# Patient Record
Sex: MALE | Race: WHITE | NOT HISPANIC OR LATINO | Employment: OTHER | URBAN - NONMETROPOLITAN AREA
[De-identification: names, ages, dates, MRNs, and addresses within clinical notes are randomized per-mention and may not be internally consistent; named-entity substitution may affect disease eponyms.]

---

## 2017-03-28 DIAGNOSIS — K21.9 GASTROESOPHAGEAL REFLUX DISEASE, ESOPHAGITIS PRESENCE NOT SPECIFIED: ICD-10-CM

## 2017-09-13 DIAGNOSIS — I10 BENIGN ESSENTIAL HYPERTENSION: ICD-10-CM

## 2017-09-13 RX ORDER — HYDROCHLOROTHIAZIDE 25 MG/1
TABLET ORAL
Qty: 60 TABLET | Refills: 0 | Status: SHIPPED | OUTPATIENT
Start: 2017-09-13 | End: 2017-10-18

## 2017-09-13 NOTE — TELEPHONE ENCOUNTER
Hydrochlorithiazide      Last Written Prescription Date: 10/26/16  Last Fill Quantity: 90, # refills: 3  Last Office Visit with [facility], [facility] or [facility] prescribing provider: 12/9/16  Next 5 appointments (look out 90 days)     Oct 18, 2017  8:20 AM CDT   (Arrive by 8:00 AM)   Office Visit with Santy Agustin,    [facility] ([facility] )    [address] [place] [address]   [phone]                   Potassium   Date Value Ref Range Status   10/26/2016 4.2 3.4 - 5.3 mmol/L Final     Creatinine   Date Value Ref Range Status   10/26/2016 0.94 0.66 - 1.25 mg/dL Final     BP Readings from Last 3 Encounters:   12/09/16 122/80   10/26/16 142/78   10/16/15 167/88

## 2017-10-18 ENCOUNTER — OFFICE VISIT (OUTPATIENT)
Dept: PEDIATRICS | Facility: OTHER | Age: 72
End: 2017-10-18
Attending: INTERNAL MEDICINE
Payer: COMMERCIAL

## 2017-10-18 VITALS
HEART RATE: 55 BPM | DIASTOLIC BLOOD PRESSURE: 78 MMHG | SYSTOLIC BLOOD PRESSURE: 158 MMHG | BODY MASS INDEX: 27.2 KG/M2 | HEIGHT: 70 IN | TEMPERATURE: 96.5 F | WEIGHT: 190 LBS | OXYGEN SATURATION: 98 %

## 2017-10-18 DIAGNOSIS — N52.9 ERECTILE DYSFUNCTION, UNSPECIFIED ERECTILE DYSFUNCTION TYPE: ICD-10-CM

## 2017-10-18 DIAGNOSIS — I10 BENIGN ESSENTIAL HYPERTENSION: Primary | ICD-10-CM

## 2017-10-18 DIAGNOSIS — Z00.00 ROUTINE GENERAL MEDICAL EXAMINATION AT A HEALTH CARE FACILITY: ICD-10-CM

## 2017-10-18 DIAGNOSIS — K21.9 GASTROESOPHAGEAL REFLUX DISEASE, ESOPHAGITIS PRESENCE NOT SPECIFIED: ICD-10-CM

## 2017-10-18 DIAGNOSIS — Z23 NEED FOR PROPHYLACTIC VACCINATION AND INOCULATION AGAINST INFLUENZA: ICD-10-CM

## 2017-10-18 DIAGNOSIS — R97.20 ELEVATED PROSTATE SPECIFIC ANTIGEN (PSA): ICD-10-CM

## 2017-10-18 DIAGNOSIS — M1A.0720 CHRONIC IDIOPATHIC GOUT INVOLVING TOE OF LEFT FOOT WITHOUT TOPHUS: ICD-10-CM

## 2017-10-18 DIAGNOSIS — Z12.11 SPECIAL SCREENING FOR MALIGNANT NEOPLASMS, COLON: ICD-10-CM

## 2017-10-18 PROBLEM — M1A.9XX0 CHRONIC GOUT: Status: ACTIVE | Noted: 2017-10-18

## 2017-10-18 LAB
ALBUMIN SERPL-MCNC: 3.9 G/DL (ref 3.4–5)
ALP SERPL-CCNC: 63 U/L (ref 40–150)
ALT SERPL W P-5'-P-CCNC: 42 U/L (ref 0–70)
ANION GAP SERPL CALCULATED.3IONS-SCNC: 6 MMOL/L (ref 3–14)
AST SERPL W P-5'-P-CCNC: 22 U/L (ref 0–45)
BILIRUB SERPL-MCNC: 0.5 MG/DL (ref 0.2–1.3)
BUN SERPL-MCNC: 23 MG/DL (ref 7–30)
CALCIUM SERPL-MCNC: 9 MG/DL (ref 8.5–10.1)
CHLORIDE SERPL-SCNC: 100 MMOL/L (ref 94–109)
CO2 SERPL-SCNC: 32 MMOL/L (ref 20–32)
CREAT SERPL-MCNC: 0.9 MG/DL (ref 0.66–1.25)
ERYTHROCYTE [DISTWIDTH] IN BLOOD BY AUTOMATED COUNT: 13.2 % (ref 10–15)
GFR SERPL CREATININE-BSD FRML MDRD: 83 ML/MIN/1.7M2
GLUCOSE SERPL-MCNC: 105 MG/DL (ref 70–99)
HCT VFR BLD AUTO: 43.3 % (ref 40–53)
HGB BLD-MCNC: 15.2 G/DL (ref 13.3–17.7)
MCH RBC QN AUTO: 31.8 PG (ref 26.5–33)
MCHC RBC AUTO-ENTMCNC: 35.1 G/DL (ref 31.5–36.5)
MCV RBC AUTO: 91 FL (ref 78–100)
PLATELET # BLD AUTO: 173 10E9/L (ref 150–450)
POTASSIUM SERPL-SCNC: 3.5 MMOL/L (ref 3.4–5.3)
PROT SERPL-MCNC: 7.9 G/DL (ref 6.8–8.8)
PSA SERPL-ACNC: 4.82 UG/L (ref 0–4)
RBC # BLD AUTO: 4.78 10E12/L (ref 4.4–5.9)
SODIUM SERPL-SCNC: 138 MMOL/L (ref 133–144)
TSH SERPL DL<=0.005 MIU/L-ACNC: 1.52 MU/L (ref 0.4–4)
WBC # BLD AUTO: 6.1 10E9/L (ref 4–11)

## 2017-10-18 PROCEDURE — 99000 SPECIMEN HANDLING OFFICE-LAB: CPT | Performed by: INTERNAL MEDICINE

## 2017-10-18 PROCEDURE — 84403 ASSAY OF TOTAL TESTOSTERONE: CPT | Mod: ZL | Performed by: INTERNAL MEDICINE

## 2017-10-18 PROCEDURE — 93010 ELECTROCARDIOGRAM REPORT: CPT | Performed by: INTERNAL MEDICINE

## 2017-10-18 PROCEDURE — G0103 PSA SCREENING: HCPCS | Mod: ZL | Performed by: INTERNAL MEDICINE

## 2017-10-18 PROCEDURE — 99397 PER PM REEVAL EST PAT 65+ YR: CPT | Mod: 25 | Performed by: INTERNAL MEDICINE

## 2017-10-18 PROCEDURE — G0008 ADMIN INFLUENZA VIRUS VAC: HCPCS

## 2017-10-18 PROCEDURE — 90662 IIV NO PRSV INCREASED AG IM: CPT | Performed by: INTERNAL MEDICINE

## 2017-10-18 PROCEDURE — 84270 ASSAY OF SEX HORMONE GLOBUL: CPT | Mod: ZL | Performed by: INTERNAL MEDICINE

## 2017-10-18 PROCEDURE — 84443 ASSAY THYROID STIM HORMONE: CPT | Mod: ZL | Performed by: INTERNAL MEDICINE

## 2017-10-18 PROCEDURE — 84153 ASSAY OF PSA TOTAL: CPT | Performed by: INTERNAL MEDICINE

## 2017-10-18 PROCEDURE — 93005 ELECTROCARDIOGRAM TRACING: CPT

## 2017-10-18 PROCEDURE — 85027 COMPLETE CBC AUTOMATED: CPT | Mod: ZL | Performed by: INTERNAL MEDICINE

## 2017-10-18 PROCEDURE — 80053 COMPREHEN METABOLIC PANEL: CPT | Mod: ZL | Performed by: INTERNAL MEDICINE

## 2017-10-18 PROCEDURE — 36415 COLL VENOUS BLD VENIPUNCTURE: CPT | Mod: ZL | Performed by: INTERNAL MEDICINE

## 2017-10-18 RX ORDER — AMLODIPINE BESYLATE 5 MG/1
5 TABLET ORAL DAILY
Qty: 30 TABLET | Refills: 3 | Status: SHIPPED | OUTPATIENT
Start: 2017-10-18 | End: 2018-01-19

## 2017-10-18 RX ORDER — ATENOLOL 50 MG/1
50 TABLET ORAL DAILY
Qty: 90 TABLET | Refills: 3 | Status: SHIPPED | OUTPATIENT
Start: 2017-10-18 | End: 2018-10-18

## 2017-10-18 RX ORDER — HYDROCHLOROTHIAZIDE 25 MG/1
25 TABLET ORAL DAILY
Qty: 180 TABLET | Refills: 3 | Status: SHIPPED | OUTPATIENT
Start: 2017-10-18 | End: 2018-10-18

## 2017-10-18 RX ORDER — TADALAFIL 20 MG/1
20 TABLET ORAL PRN
Qty: 36 TABLET | Refills: 3 | Status: SHIPPED | OUTPATIENT
Start: 2017-10-18 | End: 2019-03-18

## 2017-10-18 ASSESSMENT — ANXIETY QUESTIONNAIRES
2. NOT BEING ABLE TO STOP OR CONTROL WORRYING: NOT AT ALL
IF YOU CHECKED OFF ANY PROBLEMS ON THIS QUESTIONNAIRE, HOW DIFFICULT HAVE THESE PROBLEMS MADE IT FOR YOU TO DO YOUR WORK, TAKE CARE OF THINGS AT HOME, OR GET ALONG WITH OTHER PEOPLE: NOT DIFFICULT AT ALL
3. WORRYING TOO MUCH ABOUT DIFFERENT THINGS: NOT AT ALL
6. BECOMING EASILY ANNOYED OR IRRITABLE: NOT AT ALL
7. FEELING AFRAID AS IF SOMETHING AWFUL MIGHT HAPPEN: NOT AT ALL
5. BEING SO RESTLESS THAT IT IS HARD TO SIT STILL: NOT AT ALL
1. FEELING NERVOUS, ANXIOUS, OR ON EDGE: NOT AT ALL
4. TROUBLE RELAXING: NOT AT ALL
GAD7 TOTAL SCORE: 0

## 2017-10-18 ASSESSMENT — PATIENT HEALTH QUESTIONNAIRE - PHQ9: SUM OF ALL RESPONSES TO PHQ QUESTIONS 1-9: 0

## 2017-10-18 ASSESSMENT — PAIN SCALES - GENERAL: PAINLEVEL: MILD PAIN (3)

## 2017-10-18 NOTE — NURSING NOTE
"Chief Complaint   Patient presents with     Wellness Visit     prevenative care: Aortic Aneurysm        Initial /78  Pulse 55  Temp 96.5  F (35.8  C) (Tympanic)  Ht 5' 10\" (1.778 m)  Wt 190 lb (86.2 kg)  SpO2 98%  BMI 27.26 kg/m2 Estimated body mass index is 27.26 kg/(m^2) as calculated from the following:    Height as of this encounter: 5' 10\" (1.778 m).    Weight as of this encounter: 190 lb (86.2 kg).  Medication Reconciliation: complete   Suki Garza    "

## 2017-10-18 NOTE — PROGRESS NOTES
SUBJECTIVE:   CC: Rafa Bhatti is an 72 year old male who presents for preventative health visit.     Healthy Habits:    Do you get at least three servings of calcium containing foods daily (dairy, green leafy vegetables, etc.)? yes    Amount of exercise or daily activities, outside of work: Walking 3 days per week.    Problems taking medications regularly No  He reports that his Cialis is not working     Medication side effects: No    Have you had an eye exam in the past two years? Yes, he had an eye exam 6 months ago.    Do you see a dentist twice per year? yes    Do you have sleep apnea, excessive snoring or daytime drowsiness?yes          Hypertension Follow-up      Outpatient blood pressures are being checked at home.  Results are 140s/80.    Low Salt Diet: no added salt    GERD:  He has good control with his current medications of Zantac twice daily      Gout:  His last attack was in his usually left great toe.  He is not on any medications for this.      Today's PHQ-2 Score: PHQ-2 ( 1999 Pfizer) 5/5/2015   Q1: Little interest or pleasure in doing things 0   Q2: Feeling down, depressed or hopeless 0   PHQ-2 Score 0         Abuse: Current or Past(Physical, Sexual or Emotional)- No  Do you feel safe in your environment - Yes  Social History   Substance Use Topics     Smoking status: Never Smoker     Smokeless tobacco: Never Used     Alcohol use Yes      Comment: socially     The patient does not drink >3 drinks per day nor >7 drinks per week.    Last PSA:   PSA   Date Value Ref Range Status   10/26/2016 4.97 (H) 0 - 4 ug/L Final     Comment:     Assay Method:  Chemiluminescence using Siemens Vista analyzer       Reviewed orders with patient. Reviewed health maintenance and updated orders accordingly - Yes  BP Readings from Last 3 Encounters:   10/18/17 158/78   12/09/16 122/80   10/26/16 142/78    Wt Readings from Last 3 Encounters:   10/18/17 190 lb (86.2 kg)   12/09/16 180 lb (81.6 kg)   10/26/16 190 lb  "(86.2 kg)                      Reviewed and updated as needed this visit by clinical staffTobacco  Allergies  Meds  Med Hx  Surg Hx  Fam Hx  Soc Hx        Reviewed and updated as needed this visit by Provider        Past Medical History:   Diagnosis Date     Benign neoplasm of unspecified site 8/1/2012    Dermoid cyst     Calculus of kidney 5/20/2002     Chest pain, unspecified 3/2/2000     Gout, unspecified 8/3/2011     Hyperlipidemia      Unspecified essential hypertension 7/18/2000      Past Surgical History:   Procedure Laterality Date     cardiolyte stress test  2000    chest pain     COLONOSCOPY  1999     fistula in ANO       HEMORRHOIDECTOMY       removal of dermoid cyst of mediastinum       VASECTOMY         ROS:  C: NEGATIVE for fever, chills, change in weight  CONSTITUTIONAL:weight loss due to cutting out snacks  I: NEGATIVE for worrisome rashes, moles or lesions  E: NEGATIVE for vision changes or irritation  ENT: NEGATIVE for ear, mouth and throat problems  R: NEGATIVE for significant cough or SOB  CV: NEGATIVE for chest pain, palpitations or peripheral edema  CV: POSITIVE for palpitations on occasion  GI: NEGATIVE for nausea, abdominal pain, heartburn, or change in bowel habits   male: negative for  dysuria, hematuria, frequency, decreased urinary stream and hesitancy, he does have erectile dysfunction.  M: NEGATIVE for significant arthralgias or myalgia  N: NEGATIVE for weakness, dizziness or paresthesias  E: NEGATIVE for temperature intolerance, skin/hair changes  P: NEGATIVE for changes in mood or affect    OBJECTIVE:   /78  Pulse 55  Temp 96.5  F (35.8  C) (Tympanic)  Ht 5' 10\" (1.778 m)  Wt 190 lb (86.2 kg)  SpO2 98%  BMI 27.26 kg/m2  EXAM:  GENERAL: healthy, alert and no distress  EYES: Eyes grossly normal to inspection, PERRL and conjunctivae and sclerae normal  HENT: ear canals and TM's normal, nose and mouth without ulcers or lesions  NECK: no adenopathy, no asymmetry, " masses, or scars and thyroid normal to palpation  RESP: lungs clear to auscultation - no rales, rhonchi or wheezes  CV: regular rate and rhythm, normal S1 S2, no S3 or S4, no murmur, click or rub, no peripheral edema and peripheral pulses strong  ABDOMEN: soft, nontender, no hepatosplenomegaly, no masses and bowel sounds normal  ABDOMEN: no palpable or pulsatile masses and no bruits heard   (male): normal male genitalia without lesions or urethral discharge, no hernia  RECTAL (male): normal sphincter tone, no rectal masses, prostate normal size, smooth, nontender without nodules or masses  MS: no gross musculoskeletal defects noted, no edema  SKIN: no suspicious lesions or rashes  NEURO: Normal strength and tone, mentation intact and speech normal  NEURO: cranial nerves 3-12 intact  PSYCH: mentation appears normal, affect normal/bright  LYMPH: no cervical, supraclavicular, axillary, or inguinal adenopathy        Labs:  Results for orders placed or performed in visit on 10/18/17   CBC with platelets   Result Value Ref Range    WBC 6.1 4.0 - 11.0 10e9/L    RBC Count 4.78 4.4 - 5.9 10e12/L    Hemoglobin 15.2 13.3 - 17.7 g/dL    Hematocrit 43.3 40.0 - 53.0 %    MCV 91 78 - 100 fl    MCH 31.8 26.5 - 33.0 pg    MCHC 35.1 31.5 - 36.5 g/dL    RDW 13.2 10.0 - 15.0 %    Platelet Count 173 150 - 450 10e9/L   Comprehensive metabolic panel   Result Value Ref Range    Sodium 138 133 - 144 mmol/L    Potassium 3.5 3.4 - 5.3 mmol/L    Chloride 100 94 - 109 mmol/L    Carbon Dioxide 32 20 - 32 mmol/L    Anion Gap 6 3 - 14 mmol/L    Glucose 105 (H) 70 - 99 mg/dL    Urea Nitrogen 23 7 - 30 mg/dL    Creatinine 0.90 0.66 - 1.25 mg/dL    GFR Estimate 83 >60 mL/min/1.7m2    GFR Estimate If Black >90 >60 mL/min/1.7m2    Calcium 9.0 8.5 - 10.1 mg/dL    Bilirubin Total 0.5 0.2 - 1.3 mg/dL    Albumin 3.9 3.4 - 5.0 g/dL    Protein Total 7.9 6.8 - 8.8 g/dL    Alkaline Phosphatase 63 40 - 150 U/L    ALT 42 0 - 70 U/L    AST 22 0 - 45 U/L   TSH    Result Value Ref Range    TSH 1.52 0.40 - 4.00 mU/L   PSA, screen   Result Value Ref Range    PSA 4.82 (H) 0 - 4 ug/L   Testosterone Free and Total   Result Value Ref Range    Testosterone Total 248 240 - 950 ng/dL    Sex Hormone Binding Globulin 27 11 - 80 nmol/L    Free Testosterone Calculated 5.36 4.7 - 24.4 ng/dL          EKG Interpretation:      Interpreted by Santy Agustin DO  Time reviewed:1200 pm  Symptoms at time of EKG: None   Rhythm: Normal sinus  and Sinus arrhythmia  Rate: Normal  Axis: Normal  Ectopy: None  Conduction: Normal  ST Segments/ T Waves: No ST-T wave changes and No acute ischemic changes  Q Waves: None  Comparison to prior: No old EKG available    Clinical Impression: normal EKG          ASSESSMENT/PLAN:   (Z00.00) Routine general medical examination at a health care facility  Comment: He is up to date on his prostate cancer screening and needs a colonoscopy.  He is up to date on his immunizations.  Plan:   Coloscopy to be done within 12 months time    (I10) Benign essential hypertension  (primary encounter diagnosis)  Comment: Not at  goal.  He has normal renal function.  Plan:  He will add amLODIPine (NORVASC) 5 MG tablet, and continue atenolol (TENORMIN) 50 MG tablet, and         hydrochlorothiazide (HYDRODIURIL) 25 MG tablet    (M1A.0720) Chronic idiopathic gout involving toe of left foot without tophus  Comment: Stable without medications.  Plan:   Follow clinically.    (K21.9) Gastroesophageal reflux disease, esophagitis presence not specified  Comment: Stable on ranitidine.    Plan:   Continue ranitidine (ZANTAC) 150 MG tablet twice daily for 6 months, then discontinue.    (N52.9) Erectile dysfunction, unspecified erectile dysfunction type  Comment: His thyroid is normal.  His testosterone levels are normal.  Plan:   Increase  tadalafil  (CIALIS)from 10 MG to 20 MG tablet    (R97.20) Elevated prostate specific antigen (PSA)  Comment: His prostate is slightly  "elevated.  Plan:   Repeat in 4-6 months.    (Z12.11) Special screening for malignant neoplasms, colon  Comment: Patient last colonoscopy was pver 15 years ago.  Plan:   GENERAL SURG ADULT REFERRAL      (Z23) Need for prophylactic vaccination and inoculation against influenza  Comment:   Plan: FLU VACCINE, INCREASED ANTIGEN, PRESV FREE        COUNSELING:  Reviewed preventive health counseling, as reflected in patient instructions       Regular exercise       Vision screening       Immunizations    Vaccinated for: Influenza           Aspirin Prophylaxsis       Colon cancer screening       Prostate cancer screening             reports that he has never smoked. He has never used smokeless tobacco.      Estimated body mass index is 27.26 kg/(m^2) as calculated from the following:    Height as of this encounter: 5' 10\" (1.778 m).    Weight as of this encounter: 190 lb (86.2 kg).         Counseling Resources:  ATP IV Guidelines  Pooled Cohorts Equation Calculator  FRAX Risk Assessment  ICSI Preventive Guidelines  Dietary Guidelines for Americans, 2010  USDA's MyPlate  ASA Prophylaxis  Lung CA Screening    Santyyojana Agustin DO, DO  [facility] [place]  "

## 2017-10-18 NOTE — PROGRESS NOTES
Injectable Influenza Immunization Documentation    1.  Is the person to be vaccinated sick today?   No    2. Does the person to be vaccinated have an allergy to a component   of the vaccine?   No    3. Has the person to be vaccinated ever had a serious reaction   to influenza vaccine in the past?   No    4. Has the person to be vaccinated ever had Guillain-Barré syndrome?   No    Form completed by Suki Garza

## 2017-10-19 ASSESSMENT — ANXIETY QUESTIONNAIRES: GAD7 TOTAL SCORE: 0

## 2017-10-20 LAB
SHBG SERPL-SCNC: 27 NMOL/L (ref 11–80)
TESTOST FREE SERPL-MCNC: 5.36 NG/DL (ref 4.7–24.4)
TESTOST SERPL-MCNC: 248 NG/DL (ref 240–950)

## 2017-10-31 ENCOUNTER — TELEPHONE (OUTPATIENT)
Dept: SURGERY | Facility: OTHER | Age: 72
End: 2017-10-31

## 2017-10-31 NOTE — TELEPHONE ENCOUNTER
Patient contacted via telephone regarding a referral sent by Dr archuleta for a meet and greet colonoscopy.  Patient has been cleared by the surgery education department to have a meet and greet colonoscopy.  Message left with the direct line to the [facility] surgery department nurses to contact regarding setting up this procedure.

## 2017-12-01 ENCOUNTER — TELEPHONE (OUTPATIENT)
Dept: SURGERY | Facility: OTHER | Age: 72
End: 2017-12-01

## 2017-12-04 ENCOUNTER — HOSPITAL ENCOUNTER (EMERGENCY)
Facility: HOSPITAL | Age: 72
Discharge: HOME | End: 2017-12-04
Attending: PHYSICIAN ASSISTANT | Admitting: PHYSICIAN ASSISTANT
Payer: COMMERCIAL

## 2017-12-04 VITALS
TEMPERATURE: 96.3 F | RESPIRATION RATE: 18 BRPM | OXYGEN SATURATION: 95 % | DIASTOLIC BLOOD PRESSURE: 85 MMHG | SYSTOLIC BLOOD PRESSURE: 133 MMHG

## 2017-12-04 DIAGNOSIS — M10.9 ACUTE GOUTY ARTHRITIS: ICD-10-CM

## 2017-12-04 PROCEDURE — 99213 OFFICE O/P EST LOW 20 MIN: CPT

## 2017-12-04 PROCEDURE — 99213 OFFICE O/P EST LOW 20 MIN: CPT | Performed by: PHYSICIAN ASSISTANT

## 2017-12-04 RX ORDER — COLCHICINE 0.6 MG/1
TABLET ORAL
Qty: 20 TABLET | Refills: 0 | Status: SHIPPED | OUTPATIENT
Start: 2017-12-04 | End: 2018-01-12

## 2017-12-04 RX ORDER — INDOMETHACIN 50 MG/1
CAPSULE ORAL
Qty: 20 CAPSULE | Refills: 0 | Status: SHIPPED | OUTPATIENT
Start: 2017-12-04 | End: 2018-01-12

## 2017-12-04 RX ORDER — INDOMETHACIN 75 MG/1
CAPSULE, EXTENDED RELEASE ORAL
Qty: 10 CAPSULE | Refills: 1 | Status: SHIPPED | OUTPATIENT
Start: 2017-12-04 | End: 2017-12-04

## 2017-12-04 ASSESSMENT — ENCOUNTER SYMPTOMS
CONSTITUTIONAL NEGATIVE: 1
PSYCHIATRIC NEGATIVE: 1
CARDIOVASCULAR NEGATIVE: 1
ARTHRALGIAS: 1

## 2017-12-04 NOTE — ED AVS SNAPSHOT
[facility]    [address]    [place] [address]    Phone:  [phone]                                       Rafa Bhatti   MRN: [id number]    Department:  [facility]   Date of Visit:  12/4/2017           Patient Information     Date Of Birth          1945        Your diagnoses for this visit were:     Acute gouty arthritis        You were seen by Ann-Marie Chatman PA.      Follow-up Information     Follow up with Santy Agustin DO.    Specialties:  Internal Medicine, Pediatrics    Why:  If symptoms worsen    Contact information:    [facility] [place]  [address]  [place] [address] [phone]          Follow up with [facility].    Specialty:  EMERGENCY MEDICINE    Why:  If further concerns develop    Contact information:    [address] [place] [address] [phone]    Additional information:    From [facility]: Take [address]. Turn left at [address]. Turn left at the first stoplight on [address]. At the first stop sign, take a right onto [address]. Take a left into the parking lot and continue through until you reach the North enterance of the building.       From [place]: Take [address]. Take the [address] ramp towards [place]. Turn left onto [address]. Take a slight right onto [address]. Turn left at the first stoplight on [address]. At the first stop sign, take a right onto [address]. Take a left into the parking lot and continue through until you reach the North enterance of the building.       From [place]: Take [address]. Take a right at [address]. At the first stop sign, take a right onto [address]. Take a left into the parking lot and continue through until you reach the North enterance of the building.       Discharge References/Attachments     GOUT (ENGLISH)    GOUT ATTACKS, TREATING (ENGLISH)    GOUT  DIET (ENGLISH)         Review of your medicines      START taking        Dose / Directions Last dose taken    colchicine 0.6 MG tablet   Quantity:  20 tablet        Take 2 tablets at the onset of gout pain. May take one tablet again in one hour. Max 4 tabs in 24 hrs.   Refills:  0        indomethacin 75 MG CR capsule   Commonly known as:  INDOCIN SR   Quantity:  10 capsule        Take one tablet twice a day during gout pain   Refills:  1          Our records show that you are taking the medicines listed below. If these are incorrect, please call your family doctor or clinic.        Dose / Directions Last dose taken    amLODIPine 5 MG tablet   Commonly known as:  NORVASC   Dose:  5 mg   Quantity:  30 tablet        Take 1 tablet (5 mg) by mouth daily   Refills:  3        aspirin 81 MG tablet   Dose:  81 mg        Take 81 mg by mouth daily   Refills:  0        atenolol 50 MG tablet   Commonly known as:  TENORMIN   Dose:  50 mg   Quantity:  90 tablet        Take 1 tablet (50 mg) by mouth daily   Refills:  3        hydrochlorothiazide 25 MG tablet   Commonly known as:  HYDRODIURIL   Dose:  25 mg   Quantity:  180 tablet        Take 1 tablet (25 mg) by mouth daily   Refills:  3        ibuprofen 800 MG tablet   Commonly known as:  ADVIL/MOTRIN   Dose:  800 mg   Quantity:  30 tablet        Take 1 tablet (800 mg) by mouth every 8 hours as needed for moderate pain   Refills:  0        NAPROXEN SODIUM PO   Dose:  275 mg   Indication:  as needed        Take 275 mg by mouth once   Refills:  0        ranitidine 150 MG tablet   Commonly known as:  ZANTAC   Quantity:  180 tablet        TAKE 1 TABLET BY MOUTH TWIC E A DAY *NEEDS TO BE SEEN F OR FUTURE FILLS*   Refills:  1        tadalafil 20 MG tablet   Commonly known as:  CIALIS   Dose:  20 mg   Quantity:  36 tablet        Take 1 tablet (20 mg) by mouth as needed   Refills:  3        TUMS PO        tums 200 mg calcium (500 mg) chewable Take 1 tablet by mouth prn   Refills:  0         "        Prescriptions were sent or printed at these locations (2 Prescriptions)                   [id number] - [place], [place] - [address]   [address] [place] [address]    Telephone:  [phone]   Fax:  [phone]   Hours:                  E-Prescribed (2 of 2)         colchicine 0.6 MG tablet               indomethacin (INDOCIN SR) 75 MG CR capsule                Orders Needing Specimen Collection     None      Pending Results     No orders found from 2017 to 2017.            Pending Culture Results     No orders found from 2017 to 2017.            Thank you for choosing [facility]       Thank you for choosing [facility] for your care. Our goal is always to provide you with excellent care. Hearing back from our patients is one way we can continue to improve our services. Please take a few minutes to complete the written survey that you may receive in the mail after you visit with us. Thank you!        [employer] Information     [employer] lets you send messages to your doctor, view your test results, renew your prescriptions, schedule appointments and more. To sign up, go to [email or web address] . Click on \"Log in\" on the left side of the screen, which will take you to the Welcome page. Then click on \"Sign up Now\" on the right side of the page.     You will be asked to enter the access code listed below, as well as some personal information. Please follow the directions to create your username and password.     Your access code is: [email or web address]  Expires: 2018  8:42 AM     Your access code will  in 90 days. If you need help or a new code, please call your [facility] clinic or [phone].        Care EveryWhere ID     This is your Care EveryWhere ID. This could be used by other organizations to access your [facility] medical records  [id number]        Equal Access to Services     LIZZY ALFONSO AH: Karin Benavidez, diaz lujan, natasha betts " greg benitez ah. So [facility] [place] [phone].    ATENCIÓN: Si habla español, tiene a bae disposición servicios gratuitos de asistencia lingüística. Llame al [phone].    We comply with applicable federal civil rights laws and [place] laws. We do not discriminate on the basis of race, color, national origin, age, disability, sex, sexual orientation, or gender identity.            After Visit Summary       This is your record. Keep this with you and show to your community pharmacist(s) and doctor(s) at your next visit.

## 2017-12-04 NOTE — ED AVS SNAPSHOT
[facility]    [address] [place] [address]    Phone:  [phone]                                       Rafa Bhatti   MRN: [id number]    Department:  [facility]   Date of Visit:  12/4/2017           After Visit Summary Signature Page     I have received my discharge instructions, and my questions have been answered. I have discussed any challenges I see with this plan with the nurse or doctor.    ..........................................................................................................................................  Patient/Patient Representative Signature      ..........................................................................................................................................  Patient Representative Print Name and Relationship to Patient    ..................................................               ................................................  Date                                            Time    ..........................................................................................................................................  Reviewed by Signature/Title    ...................................................              ..............................................  Date                                                            Time

## 2017-12-05 NOTE — ED PROVIDER NOTES
History     Chief Complaint   Patient presents with     Foot Pain     left foot pain; history of gout     The history is provided by the patient. No  was used.     Rafa Bhatti is a 72 year old male who has 3 days of left first toe redness/swelling and pain. Pt states it feels like the last time he had gout. No fever. Denies any direct trauma.    Problem List:    Patient Active Problem List    Diagnosis Date Noted     Chronic gout 10/18/2017     Priority: Medium     Esophageal reflux 10/18/2017     Priority: Medium     ACP (advance care planning) 10/26/2016     Priority: Medium     Advance Care Planning 10/26/2016: ACP Review of Chart / Resources Provided:  Reviewed chart for advance care plan.  Rafa Bhatti has no plan or code status on file. Discussed available resources and provided with information. Confirmed code status reflects current choices pending further ACP discussions.  Confirmed/documented legally designated decision makers.  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 Provider to review          Past Medical History:    Past Medical History:   Diagnosis Date     Benign neoplasm of unspecified site 8/1/2012     Calculus of kidney 5/20/2002     Chest pain, unspecified 3/2/2000     Gout, unspecified 8/3/2011     Hyperlipidemia      Unspecified essential hypertension 7/18/2000       Past Surgical History:    Past Surgical History:   Procedure Laterality Date     cardiolyte stress test  2000    chest pain      COLONOSCOPY  1999     fistula in ANO       HEMORRHOIDECTOMY       removal of dermoid cyst of mediastinum       VASECTOMY         Family History:    Family History   Problem Relation Age of Onset     C.A.D. Father 80     C.A.D. Mother      Hypertension Mother      C.A.D. Brother      C.A.D. Other      family hx       Social History:  Marital Status:   [2]  Social History   Substance Use Topics     Smoking status: Never Smoker     Smokeless tobacco: Never Used     Alcohol use Yes      Comment: socially        Medications:      colchicine 0.6 MG tablet   indomethacin (INDOCIN) 50 MG capsule   NAPROXEN SODIUM PO   amLODIPine (NORVASC) 5 MG tablet   atenolol (TENORMIN) 50 MG tablet   hydrochlorothiazide (HYDRODIURIL) 25 MG tablet   ranitidine (ZANTAC) 150 MG tablet   tadalafil (CIALIS) 20 MG tablet   ibuprofen (ADVIL,MOTRIN) 800 MG tablet   aspirin 81 MG tablet   Calcium Carbonate Antacid (TUMS PO)   [DISCONTINUED] indomethacin (INDOCIN SR) 75 MG CR capsule         Review of Systems   Constitutional: Negative.    Cardiovascular: Negative.    Musculoskeletal: Positive for arthralgias and gait problem.   Psychiatric/Behavioral: Negative.        Physical Exam   BP: 133/85  Heart Rate: 80  Temp: 96.3  F (35.7  C)  Resp: 18  SpO2: 95 %      Physical Exam   Constitutional: He is oriented to person, place, and time. He appears well-developed and well-nourished. No distress.   Cardiovascular: Normal rate.    Pulmonary/Chest: Effort normal.   Musculoskeletal:   Left first toe: moderate edema/erythema to the MTP joint area. Moderate TTP. 3/5 strength due to pain. M/n/v intact.   Neurological: He is alert and oriented to person, place, and time.   Skin: He is not diaphoretic.   Psychiatric: He has a normal mood and affect.   Nursing note and vitals reviewed.      ED Course     ED Course     Procedures        Assessments & Plan (with Medical Decision Making)     I have reviewed the nursing notes.    I have reviewed the  findings, diagnosis, plan and need for follow up with the patient.      Discharge Medication List as of 12/4/2017  9:28 AM      START taking these medications    Details   colchicine 0.6 MG tablet Take 2 tablets at the onset of gout pain. May take one tablet again in one hour. Max 4 tabs in 24 hrs., Disp-20 tablet, R-0, E-Prescribe      indomethacin (INDOCIN SR) 75 MG CR capsule Take one tablet twice a day during gout pain, Disp-10 capsule, R-1, E-Prescribe             Final diagnoses:   Acute gouty arthritis       Increase fluids/cherries, decrease caffeine/ETOH  Patient verbally educated and given appropriate education sheets for the diagnoses and has no questions.  Take medications as directed.   Follow up with your Primary Care provider if symptoms increase or if concerns develop, return to the ER  Ann-Marie Chatman Certified  Physician Assistant  12/4/2017  7:52 PM  URGENT CARE CLINIC      12/4/2017   [facility]     Ann-Marie Chatman PA  12/04/17 1953

## 2017-12-26 ENCOUNTER — TELEPHONE (OUTPATIENT)
Dept: SURGERY | Facility: OTHER | Age: 72
End: 2017-12-26

## 2017-12-26 NOTE — TELEPHONE ENCOUNTER
Patient contacted via telephone regarding a referral sent by Dr Agustin for a meet and greet colonoscopy.  Patient has been cleared by the surgery education department to have a meet and greet colonoscopy.  Message left with the direct line to the [facility] surgery department nurses to contact regarding setting up this procedure.  3rd message left for patient so will send his a letter. Almaz Rincon

## 2017-12-28 ENCOUNTER — TELEPHONE (OUTPATIENT)
Dept: PEDIATRICS | Facility: OTHER | Age: 72
End: 2017-12-28

## 2017-12-28 NOTE — TELEPHONE ENCOUNTER
Patient called and stated he was seen in urgent care on 12/4/17 for a gout attack.  He said that the medication that was prescribed to him has helped ease the pain a little bit but his problem has still not cleared up.  He was wondering if you could possibly prescribe him another medication or would you need to see him in the office.  Please advise and I will call the pt back to let him know.  Thank you

## 2018-01-04 NOTE — TELEPHONE ENCOUNTER
Patient is calling back in regards to medication and gout concern. Patient has not received call back and would like call. If you can prescribe something else, please send to Ron Rincon in [place].

## 2018-01-12 ENCOUNTER — OFFICE VISIT (OUTPATIENT)
Dept: PEDIATRICS | Facility: OTHER | Age: 73
End: 2018-01-12
Attending: INTERNAL MEDICINE
Payer: COMMERCIAL

## 2018-01-12 VITALS
OXYGEN SATURATION: 97 % | HEART RATE: 61 BPM | WEIGHT: 190 LBS | BODY MASS INDEX: 27.2 KG/M2 | RESPIRATION RATE: 20 BRPM | SYSTOLIC BLOOD PRESSURE: 130 MMHG | TEMPERATURE: 98 F | HEIGHT: 70 IN | DIASTOLIC BLOOD PRESSURE: 84 MMHG

## 2018-01-12 DIAGNOSIS — M1A.0720 CHRONIC IDIOPATHIC GOUT INVOLVING TOE OF LEFT FOOT WITHOUT TOPHUS: Primary | ICD-10-CM

## 2018-01-12 LAB — URATE SERPL-MCNC: 8.7 MG/DL (ref 3.5–7.2)

## 2018-01-12 PROCEDURE — 84550 ASSAY OF BLOOD/URIC ACID: CPT | Mod: ZL | Performed by: INTERNAL MEDICINE

## 2018-01-12 PROCEDURE — 36415 COLL VENOUS BLD VENIPUNCTURE: CPT | Mod: ZL | Performed by: INTERNAL MEDICINE

## 2018-01-12 PROCEDURE — G0463 HOSPITAL OUTPT CLINIC VISIT: HCPCS

## 2018-01-12 PROCEDURE — 99213 OFFICE O/P EST LOW 20 MIN: CPT | Performed by: INTERNAL MEDICINE

## 2018-01-12 RX ORDER — ALLOPURINOL 100 MG/1
100 TABLET ORAL 2 TIMES DAILY
Qty: 90 TABLET | Refills: 1 | Status: SHIPPED | OUTPATIENT
Start: 2018-01-12 | End: 2018-04-25

## 2018-01-12 RX ORDER — COLCHICINE 0.6 MG/1
TABLET ORAL
Qty: 20 TABLET | Refills: 0 | Status: SHIPPED | OUTPATIENT
Start: 2018-01-12 | End: 2018-10-09

## 2018-01-12 RX ORDER — INDOMETHACIN 50 MG/1
CAPSULE ORAL
Qty: 20 CAPSULE | Refills: 0 | Status: SHIPPED | OUTPATIENT
Start: 2018-01-12 | End: 2018-04-11 | Stop reason: SINTOL

## 2018-01-12 ASSESSMENT — ENCOUNTER SYMPTOMS
DYSURIA: 0
HEMATURIA: 0
BLOOD IN STOOL: 0
DIZZINESS: 0
SHORTNESS OF BREATH: 0
WHEEZING: 0
CONSTIPATION: 0
HEADACHES: 0
ABDOMINAL PAIN: 0
NAUSEA: 0
FEVER: 0
PALPITATIONS: 0
COUGH: 0
SORE THROAT: 0
CHILLS: 0
VOMITING: 0
DIARRHEA: 0

## 2018-01-12 ASSESSMENT — PAIN SCALES - GENERAL: PAINLEVEL: NO PAIN (1)

## 2018-01-12 NOTE — PROGRESS NOTES
"HPI  Patient is a 71 yo male who presents for his gout.  He has had two gout attacks in the past 8 weeks.   He has pain in his left great toe which is now resolving.      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   HENT: Negative for congestion and sore throat.    Respiratory: Negative for cough, shortness of breath and wheezing.    Cardiovascular: Negative for chest pain, palpitations and leg swelling.   Gastrointestinal: Negative for abdominal pain, blood in stool, constipation, diarrhea, nausea and vomiting.   Genitourinary: Negative for dysuria and hematuria.   Musculoskeletal: Positive for joint pain.   Neurological: Negative for dizziness and headaches.       /84 (BP Location: Right arm, Patient Position: Chair, Cuff Size: Adult Regular)  Pulse 61  Temp 98  F (36.7  C) (Tympanic)  Resp 20  Ht 5' 10\" (1.778 m)  Wt 190 lb (86.2 kg)  SpO2 97%  BMI 27.26 kg/m2    Physical Exam   Constitutional: He is oriented to person, place, and time and well-developed, well-nourished, and in no distress. No distress.   Eyes: No scleral icterus.   Cardiovascular: Normal rate, regular rhythm, normal heart sounds and intact distal pulses.    No murmur heard.  Pulses:       Radial pulses are 2+ on the right side, and 2+ on the left side.        Dorsalis pedis pulses are 2+ on the left side.        Posterior tibial pulses are 2+ on the left side.   Pulmonary/Chest: Effort normal and breath sounds " normal. He has no wheezes. He has no rales.   Musculoskeletal: He exhibits no edema.   Neurological: He is alert and oriented to person, place, and time.   Skin: No rash noted.       Labs:  Recent Labs   Lab Test  10/18/17   0951  10/26/16   1152   NA  138  138   POTASSIUM  3.5  4.2   CHLORIDE  100  104   CO2  32  26   ANIONGAP  6  8   GLC  105*  95   BUN  23  16   CR  0.90  0.94   HUY  9.0  8.8     Results for orders placed or performed in visit on 01/12/18   Uric acid   Result Value Ref Range    Uric Acid 8.7 (H) 3.5 - 7.2 mg/dL           Imaging:  NA      ASSESSMENT /PLAN:  (M1A.0720) Chronic idiopathic gout involving toe of left foot without tophus  (primary encounter diagnosis)  Comment: His uric acid is elevated.  This is his second attack in 2 months.  Plan:  Start allopurinol (ZYLOPRIM) 100 MG tablet two time daily.        colchicine 0.6 MG tablet twice on the day of onset of a gouty attack and  indomethacin 50 mg BID for pain due to gout.          Follow up with Provider - 6 months for HTN        Santy Agustin DO

## 2018-01-12 NOTE — NURSING NOTE
"Chief Complaint   Patient presents with     Er F/u     Pt was seen in the [facility] on 12/4 for Gout        Initial /84 (BP Location: Right arm, Patient Position: Chair, Cuff Size: Adult Regular)  Pulse 61  Temp 98  F (36.7  C) (Tympanic)  Resp 20  Ht 5' 10\" (1.778 m)  Wt 190 lb (86.2 kg)  SpO2 97%  BMI 27.26 kg/m2 Estimated body mass index is 27.26 kg/(m^2) as calculated from the following:    Height as of this encounter: 5' 10\" (1.778 m).    Weight as of this encounter: 190 lb (86.2 kg).  Medication Reconciliation: akbar Dalton      "

## 2018-01-12 NOTE — MR AVS SNAPSHOT
"              After Visit Summary   2018    Rafa Bhatti    MRN: [id number]           Patient Information     Date Of Birth          1945        Visit Information        Provider Department      2018 2:00 PM Santy Agustin,  [facility] [place]        Today's Diagnoses     Chronic idiopathic gout involving toe of left foot without tophus    -  1       Follow-ups after your visit        Who to contact     If you have questions or need follow up information about today's clinic visit or your schedule please contact [facility] directly at [phone].  Normal or non-critical lab and imaging results will be communicated to you by Cl[employer], letter or phone within 4 business days after the clinic has received the results. If you do not hear from us within 7 days, please contact the clinic through [employer] or phone. If you have a critical or abnormal lab result, we will notify you by phone as soon as possible.  Submit refill requests through [employer] or call your pharmacy and they will forward the refill request to us. Please allow 3 business days for your refill to be completed.          Additional Information About Your Visit        MyChart Information     [employer] lets you send messages to your doctor, view your test results, renew your prescriptions, schedule appointments and more. To sign up, go to [email or web address] . Click on \"Log in\" on the left side of the screen, which will take you to the Welcome page. Then click on \"Sign up Now\" on the right side of the page.     You will be asked to enter the access code listed below, as well as some personal information. Please follow the directions to create your username and password.     Your access code is: [email or web address]  Expires: 2018  8:42 AM     Your access code will  in 90 days. If you need help or a new code, please call your [facility] or [phone].        Care EveryWhere ID     This is your Care " "EveryWhere ID. This could be used by other organizations to access your [place] medical records  [id number]        Your Vitals Were     Pulse Temperature Respirations Height Pulse Oximetry BMI (Body Mass Index)    61 98  F (36.7  C) (Tympanic) 20 5' 10\" (1.778 m) 97% 27.26 kg/m2       Blood Pressure from Last 3 Encounters:   01/12/18 130/84   12/04/17 133/85   10/18/17 158/78    Weight from Last 3 Encounters:   01/12/18 190 lb (86.2 kg)   10/18/17 190 lb (86.2 kg)   12/09/16 180 lb (81.6 kg)              We Performed the Following     Uric acid          Today's Medication Changes          These changes are accurate as of: 1/12/18  2:26 PM.  If you have any questions, ask your nurse or doctor.               Start taking these medicines.        Dose/Directions    allopurinol 100 MG tablet   Commonly known as:  ZYLOPRIM   Used for:  Chronic idiopathic gout involving toe of left foot without tophus   Started by:  Santy Agustin DO        Dose:  100 mg   Take 1 tablet (100 mg) by mouth 2 times daily   Quantity:  90 tablet   Refills:  1            Where to get your medicines      These medications were sent to [id number] - [place], [place] - [address]  [facility] [facility] [place] [address]     Phone:  [phone]     allopurinol 100 MG tablet    colchicine 0.6 MG tablet    indomethacin 50 MG capsule                Primary Care Provider Office Phone # Fax #    Santy Agustin [phone]       [facility] [address]  [place] [address]        Equal Access to Services     [facility] NATY AH: Hadkesha dunn Sonikole, waaxda luqadaha, qaybta kaalmada emmanuel, greg noguera. So [facility] [phone].    ATENCIÓN: Si habla español, tiene a bae disposición servicios gratuitos de asistencia lingüística. Llame al [phone].    We comply with applicable federal civil rights laws and [place] laws. We do not discriminate on the " basis of race, color, national origin, age, disability, sex, sexual orientation, or gender identity.            Thank you!     Thank you for choosing [facility]  for your care. Our goal is always to provide you with excellent care. Hearing back from our patients is one way we can continue to improve our services. Please take a few minutes to complete the written survey that you may receive in the mail after your visit with us. Thank you!             Your Updated Medication List - Protect others around you: Learn how to safely use, store and throw away your medicines at www.disposemymeds.org.          This list is accurate as of: 1/12/18  2:26 PM.  Always use your most recent med list.                   Brand Name Dispense Instructions for use Diagnosis    allopurinol 100 MG tablet    ZYLOPRIM    90 tablet    Take 1 tablet (100 mg) by mouth 2 times daily    Chronic idiopathic gout involving toe of left foot without tophus       amLODIPine 5 MG tablet    NORVASC    30 tablet    Take 1 tablet (5 mg) by mouth daily    Benign essential hypertension       aspirin 81 MG tablet      Take 81 mg by mouth daily        atenolol 50 MG tablet    TENORMIN    90 tablet    Take 1 tablet (50 mg) by mouth daily    Benign essential hypertension       colchicine 0.6 MG tablet     20 tablet    Take 2 tablets at the onset of gout pain. May take one tablet again in one hour. Max 4 tabs in 24 hrs.    Chronic idiopathic gout involving toe of left foot without tophus       hydrochlorothiazide 25 MG tablet    HYDRODIURIL    180 tablet    Take 1 tablet (25 mg) by mouth daily    Benign essential hypertension       ibuprofen 800 MG tablet    ADVIL/MOTRIN    30 tablet    Take 1 tablet (800 mg) by mouth every 8 hours as needed for moderate pain    Right-sided low back pain without sciatica       indomethacin 50 MG capsule    INDOCIN    20 capsule    Take 1 capsule twice a day as needed for gout pain    Chronic idiopathic gout  involving toe of left foot without tophus       NAPROXEN SODIUM PO      Take 275 mg by mouth once        ranitidine 150 MG tablet    ZANTAC    180 tablet    TAKE 1 TABLET BY MOUTH TWIC E A DAY *NEEDS TO BE SEEN F OR FUTURE FILLS*    Gastroesophageal reflux disease, esophagitis presence not specified       tadalafil 20 MG tablet    CIALIS    36 tablet    Take 1 tablet (20 mg) by mouth as needed    Erectile dysfunction, unspecified erectile dysfunction type       TUMS PO      tums 200 mg calcium (500 mg) chewable Take 1 tablet by mouth prn

## 2018-01-15 ENCOUNTER — TELEPHONE (OUTPATIENT)
Dept: PEDIATRICS | Facility: OTHER | Age: 73
End: 2018-01-15

## 2018-01-15 NOTE — TELEPHONE ENCOUNTER
Received PA request from Ron Rincon for Indomethacin on 01/15/2018. Patient has [employer]. Submitted to [facility] waiting for response. Received an APPROVAL on 01/15/18 from [employer]. Effective dates 12/30/2017 through 12/31/2018. Forms scanned to [facility].

## 2018-01-19 DIAGNOSIS — I10 BENIGN ESSENTIAL HYPERTENSION: ICD-10-CM

## 2018-01-19 RX ORDER — AMLODIPINE BESYLATE 5 MG/1
TABLET ORAL
Qty: 30 TABLET | Refills: 5 | Status: SHIPPED | OUTPATIENT
Start: 2018-01-19 | End: 2018-07-20

## 2018-03-21 DIAGNOSIS — K21.9 GASTROESOPHAGEAL REFLUX DISEASE, ESOPHAGITIS PRESENCE NOT SPECIFIED: ICD-10-CM

## 2018-03-22 NOTE — TELEPHONE ENCOUNTER
Zantac  Last Written Prescription Date: 10/18/17  Last Fill Quantity: 180 # of Refills: 1  Last Office Visit: 1/12/18

## 2018-04-08 ENCOUNTER — APPOINTMENT (OUTPATIENT)
Dept: GENERAL RADIOLOGY | Facility: HOSPITAL | Age: 73
End: 2018-04-08
Attending: FAMILY MEDICINE
Payer: COMMERCIAL

## 2018-04-08 ENCOUNTER — HOSPITAL ENCOUNTER (EMERGENCY)
Facility: HOSPITAL | Age: 73
Discharge: HOME | End: 2018-04-08
Attending: INTERNAL MEDICINE | Admitting: INTERNAL MEDICINE
Payer: COMMERCIAL

## 2018-04-08 VITALS
DIASTOLIC BLOOD PRESSURE: 87 MMHG | HEIGHT: 70 IN | OXYGEN SATURATION: 96 % | TEMPERATURE: 98 F | RESPIRATION RATE: 17 BRPM | WEIGHT: 185 LBS | BODY MASS INDEX: 26.48 KG/M2 | SYSTOLIC BLOOD PRESSURE: 143 MMHG

## 2018-04-08 DIAGNOSIS — K21.9 GASTROESOPHAGEAL REFLUX DISEASE WITHOUT ESOPHAGITIS: ICD-10-CM

## 2018-04-08 LAB
ALBUMIN SERPL-MCNC: 4.2 G/DL (ref 3.4–5)
ALP SERPL-CCNC: 67 U/L (ref 40–150)
ALT SERPL W P-5'-P-CCNC: 214 U/L (ref 0–70)
ANION GAP SERPL CALCULATED.3IONS-SCNC: 10 MMOL/L (ref 3–14)
AST SERPL W P-5'-P-CCNC: 252 U/L (ref 0–45)
BASOPHILS # BLD AUTO: 0 10E9/L (ref 0–0.2)
BASOPHILS NFR BLD AUTO: 0.4 %
BILIRUB DIRECT SERPL-MCNC: 0.5 MG/DL (ref 0–0.2)
BILIRUB SERPL-MCNC: 1 MG/DL (ref 0.2–1.3)
BUN SERPL-MCNC: 21 MG/DL (ref 7–30)
CALCIUM SERPL-MCNC: 9.8 MG/DL (ref 8.5–10.1)
CHLORIDE SERPL-SCNC: 98 MMOL/L (ref 94–109)
CO2 SERPL-SCNC: 30 MMOL/L (ref 20–32)
CREAT SERPL-MCNC: 0.83 MG/DL (ref 0.66–1.25)
DIFFERENTIAL METHOD BLD: NORMAL
EOSINOPHIL # BLD AUTO: 0 10E9/L (ref 0–0.7)
EOSINOPHIL NFR BLD AUTO: 0 %
ERYTHROCYTE [DISTWIDTH] IN BLOOD BY AUTOMATED COUNT: 12.9 % (ref 10–15)
GFR SERPL CREATININE-BSD FRML MDRD: >90 ML/MIN/1.7M2
GLUCOSE SERPL-MCNC: 236 MG/DL (ref 70–99)
HCT VFR BLD AUTO: 46.1 % (ref 40–53)
HGB BLD-MCNC: 16.1 G/DL (ref 13.3–17.7)
IMM GRANULOCYTES # BLD: 0 10E9/L (ref 0–0.4)
IMM GRANULOCYTES NFR BLD: 0.2 %
LIPASE SERPL-CCNC: 174 U/L (ref 73–393)
LYMPHOCYTES # BLD AUTO: 1.9 10E9/L (ref 0.8–5.3)
LYMPHOCYTES NFR BLD AUTO: 23.3 %
MCH RBC QN AUTO: 31.4 PG (ref 26.5–33)
MCHC RBC AUTO-ENTMCNC: 34.9 G/DL (ref 31.5–36.5)
MCV RBC AUTO: 90 FL (ref 78–100)
MONOCYTES # BLD AUTO: 0.7 10E9/L (ref 0–1.3)
MONOCYTES NFR BLD AUTO: 8.9 %
NEUTROPHILS # BLD AUTO: 5.6 10E9/L (ref 1.6–8.3)
NEUTROPHILS NFR BLD AUTO: 67.2 %
NRBC # BLD AUTO: 0 10*3/UL
NRBC BLD AUTO-RTO: 0 /100
PLATELET # BLD AUTO: 170 10E9/L (ref 150–450)
POTASSIUM SERPL-SCNC: 3.6 MMOL/L (ref 3.4–5.3)
PROT SERPL-MCNC: 8.2 G/DL (ref 6.8–8.8)
RBC # BLD AUTO: 5.13 10E12/L (ref 4.4–5.9)
SODIUM SERPL-SCNC: 138 MMOL/L (ref 133–144)
TROPONIN I SERPL-MCNC: <0.015 UG/L (ref 0–0.04)
WBC # BLD AUTO: 8.3 10E9/L (ref 4–11)

## 2018-04-08 PROCEDURE — 93005 ELECTROCARDIOGRAM TRACING: CPT

## 2018-04-08 PROCEDURE — 80076 HEPATIC FUNCTION PANEL: CPT | Performed by: INTERNAL MEDICINE

## 2018-04-08 PROCEDURE — 71046 X-RAY EXAM CHEST 2 VIEWS: CPT | Mod: TC

## 2018-04-08 PROCEDURE — 96365 THER/PROPH/DIAG IV INF INIT: CPT

## 2018-04-08 PROCEDURE — 25000132 ZZH RX MED GY IP 250 OP 250 PS 637: Performed by: INTERNAL MEDICINE

## 2018-04-08 PROCEDURE — 93010 ELECTROCARDIOGRAM REPORT: CPT | Performed by: INTERNAL MEDICINE

## 2018-04-08 PROCEDURE — 25000125 ZZHC RX 250: Performed by: INTERNAL MEDICINE

## 2018-04-08 PROCEDURE — 85025 COMPLETE CBC W/AUTO DIFF WBC: CPT | Performed by: FAMILY MEDICINE

## 2018-04-08 PROCEDURE — 83690 ASSAY OF LIPASE: CPT | Performed by: INTERNAL MEDICINE

## 2018-04-08 PROCEDURE — 84484 ASSAY OF TROPONIN QUANT: CPT | Performed by: FAMILY MEDICINE

## 2018-04-08 PROCEDURE — 99285 EMERGENCY DEPT VISIT HI MDM: CPT | Performed by: INTERNAL MEDICINE

## 2018-04-08 PROCEDURE — 36415 COLL VENOUS BLD VENIPUNCTURE: CPT | Performed by: FAMILY MEDICINE

## 2018-04-08 PROCEDURE — 99285 EMERGENCY DEPT VISIT HI MDM: CPT | Mod: 25

## 2018-04-08 PROCEDURE — 80048 BASIC METABOLIC PNL TOTAL CA: CPT | Performed by: FAMILY MEDICINE

## 2018-04-08 RX ORDER — MULTIPLE VITAMINS W/ MINERALS TAB 9MG-400MCG
1 TAB ORAL DAILY
COMMUNITY
End: 2020-05-18

## 2018-04-08 RX ORDER — ALUMINA, MAGNESIA, AND SIMETHICONE 2400; 2400; 240 MG/30ML; MG/30ML; MG/30ML
30 SUSPENSION ORAL ONCE
Status: COMPLETED | OUTPATIENT
Start: 2018-04-08 | End: 2018-04-08

## 2018-04-08 RX ADMIN — ALUMINUM HYDROXIDE, MAGNESIUM HYDROXIDE, AND DIMETHICONE 30 ML: 400; 400; 40 SUSPENSION ORAL at 01:32

## 2018-04-08 RX ADMIN — FAMOTIDINE 20 MG: 20 INJECTION, SOLUTION INTRAVENOUS at 01:33

## 2018-04-08 NOTE — ED AVS SNAPSHOT
[facility]    [address] [address]    Phone:  [phone]                                       Rafa Bhatti   MRN: [id number]    Department:  [facility]   Date of Visit:  4/8/2018           After Visit Summary Signature Page     I have received my discharge instructions, and my questions have been answered. I have discussed any challenges I see with this plan with the nurse or doctor.    ..........................................................................................................................................  Patient/Patient Representative Signature      ..........................................................................................................................................  Patient Representative Print Name and Relationship to Patient    ..................................................               ................................................  Date                                            Time    ..........................................................................................................................................  Reviewed by Signature/Title    ...................................................              ..............................................  Date                                                            Time

## 2018-04-08 NOTE — ED NOTES
Patient verbalizes understanding of discharge instructions. Afebrile. Rates pain 3/10. HR 60's no ectopy noted.

## 2018-04-08 NOTE — ED NOTES
States chest pain and abdominal discomfort better after IV pepcid and PO maalox. Dr. Hernandez updated.

## 2018-04-08 NOTE — DISCHARGE INSTRUCTIONS
Medicines for Acid Reflux  Your healthcare provider has told you that you have acid reflux. This condition causes stomach acid to wash up into your throat. For most people, acid reflux is troubling but not dangerous. But left untreated, acid reflux sometimes damages the esophagus. Medicines can help control acid reflux and limit your risk of future problems.  Medicines for acid reflux  Your healthcare provider may prescribe medicine to help treat your acid reflux. Medicine will be based on your symptoms and any test results. Your provider will explain how to take your medicine. You will also be told about possible side effects.  Reducing stomach acid  Your provider may suggest antacids that you can buy over the counter. Antacids can give fast relief. Or you may be told to take a type of medicine called H2 blockers. These are available over the counter and by prescription (for higher doses).  Blocking stomach acid  In more severe cases, your healthcare provider may suggest stronger medicines such as proton pump inhibitors (PPIs). These keep the stomach from making acid. They are often prescribed for long-term use.  Other medicines  In some cases medicines to reduce or block stomach acid may not work. Then you may be switched to another type of medicine that helps your stomach empty better.     Date Last Reviewed: 10/1/2016    1996-9998 [employer]. [address] [place] [address], [place] [address]. All rights reserved. This information is not intended as a substitute for professional medical care. Always follow your healthcare professional's instructions.          GERD (Adult)    The esophagus is a tube that carries food from the mouth to the stomach. A valve at the lower end of the esophagus prevents stomach acid from flowing upward. When this valve doesn't work properly, stomach contents may repeatedly flow back up (reflux) into the esophagus. This is called gastroesophageal reflux disease (GERD). GERD can  "irritate the esophagus. It can cause problems with swallowing or breathing. In severe cases, GERD can cause recurrent pneumonia or other serious problems.  Symptoms of reflux include burning, pressure or sharp pain in the upper abdomen or mid to lower chest. The pain can spread to the neck, back, or shoulder. There may be belching, an acid taste in the back of the throat, chronic cough, or sore throat or hoarseness. GERD symptoms often occur during the day after a big meal. They can also occur at night when lying down.   Home care  Lifestyle changes can help reduce symptoms. If needed, medicines may be prescribed. Symptoms often improve with treatment, but if treatment is stopped, the symptoms often return after a few months. So most persons with GERD will need to continue treatment.  Lifestyle changes    Limit or avoid fatty, fried, and spicy foods, as well as coffee, chocolate, mint, and foods with high acid content such as tomatoes and citrus fruit and juices (orange, grapefruit, lemon).    Don t eat large meals, especially at night. Frequent, smaller meals are best. Do not lie down right after eating. And don t eat anything 3 hours before going to bed.    Avoid drinking alcohol and smoking. As much as possible, stay away from second hand smoke.    If you are overweight, losing weight will reduce symptoms.     Avoid wearing tight clothing around your stomach area.    If your symptoms occur during sleep, use a foam wedge to elevate your upper body (not just your head.) Or, place 4\" blocks under the head of your bed.  Medicines  If needed, medicines can help relieve the symptoms of GERD and prevent damage to the esophagus. Discuss a medicine plan with your healthcare provider. This may include one or more of the following medicines:    Antacids to help neutralize the normal acids in your stomach.    Acid blockers (H2 blockers) to decrease acid production.    Acid inhibitors (PPIs) to decrease acid production in a " different way than the blockers. They may work better, but can take a little longer to take effect.  Take an antacid 30-60 minutes after eating and at bedtime, but not at the same time as an acid blocker.  Try not to take medicines such as ibuprofen and aspirin. If you are taking aspirin for your heart or other medical reasons, talk to your healthcare provider about stopping it.  Follow-up care  Follow up with your healthcare provider or as advised by our staff.  When to seek medical advice  Call your healthcare provider if any of the following occur:    Stomach pain gets worse or moves to the lower right abdomen (appendix area)    Chest pain appears or gets worse, or spreads to the back, neck, shoulder, or arm    Frequent vomiting (can t keep down liquids)    Blood in the stool or vomit (red or black in color)    Feeling weak or dizzy    Fever of 100.4 F (38 C) or higher, or as directed by your healthcare provider  Date Last Reviewed: 6/23/2015 2000-2017 [employer]. [address] [place] [address], [place] [address]. All rights reserved. This information is not intended as a substitute for professional medical care. Always follow your healthcare professional's instructions.

## 2018-04-08 NOTE — ED AVS SNAPSHOT
[facility]    [address] [place] [id number]    Phone:  [phone]                                       Rafa Bhatti   MRN: [id number]    Department:  [facility]   Date of Visit:  4/8/2018           Patient Information     Date Of Birth          1945        Your diagnoses for this visit were:     Gastroesophageal reflux disease without esophagitis        You were seen by José Luis Hernandez MD.      Follow-up Information     Schedule an appointment as soon as possible for a visit with Santy Agustin DO.    Specialties:  Internal Medicine, Pediatrics    Contact information:    [address] [place] [id number]  [phone]          Discharge Instructions         Medicines for Acid Reflux  Your healthcare provider has told you that you have acid reflux. This condition causes stomach acid to wash up into your throat. For most people, acid reflux is troubling but not dangerous. But left untreated, acid reflux sometimes damages the esophagus. Medicines can help control acid reflux and limit your risk of future problems.  Medicines for acid reflux  Your healthcare provider may prescribe medicine to help treat your acid reflux. Medicine will be based on your symptoms and any test results. Your provider will explain how to take your medicine. You will also be told about possible side effects.  Reducing stomach acid  Your provider may suggest antacids that you can buy over the counter. Antacids can give fast relief. Or you may be told to take a type of medicine called H2 blockers. These are available over the counter and by prescription (for higher doses).  Blocking stomach acid  In more severe cases, your healthcare provider may suggest stronger medicines such as proton pump inhibitors (PPIs). These keep the stomach from making acid. They are often prescribed for long-term use.  Other medicines  In some cases medicines to reduce or block stomach acid may not work.  Then you may be switched to another type of medicine that helps your stomach empty better.     Date Last Reviewed: 10/1/2016    [id number] [employer]. [address] [place] [address], [place] [address]. All rights reserved. This information is not intended as a substitute for professional medical care. Always follow your healthcare professional's instructions.          GERD (Adult)    The esophagus is a tube that carries food from the mouth to the stomach. A valve at the lower end of the esophagus prevents stomach acid from flowing upward. When this valve doesn't work properly, stomach contents may repeatedly flow back up (reflux) into the esophagus. This is called gastroesophageal reflux disease (GERD). GERD can irritate the esophagus. It can cause problems with swallowing or breathing. In severe cases, GERD can cause recurrent pneumonia or other serious problems.  Symptoms of reflux include burning, pressure or sharp pain in the upper abdomen or mid to lower chest. The pain can spread to the neck, back, or shoulder. There may be belching, an acid taste in the back of the throat, chronic cough, or sore throat or hoarseness. GERD symptoms often occur during the day after a big meal. They can also occur at night when lying down.   Home care  Lifestyle changes can help reduce symptoms. If needed, medicines may be prescribed. Symptoms often improve with treatment, but if treatment is stopped, the symptoms often return after a few months. So most persons with GERD will need to continue treatment.  Lifestyle changes    Limit or avoid fatty, fried, and spicy foods, as well as coffee, chocolate, mint, and foods with high acid content such as tomatoes and citrus fruit and juices (orange, grapefruit, lemon).    Don t eat large meals, especially at night. Frequent, smaller meals are best. Do not lie down right after eating. And don t eat anything 3 hours before going to bed.    Avoid drinking alcohol and smoking. As much  "as possible, stay away from second hand smoke.    If you are overweight, losing weight will reduce symptoms.     Avoid wearing tight clothing around your stomach area.    If your symptoms occur during sleep, use a foam wedge to elevate your upper body (not just your head.) Or, place 4\" blocks under the head of your bed.  Medicines  If needed, medicines can help relieve the symptoms of GERD and prevent damage to the esophagus. Discuss a medicine plan with your healthcare provider. This may include one or more of the following medicines:    Antacids to help neutralize the normal acids in your stomach.    Acid blockers (H2 blockers) to decrease acid production.    Acid inhibitors (PPIs) to decrease acid production in a different way than the blockers. They may work better, but can take a little longer to take effect.  Take an antacid 30-60 minutes after eating and at bedtime, but not at the same time as an acid blocker.  Try not to take medicines such as ibuprofen and aspirin. If you are taking aspirin for your heart or other medical reasons, talk to your healthcare provider about stopping it.  Follow-up care  Follow up with your healthcare provider or as advised by our staff.  When to seek medical advice  Call your healthcare provider if any of the following occur:    Stomach pain gets worse or moves to the lower right abdomen (appendix area)    Chest pain appears or gets worse, or spreads to the back, neck, shoulder, or arm    Frequent vomiting (can t keep down liquids)    Blood in the stool or vomit (red or black in color)    Feeling weak or dizzy    Fever of 100.4 F (38 C) or higher, or as directed by your healthcare provider  Date Last Reviewed: 6/23/2015 2000-2017 [email or web address]. [address] [place] [address], [place] [address]. All rights reserved. This information is not intended as a substitute for professional medical care. Always follow your healthcare professional's instructions.             Review " of your medicines      Our records show that you are taking the medicines listed below. If these are incorrect, please call your family doctor or clinic.        Dose / Directions Last dose taken    allopurinol 100 MG tablet   Commonly known as:  ZYLOPRIM   Dose:  100 mg   Quantity:  90 tablet        Take 1 tablet (100 mg) by mouth 2 times daily   Refills:  1        amLODIPine 5 MG tablet   Commonly known as:  NORVASC   Quantity:  30 tablet        TAKE 1 TABLET DAILY BY MOUTH - GENERIC FOR NORVASC   Refills:  5        aspirin 81 MG tablet   Dose:  81 mg        Take 81 mg by mouth daily   Refills:  0        atenolol 50 MG tablet   Commonly known as:  TENORMIN   Dose:  50 mg   Quantity:  90 tablet        Take 1 tablet (50 mg) by mouth daily   Refills:  3        colchicine 0.6 MG tablet   Quantity:  20 tablet        Take 2 tablets at the onset of gout pain. May take one tablet again in one hour. Max 4 tabs in 24 hrs.   Refills:  0        hydrochlorothiazide 25 MG tablet   Commonly known as:  HYDRODIURIL   Dose:  25 mg   Quantity:  180 tablet        Take 1 tablet (25 mg) by mouth daily   Refills:  3        ibuprofen 800 MG tablet   Commonly known as:  ADVIL/MOTRIN   Dose:  800 mg   Quantity:  30 tablet        Take 1 tablet (800 mg) by mouth every 8 hours as needed for moderate pain   Refills:  0        indomethacin 50 MG capsule   Commonly known as:  INDOCIN   Quantity:  20 capsule        Take 1 capsule twice a day as needed for gout pain   Refills:  0        Multi-vitamin Tabs tablet   Dose:  1 tablet        Take 1 tablet by mouth daily   Refills:  0        NAPROXEN SODIUM PO   Dose:  275 mg   Indication:  as needed        Take 275 mg by mouth once   Refills:  0        ranitidine 150 MG tablet   Commonly known as:  ZANTAC   Quantity:  180 tablet        TAKE ONE TABLET TWO TIMES A DAY BY MOUTH   Refills:  1        tadalafil 20 MG tablet   Commonly known as:  CIALIS   Dose:  20 mg   Quantity:  36 tablet        Take 1  "tablet (20 mg) by mouth as needed   Refills:  3        TUMS PO        tums 200 mg calcium (500 mg) chewable Take 1 tablet by mouth prn   Refills:  0                Procedures and tests performed during your visit     Basic metabolic panel    CBC with platelets differential    Cardiac Continuous Monitoring    EKG 12-lead, tracing only    Hepatic panel    Lipase    Peripheral IV: Standard    Pulse oximetry nursing    Troponin I    XR Chest 2 Views      Orders Needing Specimen Collection     None      Pending Results     Date and Time Order Name Status Description    2018 0102 XR Chest 2 Views In process             Pending Culture Results     No orders found from 2018 to 2018.            Thank you for choosing [facility]       Thank you for choosing [facility] for your care. Our goal is always to provide you with excellent care. Hearing back from our patients is one way we can continue to improve our services. Please take a few minutes to complete the written survey that you may receive in the mail after you visit with us. Thank you!        [employer] system Information     [email or web address] lets you send messages to your doctor, view your test results, renew your prescriptions, schedule appointments and more. To sign up, go to [email or web address] . Click on \"Log in\" on the left side of the screen, which will take you to the Welcome page. Then click on \"Sign up Now\" on the right side of the page.     You will be asked to enter the access code listed below, as well as some personal information. Please follow the directions to create your username and password.     Your access code is: [email or web address]  Expires: 2018  2:07 AM     Your access code will  in 90 days. If you need help or a new code, please call your [facility] clinic or [phone].        Care EveryWhere ID     This is your Care EveryWhere ID. This could be used by other organizations to access your [facility] medical records  [id number]        Equal Access " to Services     LIZZY ALFONSO : Karin Benavidez, diaz lujan, greg solorzano. So [facility] [phone].    ATENCIÓN: Si habla español, tiene a bae disposición servicios gratuitos de asistencia lingüística. Llame al [phone].    We comply with applicable federal civil rights laws and [place] laws. We do not discriminate on the basis of race, color, national origin, age, disability, sex, sexual orientation, or gender identity.            After Visit Summary       This is your record. Keep this with you and show to your community pharmacist(s) and doctor(s) at your next visit.

## 2018-04-08 NOTE — ED NOTES
C/o chest pain and back pain. Chest pain on and off for a couple of days. Today chest pain is constant since after supper.  Chest pain across epigastric area. Describes the pain as a burning pain. Denies SOB. C/o upset stomach. Abdomen soft and non tender. Hx kidney stones. Denies dizziness. Ambulated to room 4 without difficulty.   Monitors on. IV established. HR 60's with no ectopy noted.

## 2018-04-10 ASSESSMENT — ENCOUNTER SYMPTOMS
ABDOMINAL PAIN: 1
SLEEP DISTURBANCE: 0
HEADACHES: 0
SHORTNESS OF BREATH: 0
FLANK PAIN: 0
CONFUSION: 0
NUMBNESS: 0
PALPITATIONS: 0
FEVER: 0
COUGH: 0
WEAKNESS: 0
BLOOD IN STOOL: 0
ABDOMINAL DISTENTION: 0
CHEST TIGHTNESS: 0
FREQUENCY: 0
CHILLS: 0
DIZZINESS: 0
ANAL BLEEDING: 0
WHEEZING: 0
DIAPHORESIS: 0
COLOR CHANGE: 0
MYALGIAS: 0
NAUSEA: 0
DYSURIA: 0
LIGHT-HEADEDNESS: 0
NECK PAIN: 0
BACK PAIN: 0
VOICE CHANGE: 0
VOMITING: 0

## 2018-04-11 ENCOUNTER — OFFICE VISIT (OUTPATIENT)
Dept: PEDIATRICS | Facility: OTHER | Age: 73
End: 2018-04-11
Attending: INTERNAL MEDICINE
Payer: COMMERCIAL

## 2018-04-11 VITALS
RESPIRATION RATE: 18 BRPM | BODY MASS INDEX: 27.63 KG/M2 | OXYGEN SATURATION: 94 % | SYSTOLIC BLOOD PRESSURE: 138 MMHG | HEART RATE: 61 BPM | TEMPERATURE: 96.7 F | DIASTOLIC BLOOD PRESSURE: 80 MMHG | HEIGHT: 70 IN | WEIGHT: 193 LBS

## 2018-04-11 DIAGNOSIS — R73.9 ELEVATED RANDOM BLOOD GLUCOSE LEVEL: ICD-10-CM

## 2018-04-11 DIAGNOSIS — E11.9 TYPE 2 DIABETES MELLITUS WITHOUT COMPLICATION, WITHOUT LONG-TERM CURRENT USE OF INSULIN (H): ICD-10-CM

## 2018-04-11 DIAGNOSIS — Z87.442 HISTORY OF RENAL CALCULI: ICD-10-CM

## 2018-04-11 DIAGNOSIS — R74.8 ELEVATED LIVER ENZYMES: Primary | ICD-10-CM

## 2018-04-11 DIAGNOSIS — K21.9 GASTROESOPHAGEAL REFLUX DISEASE, ESOPHAGITIS PRESENCE NOT SPECIFIED: ICD-10-CM

## 2018-04-11 DIAGNOSIS — M1A.9XX0 CHRONIC GOUT WITHOUT TOPHUS, UNSPECIFIED CAUSE, UNSPECIFIED SITE: ICD-10-CM

## 2018-04-11 LAB
ALBUMIN SERPL-MCNC: 4 G/DL (ref 3.4–5)
ALBUMIN UR-MCNC: NEGATIVE MG/DL
ALP SERPL-CCNC: 89 U/L (ref 40–150)
ALT SERPL W P-5'-P-CCNC: 387 U/L (ref 0–70)
ANION GAP SERPL CALCULATED.3IONS-SCNC: 5 MMOL/L (ref 3–14)
APPEARANCE UR: CLEAR
AST SERPL W P-5'-P-CCNC: 195 U/L (ref 0–45)
BILIRUB SERPL-MCNC: 0.8 MG/DL (ref 0.2–1.3)
BILIRUB UR QL STRIP: NEGATIVE
BUN SERPL-MCNC: 19 MG/DL (ref 7–30)
CALCIUM SERPL-MCNC: 9.3 MG/DL (ref 8.5–10.1)
CHLORIDE SERPL-SCNC: 99 MMOL/L (ref 94–109)
CO2 SERPL-SCNC: 32 MMOL/L (ref 20–32)
COLOR UR AUTO: YELLOW
CREAT SERPL-MCNC: 1.05 MG/DL (ref 0.66–1.25)
EST. AVERAGE GLUCOSE BLD GHB EST-MCNC: 166 MG/DL
GFR SERPL CREATININE-BSD FRML MDRD: 70 ML/MIN/1.7M2
GLUCOSE SERPL-MCNC: 142 MG/DL (ref 70–99)
GLUCOSE UR STRIP-MCNC: NEGATIVE MG/DL
HBA1C MFR BLD: 7.4 % (ref 0–6.4)
HGB UR QL STRIP: NEGATIVE
KETONES UR STRIP-MCNC: NEGATIVE MG/DL
LEUKOCYTE ESTERASE UR QL STRIP: NEGATIVE
NITRATE UR QL: NEGATIVE
PH UR STRIP: 7 PH (ref 4.7–8)
POTASSIUM SERPL-SCNC: 3.8 MMOL/L (ref 3.4–5.3)
PROT SERPL-MCNC: 8.2 G/DL (ref 6.8–8.8)
SODIUM SERPL-SCNC: 136 MMOL/L (ref 133–144)
SOURCE: NORMAL
SP GR UR STRIP: 1.01 (ref 1–1.03)
URATE SERPL-MCNC: 5.4 MG/DL (ref 3.5–7.2)
UROBILINOGEN UR STRIP-MCNC: NORMAL MG/DL (ref 0–2)

## 2018-04-11 PROCEDURE — 81003 URINALYSIS AUTO W/O SCOPE: CPT | Mod: ZL | Performed by: INTERNAL MEDICINE

## 2018-04-11 PROCEDURE — 80053 COMPREHEN METABOLIC PANEL: CPT | Mod: ZL | Performed by: INTERNAL MEDICINE

## 2018-04-11 PROCEDURE — G0463 HOSPITAL OUTPT CLINIC VISIT: HCPCS

## 2018-04-11 PROCEDURE — 36415 COLL VENOUS BLD VENIPUNCTURE: CPT | Mod: ZL | Performed by: INTERNAL MEDICINE

## 2018-04-11 PROCEDURE — 99214 OFFICE O/P EST MOD 30 MIN: CPT | Performed by: INTERNAL MEDICINE

## 2018-04-11 PROCEDURE — 84550 ASSAY OF BLOOD/URIC ACID: CPT | Mod: ZL | Performed by: INTERNAL MEDICINE

## 2018-04-11 PROCEDURE — 83036 HEMOGLOBIN GLYCOSYLATED A1C: CPT | Mod: ZL | Performed by: INTERNAL MEDICINE

## 2018-04-11 PROCEDURE — 40000788 ZZHCL STATISTIC ESTIMATED AVERAGE GLUCOSE: Mod: ZL | Performed by: INTERNAL MEDICINE

## 2018-04-11 RX ORDER — GLIMEPIRIDE 2 MG/1
2 TABLET ORAL 2 TIMES DAILY WITH MEALS
Qty: 60 TABLET | Refills: 3 | Status: SHIPPED | OUTPATIENT
Start: 2018-04-11 | End: 2018-05-09

## 2018-04-11 ASSESSMENT — ANXIETY QUESTIONNAIRES
1. FEELING NERVOUS, ANXIOUS, OR ON EDGE: NOT AT ALL
2. NOT BEING ABLE TO STOP OR CONTROL WORRYING: NOT AT ALL
5. BEING SO RESTLESS THAT IT IS HARD TO SIT STILL: NOT AT ALL
4. TROUBLE RELAXING: NOT AT ALL
7. FEELING AFRAID AS IF SOMETHING AWFUL MIGHT HAPPEN: NOT AT ALL
3. WORRYING TOO MUCH ABOUT DIFFERENT THINGS: NOT AT ALL
6. BECOMING EASILY ANNOYED OR IRRITABLE: NOT AT ALL
GAD7 TOTAL SCORE: 0

## 2018-04-11 ASSESSMENT — PAIN SCALES - GENERAL: PAINLEVEL: MILD PAIN (2)

## 2018-04-11 NOTE — ED PROVIDER NOTES
History     Chief Complaint   Patient presents with     Chest Pain     for the past couple of days and today after dinner.      Back Pain     Patient is a 72 year old male presenting with abdominal pain. The history is provided by the patient.   Abdominal Pain   Pain location:  Epigastric  Pain quality: sharp    Pain radiates to:  Back  Onset quality:  Gradual  Timing:  Intermittent  Chronicity:  Recurrent  Associated symptoms: no chest pain, no chills, no cough, no dysuria, no fever, no nausea, no shortness of breath and no vomiting          Problem List:    Patient Active Problem List    Diagnosis Date Noted     Chronic gout 10/18/2017     Priority: Medium     Esophageal reflux 10/18/2017     Priority: Medium     ACP (advance care planning) 10/26/2016     Priority: Medium     Advance Care Planning 10/26/2016: ACP Review of Chart / Resources Provided:  Reviewed chart for advance care plan.  Rafa Bhatti has no plan or code status on file. Discussed available resources and provided with information. Confirmed code status reflects current choices pending further ACP discussions.  Confirmed/documented legally designated decision makers.  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 Provider to review          Past Medical History:    Past Medical History:   Diagnosis Date     Benign neoplasm of unspecified site 8/1/2012     Calculus of kidney 5/20/2002     Chest pain, unspecified 3/2/2000     Gout, unspecified 8/3/2011     Hyperlipidemia       Unspecified essential hypertension 7/18/2000       Past Surgical History:    Past Surgical History:   Procedure Laterality Date     cardiolyte stress test  2000    chest pain     COLONOSCOPY  1999     fistula in ANO       HEMORRHOIDECTOMY       removal of dermoid cyst of mediastinum       VASECTOMY         Family History:    Family History   Problem Relation Age of Onset     C.A.D. Father 80     C.A.D. Mother      Hypertension Mother      C.A.D. Brother      C.A.D. Other      family hx       Social History:  Marital Status:   [2]  Social History   Substance Use Topics     Smoking status: Never Smoker     Smokeless tobacco: Never Used     Alcohol use Yes      Comment: socially        Medications:      multivitamin, therapeutic with minerals (MULTI-VITAMIN) TABS tablet   ranitidine (ZANTAC) 150 MG tablet   amLODIPine (NORVASC) 5 MG tablet   allopurinol (ZYLOPRIM) 100 MG tablet   colchicine 0.6 MG tablet   indomethacin (INDOCIN) 50 MG capsule   NAPROXEN SODIUM PO   atenolol (TENORMIN) 50 MG tablet   hydrochlorothiazide (HYDRODIURIL) 25 MG tablet   tadalafil (CIALIS) 20 MG tablet   ibuprofen (ADVIL,MOTRIN) 800 MG tablet   aspirin 81 MG tablet   Calcium Carbonate Antacid (TUMS PO)         Review of Systems   Constitutional: Negative for chills, diaphoresis and fever.   HENT: Negative for voice change.    Eyes: Negative for visual disturbance.   Respiratory: Negative for cough, chest tightness, shortness of breath and wheezing.    Cardiovascular: Negative for chest pain, palpitations and leg swelling.   Gastrointestinal: Positive for abdominal pain. Negative for abdominal distention, anal bleeding, blood in stool, nausea and vomiting.   Genitourinary: Negative for decreased urine volume, dysuria, flank pain and frequency.   Musculoskeletal: Negative for back pain, gait problem, myalgias and neck pain.   Skin: Negative for color change, pallor and rash.   Neurological: Negative for dizziness, syncope, weakness,  "light-headedness, numbness and headaches.   Psychiatric/Behavioral: Negative for confusion, sleep disturbance and suicidal ideas.       Physical Exam   BP: 158/90  Heart Rate: 67  Temp: 96.7  F (35.9  C)  Resp: 16  Height: 177.8 cm (5' 10\")  Weight: 83.9 kg (185 lb)  SpO2: 97 %      Physical Exam   Constitutional: He is oriented to person, place, and time. He appears well-developed and well-nourished.   HENT:   Head: Normocephalic and atraumatic.   Mouth/Throat: No oropharyngeal exudate.   Eyes: Conjunctivae are normal. Pupils are equal, round, and reactive to light.   Neck: Normal range of motion. Neck supple. No JVD present. No tracheal deviation present. No thyromegaly present.   Cardiovascular: Normal rate, regular rhythm, normal heart sounds and intact distal pulses.  Exam reveals no gallop and no friction rub.    No murmur heard.  Pulmonary/Chest: Effort normal and breath sounds normal. No stridor. No respiratory distress. He has no wheezes. He has no rales. He exhibits no tenderness.   Abdominal: Soft. Bowel sounds are normal. He exhibits no distension and no mass. There is tenderness in the epigastric area. There is no rebound and no guarding.   Musculoskeletal: Normal range of motion. He exhibits no edema or tenderness.   Lymphadenopathy:     He has no cervical adenopathy.   Neurological: He is alert and oriented to person, place, and time.   Skin: Skin is warm and dry. No rash noted. No erythema. No pallor.   Psychiatric: His behavior is normal.   Nursing note and vitals reviewed.      ED Course     ED Course     Procedures                 No results found for this or any previous visit (from the past 24 hour(s)).    Medications   famotidine (PEPCID) infusion 20 mg (0 mg Intravenous Stopped 4/8/18 0151)   alum & mag hydroxide-simethicone (MYLANTA ES/MAALOX  ES) suspension 30 mL (30 mLs Oral Given 4/8/18 0132)       Assessments & Plan (with Medical Decision Making)   Epigastric pain with radiation to " back  EKG; no significant changes  CXr negative  All symptoms resolved after receiving antiacid in ER  I advised to return to ER if symptoms recurred  Fu with with PCP fur further evaluation  I have reviewed the nursing notes.    I have reviewed the findings, diagnosis, plan and need for follow up with the patient.      Discharge Medication List as of 4/8/2018  2:07 AM          Final diagnoses:   Gastroesophageal reflux disease without esophagitis       4/8/2018   [facility]     José Luis Hernandez MD  04/10/18 2013

## 2018-04-11 NOTE — PROGRESS NOTES
SUBJECTIVE:   Rafa Bhatti is a 72 year old male who presents to clinic today for the following health issues:     ED/UC Followup:    Facility:  [facility]  Date of visit: 04/08/18  Reason for visit: severe pain of lower back radiating around to under rib cage  Current Status: improved with mild back pain     He was diagnosed with reflux disease as his pain responded to antacids.        He reports that the following day he had blood in his urine.  He reports that this has now cleared  -------------------------------------    Problem list and histories reviewed & adjusted, as indicated.  Additional history: as documented    Patient Active Problem List   Diagnosis     Encounter for monitoring testosterone replacement therapy     Testicular hypofunction     Elevated blood pressure     Benign essential hypertension     Hemangioma of spine     ACP (advance care planning)     Routine general medical examination at a health care facility     Chronic gout     Esophageal reflux     Past Surgical History:   Procedure Laterality Date     cardiolyte stress test  2000    chest pain     COLONOSCOPY  1999     fistula in ANO       HEMORRHOIDECTOMY       removal of dermoid cyst of mediastinum       VASECTOMY         Social History   Substance Use Topics     Smoking status: Never Smoker     Smokeless tobacco: Never Used     Alcohol use Yes      Comment: socially     Family History   Problem Relation Age of Onset     C.A.D. Father 80     C.A.D. Mother      Hypertension Mother      C.A.D. Brother      C.A.D. Other      family hx           Reviewed and updated as needed this visit by clinical staff  Tobacco  Allergies  Meds  Problems  Med Hx  Surg Hx  Fam Hx  Soc Hx        Reviewed and updated as needed this visit by Provider         ROS:  CONSTITUTIONAL: NEGATIVE for fever, chills, change in weight  EYES: NEGATIVE for vision changes or irritation  ENT/MOUTH: NEGATIVE for ear, mouth and throat problems  RESP: NEGATIVE for  "significant cough or SOB  CV: NEGATIVE for chest pain, palpitations or peripheral edema  GI: NEGATIVE for nausea, abdominal pain, heartburn, or change in bowel habits  : NEGATIVE for frequency, dysuria, or hematuria  MUSCULOSKELETAL:back pain, right sided 1-2 /10 pain  NEURO: NEGATIVE for weakness, dizziness or paresthesias  ENDOCRINE: NEGATIVE for temperature intolerance, skin/hair changes    OBJECTIVE:     /80 (BP Location: Left arm, Patient Position: Chair, Cuff Size: Adult Regular)  Pulse 61  Temp 96.7  F (35.9  C) (Tympanic)  Resp 18  Ht 5' 10\" (1.778 m)  Wt 193 lb (87.5 kg)  SpO2 94%  BMI 27.69 kg/m2  Body mass index is 27.69 kg/(m^2).  GENERAL: healthy, alert and no distress  EYES: Eyes grossly normal to inspection and conjunctivae and sclerae normal  NECK: no adenopathy, no asymmetry, masses, or scars and thyroid normal to palpation  RESP: lungs clear to auscultation - no rales, rhonchi or wheezes  CV: regular rate and rhythm, normal S1 S2, no S3 or S4, no murmur, click or rub, no peripheral edema and peripheral pulses strong  ABDOMEN: soft, nontender, no hepatosplenomegaly, no masses and bowel sounds normal  ABDOMEN: no bruits heard and no CVA tenderness  MS: no gross musculoskeletal defects noted, no edema  PSYCH: mentation appears normal, affect normal/bright    Diagnostic Test Results:  Results for orders placed or performed in visit on 04/11/18 (from the past 24 hour(s))   Uric acid   Result Value Ref Range    Uric Acid 5.4 3.5 - 7.2 mg/dL   Comprehensive metabolic panel   Result Value Ref Range    Sodium 136 133 - 144 mmol/L    Potassium 3.8 3.4 - 5.3 mmol/L    Chloride 99 94 - 109 mmol/L    Carbon Dioxide 32 20 - 32 mmol/L    Anion Gap 5 3 - 14 mmol/L    Glucose 142 (H) 70 - 99 mg/dL    Urea Nitrogen 19 7 - 30 mg/dL    Creatinine 1.05 0.66 - 1.25 mg/dL    GFR Estimate 70 >60 mL/min/1.7m2    GFR Estimate If Black 84 >60 mL/min/1.7m2    Calcium 9.3 8.5 - 10.1 mg/dL    Bilirubin Total 0.8 " 0.2 - 1.3 mg/dL    Albumin 4.0 3.4 - 5.0 g/dL    Protein Total 8.2 6.8 - 8.8 g/dL    Alkaline Phosphatase 89 40 - 150 U/L     (H) 0 - 70 U/L     (H) 0 - 45 U/L   Hemoglobin A1c   Result Value Ref Range    Hemoglobin A1C 7.4 (H) 0 - 6.4 %   Estimated Average Glucose   Result Value Ref Range    Estimated Average Glucose 166 mg/dL   UA reflex to Microscopic   Result Value Ref Range    Color Urine Yellow     Appearance Urine Clear     Glucose Urine Negative NEG^Negative mg/dL    Bilirubin Urine Negative NEG^Negative    Ketones Urine Negative NEG^Negative mg/dL    Specific Gravity Urine 1.008 1.003 - 1.035    Blood Urine Negative NEG^Negative    pH Urine 7.0 4.7 - 8.0 pH    Protein Albumin Urine Negative NEG^Negative mg/dL    Urobilinogen mg/dL Normal 0.0 - 2.0 mg/dL    Nitrite Urine Negative NEG^Negative    Leukocyte Esterase Urine Negative NEG^Negative    Source Midstream Urine        ASSESSMENT/PLAN:   (Z87.442) History of renal calculi  Comment: He has no current blood in his urine.  However, his recent ED vivit may have been due to a renal stone as he has back and abdominal pain along with gross hematuria the following day.  Plan:   UA reflex to Microscopic    (K21.9) Gastroesophageal reflux disease, esophagitis presence not specified  Comment: There is a question as to whether or not his recent abdominal and back pain was GERD  Plan:   He will continue Zantac 150 mg BID      (R74.8) Elevated liver enzymes  (primary encounter diagnosis)  Comment: Liver enzymes continue to rise which is likely due to NSAIDS  Plan:   Repeat:  Comprehensive metabolic panel in one week    (M1A.9XX0) Chronic gout without tophus, unspecified cause, unspecified site  Comment: His uric acid is well controlled   Plan:   He will continue his allopurinol 100 mg BID    (R73.09) Elevated random blood glucose level  Comment: He does have diabetes per his A1c of 7.4 %  Plan:   As below.      (E11.9) Type 2 diabetes mellitus without  complication, without long-term current use of insulin (H)  Comment: New onset  Plan:   Start Amaryl 2.0 mg BID          FUTURE APPOINTMENTS:       - Follow-up visit in 4 weeks.    Santy Agustin DO, DO  [facility]

## 2018-04-11 NOTE — NURSING NOTE
"Chief Complaint   Patient presents with     ER F/U       Initial /80 (BP Location: Left arm, Patient Position: Chair, Cuff Size: Adult Regular)  Pulse 61  Temp 96.7  F (35.9  C) (Tympanic)  Resp 18  Ht 5' 10\" (1.778 m)  Wt 193 lb (87.5 kg)  SpO2 94%  BMI 27.69 kg/m2 Estimated body mass index is 27.69 kg/(m^2) as calculated from the following:    Height as of this encounter: 5' 10\" (1.778 m).    Weight as of this encounter: 193 lb (87.5 kg).  Medication Reconciliation: complete   Ruby Be LPN      "

## 2018-04-11 NOTE — MR AVS SNAPSHOT
"              After Visit Summary   2018    Rafa Bhatti    MRN: [id number]           Patient Information     Date Of Birth          1945        Visit Information        Provider Department      2018 2:00 PM [facility] Agustin[facility]        Today's Diagnoses     Elevated liver enzymes    -  1    Chronic gout without tophus, unspecified cause, unspecified site        Elevated random blood glucose level        History of renal calculi           Follow-ups after your visit        Who to contact     If you have questions or need follow up information about today's clinic visit or your schedule please contact [facility] directly at [phone].  Normal or non-critical lab and imaging results will be communicated to you by MyChart, letter or phone within 4 business days after the clinic has received the results. If you do not hear from us within 7 days, please contact the clinic through MyChart or phone. If you have a critical or abnormal lab result, we will notify you by phone as soon as possible.  Submit refill requests through [employer] or call your pharmacy and they will forward the refill request to us. Please allow 3 business days for your refill to be completed.          Additional Information About Your Visit        MyChart Information     [employer] lets you send messages to your doctor, view your test results, renew your prescriptions, schedule appointments and more. To sign up, go to [email or web address] . Click on \"Log in\" on the left side of the screen, which will take you to the Welcome page. Then click on \"Sign up Now\" on the right side of the page.     You will be asked to enter the access code listed below, as well as some personal information. Please follow the directions to create your username and password.     Your access code is: [email or web address]  Expires: 2018  2:07 AM     Your access code will  in 90 days. If you need help or a new code, " "please call your [facility] clinic or [phone].        Care EveryWhere ID     This is your Care EveryWhere ID. This could be used by other organizations to access your [facility] medical records  [id number]        Your Vitals Were     Pulse Temperature Respirations Height Pulse Oximetry BMI (Body Mass Index)    61 96.7  F (35.9  C) (Tympanic) 18 5' 10\" (1.778 m) 94% 27.69 kg/m2       Blood Pressure from Last 3 Encounters:   04/11/18 138/80   04/08/18 143/87   01/12/18 130/84    Weight from Last 3 Encounters:   04/11/18 193 lb (87.5 kg)   04/08/18 185 lb (83.9 kg)   01/12/18 190 lb (86.2 kg)              We Performed the Following     Comprehensive metabolic panel     Hemoglobin A1c     UA reflex to Microscopic     Uric acid        Primary Care Provider Office Phone # Fax #    Santy Raudel Vamsi,  [phone]       [facility] [address]        Equal Access to Services     LIZZY ALFONSO : Hadii malcom ku hadasho Soomaali, waaxda luqadaha, qaybta kaalmada adeegyada, greg browne haycheli monroy . So [facility] [phone].    ATENCIÓN: Si habla español, tiene a bae disposición servicios gratuitos de asistencia lingüística. Llame al [phone].    We comply with applicable federal civil rights laws and [place] laws. We do not discriminate on the basis of race, color, national origin, age, disability, sex, sexual orientation, or gender identity.            Thank you!     Thank you for choosing [facility]formerly [facility]3]  for your care. Our goal is always to provide you with excellent care. Hearing back from our patients is one way we can continue to improve our services. Please take a few minutes to complete the written survey that you may receive in the mail after your visit with us. Thank you!             Your Updated Medication List - Protect others around you: Learn how to safely use, store and throw away your medicines at www.disposemymeds.org.          This list is " accurate as of 4/11/18  2:29 PM.  Always use your most recent med list.                   Brand Name Dispense Instructions for use Diagnosis    allopurinol 100 MG tablet    ZYLOPRIM    90 tablet    Take 1 tablet (100 mg) by mouth 2 times daily    Chronic idiopathic gout involving toe of left foot without tophus       amLODIPine 5 MG tablet    NORVASC    30 tablet    TAKE 1 TABLET DAILY BY MOUTH - GENERIC FOR NORVASC    Benign essential hypertension       aspirin 81 MG tablet      Take 81 mg by mouth daily        atenolol 50 MG tablet    TENORMIN    90 tablet    Take 1 tablet (50 mg) by mouth daily    Benign essential hypertension       colchicine 0.6 MG tablet     20 tablet    Take 2 tablets at the onset of gout pain. May take one tablet again in one hour. Max 4 tabs in 24 hrs.    Chronic idiopathic gout involving toe of left foot without tophus       hydrochlorothiazide 25 MG tablet    HYDRODIURIL    180 tablet    Take 1 tablet (25 mg) by mouth daily    Benign essential hypertension       ibuprofen 800 MG tablet    ADVIL/MOTRIN    30 tablet    Take 1 tablet (800 mg) by mouth every 8 hours as needed for moderate pain    Right-sided low back pain without sciatica       indomethacin 50 MG capsule    INDOCIN    20 capsule    Take 1 capsule twice a day as needed for gout pain    Chronic idiopathic gout involving toe of left foot without tophus       Multi-vitamin Tabs tablet      Take 1 tablet by mouth daily        NAPROXEN SODIUM PO      Take 275 mg by mouth once        ranitidine 150 MG tablet    ZANTAC    180 tablet    TAKE ONE TABLET TWO TIMES A DAY BY MOUTH    Gastroesophageal reflux disease, esophagitis presence not specified       tadalafil 20 MG tablet    CIALIS    36 tablet    Take 1 tablet (20 mg) by mouth as needed    Erectile dysfunction, unspecified erectile dysfunction type       TUMS PO      tums 200 mg calcium (500 mg) chewable Take 1 tablet by mouth prn

## 2018-04-12 ENCOUNTER — APPOINTMENT (OUTPATIENT)
Dept: CT IMAGING | Facility: HOSPITAL | Age: 73
End: 2018-04-12
Attending: FAMILY MEDICINE
Payer: COMMERCIAL

## 2018-04-12 ENCOUNTER — HOSPITAL ENCOUNTER (EMERGENCY)
Facility: HOSPITAL | Age: 73
Discharge: ANOTHER HOSPITAL | End: 2018-04-12
Attending: FAMILY MEDICINE | Admitting: FAMILY MEDICINE
Payer: COMMERCIAL

## 2018-04-12 VITALS
DIASTOLIC BLOOD PRESSURE: 71 MMHG | TEMPERATURE: 96.9 F | HEART RATE: 51 BPM | OXYGEN SATURATION: 96 % | SYSTOLIC BLOOD PRESSURE: 109 MMHG | RESPIRATION RATE: 20 BRPM

## 2018-04-12 DIAGNOSIS — R10.84 ABDOMINAL PAIN, GENERALIZED: ICD-10-CM

## 2018-04-12 DIAGNOSIS — K85.10 ACUTE BILIARY PANCREATITIS WITHOUT INFECTION OR NECROSIS: Primary | ICD-10-CM

## 2018-04-12 DIAGNOSIS — K80.50 COMMON BILE DUCT STONE: ICD-10-CM

## 2018-04-12 DIAGNOSIS — R40.4 ALTERED LEVEL OF CONSCIOUSNESS: ICD-10-CM

## 2018-04-12 LAB
ALBUMIN SERPL-MCNC: 4.1 G/DL (ref 3.4–5)
ALP SERPL-CCNC: 107 U/L (ref 40–150)
ALT SERPL W P-5'-P-CCNC: 488 U/L (ref 0–70)
AMYLASE SERPL-CCNC: 5893 U/L (ref 30–110)
ANION GAP SERPL CALCULATED.3IONS-SCNC: 10 MMOL/L (ref 3–14)
APTT PPP: 23 SEC (ref 24–37)
AST SERPL W P-5'-P-CCNC: 321 U/L (ref 0–45)
BASOPHILS # BLD AUTO: 0 10E9/L (ref 0–0.2)
BASOPHILS NFR BLD AUTO: 0.4 %
BILIRUB SERPL-MCNC: 1.1 MG/DL (ref 0.2–1.3)
BUN SERPL-MCNC: 22 MG/DL (ref 7–30)
CALCIUM SERPL-MCNC: 9.3 MG/DL (ref 8.5–10.1)
CHLORIDE SERPL-SCNC: 100 MMOL/L (ref 94–109)
CO2 SERPL-SCNC: 26 MMOL/L (ref 20–32)
CREAT SERPL-MCNC: 1.01 MG/DL (ref 0.66–1.25)
CRP SERPL-MCNC: 3.8 MG/L (ref 0–8)
D DIMER PPP DDU-MCNC: 270 NG/ML D-DU (ref 0–300)
DIFFERENTIAL METHOD BLD: NORMAL
EOSINOPHIL # BLD AUTO: 0 10E9/L (ref 0–0.7)
EOSINOPHIL NFR BLD AUTO: 0 %
ERYTHROCYTE [DISTWIDTH] IN BLOOD BY AUTOMATED COUNT: 13.1 % (ref 10–15)
GFR SERPL CREATININE-BSD FRML MDRD: 73 ML/MIN/1.7M2
GLUCOSE BLDC GLUCOMTR-MCNC: 141 MG/DL (ref 70–99)
GLUCOSE SERPL-MCNC: 142 MG/DL (ref 70–99)
HCT VFR BLD AUTO: 46 % (ref 40–53)
HGB BLD-MCNC: 16.4 G/DL (ref 13.3–17.7)
IMM GRANULOCYTES # BLD: 0 10E9/L (ref 0–0.4)
IMM GRANULOCYTES NFR BLD: 0.2 %
INR PPP: 1.14 (ref 0.8–1.2)
LACTATE SERPL-SCNC: 1.8 MMOL/L (ref 0.4–2)
LIPASE SERPL-CCNC: ABNORMAL U/L (ref 73–393)
LYMPHOCYTES # BLD AUTO: 2.7 10E9/L (ref 0.8–5.3)
LYMPHOCYTES NFR BLD AUTO: 25.5 %
MCH RBC QN AUTO: 31.6 PG (ref 26.5–33)
MCHC RBC AUTO-ENTMCNC: 35.7 G/DL (ref 31.5–36.5)
MCV RBC AUTO: 89 FL (ref 78–100)
MONOCYTES # BLD AUTO: 0.9 10E9/L (ref 0–1.3)
MONOCYTES NFR BLD AUTO: 8.6 %
NEUTROPHILS # BLD AUTO: 7 10E9/L (ref 1.6–8.3)
NEUTROPHILS NFR BLD AUTO: 65.3 %
NRBC # BLD AUTO: 0 10*3/UL
NRBC BLD AUTO-RTO: 0 /100
PLATELET # BLD AUTO: 214 10E9/L (ref 150–450)
POTASSIUM SERPL-SCNC: 3.5 MMOL/L (ref 3.4–5.3)
PROT SERPL-MCNC: 8.2 G/DL (ref 6.8–8.8)
RBC # BLD AUTO: 5.19 10E12/L (ref 4.4–5.9)
SODIUM SERPL-SCNC: 136 MMOL/L (ref 133–144)
TROPONIN I SERPL-MCNC: <0.015 UG/L (ref 0–0.04)
WBC # BLD AUTO: 10.7 10E9/L (ref 4–11)

## 2018-04-12 PROCEDURE — 74174 CTA ABD&PLVS W/CONTRAST: CPT | Mod: TC

## 2018-04-12 PROCEDURE — 00000146 ZZHCL STATISTIC GLUCOSE BY METER IP

## 2018-04-12 PROCEDURE — 84484 ASSAY OF TROPONIN QUANT: CPT | Performed by: FAMILY MEDICINE

## 2018-04-12 PROCEDURE — 93010 ELECTROCARDIOGRAM REPORT: CPT | Performed by: INTERNAL MEDICINE

## 2018-04-12 PROCEDURE — 83690 ASSAY OF LIPASE: CPT | Performed by: EMERGENCY MEDICINE

## 2018-04-12 PROCEDURE — 85610 PROTHROMBIN TIME: CPT | Performed by: EMERGENCY MEDICINE

## 2018-04-12 PROCEDURE — 99285 EMERGENCY DEPT VISIT HI MDM: CPT | Mod: 25

## 2018-04-12 PROCEDURE — 25000128 H RX IP 250 OP 636

## 2018-04-12 PROCEDURE — 96375 TX/PRO/DX INJ NEW DRUG ADDON: CPT

## 2018-04-12 PROCEDURE — 80053 COMPREHEN METABOLIC PANEL: CPT | Performed by: FAMILY MEDICINE

## 2018-04-12 PROCEDURE — 83605 ASSAY OF LACTIC ACID: CPT | Performed by: EMERGENCY MEDICINE

## 2018-04-12 PROCEDURE — 25000128 H RX IP 250 OP 636: Performed by: FAMILY MEDICINE

## 2018-04-12 PROCEDURE — 82150 ASSAY OF AMYLASE: CPT | Performed by: EMERGENCY MEDICINE

## 2018-04-12 PROCEDURE — 86140 C-REACTIVE PROTEIN: CPT | Performed by: FAMILY MEDICINE

## 2018-04-12 PROCEDURE — 85730 THROMBOPLASTIN TIME PARTIAL: CPT | Performed by: EMERGENCY MEDICINE

## 2018-04-12 PROCEDURE — 93005 ELECTROCARDIOGRAM TRACING: CPT

## 2018-04-12 PROCEDURE — 85379 FIBRIN DEGRADATION QUANT: CPT | Performed by: EMERGENCY MEDICINE

## 2018-04-12 PROCEDURE — 85025 COMPLETE CBC W/AUTO DIFF WBC: CPT | Performed by: FAMILY MEDICINE

## 2018-04-12 PROCEDURE — 36415 COLL VENOUS BLD VENIPUNCTURE: CPT | Performed by: FAMILY MEDICINE

## 2018-04-12 PROCEDURE — 25000128 H RX IP 250 OP 636: Performed by: EMERGENCY MEDICINE

## 2018-04-12 PROCEDURE — 96374 THER/PROPH/DIAG INJ IV PUSH: CPT

## 2018-04-12 PROCEDURE — 99285 EMERGENCY DEPT VISIT HI MDM: CPT | Mod: Z6 | Performed by: FAMILY MEDICINE

## 2018-04-12 RX ORDER — HYDROMORPHONE HYDROCHLORIDE 1 MG/ML
1 INJECTION, SOLUTION INTRAMUSCULAR; INTRAVENOUS; SUBCUTANEOUS ONCE
Status: COMPLETED | OUTPATIENT
Start: 2018-04-12 | End: 2018-04-12

## 2018-04-12 RX ORDER — ONDANSETRON 2 MG/ML
INJECTION INTRAMUSCULAR; INTRAVENOUS
Status: COMPLETED
Start: 2018-04-12 | End: 2018-04-12

## 2018-04-12 RX ORDER — FENTANYL CITRATE 50 UG/ML
100 INJECTION, SOLUTION INTRAMUSCULAR; INTRAVENOUS ONCE
Status: COMPLETED | OUTPATIENT
Start: 2018-04-12 | End: 2018-04-12

## 2018-04-12 RX ORDER — ONDANSETRON 2 MG/ML
4 INJECTION INTRAMUSCULAR; INTRAVENOUS ONCE
Status: COMPLETED | OUTPATIENT
Start: 2018-04-12 | End: 2018-04-12

## 2018-04-12 RX ORDER — MORPHINE SULFATE 4 MG/ML
INJECTION, SOLUTION INTRAMUSCULAR; INTRAVENOUS
Status: COMPLETED
Start: 2018-04-12 | End: 2018-04-12

## 2018-04-12 RX ORDER — FENTANYL CITRATE 50 UG/ML
INJECTION, SOLUTION INTRAMUSCULAR; INTRAVENOUS
Status: COMPLETED
Start: 2018-04-12 | End: 2018-04-12

## 2018-04-12 RX ORDER — IOPAMIDOL 755 MG/ML
75 INJECTION, SOLUTION INTRAVASCULAR ONCE
Status: COMPLETED | OUTPATIENT
Start: 2018-04-12 | End: 2018-04-12

## 2018-04-12 RX ORDER — MORPHINE SULFATE 4 MG/ML
4 INJECTION, SOLUTION INTRAMUSCULAR; INTRAVENOUS ONCE
Status: COMPLETED | OUTPATIENT
Start: 2018-04-12 | End: 2018-04-12

## 2018-04-12 RX ADMIN — FENTANYL CITRATE 100 MCG: 50 INJECTION, SOLUTION INTRAMUSCULAR; INTRAVENOUS at 20:17

## 2018-04-12 RX ADMIN — ONDANSETRON 4 MG: 2 INJECTION INTRAMUSCULAR; INTRAVENOUS at 20:11

## 2018-04-12 RX ADMIN — MORPHINE SULFATE 4 MG: 4 INJECTION, SOLUTION INTRAMUSCULAR; INTRAVENOUS at 20:09

## 2018-04-12 RX ADMIN — HYDROMORPHONE HYDROCHLORIDE 1 MG: 1 INJECTION, SOLUTION INTRAMUSCULAR; INTRAVENOUS; SUBCUTANEOUS at 21:01

## 2018-04-12 RX ADMIN — IOPAMIDOL 75 ML: 755 INJECTION, SOLUTION INTRAVENOUS at 20:19

## 2018-04-12 ASSESSMENT — PATIENT HEALTH QUESTIONNAIRE - PHQ9: SUM OF ALL RESPONSES TO PHQ QUESTIONS 1-9: 0

## 2018-04-12 ASSESSMENT — ANXIETY QUESTIONNAIRES: GAD7 TOTAL SCORE: 0

## 2018-04-13 ENCOUNTER — TRANSFERRED RECORDS (OUTPATIENT)
Dept: HEALTH INFORMATION MANAGEMENT | Facility: CLINIC | Age: 73
End: 2018-04-13

## 2018-04-13 NOTE — ED PROVIDER NOTES
Patient handed over from Dr. Santos at shift change at 8 PM, please see her notes  Diagnoses:  Acute pancreatitis with stone in the distal common bile duct  Patient's pain was controlled with IV Dilaudid and fentanyl in the emergency department.  Started on aggressive IV fluid hydration.  CT scan showed acute pancreatitis with stone in the common bile duct.  Patient now being transferred to [facility] in [place] under care of gastroenterologist. Dr Kevin avila.     Yfn Hummel MD  04/12/18 [id number]

## 2018-04-13 NOTE — ED NOTES
Nurse to nurse report given to Naida DOYLE.  Aware family will be coming to hospital tonight.  Pt is transferred to [facility] at 2255 via [facility] with report given to EMS.  IV sites WDL X2.  VSS as charted.  States he is comfortable and does not rate pain on discharge.  No s/x of distress.

## 2018-04-13 NOTE — ED PROVIDER NOTES
History     Chief Complaint   Patient presents with     Abdominal Pain     c/o abd pain since supper time, denies nausea, vomiting or diarrhea. notes same pain evaluated last sat     HPI  Rafa Bhatti is a 72 year old male who presented to the ED with abdominal pain in the low abdomen that started about 1700.  By the time he got to triage he was losing consciousness and had to be brought emergently to the room.  He was profusely diaphoretic and had minimal response to stimulus.  After lying down he became somewhat more responsive, but took some time to get back to normal response.  Blood glucose on arrival was 141.  EKG showed sinus bradycardia with LVH and widened QRS complex.  Patient was taken emergently to CT for aortic angiogram.    Problem List:    Patient Active Problem List    Diagnosis Date Noted     Type 2 diabetes mellitus without complication, without long-term current use of insulin (H) 04/11/2018     Priority: Medium     Chronic gout 10/18/2017     Priority: Medium     Esophageal reflux 10/18/2017     Priority: Medium     ACP (advance care planning) 10/26/2016     Priority: Medium     Advance Care Planning 10/26/2016: ACP Review of Chart / Resources Provided:  Reviewed chart for advance care plan.  Rafa Bhatti has no plan or code status on file. Discussed available resources and provided with information. Confirmed code status reflects current choices pending further ACP discussions.  Confirmed/documented legally designated decision makers.  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 Provider to review          Past Medical History:    Past Medical History:   Diagnosis Date     Benign neoplasm of unspecified site 8/1/2012     Calculus of kidney 5/20/2002     Chest pain, unspecified 3/2/2000     Gout, unspecified 8/3/2011     Hyperlipidemia      Unspecified essential hypertension 7/18/2000       Past Surgical History:    Past Surgical History:   Procedure Laterality Date     cardiolyte stress test  2000    chest pain     COLONOSCOPY  1999     fistula in ANO       HEMORRHOIDECTOMY       removal of dermoid cyst of mediastinum       VASECTOMY         Family History:    Family History   Problem Relation Age of Onset     C.A.D. Father 80     C.A.D. Mother      Hypertension Mother      C.A.D. Brother      C.A.D. Other      family hx       Social History:  Marital Status:   [2]  Social History   Substance Use Topics     Smoking status: Never Smoker     Smokeless tobacco: Never Used     Alcohol use Yes      Comment: socially        Medications:      multivitamin, therapeutic with minerals (MULTI-VITAMIN) TABS tablet   ranitidine (ZANTAC) 150 MG tablet   amLODIPine (NORVASC) 5 MG tablet   allopurinol (ZYLOPRIM) 100 MG tablet   atenolol (TENORMIN) 50 MG tablet   hydrochlorothiazide (HYDRODIURIL) 25 MG tablet   aspirin 81 MG tablet   glimepiride (AMARYL) 2 MG tablet   colchicine 0.6 MG tablet   tadalafil (CIALIS) 20 MG tablet   Calcium Carbonate Antacid (TUMS PO)         Review of Systems  Initially unable to obtain due to patient condition, see Dr. Fernandez's note.    Physical Exam   BP: 149/88  Heart Rate: 57  Temp: 96.4  F (35.8  C)  Resp: 16  SpO2: 98 %      Physical Exam   Constitutional: He appears well-developed and well-nourished. He appears listless. He appears distressed.   HENT:   Head: Normocephalic and atraumatic.   Neck: Normal range of motion. Neck supple.   Cardiovascular: Normal rate, regular rhythm and intact distal pulses.  Exam  reveals distant heart sounds.    No murmur heard.  Pulmonary/Chest: Effort normal and breath sounds normal. No respiratory distress.   Abdominal: Soft. Bowel sounds are normal. He exhibits no distension. There is tenderness. There is rebound and guarding.   Neurological: He appears listless.   Skin: Skin is warm. He is diaphoretic.   Psychiatric:   Unable to assess.   Nursing note and vitals reviewed.      ED Course     ED Course     Procedures          EKG Interpretation:      Interpreted by Marjan Horner  Time reviewed: 1949  Symptoms at time of EKG: loss of consciousness.   Rhythm: sinus bradycardia  Rate: 50-60  Axis: Left Axis Deviation  Ectopy: none  Conduction: nonspecific interventricular conduction block  ST Segments/ T Waves: No acute ischemic changes and Non-specific ST-T wave changes  Q Waves: none  Comparison to prior: bradycardia and axis changed    Clinical Impression: non-specific EKG    Results for orders placed or performed during the hospital encounter of 04/12/18 (from the past 24 hour(s))   Glucose by meter   Result Value Ref [place]    Glucose 141 (H) 70 - 99 mg/dL   CBC with platelets differential   Result Value Ref [place]    WBC 10.7 4.0 - 11.0 10e9/L    RBC Count 5.19 4.4 - 5.9 10e12/L    Hemoglobin 16.4 13.3 - 17.7 g/dL    Hematocrit 46.0 40.0 - 53.0 %    MCV 89 78 - 100 fl    MCH 31.6 26.5 - 33.0 pg    MCHC 35.7 31.5 - 36.5 g/dL    RDW 13.1 10.0 - 15.0 %    Platelet Count 214 150 - 450 10e9/L    Diff Method Automated Method     % Neutrophils 65.3 %    % Lymphocytes 25.5 %    % Monocytes 8.6 %    % Eosinophils 0.0 %    % Basophils 0.4 %    % Immature Granulocytes 0.2 %    Nucleated RBCs 0 0 /100    Absolute Neutrophil 7.0 1.6 - 8.3 10e9/L    Absolute Lymphocytes 2.7 0.8 - 5.3 10e9/L    Absolute Monocytes 0.9 0.0 - 1.3 10e9/L    Absolute Eosinophils 0.0 0.0 - 0.7 10e9/L    Absolute Basophils 0.0 0.0 - 0.2 10e9/L    Abs Immature Granulocytes 0.0 0 - 0.4 10e9/L    Absolute Nucleated  RBC 0.0    CT Chest/Abd/Pelvis Angio w Processing    Narrative    PROCEDURE: CTA ANGIOGRAM CHEST/ABD/PELVIS W PROCESSING   4/12/2018 8:22 PM    HISTORY:Male, age,  72 years, , , shock;     COMPARISON: None    TECHNIQUE: CT scan was performed of the chest, abdomen and pelvis  after the administration of intravenous contrast. Sagittal, coronal,  axial and 3-D reconstructed images were reviewed.    FINDINGS:   Thoracic CT angiogram:  Heart and great vessels: Minimal atherosclerotic calcification within  the coronary arteries. No evidence of pulmonary embolus no evidence of  aneurysm; no dissection.    Thoracic CT:  Lungs: Mild emphysema and peripheral areas of atelectasis.    Lymph nodes: Number of normal and occasionally prominent lymph nodes  are seen throughout the mediastinum and hilar regions.    Bony structures: Scattered degenerative changes throughout the  thoracic spine. Sternum is intact.    Abdomen Pelvis CT Angiogram:    Abdominal aorta: Small volume of calcified noncalcified plaque  Iliac arteries: Small volume of calcified noncalcified plaque.    Lower extremity/runoff CT angiogram:   Femoral arteries: Patent.    Abdomen/Pelvis CT:    Esophagus/stomach: Normal.    Liver:  Hepatic steatosis.  Portable venous system: Patent.  Gallbladder: Distended. There is mild fat stranding seen along the  distal aspects of the common duct. There is a 3 mm radiodense focus  suggesting a small obstructing stone within the common duct and the  ampulla.    Spleen: Normal.    Pancreas: Pancreatitis. No evidence of apparent necrosis.    Adrenal Glands: Normal.    Kidneys: Normal.  Ureters: Normal.    Abdominal Aorta: Scattered atherosclerotic calcifications.  IVC: Normal.    Lymph Nodes: Normal.  Urinary bladder: Normal.    Large and Small Bowel: Normal.  Appendix: Not seen. No secondary signs of appendicitis.    Pelvic Organs:  Prostate calcifications.  Bony structures: No acute abnormality. Mild degenerative changes  are  seen within the lumbar spine.    Other Findings:  Inguinal lymph nodes are normal.         Impression    IMPRESSION:   1.  Pancreatitis with suggestion a small, 3 to 4 mm obstructing stone  within the distal common bile duct at the ampulla.    2.   No evidence of dissection, pulmonary embolus or acute vascular  abnormality. Hepatic steatosis.    MOISÉS RENO MD       Medications   morphine (PF) injection 4 mg (4 mg Intravenous Given 4/12/18 2009)   ondansetron (ZOFRAN) injection 4 mg (4 mg Intravenous Given 4/12/18 2011)   sodium chloride (PF) 0.9% PF flush 100 mL (100 mLs Intravenous Given 4/12/18 2019)   iopamidol (ISOVUE-370) solution 75 mL (75 mLs Intravenous Given 4/12/18 2019)   fentaNYL (PF) (SUBLIMAZE) 100 MCG/2ML injection (100 mcg  Given 4/12/18 2017)       Assessments & Plan (with Medical Decision Making)   Patient turned over to Dr. Fernandez at change of shift with labs and CT interpretation pending.      I have reviewed the nursing notes.    I have reviewed the findings, diagnosis, plan and need for follow up with the patient.  New Prescriptions    No medications on file       Final diagnoses:   Abdominal pain, generalized - BLQ   Altered level of consciousness       4/12/2018   [facility]     Marjan Flynn MD  04/12/18 2031

## 2018-04-13 NOTE — ED NOTES
Pt in triage and started becoming unresponsive and brought to ED room 10 in w/c by staff.  Pt c/o increase low abd pain 10/10.  .  Saw Dr Agustin yesterday and dxs with DM and was told he probably passed a kidney stone as he had blood in his urine.  Wife states his liver lab was elevated.  Pt with eyes closed but answers questions appropriately.  Did not lose consciousness.  Assessment is complete.  Monitors on pt.  HR dipping to 30-40s.  Provider is at the bedside.

## 2018-04-13 NOTE — DISCHARGE INSTRUCTIONS
What to expect when you have contrast    During your exam, we will inject  contrast  into your vein or artery. (Contrast is a clear liquid with iodine in it. It shows up on X-rays.)    You may feel warm or hot. You may have a metal taste in your mouth and a slight upset stomach. You may also feel pressure near the kidneys and bladder. These effects will last about 1 to 3 minutes.    Please tell us if you have:    Sneezing     Itching    Hives     Swelling in the face    A hoarse voice    Breathing problems    Other new symptoms    Serious problems are rare.  They may include:    Irregular heartbeat     Seizures    Kidney failure              Tissue damage    Shock      Death    If you have any problems during the exam, we  will treat them right away.    When you get home    Call your hospital if you have any new symptoms in the next 2 days, like hives or swelling. (Phone numbers are at the bottom of this page.) Or call your family doctor.     If you have wheezing or trouble breathing, call 911.    Self-care  -Drink at least 4 extra glasses of water today.   This reduces the stress on your kidneys.  -Keep taking your regular medicines.    The contrast will pass out of your body in your  Urine(pee). This will happen in the next 24 hours. You  will not feel this. Your urine will not  change color.    If you have kidney problems or take metformin    Drink 4 to 8 large glasses of water for the next  2 days, if you are not on a fluid restriction.    ?If you take metformin (Glucophage or Glucovance) for diabetes, keep taking it.      ?Your kidney function tests are abnormal.  If you take Metformin, do not take it for 48 hours. Please go to your clinic for a blood test within 3 days after your exam before the restarting this medicine.     (Note to provider:please give patient prescription for lab tests.)    ?Special instructions: DRINK 4 TO 8 EXTRA GLASSES OF WATER FOR THE NEXT 2 DAYS    I have read and understand the above  information.    Patient Sign Here:______________________________________Date:________Time:______    Staff Sign Here:________________________________________Date:_______Time:______      Radiology Departments:     ?[facility]: [phone] ?[facility]: [phone]     ?[facility]: [phone] ?[facility]:[phone]      ?[facility]: [phone]  ?[facility]: [phone]  ?[facility]:[phone]    ?[facility]:[phone]  ?[facility]:[phone]

## 2018-04-13 NOTE — ED NOTES
Per UA at [facility] Patricio RN will phone back in 20 minutes for report.  UA aware pt has left over minutes ago.

## 2018-04-17 ENCOUNTER — TELEPHONE (OUTPATIENT)
Dept: PEDIATRICS | Facility: OTHER | Age: 73
End: 2018-04-17

## 2018-04-17 NOTE — TELEPHONE ENCOUNTER
Patient called and LVM on my phone stating he would like a call at [phone].  Patient did no specify what he would like a call about.    Thank you

## 2018-04-18 NOTE — TELEPHONE ENCOUNTER
I don't see any insulin on his current med list nor on the cancelled list. Am I missing something or is he just not on insulin. Please advise.

## 2018-04-18 NOTE — TELEPHONE ENCOUNTER
Cookie (daughter) called and stated they would like to speak with Dr Agustin if they can. They have a question regarding pt's insulin. Pt did not have it yesterday or today. They need clarification on what pt is taking and would like to discuss this. Please call pt back at [phone]

## 2018-04-18 NOTE — TELEPHONE ENCOUNTER
Called patient back and he just wanted you to know that he was transferred to [facility] on  4/12/18 and is now home but is unable to come in this week for his diabetic labs. I told him he does have an appointment on 5/23/18 for a DM follow up and he will keep that and come in when the time is closer to have his labs done. He will have a hospital follow up next week in [place] with his surgeon.

## 2018-04-19 ENCOUNTER — TELEPHONE (OUTPATIENT)
Dept: PEDIATRICS | Facility: OTHER | Age: 73
End: 2018-04-19

## 2018-04-19 NOTE — TELEPHONE ENCOUNTER
Returned patient call and advised him to take medication as prescribed and that he does not need to take monitor blood sugars at home at this time per Dr. Agustin.

## 2018-04-19 NOTE — TELEPHONE ENCOUNTER
9:01 AM    Reason for Call: Phone Call    Description: Rafa needs to know when to take his diabetic medication / testing.  Please call Rafa MCARTHUR.    Was an appointment offered for this call?   No    Preferred method for responding to this message: [phone]    PRISCILLA Stephens

## 2018-04-21 DIAGNOSIS — R10.9 FLANK PAIN: Primary | ICD-10-CM

## 2018-04-21 NOTE — TELEPHONE ENCOUNTER
I called the daughter and clarified that Rafa was on insulin in the hospital only and not on oral medications.  Some other questions were answered and she was satisfied with the answers.

## 2018-04-23 ENCOUNTER — TELEPHONE (OUTPATIENT)
Dept: INTERNAL MEDICINE | Facility: OTHER | Age: 73
End: 2018-04-23

## 2018-04-23 NOTE — TELEPHONE ENCOUNTER
Kelsy I did not know where to put patient, I did let them know that I saw this and that I was going to forward to you, and we would be in contact with them tomorrow.    Thank you

## 2018-04-23 NOTE — TELEPHONE ENCOUNTER
10:35 AM    Reason for Call: OVERBOOK    Patient is having the following symptoms: ringing in ears and scratchy throat for days days.    The patient is requesting an appointment for 04/24/2018 with Dr Agustin.    Was an appointment offered for this call? Yes  If yes : Appointment type              Date    Preferred method for responding to this message: Telephone Call  What is your phone number ?[phone]    If we cannot reach you directly, may we leave a detailed response at the number you provided? Yes    Can this message wait until your PCP/provider returns, if unavailable today? Lyssa,     Chelsy Barcenas

## 2018-04-24 ENCOUNTER — OFFICE VISIT (OUTPATIENT)
Dept: PEDIATRICS | Facility: OTHER | Age: 73
End: 2018-04-24
Attending: INTERNAL MEDICINE
Payer: COMMERCIAL

## 2018-04-24 ENCOUNTER — RADIANT APPOINTMENT (OUTPATIENT)
Dept: GENERAL RADIOLOGY | Facility: OTHER | Age: 73
End: 2018-04-24
Attending: INTERNAL MEDICINE
Payer: COMMERCIAL

## 2018-04-24 VITALS
BODY MASS INDEX: 26.54 KG/M2 | HEART RATE: 63 BPM | OXYGEN SATURATION: 97 % | DIASTOLIC BLOOD PRESSURE: 68 MMHG | WEIGHT: 185 LBS | TEMPERATURE: 97.4 F | SYSTOLIC BLOOD PRESSURE: 118 MMHG

## 2018-04-24 DIAGNOSIS — E11.9 TYPE 2 DIABETES MELLITUS WITHOUT COMPLICATION, WITHOUT LONG-TERM CURRENT USE OF INSULIN (H): ICD-10-CM

## 2018-04-24 DIAGNOSIS — M25.571 PAIN IN JOINT INVOLVING ANKLE AND FOOT, RIGHT: ICD-10-CM

## 2018-04-24 DIAGNOSIS — J06.9 VIRAL URI: ICD-10-CM

## 2018-04-24 DIAGNOSIS — K85.10 ACUTE BILIARY PANCREATITIS, UNSPECIFIED COMPLICATION STATUS: Primary | ICD-10-CM

## 2018-04-24 LAB
ALBUMIN SERPL-MCNC: 3.3 G/DL (ref 3.4–5)
ALP SERPL-CCNC: 92 U/L (ref 40–150)
ALT SERPL W P-5'-P-CCNC: 88 U/L (ref 0–70)
AMYLASE SERPL-CCNC: 81 U/L (ref 30–110)
ANION GAP SERPL CALCULATED.3IONS-SCNC: 9 MMOL/L (ref 3–14)
AST SERPL W P-5'-P-CCNC: 50 U/L (ref 0–45)
BILIRUB SERPL-MCNC: 0.4 MG/DL (ref 0.2–1.3)
BUN SERPL-MCNC: 19 MG/DL (ref 7–30)
CALCIUM SERPL-MCNC: 9.9 MG/DL (ref 8.5–10.1)
CHLORIDE SERPL-SCNC: 97 MMOL/L (ref 94–109)
CO2 SERPL-SCNC: 29 MMOL/L (ref 20–32)
CREAT SERPL-MCNC: 0.96 MG/DL (ref 0.66–1.25)
ERYTHROCYTE [DISTWIDTH] IN BLOOD BY AUTOMATED COUNT: 13.5 % (ref 10–15)
GFR SERPL CREATININE-BSD FRML MDRD: 77 ML/MIN/1.7M2
GLUCOSE SERPL-MCNC: 218 MG/DL (ref 70–99)
HCT VFR BLD AUTO: 45.6 % (ref 40–53)
HGB BLD-MCNC: 15.2 G/DL (ref 13.3–17.7)
LIPASE SERPL-CCNC: 365 U/L (ref 73–393)
MCH RBC QN AUTO: 30.8 PG (ref 26.5–33)
MCHC RBC AUTO-ENTMCNC: 33.3 G/DL (ref 31.5–36.5)
MCV RBC AUTO: 93 FL (ref 78–100)
PLATELET # BLD AUTO: 396 10E9/L (ref 150–450)
POTASSIUM SERPL-SCNC: 4 MMOL/L (ref 3.4–5.3)
PROT SERPL-MCNC: 8.9 G/DL (ref 6.8–8.8)
RBC # BLD AUTO: 4.93 10E12/L (ref 4.4–5.9)
SODIUM SERPL-SCNC: 135 MMOL/L (ref 133–144)
TSH SERPL DL<=0.005 MIU/L-ACNC: 1.44 MU/L (ref 0.4–4)
WBC # BLD AUTO: 12.7 10E9/L (ref 4–11)

## 2018-04-24 PROCEDURE — 84443 ASSAY THYROID STIM HORMONE: CPT | Mod: ZL | Performed by: INTERNAL MEDICINE

## 2018-04-24 PROCEDURE — 82150 ASSAY OF AMYLASE: CPT | Mod: ZL | Performed by: INTERNAL MEDICINE

## 2018-04-24 PROCEDURE — 85027 COMPLETE CBC AUTOMATED: CPT | Mod: ZL | Performed by: INTERNAL MEDICINE

## 2018-04-24 PROCEDURE — 99214 OFFICE O/P EST MOD 30 MIN: CPT | Performed by: INTERNAL MEDICINE

## 2018-04-24 PROCEDURE — 80053 COMPREHEN METABOLIC PANEL: CPT | Mod: ZL | Performed by: INTERNAL MEDICINE

## 2018-04-24 PROCEDURE — 73630 X-RAY EXAM OF FOOT: CPT | Mod: TC,RT

## 2018-04-24 PROCEDURE — 36415 COLL VENOUS BLD VENIPUNCTURE: CPT | Mod: ZL | Performed by: INTERNAL MEDICINE

## 2018-04-24 PROCEDURE — 83690 ASSAY OF LIPASE: CPT | Mod: ZL | Performed by: INTERNAL MEDICINE

## 2018-04-24 PROCEDURE — G0463 HOSPITAL OUTPT CLINIC VISIT: HCPCS

## 2018-04-24 ASSESSMENT — ANXIETY QUESTIONNAIRES
2. NOT BEING ABLE TO STOP OR CONTROL WORRYING: NOT AT ALL
GAD7 TOTAL SCORE: 0
5. BEING SO RESTLESS THAT IT IS HARD TO SIT STILL: NOT AT ALL
7. FEELING AFRAID AS IF SOMETHING AWFUL MIGHT HAPPEN: NOT AT ALL
3. WORRYING TOO MUCH ABOUT DIFFERENT THINGS: NOT AT ALL
6. BECOMING EASILY ANNOYED OR IRRITABLE: NOT AT ALL
1. FEELING NERVOUS, ANXIOUS, OR ON EDGE: NOT AT ALL
4. TROUBLE RELAXING: NOT AT ALL

## 2018-04-24 ASSESSMENT — PAIN SCALES - GENERAL: PAINLEVEL: MODERATE PAIN (4)

## 2018-04-24 NOTE — NURSING NOTE
"Chief Complaint   Patient presents with     URI     ringing ears, scratchy throat       Initial /68 (BP Location: Left arm, Patient Position: Chair, Cuff Size: Adult Regular)  Pulse 63  Temp 97.4  F (36.3  C) (Tympanic)  Wt 185 lb (83.9 kg)  SpO2 97%  BMI 26.54 kg/m2 Estimated body mass index is 26.54 kg/(m^2) as calculated from the following:    Height as of 4/11/18: 5' 10\" (1.778 m).    Weight as of this encounter: 185 lb (83.9 kg).  Medication Reconciliation: complete   Verna Brock LPN  "

## 2018-04-24 NOTE — MR AVS SNAPSHOT
After Visit Summary   4/24/2018    Rafa Bhatti    MRN: [id number]           Patient Information     Date Of Birth          1945        Visit Information        Provider Department      4/24/2018 10:00 AM Santy Agustin DO [place] [facility]        Today's Diagnoses     Acute biliary pancreatitis, unspecified complication status    -  1    Type 2 diabetes mellitus without complication, without long-term current use of insulin (H)        Pain in joint involving ankle and foot, right           Follow-ups after your visit        Your next 10 appointments already scheduled     May 23, 2018  9:00 AM CDT   (Arrive by 8:40 AM)   SHORT with Santy Agustin DO   [facility] ([facility] )    [address]  [place] [address]   [phone]              Future tests that were ordered for you today     Open Future Orders        Priority Expected Expires Ordered    XR FOOT RIGHT 2 VIEWS (Clinic Performed) Routine 4/24/2018 4/24/2019 4/24/2018            Who to contact     If you have questions or need follow up information about today's clinic visit or your schedule please contact [facility] directly at [phone].  Normal or non-critical lab and imaging results will be communicated to you by MyChart, letter or phone within 4 business days after the clinic has received the results. If you do not hear from us within 7 days, please contact the clinic through MyChart or phone. If you have a critical or abnormal lab result, we will notify you by phone as soon as possible.  Submit refill requests through [facility] or call your pharmacy and they will forward the refill request to us. Please allow 3 business days for your refill to be completed.          Additional Information About Your Visit        MyChart Information     [facility] lets you send messages to your doctor, view your test results, renew your prescriptions, schedule appointments  "and more. To sign up, go to [email or web address] . Click on \"Log in\" on the left side of the screen, which will take you to the Welcome page. Then click on \"Sign up Now\" on the right side of the page.     You will be asked to enter the access code listed below, as well as some personal information. Please follow the directions to create your username and password.     Your access code is: [email or web address]  Expires: 2018  2:07 AM     Your access code will  in 90 days. If you need help or a new code, please call your [facility] clinic or [phone].        Care EveryWhere ID     This is your Care EveryWhere ID. This could be used by other organizations to access your [facility] medical records  [id number]        Your Vitals Were     Pulse Temperature Pulse Oximetry BMI (Body Mass Index)          63 97.4  F (36.3  C) (Tympanic) 97% 26.54 kg/m2         Blood Pressure from Last 3 Encounters:   18 118/68   18 109/71   18 138/80    Weight from Last 3 Encounters:   18 185 lb (83.9 kg)   18 193 lb (87.5 kg)   18 185 lb (83.9 kg)              We Performed the Following     Amylase     CBC with platelets     Comprehensive metabolic panel     Lipase     TSH with free T4 reflex        Primary Care Provider Office Phone # Fax #    Santy Raudel Agustin,  [phone]       [address] [place] [address]        Equal Access to Services     [facility] : Hadii malcom dunn Sonikole, waaxda luqadaha, qaybta kaalmada emmanuel, greg monroy . So [facility] [phone].    ATENCIÓN: Si habla español, tiene a bae disposición servicios gratuitos de asistencia lingüística. Llame al [phone].    We comply with applicable federal civil rights laws and [place] laws. We do not discriminate on the basis of race, color, national origin, age, disability, sex, sexual orientation, or gender identity.            Thank you!     Thank you for " choosing [facility] [place]  for your care. Our goal is always to provide you with excellent care. Hearing back from our patients is one way we can continue to improve our services. Please take a few minutes to complete the written survey that you may receive in the mail after your visit with us. Thank you!             Your Updated Medication List - Protect others around you: Learn how to safely use, store and throw away your medicines at www.disposemymeds.org.          This list is accurate as of 4/24/18 10:11 AM.  Always use your most recent med list.                   Brand Name Dispense Instructions for use Diagnosis    allopurinol 100 MG tablet    ZYLOPRIM    90 tablet    Take 1 tablet (100 mg) by mouth 2 times daily    Chronic idiopathic gout involving toe of left foot without tophus       amLODIPine 5 MG tablet    NORVASC    30 tablet    TAKE 1 TABLET DAILY BY MOUTH - GENERIC FOR NORVASC    Benign essential hypertension       aspirin 81 MG tablet      Take 81 mg by mouth daily        atenolol 50 MG tablet    TENORMIN    90 tablet    Take 1 tablet (50 mg) by mouth daily    Benign essential hypertension       colchicine 0.6 MG tablet     20 tablet    Take 2 tablets at the onset of gout pain. May take one tablet again in one hour. Max 4 tabs in 24 hrs.    Chronic idiopathic gout involving toe of left foot without tophus       glimepiride 2 MG tablet    AMARYL    60 tablet    Take 1 tablet (2 mg) by mouth 2 times daily (with meals)    Type 2 diabetes mellitus without complication, without long-term current use of insulin (H)       hydrochlorothiazide 25 MG tablet    HYDRODIURIL    180 tablet    Take 1 tablet (25 mg) by mouth daily    Benign essential hypertension       Multi-vitamin Tabs tablet      Take 1 tablet by mouth daily        ranitidine 150 MG tablet    ZANTAC    180 tablet    TAKE ONE TABLET TWO TIMES A DAY BY MOUTH    Gastroesophageal reflux disease, esophagitis presence not specified        tadalafil 20 MG tablet    CIALIS    36 tablet    Take 1 tablet (20 mg) by mouth as needed    Erectile dysfunction, unspecified erectile dysfunction type       TUMS PO      tums 200 mg calcium (500 mg) chewable Take 1 tablet by mouth prn

## 2018-04-24 NOTE — TELEPHONE ENCOUNTER
Please schedule patient for date/time: Today at 10 arrive 940    Have patient go to ER/Urgent Care Center. Urgent Care hours are 9:30 am to 8 pm, open 7 days a week. No.    Provider will call patient.No.    Other:

## 2018-04-24 NOTE — PROGRESS NOTES
SUBJECTIVE:   Rafa Bhatti is a 72 year old male who presents to clinic today for the following health issues:      RESPIRATORY SYMPTOMS      Duration: Day 3    Description  ear pain both and hoarse voice, sounds are muffles    Severity: moderate    Accompanying signs and symptoms: None    History (predisposing factors):  strep exposure (possible)    Precipitating or alleviating factors: None    Therapies tried and outcome:  rest and fluids, tylenol      -------------------------------------    Problem list and histories reviewed & adjusted, as indicated.  Additional history: as documented    Patient Active Problem List   Diagnosis     Encounter for monitoring testosterone replacement therapy     Testicular hypofunction     Elevated blood pressure     Benign essential hypertension     Hemangioma of spine     ACP (advance care planning)     Routine general medical examination at a health care facility     Chronic gout     Esophageal reflux     Type 2 diabetes mellitus without complication, without long-term current use of insulin (H)     Past Surgical History:   Procedure Laterality Date     cardiolyte stress test  2000    chest pain     CHOLECYSTECTOMY, COMMON BILE DUCT EXPLORATION, COMBINED  04/15/2018     COLONOSCOPY  1999     fistula in ANO       HEMORRHOIDECTOMY       removal of dermoid cyst of mediastinum       VASECTOMY         Social History   Substance Use Topics     Smoking status: Never Smoker     Smokeless tobacco: Never Used     Alcohol use Yes      Comment: socially     Family History   Problem Relation Age of Onset     C.A.D. Father 80     C.A.D. Mother      Hypertension Mother      C.A.D. Brother      C.A.D. Other      family hx           Reviewed and updated as needed this visit by clinical staff  Tobacco  Allergies  Meds  Med Hx  Surg Hx  Fam Hx  Soc Hx      Reviewed and updated as needed this visit by Provider         ROS:  CONSTITUTIONAL: NEGATIVE for fever, chills, change in  weight    ENT/MOUTH: as in the HPI  RESP:POSITIVE for cough-productive  BREAST: NEGATIVE for masses, tenderness or discharge  CV: NEGATIVE for chest pain, palpitations or peripheral edema  GI: NEGATIVE for nausea, abdominal pain, heartburn, or change in bowel habits  : NEGATIVE for frequency, dysuria, or hematuria  MUSCULOSKELETAL: NEGATIVE for significant arthralgias or myalgia  NEURO: NEGATIVE for weakness, dizziness or paresthesias  PSYCHIATRIC: NEGATIVE for changes in mood or affect    OBJECTIVE:     /68 (BP Location: Left arm, Patient Position: Chair, Cuff Size: Adult Regular)  Pulse 63  Temp 97.4  F (36.3  C) (Tympanic)  Wt 185 lb (83.9 kg)  SpO2 97%  BMI 26.54 kg/m2  Body mass index is 26.54 kg/(m^2).  GENERAL: healthy, alert and no distress  HENT: both ears: clear effusion, nasal mucosa erythematous, oropharynx clear and oral mucous membranes moist  NECK: no adenopathy, no asymmetry, masses, or scars and thyroid normal to palpation  RESP: lungs clear to auscultation - no rales, rhonchi or wheezes  CV: regular rate and rhythm, normal S1 S2, no S3 or S4, no murmur, click or rub, no peripheral edema and peripheral pulses strong  ABDOMEN: soft, nontender, no hepatosplenomegaly, no masses and bowel sounds normal  ABDOMEN: no bruits heard  MS: no gross musculoskeletal defects noted, no edema  MS: Tenderness over the proximal foot on the right overe the 1st and 2nd metarsals  SKIN: no suspicious lesions or rashes  PSYCH: mentation appears normal, affect normal/bright    Diagnostic Test Results:  Results for orders placed or performed in visit on 04/24/18 (from the past 24 hour(s))   Comprehensive metabolic panel   Result Value Ref Range    Sodium 135 133 - 144 mmol/L    Potassium 4.0 3.4 - 5.3 mmol/L    Chloride 97 94 - 109 mmol/L    Carbon Dioxide 29 20 - 32 mmol/L    Anion Gap 9 3 - 14 mmol/L    Glucose 218 (H) 70 - 99 mg/dL    Urea Nitrogen 19 7 - 30 mg/dL    Creatinine 0.96 0.66 - 1.25 mg/dL    GFR  Estimate 77 >60 mL/min/1.7m2    GFR Estimate If Black >90 >60 mL/min/1.7m2    Calcium 9.9 8.5 - 10.1 mg/dL    Bilirubin Total 0.4 0.2 - 1.3 mg/dL    Albumin 3.3 (L) 3.4 - 5.0 g/dL    Protein Total 8.9 (H) 6.8 - 8.8 g/dL    Alkaline Phosphatase 92 40 - 150 U/L    ALT 88 (H) 0 - 70 U/L    AST 50 (H) 0 - 45 U/L   Amylase   Result Value Ref Range    Amylase 81 30 - 110 U/L   Lipase   Result Value Ref Range    Lipase 365 73 - 393 U/L   CBC with platelets   Result Value Ref Range    WBC 12.7 (H) 4.0 - 11.0 10e9/L    RBC Count 4.93 4.4 - 5.9 10e12/L    Hemoglobin 15.2 13.3 - 17.7 g/dL    Hematocrit 45.6 40.0 - 53.0 %    MCV 93 78 - 100 fl    MCH 30.8 26.5 - 33.0 pg    MCHC 33.3 31.5 - 36.5 g/dL    RDW 13.5 10.0 - 15.0 %    Platelet Count 396 150 - 450 10e9/L   TSH with free T4 reflex   Result Value Ref Range    TSH 1.44 0.40 - 4.00 mU/L       ASSESSMENT/PLAN:   (J06.9,  B97.89) Viral URI  Comment:   Plan:   Reassurance given.    (K85.10) Acute biliary pancreatitis, unspecified complication status  (primary encounter diagnosis)  Comment: Labs have Neer normal values.  Plan:  Resolved.    (E11.9) Type 2 diabetes mellitus without complication, without long-term current use of insulin (H)  Comment: Stable.  He did hava an underlying pancreatitis which may have causes a reduction in insulin.  Plan:   Continue Amaryl 2 mg BID and chesk fasting glucose level weakly.    (M25.571) Pain in joint involving ankle and foot, right  Comment: His foot xray  Is normal.  Plan:   Reassurance given.              FUTURE APPOINTMENTS:       - Follow-up visit in one month for diabetes.    Santy Agustin DO,   [facility]

## 2018-04-25 DIAGNOSIS — M1A.0720 CHRONIC IDIOPATHIC GOUT INVOLVING TOE OF LEFT FOOT WITHOUT TOPHUS: ICD-10-CM

## 2018-04-25 ASSESSMENT — ANXIETY QUESTIONNAIRES: GAD7 TOTAL SCORE: 0

## 2018-04-25 ASSESSMENT — PATIENT HEALTH QUESTIONNAIRE - PHQ9: SUM OF ALL RESPONSES TO PHQ QUESTIONS 1-9: 1

## 2018-04-26 RX ORDER — ALLOPURINOL 100 MG/1
TABLET ORAL
Qty: 90 TABLET | Refills: 0 | Status: SHIPPED | OUTPATIENT
Start: 2018-04-26 | End: 2018-06-07

## 2018-04-30 ENCOUNTER — TRANSFERRED RECORDS (OUTPATIENT)
Dept: HEALTH INFORMATION MANAGEMENT | Facility: CLINIC | Age: 73
End: 2018-04-30

## 2018-05-02 DIAGNOSIS — R10.9 FLANK PAIN: ICD-10-CM

## 2018-05-02 LAB
ALBUMIN UR-MCNC: NEGATIVE MG/DL
APPEARANCE UR: CLEAR
BILIRUB UR QL STRIP: NEGATIVE
COLOR UR AUTO: YELLOW
GLUCOSE UR STRIP-MCNC: NEGATIVE MG/DL
HGB UR QL STRIP: NEGATIVE
KETONES UR STRIP-MCNC: NEGATIVE MG/DL
LEUKOCYTE ESTERASE UR QL STRIP: NEGATIVE
NITRATE UR QL: NEGATIVE
PH UR STRIP: 5.5 PH (ref 4.7–8)
SOURCE: NORMAL
SP GR UR STRIP: 1.02 (ref 1–1.03)
UROBILINOGEN UR STRIP-MCNC: NORMAL MG/DL (ref 0–2)

## 2018-05-02 PROCEDURE — 81003 URINALYSIS AUTO W/O SCOPE: CPT | Mod: ZL | Performed by: INTERNAL MEDICINE

## 2018-05-09 DIAGNOSIS — D72.829 LEUKOCYTOSIS, UNSPECIFIED TYPE: ICD-10-CM

## 2018-05-09 DIAGNOSIS — E11.9 TYPE 2 DIABETES MELLITUS WITHOUT COMPLICATION, WITHOUT LONG-TERM CURRENT USE OF INSULIN (H): Primary | ICD-10-CM

## 2018-05-09 DIAGNOSIS — E11.9 TYPE 2 DIABETES MELLITUS WITHOUT COMPLICATION, WITHOUT LONG-TERM CURRENT USE OF INSULIN (H): ICD-10-CM

## 2018-05-09 LAB
ALBUMIN SERPL-MCNC: 3.5 G/DL (ref 3.4–5)
ALP SERPL-CCNC: 65 U/L (ref 40–150)
ALT SERPL W P-5'-P-CCNC: 44 U/L (ref 0–70)
ANION GAP SERPL CALCULATED.3IONS-SCNC: 12 MMOL/L (ref 3–14)
AST SERPL W P-5'-P-CCNC: 23 U/L (ref 0–45)
BILIRUB SERPL-MCNC: 0.4 MG/DL (ref 0.2–1.3)
BUN SERPL-MCNC: 14 MG/DL (ref 7–30)
CALCIUM SERPL-MCNC: 9.2 MG/DL (ref 8.5–10.1)
CHLORIDE SERPL-SCNC: 100 MMOL/L (ref 94–109)
CHOLEST SERPL-MCNC: 159 MG/DL
CO2 SERPL-SCNC: 26 MMOL/L (ref 20–32)
CREAT SERPL-MCNC: 0.94 MG/DL (ref 0.66–1.25)
CREAT UR-MCNC: 256 MG/DL
ERYTHROCYTE [DISTWIDTH] IN BLOOD BY AUTOMATED COUNT: 13.2 % (ref 10–15)
EST. AVERAGE GLUCOSE BLD GHB EST-MCNC: 177 MG/DL
GFR SERPL CREATININE-BSD FRML MDRD: 79 ML/MIN/1.7M2
GLUCOSE SERPL-MCNC: 114 MG/DL (ref 70–99)
HBA1C MFR BLD: 7.8 % (ref 0–5.6)
HCT VFR BLD AUTO: 41.6 % (ref 40–53)
HDLC SERPL-MCNC: 39 MG/DL
HGB BLD-MCNC: 14 G/DL (ref 13.3–17.7)
LDLC SERPL CALC-MCNC: 83 MG/DL
MCH RBC QN AUTO: 30.7 PG (ref 26.5–33)
MCHC RBC AUTO-ENTMCNC: 33.7 G/DL (ref 31.5–36.5)
MCV RBC AUTO: 91 FL (ref 78–100)
MICROALBUMIN UR-MCNC: 12 MG/L
MICROALBUMIN/CREAT UR: 4.77 MG/G CR (ref 0–17)
NONHDLC SERPL-MCNC: 120 MG/DL
PLATELET # BLD AUTO: 222 10E9/L (ref 150–450)
POTASSIUM SERPL-SCNC: 3.8 MMOL/L (ref 3.4–5.3)
PROT SERPL-MCNC: 7.7 G/DL (ref 6.8–8.8)
RBC # BLD AUTO: 4.56 10E12/L (ref 4.4–5.9)
SODIUM SERPL-SCNC: 138 MMOL/L (ref 133–144)
TRIGL SERPL-MCNC: 186 MG/DL
WBC # BLD AUTO: 6.5 10E9/L (ref 4–11)

## 2018-05-09 PROCEDURE — 85027 COMPLETE CBC AUTOMATED: CPT | Mod: ZL | Performed by: INTERNAL MEDICINE

## 2018-05-09 PROCEDURE — 82043 UR ALBUMIN QUANTITATIVE: CPT | Mod: ZL | Performed by: INTERNAL MEDICINE

## 2018-05-09 PROCEDURE — 83036 HEMOGLOBIN GLYCOSYLATED A1C: CPT | Mod: ZL | Performed by: INTERNAL MEDICINE

## 2018-05-09 PROCEDURE — 40000788 ZZHCL STATISTIC ESTIMATED AVERAGE GLUCOSE: Mod: ZL | Performed by: INTERNAL MEDICINE

## 2018-05-09 PROCEDURE — 80061 LIPID PANEL: CPT | Mod: ZL | Performed by: INTERNAL MEDICINE

## 2018-05-09 PROCEDURE — 80053 COMPREHEN METABOLIC PANEL: CPT | Mod: ZL | Performed by: INTERNAL MEDICINE

## 2018-05-09 PROCEDURE — 36415 COLL VENOUS BLD VENIPUNCTURE: CPT | Mod: ZL | Performed by: INTERNAL MEDICINE

## 2018-05-09 RX ORDER — GLIMEPIRIDE 2 MG/1
TABLET ORAL
Qty: 90 TABLET | Refills: 3 | Status: SHIPPED | OUTPATIENT
Start: 2018-05-09 | End: 2018-05-23 | Stop reason: ALTCHOICE

## 2018-05-11 ENCOUNTER — TELEPHONE (OUTPATIENT)
Dept: PEDIATRICS | Facility: OTHER | Age: 73
End: 2018-05-11

## 2018-05-11 DIAGNOSIS — E11.9 TYPE 2 DIABETES MELLITUS WITHOUT COMPLICATION, WITHOUT LONG-TERM CURRENT USE OF INSULIN (H): Primary | ICD-10-CM

## 2018-05-11 DIAGNOSIS — M1A.9XX0 CHRONIC GOUT WITHOUT TOPHUS, UNSPECIFIED CAUSE, UNSPECIFIED SITE: ICD-10-CM

## 2018-05-11 NOTE — TELEPHONE ENCOUNTER
2:24 PM    Reason for Call: Phone Call    Description: Pt called in and states he is looking for some test results. Did not state which tests, but states it had something to do with his A1C. Please call him when you can. Thank you!    Was an appointment offered for this call? No  If yes : Appointment type              Date    Preferred method for responding to this message: Telephone Call  What is your phone number ? [phone] or [phone].      If we cannot reach you directly, may we leave a detailed response at the number you provided? Yes    Can this message wait until your PCP/provider returns, if available today? YES, aware provider out    Rosales Rayo

## 2018-05-11 NOTE — TELEPHONE ENCOUNTER
"Called pt back- states he is looking for an answer of what he told me yesterday- pt was notified he did not speak to me yesterday and that Dr. Agustin is out until Tuesday. Pt states he is \"crashing\" mid morning and thinks he may need to see a specialist for his diabetes because the medications are not working. Pt was notified we can place a referral for him to see the diabetic resource center. Pt states he will call back on Monday. Again pt was notified Dr. Agustin is gone until Tuesday. Pt understood and said he would call back. Mariela Carnes LPN    "

## 2018-05-14 NOTE — TELEPHONE ENCOUNTER
He needs to take his blood glucose if he thinks he is having lows.  His body is gong to act as though he is in withdrawal as his body wants to maintain glucose at 200.  So a glucose of 100-110 my feel like he is low even this may not be the case.  We could consider changing his medications, however, he needs to come see me for more education.  At this time, I do not think diabetes resource center is necessary as I can clarify things for him.

## 2018-05-14 NOTE — TELEPHONE ENCOUNTER
Pt notified. He also states he went to the lab and there were no orders there for him so he could not get drawn. States he needed to do this for 3 weeks and only got it done once. Patient is not sure which labs but is thinking glucose and albumin. He said he will be there ar 7:30 tomorrow morning for labs. Also states he is taking 2 allopurinol a day at 100mg each and it is not working, his gout is back. Please advise. Mariela Carnes LPN

## 2018-05-16 DIAGNOSIS — M1A.9XX0 CHRONIC GOUT WITHOUT TOPHUS, UNSPECIFIED CAUSE, UNSPECIFIED SITE: ICD-10-CM

## 2018-05-16 DIAGNOSIS — E11.9 TYPE 2 DIABETES MELLITUS WITHOUT COMPLICATION, WITHOUT LONG-TERM CURRENT USE OF INSULIN (H): ICD-10-CM

## 2018-05-16 LAB
ANION GAP SERPL CALCULATED.3IONS-SCNC: 7 MMOL/L (ref 3–14)
BUN SERPL-MCNC: 19 MG/DL (ref 7–30)
CALCIUM SERPL-MCNC: 9.4 MG/DL (ref 8.5–10.1)
CHLORIDE SERPL-SCNC: 100 MMOL/L (ref 94–109)
CO2 SERPL-SCNC: 32 MMOL/L (ref 20–32)
CREAT SERPL-MCNC: 0.96 MG/DL (ref 0.66–1.25)
GFR SERPL CREATININE-BSD FRML MDRD: 77 ML/MIN/1.7M2
GLUCOSE SERPL-MCNC: 115 MG/DL (ref 70–99)
POTASSIUM SERPL-SCNC: 3.4 MMOL/L (ref 3.4–5.3)
SODIUM SERPL-SCNC: 139 MMOL/L (ref 133–144)
URATE SERPL-MCNC: 5.7 MG/DL (ref 3.5–7.2)

## 2018-05-16 PROCEDURE — 84550 ASSAY OF BLOOD/URIC ACID: CPT | Mod: ZL | Performed by: INTERNAL MEDICINE

## 2018-05-16 PROCEDURE — 80048 BASIC METABOLIC PNL TOTAL CA: CPT | Mod: ZL | Performed by: INTERNAL MEDICINE

## 2018-05-16 PROCEDURE — 36415 COLL VENOUS BLD VENIPUNCTURE: CPT | Mod: ZL | Performed by: INTERNAL MEDICINE

## 2018-05-23 ENCOUNTER — OFFICE VISIT (OUTPATIENT)
Dept: PEDIATRICS | Facility: OTHER | Age: 73
End: 2018-05-23
Attending: INTERNAL MEDICINE
Payer: COMMERCIAL

## 2018-05-23 VITALS
OXYGEN SATURATION: 98 % | RESPIRATION RATE: 20 BRPM | BODY MASS INDEX: 26.49 KG/M2 | WEIGHT: 184.6 LBS | TEMPERATURE: 97.6 F | SYSTOLIC BLOOD PRESSURE: 128 MMHG | DIASTOLIC BLOOD PRESSURE: 64 MMHG | HEART RATE: 62 BPM

## 2018-05-23 DIAGNOSIS — E11.42 TYPE 2 DIABETES MELLITUS WITH DIABETIC POLYNEUROPATHY, WITHOUT LONG-TERM CURRENT USE OF INSULIN (H): Primary | ICD-10-CM

## 2018-05-23 DIAGNOSIS — E11.9 ENCOUNTER FOR DIABETIC FOOT EXAM (H): ICD-10-CM

## 2018-05-23 PROCEDURE — G0463 HOSPITAL OUTPT CLINIC VISIT: HCPCS

## 2018-05-23 PROCEDURE — 99214 OFFICE O/P EST MOD 30 MIN: CPT | Performed by: INTERNAL MEDICINE

## 2018-05-23 ASSESSMENT — ANXIETY QUESTIONNAIRES
5. BEING SO RESTLESS THAT IT IS HARD TO SIT STILL: NOT AT ALL
7. FEELING AFRAID AS IF SOMETHING AWFUL MIGHT HAPPEN: NOT AT ALL
3. WORRYING TOO MUCH ABOUT DIFFERENT THINGS: NOT AT ALL
6. BECOMING EASILY ANNOYED OR IRRITABLE: NOT AT ALL
4. TROUBLE RELAXING: NOT AT ALL
1. FEELING NERVOUS, ANXIOUS, OR ON EDGE: NOT AT ALL
2. NOT BEING ABLE TO STOP OR CONTROL WORRYING: NOT AT ALL
GAD7 TOTAL SCORE: 0

## 2018-05-23 ASSESSMENT — PAIN SCALES - GENERAL: PAINLEVEL: NO PAIN (0)

## 2018-05-23 NOTE — MR AVS SNAPSHOT
After Visit Summary   5/23/2018    Rafa Bhatti    MRN: [id number]           Patient Information     Date Of Birth          1945        Visit Information        Provider Department      5/23/2018 9:00 AM Santy Agustin DO [facility]        Today's Diagnoses     Type 2 diabetes mellitus with diabetic polyneuropathy, without long-term current use of insulin (H)    -  1    Encounter for diabetic foot exam (H)          Care Instructions    Measure your glucose before AM male and PM meal and with any symptoms of low glucose.          Follow-ups after your visit        Additional Services     DIABETES EDUCATION REFERRAL ([place])       DIABETES SELF-MANAGEMENT TRAINING (DSMT)  Type of training and number of hours requested:  Initial Group DMST; 10    (Medicare will cover:10 hours initial DSMT in 12-month period, plus 2 hours follow-up DSMT annually)    Please add if the patient has special educational need: None  (Patients with special needs requiring individual DSMT)    Please include the following DMST content: Nutritional management            , Diabetes as disease process and Goal setting, problem solving    Patient has the following:Hypertension    Please begin Medical Nutritional Therapy.  Initial Medical Nutritional Therapy (MNT)  ([employer] allows 3 hours initial MNT in the first calendar year, plus two hours follow up MNT annually.  Additional MNT hours are available for change in medical condition treatment and/or diagnosis)    Additional Services Provided:  >>A1c will be completed upon referral and completion of program unless completed in clinic.  >>Influenza vaccination assessment (form #N228) as applicable.  >>Order for diabetes supplies will be faxed to patient's pharmacy.  >>If on insulin: Insulin dose adjustment per staged Diabetes Mgmt. Protocols    DIABETES RESOURCE CENTER  [facility]  Telephone:  [phone]   Fax:  [phone]                   Follow-up notes from your care team     Return in about 2 months (around 7/23/2018) for diabetes .      Who to contact     If you have questions or need follow up information about today's clinic visit or your schedule please contact [facility] directly at [phone].  Normal or non-critical lab and imaging results will be communicated to you by MyChart, letter or phone within 4 business days after the clinic has received the results. If you do not hear from us within 7 days, please contact the clinic through MyChart or phone. If you have a critical or abnormal lab result, we will notify you by phone as soon as possible.  Submit refill requests through [employer] or call your pharmacy and they will forward the refill request to us. Please allow 3 business days for your refill to be completed.          Additional Information About Your Visit        Care EveryWhere ID     This is your Care EveryWhere ID. This could be used by other organizations to access your [facility] medical records  [id number]        Your Vitals Were     Pulse Temperature Respirations Pulse Oximetry BMI (Body Mass Index)       62 97.6  F (36.4  C) (Tympanic) 20 98% 26.49 kg/m2        Blood Pressure from Last 3 Encounters:   05/23/18 128/64   04/24/18 118/68   04/12/18 109/71    Weight from Last 3 Encounters:   05/23/18 184 lb 9.6 oz (83.7 kg)   04/24/18 185 lb (83.9 kg)   04/11/18 193 lb (87.5 kg)              We Performed the Following     DIABETES EDUCATION REFERRAL ([facility])          Today's Medication Changes          These changes are accurate as of 5/23/18  9:38 AM.  If you have any questions, ask your nurse or doctor.               Start taking these medicines.        Dose/Directions    metFORMIN 500 MG tablet   Commonly known as:  GLUCOPHAGE   Used for:  Type 2 diabetes mellitus with diabetic polyneuropathy, without long-term current use of insulin (H)   Started by:  Santy Agustin DO        Dose:  500 mg    Take 1 tablet (500 mg) by mouth 2 times daily (with meals)   Quantity:  60 tablet   Refills:  3         Stop taking these medicines if you haven't already. Please contact your care team if you have questions.     glimepiride 2 MG tablet   Commonly known as:  AMARYL   Stopped by:  Santy Agustin DO                Where to get your medicines      These medications were sent to [id number] - [place], [place] - [address]  [address] [facility] [place] [address]     Phone:  [phone]     metFORMIN 500 MG tablet                Primary Care Provider Office Phone # Fax #    Santy Agustin -[phone] [address] [facility] [address]        Equal Access to Services     LIZZY ALFONSO : Karin lagoso Sonikole, waaxda luqadaha, qaybta kaalmada adeegyada, greg monroy . So [facility] [phone].    ATENCIÓN: Si habla español, tiene a bae disposición servicios gratuitos de asistencia lingüística. [facility] [phone].    We comply with applicable federal civil rights laws and [place] laws. We do not discriminate on the basis of race, color, national origin, age, disability, sex, sexual orientation, or gender identity.            Thank you!     Thank you for choosing [facility]  for your care. Our goal is always to provide you with excellent care. Hearing back from our patients is one way we can continue to improve our services. Please take a few minutes to complete the written survey that you may receive in the mail after your visit with us. Thank you!             Your Updated Medication List - Protect others around you: Learn how to safely use, store and throw away your medicines at www.disposemymeds.org.          This list is accurate as of 5/23/18  9:38 AM.  Always use your most recent med list.                   Brand Name Dispense Instructions for use Diagnosis    allopurinol 100 MG tablet    ZYLOPRIM    90 tablet     TAKE 1 TABLET (100 MG) BY MOUTH TWICE DAILY    Chronic idiopathic gout involving toe of left foot without tophus       amLODIPine 5 MG tablet    NORVASC    30 tablet    TAKE 1 TABLET DAILY BY MOUTH - GENERIC FOR NORVASC    Benign essential hypertension       aspirin 81 MG tablet      Take 81 mg by mouth daily        atenolol 50 MG tablet    TENORMIN    90 tablet    Take 1 tablet (50 mg) by mouth daily    Benign essential hypertension       colchicine 0.6 MG tablet    COLCYRS    20 tablet    Take 2 tablets at the onset of gout pain. May take one tablet again in one hour. Max 4 tabs in 24 hrs.    Chronic idiopathic gout involving toe of left foot without tophus       hydrochlorothiazide 25 MG tablet    HYDRODIURIL    180 tablet    Take 1 tablet (25 mg) by mouth daily    Benign essential hypertension       metFORMIN 500 MG tablet    GLUCOPHAGE    60 tablet    Take 1 tablet (500 mg) by mouth 2 times daily (with meals)    Type 2 diabetes mellitus with diabetic polyneuropathy, without long-term current use of insulin (H)       Multi-vitamin Tabs tablet      Take 1 tablet by mouth daily        ranitidine 150 MG tablet    ZANTAC    180 tablet    TAKE ONE TABLET TWO TIMES A DAY BY MOUTH    Gastroesophageal reflux disease, esophagitis presence not specified       tadalafil 20 MG tablet    CIALIS    36 tablet    Take 1 tablet (20 mg) by mouth as needed    Erectile dysfunction, unspecified erectile dysfunction type       TUMS PO      tums 200 mg calcium (500 mg) chewable Take 1 tablet by mouth prn

## 2018-05-23 NOTE — NURSING NOTE
"Chief Complaint   Patient presents with     Diabetes       Initial /64 (BP Location: Right arm, Patient Position: Chair, Cuff Size: Adult Large)  Pulse 62  Temp 97.6  F (36.4  C) (Tympanic)  Resp 20  Wt 184 lb 9.6 oz (83.7 kg)  SpO2 98%  BMI 26.49 kg/m2 Estimated body mass index is 26.49 kg/(m^2) as calculated from the following:    Height as of 4/11/18: 5' 10\" (1.778 m).    Weight as of this encounter: 184 lb 9.6 oz (83.7 kg).  Medication Reconciliation: complete    Kelsy Yeager LPN    "

## 2018-05-23 NOTE — PROGRESS NOTES
SUBJECTIVE:   Rafa Bhatti is a 72 year old male who presents to clinic today for the following health issues:      Diabetes Follow-up      Patient is checking blood sugars: not at all    Diabetic concerns: None     Symptoms of hypoglycemia (low blood sugar): shaky, weak, sometimes sweaty     Paresthesias (numbness or burning in feet) or sores: Yes Some numbness on ends of toes     Date of last diabetic eye exam: 04-17-18    BP Readings from Last 2 Encounters:   05/23/18 128/64   04/24/18 118/68     Hemoglobin A1C (%)   Date Value   05/09/2018 7.8 (H)   04/11/2018 7.4 (H)     LDL Cholesterol Calculated (mg/dL)   Date Value   05/09/2018 83   10/26/2016 122 (H)       Amount of exercise or physical activity: 6-7 days/week for an average of greater than 60 minutes    Problems taking medications regularly: No    Medication side effects: none    Diet: no alcohol for 3 months, watching sugar intake.        -------------------------------------    Problem list and histories reviewed & adjusted, as indicated.  Additional history: as documented    Patient Active Problem List   Diagnosis     Encounter for monitoring testosterone replacement therapy     Testicular hypofunction     Elevated blood pressure     Benign essential hypertension     Hemangioma of spine     ACP (advance care planning)     Routine general medical examination at a health care facility     Chronic gout     Esophageal reflux     Type 2 diabetes mellitus without complication, without long-term current use of insulin (H)     Viral URI     Past Surgical History:   Procedure Laterality Date     cardiolyte stress test  2000    chest pain     CHOLECYSTECTOMY, COMMON BILE DUCT EXPLORATION, COMBINED  04/15/2018     COLONOSCOPY  1999     fistula in ANO       HEMORRHOIDECTOMY       removal of dermoid cyst of mediastinum       VASECTOMY         Social History   Substance Use Topics     Smoking status: Never Smoker     Smokeless tobacco: Never Used     Alcohol use  Yes      Comment: socially     Family History   Problem Relation Age of Onset     C.A.D. Father 80     C.A.D. Mother      Hypertension Mother      C.A.D. Brother      C.A.D. Other      family hx           Reviewed and updated as needed this visit by clinical staff  Tobacco  Allergies  Meds  Med Hx  Surg Hx  Fam Hx  Soc Hx      Reviewed and updated as needed this visit by Provider         ROS:  CONSTITUTIONAL: NEGATIVE for fever, chills, change in weight  EYES: NEGATIVE for vision changes or irritation  EYES: POSITIVE for blurred vision bilateral when his glucose seems to low  ENT/MOUTH: NEGATIVE for ear, mouth and throat problems  ENT/MOUTH: POSITIVE for nasal congestion which is chronic  RESP: NEGATIVE for significant cough or SOB  CV: NEGATIVE for chest pain, palpitations or peripheral edema  GI: POSITIVE for abdominal pain over his surgical port sites and NEGATIVE for constipation, dyspepsia, gas or bloating, heartburn or reflux, nausea, poor appetite and vomiting  : NEGATIVE for frequency, dysuria, or hematuria  MUSCULOSKELETAL: NEGATIVE for significant arthralgias or myalgia  NEURO: POSITIVE for numbness or tingling in the toes which is inconsistent and NEGATIVE for dizziness/lightheadedness  ENDOCRINE: NEGATIVE for temperature intolerance, skin/hair changes  PSYCHIATRIC: NEGATIVE for changes in mood or affect    OBJECTIVE:     /64 (BP Location: Right arm, Patient Position: Chair, Cuff Size: Adult Large)  Pulse 62  Temp 97.6  F (36.4  C) (Tympanic)  Resp 20  Wt 184 lb 9.6 oz (83.7 kg)  SpO2 98%  BMI 26.49 kg/m2  Body mass index is 26.49 kg/(m^2).  GENERAL: healthy, alert and no distress  NECK: no adenopathy, no asymmetry, masses, or scars and thyroid normal to palpation  NECK: no carotid bruits  RESP: lungs clear to auscultation - no rales, rhonchi or wheezes  CV: regular rate and rhythm, normal S1 S2, no S3 or S4, no murmur, click or rub, no peripheral edema and peripheral pulses  strong  ABDOMEN: soft, nontender, no hepatosplenomegaly, no masses and bowel sounds normal  ABDOMEN: no bruits heard  MS: no gross musculoskeletal defects noted, no edema  SKIN: no suspicious lesions or rashes  PSYCH: mentation appears normal, affect normal/bright  Diabetic foot exam: normal DP and PT pulses, no trophic changes or ulcerative lesions, normal sensory exam, normal monofilament exam and DP reduced right    Diagnostic Test Results:  Lab Results   Component Value Date    A1C 7.8 05/09/2018    A1C 7.4 04/11/2018       Recent Labs   Lab Test  05/16/18   0732  05/09/18   0730   NA  139  138   POTASSIUM  3.4  3.8   CHLORIDE  100  100   CO2  32  26   ANIONGAP  7  12   GLC  115*  114*   BUN  19  14   CR  0.96  0.94   HUY  9.4  9.2     Recent Labs   Lab Test  05/09/18   0730  10/26/16   1152  12/22/14   0740   CHOL  159  188  198   HDL  39*  41  40*   LDL  83  122*  131*   TRIG  186*  127  133   CHOLHDLRATIO   --    --   5.0     Lab Results   Component Value Date    MICROL 12 05/09/2018     No results found for: MICROALBUMIN  ASSESSMENT/PLAN:   (E11.42) Type 2 diabetes mellitus with diabetic polyneuropathy, without long-term current use of insulin (H)  (primary encounter diagnosis)  Comment:  Patient nit at goal.  He is sensitive to sunfonylurea medications.  His blood pressure is at goal and his renal function is normal.  He is not in an ACE inhibitor or a STATIN  Plan:   Discontinue Amaryl, start  metFORMIN (GLUCOPHAGE) 500 MG table BIDt, DIABETES         EDUCATION REFERRAL ([place])    (E11.9) Encounter for diabetic foot exam (H)  Comment: Normal exam.  Plan:   Repeat exam yearly.    Patient Instructions   Measure your glucose before AM male and PM meal and with any symptoms of low glucose.                FUTURE APPOINTMENTS:       - Follow-up visit in 2 months for diabetes.    Santy Agustin DO, DO  [facility]

## 2018-05-24 ASSESSMENT — PATIENT HEALTH QUESTIONNAIRE - PHQ9: SUM OF ALL RESPONSES TO PHQ QUESTIONS 1-9: 0

## 2018-05-24 ASSESSMENT — ANXIETY QUESTIONNAIRES: GAD7 TOTAL SCORE: 0

## 2018-05-31 ENCOUNTER — HOSPITAL ENCOUNTER (OUTPATIENT)
Dept: EDUCATION SERVICES | Facility: HOSPITAL | Age: 73
Discharge: HOME | End: 2018-05-31
Attending: INTERNAL MEDICINE | Admitting: INTERNAL MEDICINE
Payer: COMMERCIAL

## 2018-05-31 VITALS
WEIGHT: 184 LBS | RESPIRATION RATE: 16 BRPM | HEART RATE: 70 BPM | DIASTOLIC BLOOD PRESSURE: 82 MMHG | OXYGEN SATURATION: 98 % | SYSTOLIC BLOOD PRESSURE: 138 MMHG | BODY MASS INDEX: 26.34 KG/M2 | HEIGHT: 70 IN

## 2018-05-31 DIAGNOSIS — E11.9 TYPE 2 DIABETES MELLITUS WITHOUT COMPLICATION, WITHOUT LONG-TERM CURRENT USE OF INSULIN (H): Primary | ICD-10-CM

## 2018-05-31 PROCEDURE — G0108 DIAB MANAGE TRN  PER INDIV: HCPCS | Performed by: DIETITIAN, REGISTERED

## 2018-05-31 RX ORDER — LANCETS 28 GAUGE
EACH MISCELLANEOUS
Qty: 100 EACH | Refills: 3 | Status: SHIPPED | OUTPATIENT
Start: 2018-05-31

## 2018-05-31 ASSESSMENT — PAIN SCALES - GENERAL: PAINLEVEL: NO PAIN (0)

## 2018-05-31 NOTE — NURSING NOTE
"Chief Complaint   Patient presents with     Diabetes     session1       Initial /82  Pulse 70  Resp 16  Ht 1.778 m (5' 10\")  Wt 83.5 kg (184 lb)  SpO2 98%  BMI 26.4 kg/m2 Estimated body mass index is 26.4 kg/(m^2) as calculated from the following:    Height as of this encounter: 1.778 m (5' 10\").    Weight as of this encounter: 83.5 kg (184 lb).  Medication Reconciliation: complete    Deborah Landaverde LPN    "

## 2018-05-31 NOTE — IP AVS SNAPSHOT
MRN:[id number]                      After Visit Summary   5/31/2018    Rafa Bhatti    MRN: [id number]           Thank you!     Thank you for choosing [facility] for your care. Our goal is always to provide you with excellent care. Hearing back from our patients is one way we can continue to improve our services. Please take a few minutes to complete the written survey that you may receive in the mail after you visit with us. Thank you!        Patient Information     Date Of Birth          1945        About your hospital stay     You were admitted on:  May 31, 2018 You last received care in the:  [facility]    You were discharged on:  May 31, 2018       Who to Call     For medical emergencies, please call 911.  For non-urgent questions about your medical care, please call your primary care provider or clinic, [phone]          Attending Provider     Provider Santy Miles DO Internal Medicine       Primary Care Provider Office Phone # Fax #    Santy Agustin -[phone]      Your next 10 appointments already scheduled     Jul 26, 2018  9:20 AM CDT   (Arrive by 9:00 AM)   SHORT with Santy Agustin DO   [facility] [place] ([facility] [place] )    [address]  [place] [address]   [phone]              Further instructions from your care team       -Follow carbohydrate counting meal plan - 60 grams/meal, 15-30 grams/snack.   -Be as active as you can - exercise goal is 30 minutes most days of the week.   -Test your glucose 2x/day - fasting and alternate before supper with 2 hours after supper.   -Target levels are fasting and before supper , 2 hours after supper less than 180.    -Hold Metformin for now and we will determine if needed at next visit.   -Bring your book, meters to follow up session.   -Follow up in 2 weeks.   -Call with any concerns - MICHAEL Seals, [phone].  "    Pending Results     No orders found from 5/29/2018 to 6/1/2018.            Admission Information     Date & Time Provider Department Dept. Phone    5/31/2018 Santy Agustin DO [facility] [phone]      Your Vitals Were     Blood Pressure Pulse Respirations Height Weight Pulse Oximetry    138/82 70 16 1.778 m (5' 10\") 83.5 kg (184 lb) 98%    BMI (Body Mass Index)                   26.4 kg/m2           Care EveryWhere ID     This is your Care EveryWhere ID. This could be used by other organizations to access your [place] medical records  [id number]        Equal Access to Services     [facility]: Hadii malcom barboza hadyury Sonikole, waaxda lurubina, qaybta kaalmada leniyada, greg monroy . [facility] [phone].    ATENCIÓN: Si habla español, tiene a bae disposición servicios gratuitos de asistencia lingüística. Ll[facility], [place] [phone].    We comply with applicable federal civil rights laws and [place] laws. We do not discriminate on the basis of race, color, national origin, age, disability, sex, sexual orientation, or gender identity.               Review of your medicines      UNREVIEWED medicines. Ask your doctor about these medicines        Dose / Directions    allopurinol 100 MG tablet   Commonly known as:  ZYLOPRIM   Used for:  Chronic idiopathic gout involving toe of left foot without tophus        TAKE 1 TABLET (100 MG) BY MOUTH TWICE DAILY   Quantity:  90 tablet   Refills:  0       amLODIPine 5 MG tablet   Commonly known as:  NORVASC   Used for:  Benign essential hypertension        TAKE 1 TABLET DAILY BY MOUTH - GENERIC FOR NORVASC   Quantity:  30 tablet   Refills:  5       aspirin 81 MG tablet        Dose:  81 mg   Take 81 mg by mouth daily   Refills:  0       atenolol 50 MG tablet   Commonly known as:  TENORMIN   Used for:  Benign essential hypertension        Dose:  50 mg   Take 1 tablet (50 mg) by mouth daily   Quantity:  90 tablet   Refills:  3    "    colchicine 0.6 MG tablet   Commonly known as:  COLCYRS   Used for:  Chronic idiopathic gout involving toe of left foot without tophus        Take 2 tablets at the onset of gout pain. May take one tablet again in one hour. Max 4 tabs in 24 hrs.   Quantity:  20 tablet   Refills:  0       hydrochlorothiazide 25 MG tablet   Commonly known as:  HYDRODIURIL   Used for:  Benign essential hypertension        Dose:  25 mg   Take 1 tablet (25 mg) by mouth daily   Quantity:  180 tablet   Refills:  3       metFORMIN 500 MG tablet   Commonly known as:  GLUCOPHAGE   Used for:  Type 2 diabetes mellitus with diabetic polyneuropathy, without long-term current use of insulin (H)        Dose:  500 mg   Take 1 tablet (500 mg) by mouth 2 times daily (with meals)   Quantity:  60 tablet   Refills:  3       Multi-vitamin Tabs tablet        Dose:  1 tablet   Take 1 tablet by mouth daily   Refills:  0       ranitidine 150 MG tablet   Commonly known as:  ZANTAC   Used for:  Gastroesophageal reflux disease, esophagitis presence not specified        TAKE ONE TABLET TWO TIMES A DAY BY MOUTH   Quantity:  180 tablet   Refills:  1       tadalafil 20 MG tablet   Commonly known as:  CIALIS   Used for:  Erectile dysfunction, unspecified erectile dysfunction type        Dose:  20 mg   Take 1 tablet (20 mg) by mouth as needed   Quantity:  36 tablet   Refills:  3       TUMS PO        tums 200 mg calcium (500 mg) chewable Take 1 tablet by mouth prn   Refills:  0                Protect others around you: Learn how to safely use, store and throw away your medicines at www.disposemymeds.org.             Medication List: This is a list of all your medications and when to take them. Check marks below indicate your daily home schedule. Keep this list as a reference.      Medications           Morning Afternoon Evening Bedtime As Needed    allopurinol 100 MG tablet   Commonly known as:  ZYLOPRIM   TAKE 1 TABLET (100 MG) BY MOUTH TWICE DAILY                                 amLODIPine 5 MG tablet   Commonly known as:  NORVASC   TAKE 1 TABLET DAILY BY MOUTH - GENERIC FOR NORVASC                                aspirin 81 MG tablet   Take 81 mg by mouth daily                                atenolol 50 MG tablet   Commonly known as:  TENORMIN   Take 1 tablet (50 mg) by mouth daily                                colchicine 0.6 MG tablet   Commonly known as:  COLCYRS   Take 2 tablets at the onset of gout pain. May take one tablet again in one hour. Max 4 tabs in 24 hrs.                                hydrochlorothiazide 25 MG tablet   Commonly known as:  HYDRODIURIL   Take 1 tablet (25 mg) by mouth daily                                metFORMIN 500 MG tablet   Commonly known as:  GLUCOPHAGE   Take 1 tablet (500 mg) by mouth 2 times daily (with meals)                                Multi-vitamin Tabs tablet   Take 1 tablet by mouth daily                                ranitidine 150 MG tablet   Commonly known as:  ZANTAC   TAKE ONE TABLET TWO TIMES A DAY BY MOUTH                                tadalafil 20 MG tablet   Commonly known as:  CIALIS   Take 1 tablet (20 mg) by mouth as needed                                TUMS PO   tums 200 mg calcium (500 mg) chewable Take 1 tablet by mouth prn

## 2018-05-31 NOTE — PROGRESS NOTES
"Pt here for Session 1.  New dx of diabetes.  Pt reports he was dx when he was hospitalized in April for pancreatitis and gallbladder removal.  Pt has many questions regarding if he has true dx or if levels may have been elevated r/t pancreatitis.      Blood pressure 138/82, pulse 70, resp. rate 16, height 1.778 m (5' 10\"), weight 83.5 kg (184 lb), SpO2 98 %.  Weight down 15# since illness in April.     Current diabetes medications: Pt was on Glimepiride but stopped taking it yesterday r/t s/s hypoglycemia in the am.  He is now prescribed Metformin 500 mg bid but has not started it yet.      Current glucose levels:  Pt is not testing yet.  He has two glucose meters with him that his daughter gave him.  Daughter's dog has diabetes but she decided not to do testing.     Lab Results   Component Value Date    A1C 7.8 05/09/2018    A1C 7.4 04/11/2018     Readiness to learn: very willing.      Barriers to learning: none.      Verbal diet recall obtained.  Pt eats three meals per day.    Breakfast-cereal (Honey Nut Cheerios), lactose free milk, coffee with flavored creamer  Lunch-lunchmeat sandwich, raw vegis, few chips, water  Supper-lean meat, potato, sometimes salad, water  HS snack-popcorn, small soda    Pt walks 3-4x/week with his wife for about 50 minutes.  He also mows large lawn and other outdoor chores.      Topics covered: diabetes pathophysiology, symptoms, diagnosis, treatments, medication actions, BG self-monitoring, HgbA1c, targets (blood sugar/A1c), sharps disposal, complications and risk factors,  food planning/carbohydrate counting meal plan, portion control, label reading and benefits of physical activity.    Pt actively participate and demonstrated adequate understanding of topics discussed.  Pt had many questions and seemed motivated to make necessary changes.      Pt had both a Freestyle Lite and [employer] glucose meter.  He had supplies for [employer] meter from daughter.  Instructed pt on use of both meters.  Pt " appropriately demonstrated use with minimal cueing. Random BG 3.5 hours post parandial = 114.  Testing supplies ordered for Freestyle Lite meter.      Plan: Follow carbohydrate counting meal plan.  Increase exercise to goal of 30 minutes most days of the week.  Test glucose 2x/day - fasting and alternate before supper/2 hours after supper.  Pt desires to test glucose prior to beginning Metformin and I think this is reasonable option.  He is aware if levels are elevated he will need to begin medication.  Will see pt back in two weeks.    Follow up: 2 weeks.      Total time spent with patient: 90 minutes.      Ava Templeton, MICHAEL, CDE

## 2018-05-31 NOTE — IP AVS SNAPSHOT
[facility]    [address] [place] [id number]    Phone:  [phone]    Fax:  [phone]                                       After Visit Summary   5/31/2018    Rafa Bhatti    MRN: [id number]           After Visit Summary Signature Page     I have received my discharge instructions, and my questions have been answered. I have discussed any challenges I see with this plan with the nurse or doctor.    ..........................................................................................................................................  Patient/Patient Representative Signature      ..........................................................................................................................................  Patient Representative Print Name and Relationship to Patient    ..................................................               ................................................  Date                                            Time    ..........................................................................................................................................  Reviewed by Signature/Title    ...................................................              ..............................................  Date                                                            Time

## 2018-06-06 ENCOUNTER — TRANSFERRED RECORDS (OUTPATIENT)
Dept: HEALTH INFORMATION MANAGEMENT | Facility: CLINIC | Age: 73
End: 2018-06-06

## 2018-06-14 ENCOUNTER — HOSPITAL ENCOUNTER (OUTPATIENT)
Dept: EDUCATION SERVICES | Facility: HOSPITAL | Age: 73
Discharge: HOME | End: 2018-06-14
Attending: NURSE PRACTITIONER | Admitting: INTERNAL MEDICINE
Payer: COMMERCIAL

## 2018-06-14 ENCOUNTER — TELEPHONE (OUTPATIENT)
Dept: EDUCATION SERVICES | Facility: HOSPITAL | Age: 73
End: 2018-06-14

## 2018-06-14 VITALS
HEART RATE: 56 BPM | DIASTOLIC BLOOD PRESSURE: 70 MMHG | OXYGEN SATURATION: 97 % | HEIGHT: 70 IN | SYSTOLIC BLOOD PRESSURE: 111 MMHG | WEIGHT: 180 LBS | BODY MASS INDEX: 25.77 KG/M2

## 2018-06-14 DIAGNOSIS — E11.9 TYPE 2 DIABETES MELLITUS WITHOUT COMPLICATION, WITHOUT LONG-TERM CURRENT USE OF INSULIN (H): Primary | ICD-10-CM

## 2018-06-14 PROCEDURE — G0108 DIAB MANAGE TRN  PER INDIV: HCPCS | Performed by: DIETITIAN, REGISTERED

## 2018-06-14 ASSESSMENT — PAIN SCALES - GENERAL: PAINLEVEL: NO PAIN (0)

## 2018-06-14 NOTE — NURSING NOTE
"Chief Complaint   Patient presents with     Diabetes     Session 2       Initial /70 (BP Location: Right arm, Patient Position: Chair, Cuff Size: Adult Large)  Pulse 56  Ht 1.778 m (5' 10\")  Wt 81.6 kg (180 lb)  SpO2 97%  BMI 25.83 kg/m2 Estimated body mass index is 25.83 kg/(m^2) as calculated from the following:    Height as of this encounter: 1.778 m (5' 10\").    Weight as of this encounter: 81.6 kg (180 lb).  Medication Reconciliation: complete  Jacquelyn Fraire LPN    "

## 2018-06-14 NOTE — DISCHARGE INSTRUCTIONS
-Continue to try to limit carbohydrates in your diet.   -Be as active you can - exercise helps lower glucose levels.   -Test your glucose 3-4x/week - alternate times - fasting and 2 hours after supper.   -Target levels are fasting , 2 hours after supper less than 180.   -A1c was 7.8% on 5/9/18.  Goal is A1c less than 7.0%.    -Glucose average today was 114.  Great job!  -Follow up in August.    -Call with any concerns - MICHAEL Seals, [phone].

## 2018-06-14 NOTE — IP AVS SNAPSHOT
[facility]    [address] [place] [id number]    Phone:  [phone]    Fax:  [phone]                                       After Visit Summary   6/14/2018    Rafa Bhatti    MRN: [id number]           After Visit Summary Signature Page     I have received my discharge instructions, and my questions have been answered. I have discussed any challenges I see with this plan with the nurse or doctor.    ..........................................................................................................................................  Patient/Patient Representative Signature      ..........................................................................................................................................  Patient Representative Print Name and Relationship to Patient    ..................................................               ................................................  Date                                            Time    ..........................................................................................................................................  Reviewed by Signature/Title    ...................................................              ..............................................  Date                                                            Time

## 2018-06-14 NOTE — PROGRESS NOTES
"Pt here for session 2.     Blood pressure 111/70, pulse 56, height 1.778 m (5' 10\"), weight 81.6 kg (180 lb), SpO2 97 %.  Weight is down 4# since 5/31.      Current diabetes medications: none    Current glucose levels:  [id number]  Before jzamst-  2 hours post ukoihb-  Two week average is 114.      Pt has been limiting carbohydrates in his diet and reports his wife is supportive of this effort.  He has reduced portion sizes of some food items and is not finding meal plan to be difficult.  He is active with outdoor chores but no routine exercise.  Pt is pleased with weight loss of 4# and would like to lose a few more pounds.  Glucose levels appear to be well controlled without medication. Will notify provider.  Current glucose average of 114 = A1c of 5.6%.  Perhaps pancreatitis caused higher levels at dx.     Readiness to learn: willing.     Barriers to learning: none.      Pt actively participated and demonstrated adequate understanding of topics discussed.     Topics covered: evaluating BG self-test results & improving self-testing skills, meter maintenance & , causes & treatment for high & low blood sugar, sick day management skills, carbohydrate counting review, strategies for food selection when eating away from home and alcohol and diabetes.    Goals: Pt would like to remain off of diabetes medications.     Plan: Continue current meal planning efforts.  Be as active as able.  Test glucose 3-4x/week - alternate before supper/2 hours after supper.     Follow up: August after A1c.     Total time spent with patient: 30 minutes.      MICHAEL Seals, CDE    "

## 2018-06-14 NOTE — IP AVS SNAPSHOT
MRN:[id number]                      After Visit Summary   6/14/2018    Rafa Bhatti    MRN: [id number]           Thank you!     Thank you for choosing [facility] for your care. Our goal is always to provide you with excellent care. Hearing back from our patients is one way we can continue to improve our services. Please take a few minutes to complete the written survey that you may receive in the mail after you visit with us. Thank you!        Patient Information     Date Of Birth          1945        About your hospital stay     You were admitted on:  June 14, 2018 You last received care in the:  [facility]    You were discharged on:  June 14, 2018       Who to Call     For medical emergencies, please call 911.  For non-urgent questions about your medical care, please call your primary care provider or clinic, [phone]          Attending Provider     Provider Specialty    Rosa Muhammad NP Family Practice       Primary Care Provider Office Phone # Fax #    Santy Raudel Agustin [phone]      Your next 10 appointments already scheduled     Jul 23, 2018 10:20 AM CDT   (Arrive by 10:00 AM)   SHORT with Santy Agustin DO   [facility] [place] ([facility] [place] )    [address]  [place] [address]   [phone]              Further instructions from your care team       -Continue to try to limit carbohydrates in your diet.   -Be as active you can - exercise helps lower glucose levels.   -Test your glucose 3-4x/week - alternate times - fasting and 2 hours after supper.   -Target levels are fasting , 2 hours after supper less than 180.   -A1c was 7.8% on 5/9/18.  Goal is A1c less than 7.0%.    -Glucose average today was 114.  Great job!  -Follow up in August.    -Call with any concerns - MICHAEL Seals, [phone].     Pending Results     No orders found from 6/12/2018 to 6/15/2018.            Admission  "Information     Date & Time Provider Department Dept. Phone    6/14/2018 Rosa Muhammad NP [facility] [phone]      Your Vitals Were     Blood Pressure Pulse Height Weight Pulse Oximetry BMI (Body Mass Index)    111/70 (BP Location: Right arm, Patient Position: Chair, Cuff Size: Adult Large) 56 1.778 m (5' 10\") 81.6 kg (180 lb) 97% 25.83 kg/m2      Care EveryWhere ID     This is your Care EveryWhere ID. This could be used by other organizations to access your [facility] medical records  [id number]        Equal Access to Services     : Hadii malcom barboza hadashyasmine Soomaali, waaxda luqadaha, qaybta kaalmagregorio benitez, greg monroy . So [facility] [phone].    ATENCIÓN: Si habla español, tiene a bae disposición servicios gratuitos de asistencia lingüística. [facility] [phone].    We comply with applicable federal civil rights laws and [place] laws. We do not discriminate on the basis of race, color, national origin, age, disability, sex, sexual orientation, or gender identity.               Review of your medicines      UNREVIEWED medicines. Ask your doctor about these medicines        Dose / Directions    allopurinol 100 MG tablet   Commonly known as:  ZYLOPRIM   Used for:  Chronic idiopathic gout involving toe of left foot without tophus        TAKE 1 TABLET (100 MG) BY MOUTH TWICE DAILY   Quantity:  60 tablet   Refills:  2       amLODIPine 5 MG tablet   Commonly known as:  NORVASC   Used for:  Benign essential hypertension        TAKE 1 TABLET DAILY BY MOUTH - GENERIC FOR NORVASC   Quantity:  30 tablet   Refills:  5       aspirin 81 MG tablet        Dose:  81 mg   Take 81 mg by mouth daily   Refills:  0       atenolol 50 MG tablet   Commonly known as:  TENORMIN   Used for:  Benign essential hypertension        Dose:  50 mg   Take 1 tablet (50 mg) by mouth daily   Quantity:  90 tablet   Refills:  3       colchicine 0.6 MG tablet   Commonly known as:  COLCYRS   Used " for:  Chronic idiopathic gout involving toe of left foot without tophus        Take 2 tablets at the onset of gout pain. May take one tablet again in one hour. Max 4 tabs in 24 hrs.   Quantity:  20 tablet   Refills:  0       hydrochlorothiazide 25 MG tablet   Commonly known as:  HYDRODIURIL   Used for:  Benign essential hypertension        Dose:  25 mg   Take 1 tablet (25 mg) by mouth daily   Quantity:  180 tablet   Refills:  3       metFORMIN 500 MG tablet   Commonly known as:  GLUCOPHAGE   Used for:  Type 2 diabetes mellitus with diabetic polyneuropathy, without long-term current use of insulin (H)        Dose:  500 mg   Take 1 tablet (500 mg) by mouth 2 times daily (with meals)   Quantity:  60 tablet   Refills:  3       Multi-vitamin Tabs tablet        Dose:  1 tablet   Take 1 tablet by mouth daily   Refills:  0       ranitidine 150 MG tablet   Commonly known as:  ZANTAC   Used for:  Gastroesophageal reflux disease, esophagitis presence not specified        TAKE ONE TABLET TWO TIMES A DAY BY MOUTH   Quantity:  180 tablet   Refills:  1       tadalafil 20 MG tablet   Commonly known as:  CIALIS   Used for:  Erectile dysfunction, unspecified erectile dysfunction type        Dose:  20 mg   Take 1 tablet (20 mg) by mouth as needed   Quantity:  36 tablet   Refills:  3       TUMS PO        tums 200 mg calcium (500 mg) chewable Take 1 tablet by mouth prn   Refills:  0         CONTINUE these medicines which have NOT CHANGED        Dose / Directions    blood glucose monitoring lancets   Used for:  Type 2 diabetes mellitus without complication, without long-term current use of insulin (H)        Use to test blood sugar 1 times daily.   Quantity:  100 each   Refills:  3       blood glucose monitoring test strip   Commonly known as:  FREESTYLE LITE   Used for:  Type 2 diabetes mellitus without complication, without long-term current use of insulin (H)        Use to test blood sugar 1 times daily.   Quantity:  100 each    Refills:  3                Protect others around you: Learn how to safely use, store and throw away your medicines at www.disposemymeds.org.             Medication List: This is a list of all your medications and when to take them. Check marks below indicate your daily home schedule. Keep this list as a reference.      Medications           Morning Afternoon Evening Bedtime As Needed    allopurinol 100 MG tablet   Commonly known as:  ZYLOPRIM   TAKE 1 TABLET (100 MG) BY MOUTH TWICE DAILY                                amLODIPine 5 MG tablet   Commonly known as:  NORVASC   TAKE 1 TABLET DAILY BY MOUTH - GENERIC FOR NORVASC                                aspirin 81 MG tablet   Take 81 mg by mouth daily                                atenolol 50 MG tablet   Commonly known as:  TENORMIN   Take 1 tablet (50 mg) by mouth daily                                blood glucose monitoring lancets   Use to test blood sugar 1 times daily.                                blood glucose monitoring test strip   Commonly known as:  FREESTYLE LITE   Use to test blood sugar 1 times daily.                                colchicine 0.6 MG tablet   Commonly known as:  COLCYRS   Take 2 tablets at the onset of gout pain. May take one tablet again in one hour. Max 4 tabs in 24 hrs.                                hydrochlorothiazide 25 MG tablet   Commonly known as:  HYDRODIURIL   Take 1 tablet (25 mg) by mouth daily                                metFORMIN 500 MG tablet   Commonly known as:  GLUCOPHAGE   Take 1 tablet (500 mg) by mouth 2 times daily (with meals)                                Multi-vitamin Tabs tablet   Take 1 tablet by mouth daily                                ranitidine 150 MG tablet   Commonly known as:  ZANTAC   TAKE ONE TABLET TWO TIMES A DAY BY MOUTH                                tadalafil 20 MG tablet   Commonly known as:  CIALIS   Take 1 tablet (20 mg) by mouth as needed                                TUMS PO   tums  200 mg calcium (500 mg) chewable Take 1 tablet by mouth prn

## 2018-06-14 NOTE — TELEPHONE ENCOUNTER
Pt was dx with diabetes shortly after having pancreatitis.  A1c was 7.8% (5/9/18).  Pt was prescribed Glimepiride which caused lows so it was discontinued and Metformin was prescribed.   Pt has not started Metformin yet. He has been working on diet changes and being active.  Glucose levels:  [id number]  Before clxyki-  2 hours post gkufbi-  Two week average is 114 = A1c of 5.6%.   Okay to not begin Metformin ?  Thanks!

## 2018-07-20 DIAGNOSIS — I10 BENIGN ESSENTIAL HYPERTENSION: ICD-10-CM

## 2018-07-20 RX ORDER — AMLODIPINE BESYLATE 5 MG/1
TABLET ORAL
Qty: 30 TABLET | Refills: 3 | Status: SHIPPED | OUTPATIENT
Start: 2018-07-20 | End: 2018-11-17

## 2018-07-20 NOTE — TELEPHONE ENCOUNTER
norvasc      Last Written Prescription Date:  1/19/18  Last Fill Quantity: 30,   # refills: 5  Last Office Visit: 5/23/18  Future Office visit:    Next 5 appointments (look out 90 days)     Jul 23, 2018 10:20 AM CDT   (Arrive by 10:00 AM)   SHORT with Santy Agustin DO   [facility] ([facility] [place] )    [address] [place] [address] [id number] [phone]            Aug 14, 2018  9:30 AM CDT   (Arrive by 9:15 AM)   Return Visit with Ava Templeton RD   [facility] ([facility] )    [address] [place] [id number] [phone]

## 2018-07-23 ENCOUNTER — OFFICE VISIT (OUTPATIENT)
Dept: PEDIATRICS | Facility: OTHER | Age: 73
End: 2018-07-23
Attending: INTERNAL MEDICINE
Payer: COMMERCIAL

## 2018-07-23 VITALS
TEMPERATURE: 97.2 F | HEART RATE: 51 BPM | WEIGHT: 180 LBS | RESPIRATION RATE: 20 BRPM | BODY MASS INDEX: 25.83 KG/M2 | DIASTOLIC BLOOD PRESSURE: 60 MMHG | OXYGEN SATURATION: 97 % | SYSTOLIC BLOOD PRESSURE: 130 MMHG

## 2018-07-23 DIAGNOSIS — Z53.9 ERRONEOUS ENCOUNTER--DISREGARD: ICD-10-CM

## 2018-07-23 DIAGNOSIS — E11.9 TYPE 2 DIABETES MELLITUS WITHOUT COMPLICATION, WITHOUT LONG-TERM CURRENT USE OF INSULIN (H): Primary | ICD-10-CM

## 2018-07-23 DIAGNOSIS — E11.9 TYPE 2 DIABETES MELLITUS WITHOUT COMPLICATION, WITHOUT LONG-TERM CURRENT USE OF INSULIN (H): ICD-10-CM

## 2018-07-23 LAB
ANION GAP SERPL CALCULATED.3IONS-SCNC: 8 MMOL/L (ref 3–14)
BUN SERPL-MCNC: 18 MG/DL (ref 7–30)
CALCIUM SERPL-MCNC: 9.2 MG/DL (ref 8.5–10.1)
CHLORIDE SERPL-SCNC: 101 MMOL/L (ref 94–109)
CO2 SERPL-SCNC: 31 MMOL/L (ref 20–32)
CREAT SERPL-MCNC: 0.92 MG/DL (ref 0.66–1.25)
GFR SERPL CREATININE-BSD FRML MDRD: 81 ML/MIN/1.7M2
GLUCOSE SERPL-MCNC: 118 MG/DL (ref 70–99)
POTASSIUM SERPL-SCNC: 3.6 MMOL/L (ref 3.4–5.3)
SODIUM SERPL-SCNC: 140 MMOL/L (ref 133–144)

## 2018-07-23 PROCEDURE — 36415 COLL VENOUS BLD VENIPUNCTURE: CPT | Mod: ZL | Performed by: INTERNAL MEDICINE

## 2018-07-23 PROCEDURE — 80048 BASIC METABOLIC PNL TOTAL CA: CPT | Mod: ZL | Performed by: INTERNAL MEDICINE

## 2018-07-23 ASSESSMENT — ANXIETY QUESTIONNAIRES
1. FEELING NERVOUS, ANXIOUS, OR ON EDGE: NOT AT ALL
7. FEELING AFRAID AS IF SOMETHING AWFUL MIGHT HAPPEN: NOT AT ALL
5. BEING SO RESTLESS THAT IT IS HARD TO SIT STILL: NOT AT ALL
2. NOT BEING ABLE TO STOP OR CONTROL WORRYING: NOT AT ALL
6. BECOMING EASILY ANNOYED OR IRRITABLE: NOT AT ALL
GAD7 TOTAL SCORE: 0
3. WORRYING TOO MUCH ABOUT DIFFERENT THINGS: NOT AT ALL

## 2018-07-23 ASSESSMENT — PATIENT HEALTH QUESTIONNAIRE - PHQ9: 5. POOR APPETITE OR OVEREATING: NOT AT ALL

## 2018-07-23 ASSESSMENT — PAIN SCALES - GENERAL: PAINLEVEL: NO PAIN (0)

## 2018-07-23 NOTE — NURSING NOTE
"Chief Complaint   Patient presents with     Diabetes       Initial /60 (BP Location: Right arm, Patient Position: Chair, Cuff Size: Adult Large)  Pulse 51  Temp 97.2  F (36.2  C) (Tympanic)  Resp 20  Wt 180 lb (81.6 kg)  SpO2 97%  BMI 25.83 kg/m2 Estimated body mass index is 25.83 kg/(m^2) as calculated from the following:    Height as of 6/14/18: 5' 10\" (1.778 m).    Weight as of this encounter: 180 lb (81.6 kg).  Medication Reconciliation: complete    Kelsy Yeager LPN    "

## 2018-07-23 NOTE — PROGRESS NOTES
Patient left without seeing MD. Had to go to a . Will call back to reschedule.     Appointment cancelled.

## 2018-07-23 NOTE — MR AVS SNAPSHOT
After Visit Summary   7/23/2018    Rafa Bhatti    MRN: [id number]           Patient Information     Date Of Birth          1945        Visit Information        Provider Department      7/23/2018 10:20 AM Santy Agustin,  [facility]        Today's Diagnoses     Type 2 diabetes mellitus without complication, without long-term current use of insulin (H)    -  1    ERRONEOUS ENCOUNTER--DISREGARD           Follow-ups after your visit        Your next 10 appointments already scheduled     Aug 14, 2018  9:30 AM CDT   (Arrive by 9:15 AM)   Return Visit with Ava Templeton RD   [facility] ([facility] )    [address] [place] [id number] [phone]              Who to contact     If you have questions or need follow up information about today's clinic visit or your schedule please contact [facility] directly at [phone].  Normal or non-critical lab and imaging results will be communicated to you by MyChart, letter or phone within 4 business days after the clinic has received the results. If you do not hear from us within 7 days, please contact the clinic through MyChart or phone. If you have a critical or abnormal lab result, we will notify you by phone as soon as possible.  Submit refill requests through [employer] or call your pharmacy and they will forward the refill request to us. Please allow 3 business days for your refill to be completed.          Additional Information About Your Visit        Care EveryWhere ID     This is your Care EveryWhere ID. This could be used by other organizations to access your [facility] medical records  [id number]        Your Vitals Were     Pulse Temperature Respirations Pulse Oximetry BMI (Body Mass Index)       51 97.2  F (36.2  C) (Tympanic) 20 97% 25.83 kg/m2        Blood Pressure from Last 3 Encounters:   07/23/18 130/60   06/14/18 111/70   05/31/18 138/82    Weight from Last 3  Encounters:   07/23/18 180 lb (81.6 kg)   06/14/18 180 lb (81.6 kg)   05/31/18 184 lb (83.5 kg)              Today, you had the following     No orders found for display       Primary Care Provider Office Phone # Fax #    Santy Agustin [phone] [address] [address]        Equal Access to Services     LIZZY ALFONSO : Hadii aad ku hadasho Soomaali, waaxda luqadaha, qaybta kaalmada adeegyada, waxay idiin hayaan adeeg gurdeephallebozena lachristal . So [facility] [phone].    ATENCIÓN: Si habla espmargi, tiene a bae disposición servicios gratuitos de asistencia lingüística. Llame al [phone].    We comply with applicable federal civil rights laws and [place] laws. We do not discriminate on the basis of race, color, national origin, age, disability, sex, sexual orientation, or gender identity.            Thank you!     Thank you for choosing [facility]  for your care. Our goal is always to provide you with excellent care. Hearing back from our patients is one way we can continue to improve our services. Please take a few minutes to complete the written survey that you may receive in the mail after your visit with us. Thank you!             Your Updated Medication List - Protect others around you: Learn how to safely use, store and throw away your medicines at www.disposemymeds.org.          This list is accurate as of 7/23/18 11:11 AM.  Always use your most recent med list.                   Brand Name Dispense Instructions for use Diagnosis    allopurinol 100 MG tablet    ZYLOPRIM    60 tablet    TAKE 1 TABLET (100 MG) BY MOUTH TWICE DAILY    Chronic idiopathic gout involving toe of left foot without tophus       amLODIPine 5 MG tablet    NORVASC    30 tablet    TAKE 1 TABLET DAILY BY MOUTH - GENERIC FOR NORVASC    Benign essential hypertension       aspirin 81 MG tablet      Take 81 mg by mouth daily        atenolol 50 MG tablet    TENORMIN    90 tablet    Take 1  tablet (50 mg) by mouth daily    Benign essential hypertension       blood glucose monitoring lancets     100 each    Use to test blood sugar 1 times daily.    Type 2 diabetes mellitus without complication, without long-term current use of insulin (H)       blood glucose monitoring test strip    FREESTYLE LITE    100 each    Use to test blood sugar 1 times daily.    Type 2 diabetes mellitus without complication, without long-term current use of insulin (H)       colchicine 0.6 MG tablet    COLCYRS    20 tablet    Take 2 tablets at the onset of gout pain. May take one tablet again in one hour. Max 4 tabs in 24 hrs.    Chronic idiopathic gout involving toe of left foot without tophus       hydrochlorothiazide 25 MG tablet    HYDRODIURIL    180 tablet    Take 1 tablet (25 mg) by mouth daily    Benign essential hypertension       metFORMIN 500 MG tablet    GLUCOPHAGE    60 tablet    Take 1 tablet (500 mg) by mouth 2 times daily (with meals)    Type 2 diabetes mellitus with diabetic polyneuropathy, without long-term current use of insulin (H)       Multi-vitamin Tabs tablet      Take 1 tablet by mouth daily        ranitidine 150 MG tablet    ZANTAC    180 tablet    TAKE ONE TABLET TWO TIMES A DAY BY MOUTH    Gastroesophageal reflux disease, esophagitis presence not specified       tadalafil 20 MG tablet    CIALIS    36 tablet    Take 1 tablet (20 mg) by mouth as needed    Erectile dysfunction, unspecified erectile dysfunction type       TUMS PO      tums 200 mg calcium (500 mg) chewable Take 1 tablet by mouth prn

## 2018-07-24 ASSESSMENT — PATIENT HEALTH QUESTIONNAIRE - PHQ9: SUM OF ALL RESPONSES TO PHQ QUESTIONS 1-9: 0

## 2018-07-24 ASSESSMENT — ANXIETY QUESTIONNAIRES: GAD7 TOTAL SCORE: 0

## 2018-08-14 ENCOUNTER — HOSPITAL ENCOUNTER (OUTPATIENT)
Dept: EDUCATION SERVICES | Facility: HOSPITAL | Age: 73
Discharge: HOME | End: 2018-08-14
Attending: NURSE PRACTITIONER | Admitting: INTERNAL MEDICINE
Payer: COMMERCIAL

## 2018-08-14 VITALS
DIASTOLIC BLOOD PRESSURE: 75 MMHG | SYSTOLIC BLOOD PRESSURE: 135 MMHG | HEART RATE: 53 BPM | WEIGHT: 180.5 LBS | BODY MASS INDEX: 25.84 KG/M2 | HEIGHT: 70 IN | OXYGEN SATURATION: 97 %

## 2018-08-14 DIAGNOSIS — E11.9 TYPE 2 DIABETES MELLITUS WITHOUT COMPLICATION, WITHOUT LONG-TERM CURRENT USE OF INSULIN (H): Primary | ICD-10-CM

## 2018-08-14 LAB — HBA1C MFR BLD: 5.8 % (ref 0–5.7)

## 2018-08-14 PROCEDURE — 36416 COLLJ CAPILLARY BLOOD SPEC: CPT | Performed by: DIETITIAN, REGISTERED

## 2018-08-14 PROCEDURE — G0108 DIAB MANAGE TRN  PER INDIV: HCPCS | Performed by: DIETITIAN, REGISTERED

## 2018-08-14 PROCEDURE — 83036 HEMOGLOBIN GLYCOSYLATED A1C: CPT | Performed by: DIETITIAN, REGISTERED

## 2018-08-14 NOTE — IP AVS SNAPSHOT
MRN:[id number]                      After Visit Summary   8/14/2018    Rafa Bhatti    MRN: [id number]           Thank you!     Thank you for choosing [facility] for your care. Our goal is always to provide you with excellent care. Hearing back from our patients is one way we can continue to improve our services. Please take a few minutes to complete the written survey that you may receive in the mail after you visit with us. Thank you!        Patient Information     Date Of Birth          1945        About your hospital stay     You were admitted on:  August 14, 2018 You last received care in the:  [facility]    You were discharged on:  August 14, 2018       Who to Call     For medical emergencies, please call 911.  For non-urgent questions about your medical care, please call your primary care provider or clinic, [phone]          Attending Provider     Provider Specialty    Rosa Muhammad NP Family Practice       Primary Care Provider Office Phone # Fax #    Santy Agustin [phone]      Further instructions from your care team       -Keep limiting foods high in carbohydrates in your diet.   -Continue to be active.   -Test your glucose levels a few times per week - either fasting or 2 hours after a meal.   -Target levels are fasting , 2 hours after a meal less than 180.   -A1c today was 5.8% down from 7.8% in May.  Great job!.    -You should have A1c checked in approximately 6 months.   -Follow up annually or sooner if indicated.   -Call with concerns - MICHAEL Seals, [phone].     Pending Results     No orders found from 8/12/2018 to 8/15/2018.            Admission Information     Date & Time Provider Department Dept. Phone    8/14/2018 Rosa Muhammad NP [facility] [phone]      Care EveryWhere ID     This is your Care EveryWhere ID. This could be used by other organizations to access your [facility]  records  [id number]        Equal Access to Services     CARLOSDANNY NATY : Hadii malcom barboza demondyasmine Somelinaali, waaxda luqadaha, qaybta kaalmagregorio benitez, greg mackenziehallebozena noguera. So [facility] [phone].    ATENCIÓN: Si habla español, tiene a bae disposición servicios gratuitos de asistencia lingüística. Llame al [phone].    We comply with applicable federal civil rights laws and [place] laws. We do not discriminate on the basis of race, color, national origin, age, disability, sex, sexual orientation, or gender identity.               Review of your medicines      UNREVIEWED medicines. Ask your doctor about these medicines        Dose / Directions    allopurinol 100 MG tablet   Commonly known as:  ZYLOPRIM   Used for:  Chronic idiopathic gout involving toe of left foot without tophus        TAKE 1 TABLET (100 MG) BY MOUTH TWICE DAILY   Quantity:  60 tablet   Refills:  2       amLODIPine 5 MG tablet   Commonly known as:  NORVASC   Used for:  Benign essential hypertension        TAKE 1 TABLET DAILY BY MOUTH - GENERIC FOR NORVASC   Quantity:  30 tablet   Refills:  3       aspirin 81 MG tablet        Dose:  81 mg   Take 81 mg by mouth daily   Refills:  0       atenolol 50 MG tablet   Commonly known as:  TENORMIN   Used for:  Benign essential hypertension        Dose:  50 mg   Take 1 tablet (50 mg) by mouth daily   Quantity:  90 tablet   Refills:  3       colchicine 0.6 MG tablet   Commonly known as:  COLCYRS   Used for:  Chronic idiopathic gout involving toe of left foot without tophus        Take 2 tablets at the onset of gout pain. May take one tablet again in one hour. Max 4 tabs in 24 hrs.   Quantity:  20 tablet   Refills:  0       hydrochlorothiazide 25 MG tablet   Commonly known as:  HYDRODIURIL   Used for:  Benign essential hypertension        Dose:  25 mg   Take 1 tablet (25 mg) by mouth daily   Quantity:  180 tablet   Refills:  3       metFORMIN 500 MG tablet   Commonly known as:  GLUCOPHAGE    Used for:  Type 2 diabetes mellitus with diabetic polyneuropathy, without long-term current use of insulin (H)        Dose:  500 mg   Take 1 tablet (500 mg) by mouth 2 times daily (with meals)   Quantity:  60 tablet   Refills:  3       Multi-vitamin Tabs tablet        Dose:  1 tablet   Take 1 tablet by mouth daily   Refills:  0       ranitidine 150 MG tablet   Commonly known as:  ZANTAC   Used for:  Gastroesophageal reflux disease, esophagitis presence not specified        TAKE ONE TABLET TWO TIMES A DAY BY MOUTH   Quantity:  180 tablet   Refills:  1       tadalafil 20 MG tablet   Commonly known as:  CIALIS   Used for:  Erectile dysfunction, unspecified erectile dysfunction type        Dose:  20 mg   Take 1 tablet (20 mg) by mouth as needed   Quantity:  36 tablet   Refills:  3       TUMS PO        tums 200 mg calcium (500 mg) chewable Take 1 tablet by mouth prn   Refills:  0         CONTINUE these medicines which have NOT CHANGED        Dose / Directions    blood glucose monitoring lancets   Used for:  Type 2 diabetes mellitus without complication, without long-term current use of insulin (H)        Use to test blood sugar 1 times daily.   Quantity:  100 each   Refills:  3       blood glucose monitoring test strip   Commonly known as:  FREESTYLE LITE   Used for:  Type 2 diabetes mellitus without complication, without long-term current use of insulin (H)        Use to test blood sugar 1 times daily.   Quantity:  100 each   Refills:  3                Protect others around you: Learn how to safely use, store and throw away your medicines at www.disposemymeds.org.             Medication List: This is a list of all your medications and when to take them. Check marks below indicate your daily home schedule. Keep this list as a reference.      Medications           Morning Afternoon Evening Bedtime As Needed    allopurinol 100 MG tablet   Commonly known as:  ZYLOPRIM   TAKE 1 TABLET (100 MG) BY MOUTH TWICE DAILY                                 amLODIPine 5 MG tablet   Commonly known as:  NORVASC   TAKE 1 TABLET DAILY BY MOUTH - GENERIC FOR NORVASC                                aspirin 81 MG tablet   Take 81 mg by mouth daily                                atenolol 50 MG tablet   Commonly known as:  TENORMIN   Take 1 tablet (50 mg) by mouth daily                                blood glucose monitoring lancets   Use to test blood sugar 1 times daily.                                blood glucose monitoring test strip   Commonly known as:  FREESTYLE LITE   Use to test blood sugar 1 times daily.                                colchicine 0.6 MG tablet   Commonly known as:  COLCYRS   Take 2 tablets at the onset of gout pain. May take one tablet again in one hour. Max 4 tabs in 24 hrs.                                hydrochlorothiazide 25 MG tablet   Commonly known as:  HYDRODIURIL   Take 1 tablet (25 mg) by mouth daily                                metFORMIN 500 MG tablet   Commonly known as:  GLUCOPHAGE   Take 1 tablet (500 mg) by mouth 2 times daily (with meals)                                Multi-vitamin Tabs tablet   Take 1 tablet by mouth daily                                ranitidine 150 MG tablet   Commonly known as:  ZANTAC   TAKE ONE TABLET TWO TIMES A DAY BY MOUTH                                tadalafil 20 MG tablet   Commonly known as:  CIALIS   Take 1 tablet (20 mg) by mouth as needed                                TUMS PO   tums 200 mg calcium (500 mg) chewable Take 1 tablet by mouth prn

## 2018-08-14 NOTE — PROGRESS NOTES
"Pt here for Session 4.     Blood pressure 135/75, pulse 53, height 1.778 m (5' 10\"), weight 81.9 kg (180 lb 8 oz), SpO2 97 %. Weight is down 3.5# from initial visit.     Current diabetes medications: none.      Current glucose levels:   [id number]  Before [id number]  Two week average is 107.     Lab Results   Component Value Date    A1C 5.8 08/14/2018    A1C 7.8 05/09/2018    A1C 7.4 04/11/2018     Readiness to learn: willing.      Barriers to learning: none.      Pt continues to limit his portion sizes and foods high in carbohydrates.  He is active walking or biking every other day.  Pt is confident he can continue with changes he has made and is pleased with decrease in his A1c.     Eye exam 6/6/2019.  Foot exam 5/23/2019.  Pt does not smoke.     Pt actively participated in the session and continues to demonstrate motivation.     Topics covered: diabetes success plan, behavior change goal review, meal planning and importance of long term compliance, developing problem solving skills, stress and how it affects blood sugar, relationship between diabetes and depression along with symptoms of depression and how they affect diabetes self-care. Rationale for consistent self-care and regular diabetes care visits, identifying medical tests/exams needed for regular diabetes care and community resources for ongoing education and support    Pt goals updated. Pt met goal of avoiding medications for diabetes.      Follow up Diabetes self-management support plan: Continue current meal planning and exercise efforts.  Test glucose a few times per week - fasting or 2 hours post meal.  A1c check in approximately 6 months.     Will follow annually or sooner if indicated.     Total visit time: 30 minutes.     MICHAEL Seals, CDE    "

## 2018-08-14 NOTE — DISCHARGE INSTRUCTIONS
-Keep limiting foods high in carbohydrates in your diet.   -Continue to be active.   -Test your glucose levels a few times per week - either fasting or 2 hours after a meal.   -Target levels are fasting , 2 hours after a meal less than 180.   -A1c today was 5.8% down from 7.8% in May.  Great job!.    -You should have A1c checked in approximately 6 months.   -Follow up annually or sooner if indicated.   -Call with concerns - MICHAEL Seals, [phone].

## 2018-08-14 NOTE — IP AVS SNAPSHOT
[facility]    [address] [place] [id number]    Phone:  [phone]    Fax:  [phone]                                       After Visit Summary   8/14/2018    Rafa Bhatti    MRN: [id number]           After Visit Summary Signature Page     I have received my discharge instructions, and my questions have been answered. I have discussed any challenges I see with this plan with the nurse or doctor.    ..........................................................................................................................................  Patient/Patient Representative Signature      ..........................................................................................................................................  Patient Representative Print Name and Relationship to Patient    ..................................................               ................................................  Date                                            Time    ..........................................................................................................................................  Reviewed by Signature/Title    ...................................................              ..............................................  Date                                                            Time

## 2018-10-09 DIAGNOSIS — M1A.0720 CHRONIC IDIOPATHIC GOUT INVOLVING TOE OF LEFT FOOT WITHOUT TOPHUS: ICD-10-CM

## 2018-10-11 RX ORDER — COLCHICINE 0.6 MG/1
TABLET ORAL
Qty: 20 TABLET | Refills: 1 | Status: SHIPPED | OUTPATIENT
Start: 2018-10-11 | End: 2020-12-14

## 2018-10-11 NOTE — TELEPHONE ENCOUNTER
colchicine (COLCYRS) 0.6 MG tablet    Last Written Prescription Date:  01/12/2018  Last Fill Quantity: 20,   # refills: 0  Last Office Visit: 07/23/2018  Future Office visit:       Routing refill request to provider for review/approval because:

## 2018-10-18 DIAGNOSIS — I10 BENIGN ESSENTIAL HYPERTENSION: ICD-10-CM

## 2018-10-18 RX ORDER — HYDROCHLOROTHIAZIDE 25 MG/1
TABLET ORAL
Qty: 180 TABLET | Refills: 0 | Status: SHIPPED | OUTPATIENT
Start: 2018-10-18 | End: 2019-03-18

## 2018-10-18 RX ORDER — ATENOLOL 50 MG/1
TABLET ORAL
Qty: 90 TABLET | Refills: 0 | Status: SHIPPED | OUTPATIENT
Start: 2018-10-18 | End: 2019-01-16

## 2018-10-18 NOTE — TELEPHONE ENCOUNTER
Atenolol  Last Written Prescription Date: 10/18/17  Last Fill Quantity: 90 # of Refills: 3  Last Office Visit: 7/23/18    HCTZ  Last Written Prescription Date: 10/18/17  Last Fill Quantity: 180 # of Refills: 3  Last Office Visit: 7/23/18

## 2018-11-17 DIAGNOSIS — I10 BENIGN ESSENTIAL HYPERTENSION: ICD-10-CM

## 2018-11-19 RX ORDER — AMLODIPINE BESYLATE 5 MG/1
TABLET ORAL
Qty: 30 TABLET | Refills: 3 | Status: SHIPPED | OUTPATIENT
Start: 2018-11-19 | End: 2019-03-18

## 2018-11-19 NOTE — TELEPHONE ENCOUNTER
AMLODIPINE 5MG TABLET      Last Written Prescription Date:  7/20/18  Last Fill Quantity: 30,   # refills: 3  Last Office Visit: 7/23/18  Future Office visit:

## 2018-12-06 ENCOUNTER — TELEPHONE (OUTPATIENT)
Dept: PEDIATRICS | Facility: OTHER | Age: 73
End: 2018-12-06

## 2019-01-16 DIAGNOSIS — I10 BENIGN ESSENTIAL HYPERTENSION: ICD-10-CM

## 2019-01-16 RX ORDER — ATENOLOL 50 MG/1
TABLET ORAL
Qty: 90 TABLET | Refills: 0 | Status: SHIPPED | OUTPATIENT
Start: 2019-01-16 | End: 2019-03-18

## 2019-03-01 ENCOUNTER — TELEPHONE (OUTPATIENT)
Dept: PEDIATRICS | Facility: OTHER | Age: 74
End: 2019-03-01

## 2019-03-01 NOTE — TELEPHONE ENCOUNTER
9:35 AM    Reason for Call: OVERBOOK    Patient is having the following symptoms: Needs a Dm follow up/meds before he leaves. weeks.    The patient is requesting an appointment before March 18th with Dr. Agustin.    Was an appointment offered for this call? Yes  If yes : Appointment type: short              Date: March 27    Preferred method for responding to this message: Telephone Call  What is your phone number ?  [phone]    If we cannot reach you directly, may we leave a detailed response at the number you provided? Yes    Can this message wait until your PCP/provider returns, if unavailable today? YES,     Cynthia Hawkins

## 2019-03-14 NOTE — PROGRESS NOTES
SUBJECTIVE:   Rafa Bhatti is a 73 year old male who presents to clinic today for the following health issues:      Diabetes Follow-up      Patient is checking blood sugars: 1 times a week 107-114    Diabetic concerns: None     Symptoms of hypoglycemia (low blood sugar): none     Paresthesias (numbness or burning in feet) or sores: No     Date of last diabetic eye exam: Has not had one    BP Readings from Last 2 Encounters:   08/14/18 135/75   07/23/18 130/60     Hemoglobin A1C (%)   Date Value   08/14/2018 5.8   05/09/2018 7.8 (H)     LDL Cholesterol Calculated (mg/dL)   Date Value   05/09/2018 83   10/26/2016 122 (H)     He had adjusted his diet and exercise.  Diabetes Management Resources    Amount of exercise or physical activity: 6-7 days/week for an average of 30-45 minutes,  Walks 2 miles daily    Problems taking medications regularly: No    Medication side effects: none    Diet: regular (no restrictions) watching     Hypertension:  He does not check his blood pressures.  He denies and dizziness or headaches.  He denies any chest pains or leg swelling.    BP Readings from Last 6 Encounters:   03/18/19 134/76   08/14/18 135/75   07/23/18 130/60   06/14/18 111/70   05/31/18 138/82   05/23/18 128/64     Urinary retention:      Duration: Ongoing for 2 years    Accompanying signs and symptoms: none    History (similar episodes/previous evaluation): Yes    Precipitating or alleviating factors: Cialis; doesn't work       Would like to discuss seeing a urologist.  He has urinary retention, he has post void dribbling.  He drinks two cups of coffee per day and no other caffeine.          PSA   Date Value Ref Range Status   10/18/2017 4.82 (H) 0 - 4 ug/L Final     Comment:     Assay Method:  Chemiluminescence using Siemens Vista analyzer       Would like to discuss a toe fungus ongoing for a while         Problem list and histories reviewed & adjusted, as indicated.  Additional history: as documented    Patient  Active Problem List   Diagnosis     Encounter for monitoring testosterone replacement therapy     Testicular hypofunction     Elevated blood pressure     Benign essential hypertension     Hemangioma of spine     ACP (advance care planning)     Routine general medical examination at a health care facility     Chronic gout     Esophageal reflux     Type 2 diabetes mellitus without complication, without long-term current use of insulin (H)     Encounter for diabetic foot exam (H)     Type 2 diabetes mellitus with diabetic polyneuropathy, without long-term current use of insulin (H)     Basal cell carcinoma, face     Personal history of malignant neoplasm of skin     Past Surgical History:   Procedure Laterality Date     cardiolyte stress test  2000    chest pain     CHOLECYSTECTOMY, COMMON BILE DUCT EXPLORATION, COMBINED  04/15/2018     COLONOSCOPY  1999     fistula in ANO       HEMORRHOIDECTOMY       removal of dermoid cyst of mediastinum       VASECTOMY         Social History     Tobacco Use     Smoking status: Never Smoker     Smokeless tobacco: Never Used   Substance Use Topics     Alcohol use: Yes     Comment: socially     Family History   Problem Relation Age of Onset     C.A.D. Father 80     C.A.D. Mother      Hypertension Mother      C.A.D. Brother      C.A.D. Other         family hx           Reviewed and updated as needed this visit by clinical staff       Reviewed and updated as needed this visit by Provider         ROS:  CONSTITUTIONAL: NEGATIVE for fever, chills, change in weight  EYES: NEGATIVE for vision changes or irritation  ENT/MOUTH: NEGATIVE for ear, mouth and throat problems  RESP: NEGATIVE for significant cough or SOB  CV: NEGATIVE for chest pain, palpitations or peripheral edema  GI: NEGATIVE for nausea, abdominal pain, heartburn, or change in bowel habits   male :See HPI  MUSCULOSKELETAL: NEGATIVE for significant arthralgias or myalgia  NEURO: NEGATIVE for weakness, dizziness or  paresthesias  ENDOCRINE: NEGATIVE for temperature intolerance, skin/hair changes  PSYCHIATRIC: NEGATIVE for changes in mood or affect    OBJECTIVE:     /76 (BP Location: Right arm, Patient Position: Sitting, Cuff Size: Adult Large)   Pulse 57   Temp 96.5  F (35.8  C) (Tympanic)   Resp 12   Wt 86.4 kg (190 lb 6.4 oz)   SpO2 95%   BMI 27.32 kg/m    Body mass index is 27.32 kg/m .  GENERAL: healthy, alert and no distress  EYES: Eyes grossly normal to inspection and conjunctivae and sclerae normal  NECK: no adenopathy, no asymmetry, masses, or scars and thyroid normal to palpation  NECK: no carotid bruits  RESP: lungs clear to auscultation - no rales, rhonchi or wheezes  CV: regular rate and rhythm, normal S1 S2, no S3 or S4, no murmur, click or rub, no peripheral edema and peripheral pulses strong  ABDOMEN: soft, nontender, no hepatosplenomegaly, no masses and bowel sounds normal  ABDOMEN: no bruits heard  MS: no gross musculoskeletal defects noted, no edema  PSYCH: mentation appears normal, affect normal/bright    Diagnostic Test Results:  Results for orders placed or performed in visit on 03/18/19 (from the past 24 hour(s))   Hemoglobin A1c   Result Value Ref Range    Hemoglobin A1C 7.4 (H) 0 - 5.6 %   Comprehensive metabolic panel   Result Value Ref Range    Sodium 136 133 - 144 mmol/L    Potassium 3.3 (L) 3.4 - 5.3 mmol/L    Chloride 102 94 - 109 mmol/L    Carbon Dioxide 29 20 - 32 mmol/L    Anion Gap 5 3 - 14 mmol/L    Glucose 132 (H) 70 - 99 mg/dL    Urea Nitrogen 19 7 - 30 mg/dL    Creatinine 0.96 0.66 - 1.25 mg/dL    GFR Estimate 79 >60 mL/min/[1.73_m2]    GFR Estimate If Black >90 >60 mL/min/[1.73_m2]    Calcium 9.2 8.5 - 10.1 mg/dL    Bilirubin Total 0.6 0.2 - 1.3 mg/dL    Albumin 4.1 3.4 - 5.0 g/dL    Protein Total 7.7 6.8 - 8.8 g/dL    Alkaline Phosphatase 62 40 - 150 U/L    ALT 49 0 - 70 U/L    AST 26 0 - 45 U/L   Lipid Profile (Chol, Trig, HDL, LDL calc)   Result Value Ref Range    Cholesterol  183 <200 mg/dL    Triglycerides 191 (H) <150 mg/dL    HDL Cholesterol 37 (L) >39 mg/dL    LDL Cholesterol Calculated 108 (H) <100 mg/dL    Non HDL Cholesterol 146 (H) <130 mg/dL   Uric acid   Result Value Ref Range    Uric Acid 6.1 3.5 - 7.2 mg/dL   PSA, screen   Result Value Ref Range    PSA 4.62 (H) 0 - 4 ug/L   Albumin Random Urine Quantitative with Creat Ratio   Result Value Ref Range    Creatinine Urine 156 mg/dL    Albumin Urine mg/L 6 mg/L    Albumin Urine mg/g Cr 4.08 0 - 17 mg/g Cr       ASSESSMENT/PLAN:   (E11.42) Type 2 diabetes mellitus with diabetic polyneuropathy, without long-term current use of insulin (H)  (primary encounter diagnosis)  Comment: Not at goal with borderline BP control.  He has normal renal function.  He is not on  STATIN or an ACE inhibitor.  Plan:    Start metFORMIN (GLUCOPHAGE) 500 MG tablet BID.  He will continue ASA 81 mg daily.    (I10) Benign essential hypertension  Comment: Borderline control.  Plan:  He will continue Atenolol 50 mg, Norvasc 10 mg and HCTZ 25 mg daily.    (E78.2) Mixed hyperlipidemia  Comment: The 10-year ASCVD risk score (Damon DC Jr., et al., 2013) is: 43.9%    Values used to calculate the score:      Age: 73 years      Sex: Male      Is Non- : No      Diabetic: Yes      Tobacco smoker: No      Systolic Blood Pressure: 134 mmHg      Is BP treated: Yes      HDL Cholesterol: 37 mg/dL      Total Cholesterol: 183 mg/dL    Plan:   I will discuss starting a STATIN with the patient.    (M1A.8120) Chronic idiopathic gout involving toe of left foot without tophus  Comment: controlled.  Plan:   continue allopurinol (ZYLOPRIM) 100 MG tablet BID    (R33.9) Urinary retention  Comment: Due to BPH  Plan:   tamsulosin (FLOMAX) 0.4 MG capsule, UROLOGY ADULT REFERRAL    (Z12.5) Screening for prostate cancer  Comment: His PSA is below 5.0.  He has mild elevation due to BPH.  Plan:  Continue annual PSA, screen    (N52.9) Erectile dysfunction, unspecified  erectile dysfunction type  Comment:   Plan:   Start sildenafil (REVATIO) 20 MG tablet, 1-2 tablets daily PRN, UROLOGY         ADULT REFERRAL    (Z23) Need for vaccination  Comment:   Plan: 1st  Administration  [63669], ], Pneumococcal vaccine        23 valent PPSV23  (Pneumovax) [78098]              FUTURE APPOINTMENTS:       - Follow-up visit in 6 months for diabetes with foot exam and HTN    Santy Agustin DO,   [facility] [place]

## 2019-03-18 ENCOUNTER — OFFICE VISIT (OUTPATIENT)
Dept: INTERNAL MEDICINE | Facility: OTHER | Age: 74
End: 2019-03-18
Attending: INTERNAL MEDICINE
Payer: COMMERCIAL

## 2019-03-18 VITALS
RESPIRATION RATE: 12 BRPM | HEART RATE: 57 BPM | SYSTOLIC BLOOD PRESSURE: 134 MMHG | BODY MASS INDEX: 27.32 KG/M2 | WEIGHT: 190.4 LBS | OXYGEN SATURATION: 95 % | TEMPERATURE: 96.5 F | DIASTOLIC BLOOD PRESSURE: 76 MMHG

## 2019-03-18 DIAGNOSIS — E11.42 TYPE 2 DIABETES MELLITUS WITH DIABETIC POLYNEUROPATHY, WITHOUT LONG-TERM CURRENT USE OF INSULIN (H): Primary | ICD-10-CM

## 2019-03-18 DIAGNOSIS — E78.2 MIXED HYPERLIPIDEMIA: ICD-10-CM

## 2019-03-18 DIAGNOSIS — R33.9 URINARY RETENTION: ICD-10-CM

## 2019-03-18 DIAGNOSIS — M1A.0720 CHRONIC IDIOPATHIC GOUT INVOLVING TOE OF LEFT FOOT WITHOUT TOPHUS: ICD-10-CM

## 2019-03-18 DIAGNOSIS — I10 BENIGN ESSENTIAL HYPERTENSION: ICD-10-CM

## 2019-03-18 DIAGNOSIS — N52.9 ERECTILE DYSFUNCTION, UNSPECIFIED ERECTILE DYSFUNCTION TYPE: ICD-10-CM

## 2019-03-18 DIAGNOSIS — Z23 NEED FOR VACCINATION: ICD-10-CM

## 2019-03-18 DIAGNOSIS — Z12.5 SCREENING FOR PROSTATE CANCER: ICD-10-CM

## 2019-03-18 PROBLEM — J06.9 VIRAL URI: Status: RESOLVED | Noted: 2018-04-24 | Resolved: 2019-03-18

## 2019-03-18 LAB
ALBUMIN SERPL-MCNC: 4.1 G/DL (ref 3.4–5)
ALP SERPL-CCNC: 62 U/L (ref 40–150)
ALT SERPL W P-5'-P-CCNC: 49 U/L (ref 0–70)
ANION GAP SERPL CALCULATED.3IONS-SCNC: 5 MMOL/L (ref 3–14)
AST SERPL W P-5'-P-CCNC: 26 U/L (ref 0–45)
BILIRUB SERPL-MCNC: 0.6 MG/DL (ref 0.2–1.3)
BUN SERPL-MCNC: 19 MG/DL (ref 7–30)
CALCIUM SERPL-MCNC: 9.2 MG/DL (ref 8.5–10.1)
CHLORIDE SERPL-SCNC: 102 MMOL/L (ref 94–109)
CHOLEST SERPL-MCNC: 183 MG/DL
CO2 SERPL-SCNC: 29 MMOL/L (ref 20–32)
CREAT SERPL-MCNC: 0.96 MG/DL (ref 0.66–1.25)
CREAT UR-MCNC: 156 MG/DL
GFR SERPL CREATININE-BSD FRML MDRD: 79 ML/MIN/{1.73_M2}
GLUCOSE SERPL-MCNC: 132 MG/DL (ref 70–99)
HBA1C MFR BLD: 7.4 % (ref 0–5.6)
HDLC SERPL-MCNC: 37 MG/DL
LDLC SERPL CALC-MCNC: 108 MG/DL
MICROALBUMIN UR-MCNC: 6 MG/L
MICROALBUMIN/CREAT UR: 4.08 MG/G CR (ref 0–17)
NONHDLC SERPL-MCNC: 146 MG/DL
POTASSIUM SERPL-SCNC: 3.3 MMOL/L (ref 3.4–5.3)
PROT SERPL-MCNC: 7.7 G/DL (ref 6.8–8.8)
PSA SERPL-ACNC: 4.62 UG/L (ref 0–4)
SODIUM SERPL-SCNC: 136 MMOL/L (ref 133–144)
TRIGL SERPL-MCNC: 191 MG/DL
URATE SERPL-MCNC: 6.1 MG/DL (ref 3.5–7.2)

## 2019-03-18 PROCEDURE — 84550 ASSAY OF BLOOD/URIC ACID: CPT | Mod: ZL | Performed by: INTERNAL MEDICINE

## 2019-03-18 PROCEDURE — 83036 HEMOGLOBIN GLYCOSYLATED A1C: CPT | Mod: ZL | Performed by: INTERNAL MEDICINE

## 2019-03-18 PROCEDURE — 90732 PPSV23 VACC 2 YRS+ SUBQ/IM: CPT

## 2019-03-18 PROCEDURE — 36415 COLL VENOUS BLD VENIPUNCTURE: CPT | Mod: ZL | Performed by: INTERNAL MEDICINE

## 2019-03-18 PROCEDURE — 80053 COMPREHEN METABOLIC PANEL: CPT | Mod: ZL | Performed by: INTERNAL MEDICINE

## 2019-03-18 PROCEDURE — 90472 IMMUNIZATION ADMIN EACH ADD: CPT | Performed by: INTERNAL MEDICINE

## 2019-03-18 PROCEDURE — 90750 HZV VACC RECOMBINANT IM: CPT | Performed by: INTERNAL MEDICINE

## 2019-03-18 PROCEDURE — G0103 PSA SCREENING: HCPCS | Mod: ZL | Performed by: INTERNAL MEDICINE

## 2019-03-18 PROCEDURE — 84153 ASSAY OF PSA TOTAL: CPT | Mod: ZL | Performed by: INTERNAL MEDICINE

## 2019-03-18 PROCEDURE — 82043 UR ALBUMIN QUANTITATIVE: CPT | Mod: ZL | Performed by: INTERNAL MEDICINE

## 2019-03-18 PROCEDURE — 80061 LIPID PANEL: CPT | Mod: ZL | Performed by: INTERNAL MEDICINE

## 2019-03-18 PROCEDURE — G0009 ADMIN PNEUMOCOCCAL VACCINE: HCPCS | Performed by: INTERNAL MEDICINE

## 2019-03-18 PROCEDURE — G0463 HOSPITAL OUTPT CLINIC VISIT: HCPCS | Mod: 25

## 2019-03-18 PROCEDURE — 99214 OFFICE O/P EST MOD 30 MIN: CPT | Performed by: INTERNAL MEDICINE

## 2019-03-18 RX ORDER — ALLOPURINOL 100 MG/1
TABLET ORAL
Qty: 90 TABLET | Refills: 3 | Status: SHIPPED | OUTPATIENT
Start: 2019-03-18 | End: 2019-11-13

## 2019-03-18 RX ORDER — HYDROCHLOROTHIAZIDE 25 MG/1
TABLET ORAL
Qty: 90 TABLET | Refills: 3 | Status: SHIPPED | OUTPATIENT
Start: 2019-03-18 | End: 2020-02-24 | Stop reason: ALTCHOICE

## 2019-03-18 RX ORDER — SILDENAFIL CITRATE 20 MG/1
20-40 TABLET ORAL DAILY PRN
Qty: 60 TABLET | Refills: 1 | Status: SHIPPED | OUTPATIENT
Start: 2019-03-18 | End: 2023-09-21

## 2019-03-18 RX ORDER — TAMSULOSIN HYDROCHLORIDE 0.4 MG/1
0.4 CAPSULE ORAL DAILY
Qty: 90 CAPSULE | Refills: 1 | Status: SHIPPED | OUTPATIENT
Start: 2019-03-18 | End: 2019-06-24

## 2019-03-18 RX ORDER — ATENOLOL 50 MG/1
TABLET ORAL
Qty: 90 TABLET | Refills: 3 | Status: SHIPPED | OUTPATIENT
Start: 2019-03-18 | End: 2020-02-24

## 2019-03-18 RX ORDER — AMLODIPINE BESYLATE 5 MG/1
TABLET ORAL
Qty: 90 TABLET | Refills: 3 | Status: SHIPPED | OUTPATIENT
Start: 2019-03-18 | End: 2020-02-24

## 2019-03-18 ASSESSMENT — PAIN SCALES - GENERAL: PAINLEVEL: NO PAIN (0)

## 2019-03-18 NOTE — NURSING NOTE
"Chief Complaint   Patient presents with     Diabetes       Initial /76 (BP Location: Right arm, Patient Position: Sitting, Cuff Size: Adult Large)   Pulse 57   Temp 96.5  F (35.8  C) (Tympanic)   Resp 12   Wt 86.4 kg (190 lb 6.4 oz)   SpO2 95%   BMI 27.32 kg/m   Estimated body mass index is 27.32 kg/m  as calculated from the following:    Height as of 8/14/18: 1.778 m (5' 10\").    Weight as of this encounter: 86.4 kg (190 lb 6.4 oz).  Medication Reconciliation: complete    Jessa Behrman, LPN  "

## 2019-03-19 ENCOUNTER — TELEPHONE (OUTPATIENT)
Dept: PEDIATRICS | Facility: OTHER | Age: 74
End: 2019-03-19

## 2019-03-19 DIAGNOSIS — E11.9 TYPE 2 DIABETES MELLITUS WITHOUT COMPLICATION, WITHOUT LONG-TERM CURRENT USE OF INSULIN (H): ICD-10-CM

## 2019-03-19 DIAGNOSIS — E78.2 MIXED HYPERLIPIDEMIA: Primary | ICD-10-CM

## 2019-03-19 RX ORDER — ROSUVASTATIN CALCIUM 10 MG/1
10 TABLET, COATED ORAL DAILY
Qty: 90 TABLET | Refills: 3 | Status: SHIPPED | OUTPATIENT
Start: 2019-03-19 | End: 2019-12-23

## 2019-03-19 NOTE — TELEPHONE ENCOUNTER
Pt called triage requested to review lipid profile and is in agreement with plan to start statin medication. Pt is waiting for drug to be ordered.

## 2019-06-12 ENCOUNTER — OFFICE VISIT (OUTPATIENT)
Dept: UROLOGY | Facility: OTHER | Age: 74
End: 2019-06-12
Attending: INTERNAL MEDICINE
Payer: COMMERCIAL

## 2019-06-12 VITALS
SYSTOLIC BLOOD PRESSURE: 138 MMHG | HEART RATE: 59 BPM | RESPIRATION RATE: 16 BRPM | HEIGHT: 70 IN | OXYGEN SATURATION: 95 % | WEIGHT: 178 LBS | TEMPERATURE: 95.8 F | BODY MASS INDEX: 25.48 KG/M2 | DIASTOLIC BLOOD PRESSURE: 68 MMHG

## 2019-06-12 DIAGNOSIS — N52.9 ERECTILE DYSFUNCTION, UNSPECIFIED ERECTILE DYSFUNCTION TYPE: ICD-10-CM

## 2019-06-12 DIAGNOSIS — R33.9 URINARY RETENTION: ICD-10-CM

## 2019-06-12 PROCEDURE — G0463 HOSPITAL OUTPT CLINIC VISIT: HCPCS

## 2019-06-12 PROCEDURE — 51798 US URINE CAPACITY MEASURE: CPT

## 2019-06-12 PROCEDURE — G0463 HOSPITAL OUTPT CLINIC VISIT: HCPCS | Mod: 25

## 2019-06-12 PROCEDURE — 99203 OFFICE O/P NEW LOW 30 MIN: CPT | Performed by: UROLOGY

## 2019-06-12 ASSESSMENT — PAIN SCALES - GENERAL: PAINLEVEL: NO PAIN (0)

## 2019-06-12 ASSESSMENT — MIFFLIN-ST. JEOR: SCORE: 1563.65

## 2019-06-12 NOTE — PROGRESS NOTES
I was asked to see this patient by Dr Agustin and provide my opinion about the following:  Erectile dysfunction    Type of Visit  Consult    Chief Complaint  Erectile dysfunction    HPI  Mr. Bhatti is a 74 year old male who presents with erectile dysfunction.  The patient has been taking sildenafil 20 to 40 mg per dose for ED.  He typically waits 20 to 30 minutes prior to expecting results.  This approach has been disappointing as he has not noticed an improvement in rigidity.  He denies side effects with the medication.    He reports taking the previous PDE5 inhibitors correctly  He does not take oral nitrates for chest pain.      Past Medical History  He  has a past medical history of Benign neoplasm of unspecified site (8/1/2012), Calculus of kidney (5/20/2002), Chest pain, unspecified (3/2/2000), Diabetic eye exam (H) (06/06/2018), Gout, unspecified (8/3/2011), Hyperlipidemia, and Unspecified essential hypertension (7/18/2000).  Patient Active Problem List   Diagnosis     Encounter for monitoring testosterone replacement therapy     Testicular hypofunction     Elevated blood pressure     Benign essential hypertension     Hemangioma of spine     ACP (advance care planning)     Routine general medical examination at a health care facility     Chronic gout     Esophageal reflux     Type 2 diabetes mellitus without complication, without long-term current use of insulin (H)     Encounter for diabetic foot exam (H)     Type 2 diabetes mellitus with diabetic polyneuropathy, without long-term current use of insulin (H)     Basal cell carcinoma, face     Personal history of malignant neoplasm of skin       Past Surgical History  He  has a past surgical history that includes cardiolyte stress test (2000); vasectomy; hemorrhoidectomy; fistula in ANO; removal of dermoid cyst of mediastinum; colonoscopy (1999); and Cholecystectomy, common bile duct exploration, combined (04/15/2018).    Medications  He has a current medication  "list which includes the following prescription(s): allopurinol, amlodipine, atenolol, blood glucose, blood glucose monitoring, calcium carbonate antacid, colchicine, hydrochlorothiazide, metformin, ranitidine, rosuvastatin, sildenafil, tamsulosin, aspirin, and multivitamin w/minerals.    Allergies  No Known Allergies    Social History  He  reports that he has never smoked. He has never used smokeless tobacco. He reports that he drinks alcohol. He reports that he does not use drugs.  No drug abuse.    Family History  Family History   Problem Relation Age of Onset     C.A.D. Father 80     C.A.D. Mother      Hypertension Mother      C.A.D. Brother      C.A.D. Other         family hx       Review of Systems  I personally reviewed the ROS with the patient.    Nursing Notes:   Chelle Abarca LPN  6/12/2019  9:24 AM  Signed  Chief Complaint   Patient presents with     Consult     Urinary retention and ED. Per Vamsi.       Initial /68   Pulse 59   Temp 95.8  F (35.4  C) (Tympanic)   Resp 16   Ht 1.778 m (5' 10\")   Wt 80.7 kg (178 lb)   SpO2 95%   BMI 25.54 kg/m    Estimated body mass index is 25.54 kg/m  as calculated from the following:    Height as of this encounter: 1.778 m (5' 10\").    Weight as of this encounter: 80.7 kg (178 lb).  Medication Reconciliation: complete   Review of Systems:    Weight loss:    No     Recent fever/chills:  No   Night sweats:   No  Current skin rash:  No   Recent hair loss:  No  Heat intolerance:  No   Cold intolerance:  No  Chest pain:   No   Palpitations:   No  Shortness of breath:  No   Wheezing:   No  Constipation:    yes   Diarrhea:   yes   Nausea:   No   Vomiting:   No   Kidney/side pain:  yes   Back pain:   yes  Frequent headaches:  No   Dizziness:     No  Leg swelling:   yes   Calf pain:    yes    Parents, brothers or sisters with history of kidney cancer:   No  Parents, brothers or sisters with history of bladder cancer: No        Chelle Abarca, " "LPN      Physical Exam  Vitals:    06/12/19 0910   BP: 138/68   Pulse: 59   Resp: 16   Temp: 95.8  F (35.4  C)   TempSrc: Tympanic   SpO2: 95%   Weight: 80.7 kg (178 lb)   Height: 1.778 m (5' 10\")     Constitutional: No acute distress.  Alert and cooperative   Head: NCAT  Eyes: Conjunctivae normal  Cardiovascular: Regular rate  Pulmonary/Chest: Respirations are even and non-labored bilaterally, no audible wheezing  Abdominal: Soft. No distension, tenderness, masses or guarding.   Neurological: A + O x 3.  Cranial Nerves II-XII grossly intact.  Extremities: LYN x 4, Warm. No clubbing.  No cyanosis.    Skin: Pink, warm and dry.  No visible rashes noted.  Psychiatric:  Normal mood and affect  Back:  No left CVA tenderness.  No right CVA tenderness.  Genitourinary:  Nonpalpable bladder  Normal male phallus without discharge or lesions, normal pubic hair distribution.  Testicles descended bilaterally.     Labs  Results for orders placed or performed in visit on 03/18/19   Hemoglobin A1c   Result Value Ref Range    Hemoglobin A1C 7.4 (H) 0 - 5.6 %   Comprehensive metabolic panel   Result Value Ref Range    Sodium 136 133 - 144 mmol/L    Potassium 3.3 (L) 3.4 - 5.3 mmol/L    Chloride 102 94 - 109 mmol/L    Carbon Dioxide 29 20 - 32 mmol/L    Anion Gap 5 3 - 14 mmol/L    Glucose 132 (H) 70 - 99 mg/dL    Urea Nitrogen 19 7 - 30 mg/dL    Creatinine 0.96 0.66 - 1.25 mg/dL    GFR Estimate 79 >60 mL/min/[1.73_m2]    GFR Estimate If Black >90 >60 mL/min/[1.73_m2]    Calcium 9.2 8.5 - 10.1 mg/dL    Bilirubin Total 0.6 0.2 - 1.3 mg/dL    Albumin 4.1 3.4 - 5.0 g/dL    Protein Total 7.7 6.8 - 8.8 g/dL    Alkaline Phosphatase 62 40 - 150 U/L    ALT 49 0 - 70 U/L    AST 26 0 - 45 U/L   Albumin Random Urine Quantitative with Creat Ratio   Result Value Ref Range    Creatinine Urine 156 mg/dL    Albumin Urine mg/L 6 mg/L    Albumin Urine mg/g Cr 4.08 0 - 17 mg/g Cr   Lipid Profile (Chol, Trig, HDL, LDL calc)   Result Value Ref Range    " Cholesterol 183 <200 mg/dL    Triglycerides 191 (H) <150 mg/dL    HDL Cholesterol 37 (L) >39 mg/dL    LDL Cholesterol Calculated 108 (H) <100 mg/dL    Non HDL Cholesterol 146 (H) <130 mg/dL   Uric acid   Result Value Ref Range    Uric Acid 6.1 3.5 - 7.2 mg/dL   PSA, screen   Result Value Ref Range    PSA 4.62 (H) 0 - 4 ug/L     Results for GRACE CANSECO (MRN [id number]) as of 6/12/2019 09:43   9/23/2013 07:37 10/26/2016 11:52 10/18/2017 09:51 3/18/2019 09:02   PSA 3.63 4.97 (H) 4.82 (H) 4.62 (H)       Post-Void Residual  A post-void residual was measured by ultrasonic bladder scanner.  4 mL today    Assessment  Mr. Canseco is a 74 year old male with erectile dysfunction.  PVR rules out retention.    We discussed treatment options including oral PDE5- medication, Trimix injections, vacuum erection devices and implantable penile prostheses.    He was informed of the most common side effects of Viagra such as headache, flushing, nausea and visual changes (such as blurred vision).    Plan  Start sildenafil 60-100mg by mouth 1 hour prior to sexual activity (empty stomach).   -I explained the dosing strategy and I think he will benefit from a higher dose   -I also explained that he needs to wait at least an hour before expecting results after taking the medication   -I also recommended he avoid fatty meals at the time of medication

## 2019-06-12 NOTE — NURSING NOTE
"Chief Complaint   Patient presents with     Consult     Urinary retention and ED. Per Vamsi.       Initial /68   Pulse 59   Temp 95.8  F (35.4  C) (Tympanic)   Resp 16   Ht 1.778 m (5' 10\")   Wt 80.7 kg (178 lb)   SpO2 95%   BMI 25.54 kg/m   Estimated body mass index is 25.54 kg/m  as calculated from the following:    Height as of this encounter: 1.778 m (5' 10\").    Weight as of this encounter: 80.7 kg (178 lb).  Medication Reconciliation: complete   Review of Systems:    Weight loss:    No     Recent fever/chills:  No   Night sweats:   No  Current skin rash:  No   Recent hair loss:  No  Heat intolerance:  No   Cold intolerance:  No  Chest pain:   No   Palpitations:   No  Shortness of breath:  No   Wheezing:   No  Constipation:    yes   Diarrhea:   yes   Nausea:   No   Vomiting:   No   Kidney/side pain:  yes   Back pain:   yes  Frequent headaches:  No   Dizziness:     No  Leg swelling:   yes   Calf pain:    yes    Parents, brothers or sisters with history of kidney cancer:   No  Parents, brothers or sisters with history of bladder cancer: No        Chelle Abarca LPN    "

## 2019-06-24 DIAGNOSIS — R33.9 URINARY RETENTION: ICD-10-CM

## 2019-06-24 RX ORDER — TAMSULOSIN HYDROCHLORIDE 0.4 MG/1
0.4 CAPSULE ORAL DAILY
Qty: 90 CAPSULE | Refills: 1 | OUTPATIENT
Start: 2019-06-24

## 2019-06-24 RX ORDER — TAMSULOSIN HYDROCHLORIDE 0.4 MG/1
0.4 CAPSULE ORAL DAILY
Qty: 90 CAPSULE | Refills: 1 | Status: SHIPPED | OUTPATIENT
Start: 2019-06-24 | End: 2019-12-12

## 2019-06-24 NOTE — TELEPHONE ENCOUNTER
tamsulosin  Last Written Prescription Date: 3/18/19  Last Fill Quantity: 90 # of Refills: 1  Last Office Visit: 3/18/19

## 2019-07-31 ENCOUNTER — TELEPHONE (OUTPATIENT)
Dept: EDUCATION SERVICES | Facility: OTHER | Age: 74
End: 2019-07-31

## 2019-07-31 DIAGNOSIS — E11.9 TYPE 2 DIABETES MELLITUS WITHOUT COMPLICATION, WITHOUT LONG-TERM CURRENT USE OF INSULIN (H): Primary | ICD-10-CM

## 2019-08-14 ENCOUNTER — HOSPITAL ENCOUNTER (OUTPATIENT)
Dept: EDUCATION SERVICES | Facility: HOSPITAL | Age: 74
Discharge: HOME | End: 2019-08-14
Attending: INTERNAL MEDICINE | Admitting: INTERNAL MEDICINE
Payer: COMMERCIAL

## 2019-08-14 VITALS
DIASTOLIC BLOOD PRESSURE: 67 MMHG | HEIGHT: 69 IN | RESPIRATION RATE: 16 BRPM | OXYGEN SATURATION: 97 % | BODY MASS INDEX: 26.51 KG/M2 | WEIGHT: 179 LBS | HEART RATE: 55 BPM | SYSTOLIC BLOOD PRESSURE: 120 MMHG

## 2019-08-14 DIAGNOSIS — E11.9 TYPE 2 DIABETES MELLITUS WITHOUT COMPLICATION, WITHOUT LONG-TERM CURRENT USE OF INSULIN (H): Primary | ICD-10-CM

## 2019-08-14 LAB — HBA1C MFR BLD: 5.9 % (ref 0–5.7)

## 2019-08-14 PROCEDURE — G0108 DIAB MANAGE TRN  PER INDIV: HCPCS | Performed by: DIETITIAN, REGISTERED

## 2019-08-14 PROCEDURE — 36416 COLLJ CAPILLARY BLOOD SPEC: CPT | Performed by: DIETITIAN, REGISTERED

## 2019-08-14 PROCEDURE — 83036 HEMOGLOBIN GLYCOSYLATED A1C: CPT | Performed by: DIETITIAN, REGISTERED

## 2019-08-14 ASSESSMENT — ANXIETY QUESTIONNAIRES
3. WORRYING TOO MUCH ABOUT DIFFERENT THINGS: NOT AT ALL
6. BECOMING EASILY ANNOYED OR IRRITABLE: NOT AT ALL
1. FEELING NERVOUS, ANXIOUS, OR ON EDGE: NOT AT ALL
5. BEING SO RESTLESS THAT IT IS HARD TO SIT STILL: NOT AT ALL
7. FEELING AFRAID AS IF SOMETHING AWFUL MIGHT HAPPEN: NOT AT ALL
2. NOT BEING ABLE TO STOP OR CONTROL WORRYING: NOT AT ALL
GAD7 TOTAL SCORE: 0
IF YOU CHECKED OFF ANY PROBLEMS ON THIS QUESTIONNAIRE, HOW DIFFICULT HAVE THESE PROBLEMS MADE IT FOR YOU TO DO YOUR WORK, TAKE CARE OF THINGS AT HOME, OR GET ALONG WITH OTHER PEOPLE: NOT DIFFICULT AT ALL

## 2019-08-14 ASSESSMENT — PATIENT HEALTH QUESTIONNAIRE - PHQ9
SUM OF ALL RESPONSES TO PHQ QUESTIONS 1-9: 1
5. POOR APPETITE OR OVEREATING: NOT AT ALL

## 2019-08-14 ASSESSMENT — MIFFLIN-ST. JEOR: SCORE: 1552.32

## 2019-08-14 ASSESSMENT — PAIN SCALES - GENERAL: PAINLEVEL: MODERATE PAIN (5)

## 2019-08-14 NOTE — PROGRESS NOTES
"Diabetes Self-Management Education & Support    Diabetes Education Self Management & Training    SUBJECTIVE/OBJECTIVE:  Presents for: Individual review  Accompanied by: Self  Diabetes education in the past 24mo: Yes  Focus of Visit: Monitoring  Diabetes type: Type 2  Date of diagnosis: 4/2018  Disease course: Improving  How confident are you filling out medical forms by yourself:: Extremely  Diabetes management related comments/concerns: No concerns   Transportation concerns: No  Other concerns:: None  Cultural Influences/Ethnic Background:  American    Diabetes Symptoms & Complications  Blurred vision: No  Fatigue: No  Neuropathy: No  Foot ulcerations: No  Polydipsia: No  Polyphagia: No  Polyuria: No  Visual change: No  Weakness: No  Weight loss: No  Slow healing wounds: No  Recent Infection(s): No  Symptom course: Stable  Weight trend: Stable  Autonomic neuropathy: No  CVA: No  Heart disease: No  Nephropathy: No  Peripheral neuropathy: No  Peripheral Vascular Disease: No  Retinopathy: No  Sexual dysfunction: Yes    Patient Problem List and Family Medical History reviewed for relevant medical history, current medical status, and diabetes risk factors.    Vitals:  /67   Pulse 55   Resp 16   Ht 1.753 m (5' 9\")   Wt 81.2 kg (179 lb)   SpO2 97%   BMI 26.43 kg/m    Estimated body mass index is 26.43 kg/m  as calculated from the following:    Height as of this encounter: 1.753 m (5' 9\").    Weight as of this encounter: 81.2 kg (179 lb).   Last 3 BP:   BP Readings from Last 3 Encounters:   08/14/19 120/67   06/12/19 138/68   03/18/19 134/76       History   Smoking Status     Never Smoker   Smokeless Tobacco     Never Used       Labs:  Lab Results   Component Value Date    A1C 5.9 08/14/2019     Lab Results   Component Value Date     03/18/2019     Lab Results   Component Value Date     03/18/2019     HDL Cholesterol   Date Value Ref Range Status   03/18/2019 37 (L) >39 mg/dL Final   ]  GFR " Estimate   Date Value Ref Range Status   2019 79 >60 mL/min/[1.73_m2] Final     Comment:     Non  GFR Calc  Starting 2018, serum creatinine based estimated GFR (eGFR) will be   calculated using the Chronic Kidney Disease Epidemiology Collaboration   (CKD-EPI) equation.       GFR Estimate If Black   Date Value Ref Range Status   2019 >90 >60 mL/min/[1.73_m2] Final     Comment:      GFR Calc  Starting 2018, serum creatinine based estimated GFR (eGFR) will be   calculated using the Chronic Kidney Disease Epidemiology Collaboration   (CKD-EPI) equation.       Lab Results   Component Value Date    CR 0.96 2019     No results found for: MICROALBUMIN    Healthy Eating  Healthy Eating Assessed Today: Yes  Cultural/Yazidi diet restrictions?: No  Patient on a regular basis: Eats 3 meals a day  Meal planning: Smaller portions  Meals include: Breakfast, Lunch, Dinner, Snacks  Breakfast: cold cereal with fruit, coffee  Lunch: soup or sandwich with a few chips - water or sugar free ice tea  Dinner: meat, starch, veggies - water   Snacks: 1/2 english muffin   Beverages: Water, Coffee, Diet soda(sugar free ice tea)  Has patient met with a dietitian in the past?: Yes    Being Active  Being Active Assessed Today: Yes  Exercise:: Yes(cuts grass, walks, kayaking)  Days per week of moderate to strenuous exercise (like a brisk walk): 4  On average, minutes per day of exercise at this level: 40  How intense was your typical exercise? : Moderate (like brisk walking)  Exercise Minutes per Week: 160  Barrier to exercise: None    Monitoring  Monitoring Assessed Today: Yes  Did patient bring glucose meter to appointment? : No  Blood Glucose Meter: FreeStyle  Home Glucose (Sugar) Monitorin-2 times per week  Blood glucose trend: No change  Pt reports he only tests once in awhile.  Fasting levels 100-110 per his report.     Taking Medications  Diabetes Medication(s)      Biguanides       metFORMIN (GLUCOPHAGE) 500 MG tablet    Take 1 tablet (500 mg) by mouth 2 times daily (with meals)        Taking Medication Assessed Today: Yes  Current Treatments: Diet, Oral Agent (monotherapy)(Metformin 500 mg bid)  Problems taking diabetes medications regularly?: No  Diabetes medication side effects?: Yes(diarrhea 1-2x/week)  Treatment Compliance: All of the time    Problem Solving  Problem Solving Assessed Today: Yes  Hypoglycemia Frequency: Rarely  Hypoglycemia Treatment: Glucose (tablets or gel)  Patient carries a carbohydrate source: Yes    Reducing Risks  Reducing Risks Assessed Today: Yes  Diabetes Risks: Age over 45 years, Hyperlipidemia, Family History  CAD Risks: Diabetes Mellitus, Male sex, Family history  Has dilated eye exam at least once a year?: Yes  Sees dentist every 6 months?: Yes  Sees podiatrist (foot doctor)?: No    Healthy Coping  Healthy Coping Assessed Today: Yes  Emotional response to diabetes: Ready to learn, Acceptance, Confidence diabetes can be controlled  Informal Support system:: Family  Stage of change: ACTION (Actively working towards change)  Difficulty affording diabetes management supplies?: No  Support resources: None  Patient Activation Measure Survey Score:  No flowsheet data found.    ASSESSMENT:  A1c today was 5.9% which is improved from previous A1c of 7.4% with addition of Metformin.  Pt continues to limit foods high in carbohydrates in his diet and he gets some activity daily.  Weight is stable.  Pt is doing very well.     Patient's most recent   Lab Results   Component Value Date    A1C 5.9 08/14/2019    is meeting goal of <7.0    INTERVENTION:   Diabetes knowledge and skills assessment:     Patient is knowledgeable in diabetes management concepts related to: Healthy Eating, Being Active, Taking Medication, Problem Solving and Healthy Coping    Patient needs further education on the following diabetes management concepts: Monitoring and Reducing  Risks    Based on learning assessment above, most appropriate setting for further diabetes education would be: Individual setting.    Education provided today on:  AADE Self-Care Behaviors:  Monitoring: individual blood glucose targets and frequency of monitoring  Reducing Risks: major complications of diabetes, prevention, early diagnostic measures and treatment of complications, annual eye exam and A1C - goals, relating to blood glucose levels, how often to check    Opportunities for ongoing education and support in diabetes-self management were discussed.    Pt verbalized understanding of concepts discussed and recommendations provided today.       Education Materials Provided:  No new materials today.     PLAN:  Continue efforts to follow low carbohydrate meal plan.   Try to get some exercise daily.   Test glucose a few times per week - alternate times.   A1c check again in 3-6 months.   See Patient Instructions for co-developed, patient-stated behavior change goals.  AVS printed and provided to patient today.  Follow up annually or sooner if indicated.     Time Spent: 35 minutes  Encounter Type: Individual    Any diabetes medication dose changes were made via the CDE Protocol and Collaborative Practice Agreement with the patient's referring provider. A copy of this encounter was shared with the provider.

## 2019-08-14 NOTE — LETTER
"    8/14/2019        RE: Rafa Bhatti  [address] [facility] [id number]        Diabetes Self-Management Education & Support    Diabetes Education Self Management & Training    SUBJECTIVE/OBJECTIVE:  Presents for: Individual review  Accompanied by: Self  Diabetes education in the past 24mo: Yes  Focus of Visit: Monitoring  Diabetes type: Type 2  Date of diagnosis: 4/2018  Disease course: Improving  How confident are you filling out medical forms by yourself:: Extremely  Diabetes management related comments/concerns: No concerns   Transportation concerns: No  Other concerns:: None  Cultural Influences/Ethnic Background:  American    Diabetes Symptoms & Complications  Blurred vision: No  Fatigue: No  Neuropathy: No  Foot ulcerations: No  Polydipsia: No  Polyphagia: No  Polyuria: No  Visual change: No  Weakness: No  Weight loss: No  Slow healing wounds: No  Recent Infection(s): No  Symptom course: Stable  Weight trend: Stable  Autonomic neuropathy: No  CVA: No  Heart disease: No  Nephropathy: No  Peripheral neuropathy: No  Peripheral Vascular Disease: No  Retinopathy: No  Sexual dysfunction: Yes    Patient Problem List and Family Medical History reviewed for relevant medical history, current medical status, and diabetes risk factors.    Vitals:  /67   Pulse 55   Resp 16   Ht 1.753 m (5' 9\")   Wt 81.2 kg (179 lb)   SpO2 97%   BMI 26.43 kg/m     Estimated body mass index is 26.43 kg/m  as calculated from the following:    Height as of this encounter: 1.753 m (5' 9\").    Weight as of this encounter: 81.2 kg (179 lb).   Last 3 BP:   BP Readings from Last 3 Encounters:   08/14/19 120/67   06/12/19 138/68   03/18/19 134/76       History   Smoking Status     Never Smoker   Smokeless Tobacco     Never Used       Labs:  Lab Results   Component Value Date    A1C 5.9 08/14/2019     Lab Results   Component Value Date     03/18/2019     Lab Results   Component Value Date     03/18/2019     HDL " Cholesterol   Date Value Ref Range Status   2019 37 (L) >39 mg/dL Final   ]  GFR Estimate   Date Value Ref Range Status   2019 79 >60 mL/min/[1.73_m2] Final     Comment:     Non  GFR Calc  Starting 2018, serum creatinine based estimated GFR (eGFR) will be   calculated using the Chronic Kidney Disease Epidemiology Collaboration   (CKD-EPI) equation.       GFR Estimate If Black   Date Value Ref Range Status   2019 >90 >60 mL/min/[1.73_m2] Final     Comment:      GFR Calc  Starting 2018, serum creatinine based estimated GFR (eGFR) will be   calculated using the Chronic Kidney Disease Epidemiology Collaboration   (CKD-EPI) equation.       Lab Results   Component Value Date    CR 0.96 2019     No results found for: MICROALBUMIN    Healthy Eating  Healthy Eating Assessed Today: Yes  Cultural/Muslim diet restrictions?: No  Patient on a regular basis: Eats 3 meals a day  Meal planning: Smaller portions  Meals include: Breakfast, Lunch, Dinner, Snacks  Breakfast: cold cereal with fruit, coffee  Lunch: soup or sandwich with a few chips - water or sugar free ice tea  Dinner: meat, starch, veggies - water   Snacks: 1/2 english muffin   Beverages: Water, Coffee, Diet soda(sugar free ice tea)  Has patient met with a dietitian in the past?: Yes    Being Active  Being Active Assessed Today: Yes  Exercise:: Yes(cuts grass, walks, kayaking)  Days per week of moderate to strenuous exercise (like a brisk walk): 4  On average, minutes per day of exercise at this level: 40  How intense was your typical exercise? : Moderate (like brisk walking)  Exercise Minutes per Week: 160  Barrier to exercise: None    Monitoring  Monitoring Assessed Today: Yes  Did patient bring glucose meter to appointment? : No  Blood Glucose Meter: FreeStyle  Home Glucose (Sugar) Monitorin-2 times per week  Blood glucose trend: No change  Pt reports he only tests once in awhile.  Fasting  levels 100-110 per his report.     Taking Medications  Diabetes Medication(s)     Biguanides       metFORMIN (GLUCOPHAGE) 500 MG tablet    Take 1 tablet (500 mg) by mouth 2 times daily (with meals)        Taking Medication Assessed Today: Yes  Current Treatments: Diet, Oral Agent (monotherapy)(Metformin 500 mg bid)  Problems taking diabetes medications regularly?: No  Diabetes medication side effects?: Yes(diarrhea 1-2x/week)  Treatment Compliance: All of the time    Problem Solving  Problem Solving Assessed Today: Yes  Hypoglycemia Frequency: Rarely  Hypoglycemia Treatment: Glucose (tablets or gel)  Patient carries a carbohydrate source: Yes    Reducing Risks  Reducing Risks Assessed Today: Yes  Diabetes Risks: Age over 45 years, Hyperlipidemia, Family History  CAD Risks: Diabetes Mellitus, Male sex, Family history  Has dilated eye exam at least once a year?: Yes  Sees dentist every 6 months?: Yes  Sees podiatrist (foot doctor)?: No    Healthy Coping  Healthy Coping Assessed Today: Yes  Emotional response to diabetes: Ready to learn, Acceptance, Confidence diabetes can be controlled  Informal Support system:: Family  Stage of change: ACTION (Actively working towards change)  Difficulty affording diabetes management supplies?: No  Support resources: None  Patient Activation Measure Survey Score:  No flowsheet data found.    ASSESSMENT:  A1c today was 5.9% which is improved from previous A1c of 7.4% with addition of Metformin.  Pt continues to limit foods high in carbohydrates in his diet and he gets some activity daily.  Weight is stable.  Pt is doing very well.     Patient's most recent   Lab Results   Component Value Date    A1C 5.9 08/14/2019    is meeting goal of <7.0    INTERVENTION:   Diabetes knowledge and skills assessment:     Patient is knowledgeable in diabetes management concepts related to: Healthy Eating, Being Active, Taking Medication, Problem Solving and Healthy Coping    Patient needs further  education on the following diabetes management concepts: Monitoring and Reducing Risks    Based on learning assessment above, most appropriate setting for further diabetes education would be: Individual setting.    Education provided today on:  AADE Self-Care Behaviors:  Monitoring: individual blood glucose targets and frequency of monitoring  Reducing Risks: major complications of diabetes, prevention, early diagnostic measures and treatment of complications, annual eye exam and A1C - goals, relating to blood glucose levels, how often to check    Opportunities for ongoing education and support in diabetes-self management were discussed.    Pt verbalized understanding of concepts discussed and recommendations provided today.       Education Materials Provided:  No new materials today.     PLAN:  Continue efforts to follow low carbohydrate meal plan.   Try to get some exercise daily.   Test glucose a few times per week - alternate times.   A1c check again in 3-6 months.   See Patient Instructions for co-developed, patient-stated behavior change goals.  AVS printed and provided to patient today.  Follow up annually or sooner if indicated.     Time Spent: 35 minutes  Encounter Type: Individual    Any diabetes medication dose changes were made via the CDE Protocol and Collaborative Practice Agreement with the patient's referring provider. A copy of this encounter was shared with the provider.        Sincerely,        Ava Templeton RD

## 2019-08-14 NOTE — PATIENT INSTRUCTIONS
-Keep limiting foods high in carbohydrates in your diet.   -Try to get some exercise most days of the week.  -Test your glucose a few times per week.  Good times are fasting or 2 hours after a meal.  -Target levels are fasting  and 2 hours after a meal less than 180.  -A1c today was 5.9%.  Great job!  Goal is to keep A1c less than 7.0%.    -Schedule your eye exam when able.  -Follow up in one year or sooner if needed.   -Call with any concerns - MICHAEL Seals, -[phone].

## 2019-08-15 ASSESSMENT — ANXIETY QUESTIONNAIRES: GAD7 TOTAL SCORE: 0

## 2019-09-13 DIAGNOSIS — K21.9 GASTROESOPHAGEAL REFLUX DISEASE, ESOPHAGITIS PRESENCE NOT SPECIFIED: ICD-10-CM

## 2019-10-16 ENCOUNTER — IMMUNIZATION (OUTPATIENT)
Dept: FAMILY MEDICINE | Facility: OTHER | Age: 74
End: 2019-10-16
Attending: INTERNAL MEDICINE
Payer: COMMERCIAL

## 2019-10-16 DIAGNOSIS — Z23 NEED FOR PROPHYLACTIC VACCINATION AND INOCULATION AGAINST INFLUENZA: Primary | ICD-10-CM

## 2019-10-16 PROCEDURE — G0008 ADMIN INFLUENZA VIRUS VAC: HCPCS

## 2019-10-16 PROCEDURE — 90662 IIV NO PRSV INCREASED AG IM: CPT

## 2019-11-18 ENCOUNTER — TELEPHONE (OUTPATIENT)
Dept: PEDIATRICS | Facility: OTHER | Age: 74
End: 2019-11-18

## 2019-11-18 DIAGNOSIS — K21.9 GASTROESOPHAGEAL REFLUX DISEASE, ESOPHAGITIS PRESENCE NOT SPECIFIED: Primary | ICD-10-CM

## 2019-11-18 RX ORDER — FAMOTIDINE 20 MG/1
20 TABLET, FILM COATED ORAL 2 TIMES DAILY
Qty: 180 TABLET | Refills: 3 | Status: SHIPPED | OUTPATIENT
Start: 2019-11-18 | End: 2019-12-02

## 2019-11-18 NOTE — TELEPHONE ENCOUNTER
Dr Augstin stated discontinue zantac and start Pepcid 20 BID. Pepcid pended for pt and pt notified.    Jessa Behrman, LPN

## 2019-11-18 NOTE — TELEPHONE ENCOUNTER
Pt calling, reports Zantac is being recalled,    What should he do, new medication ???    Please call pt.    Flavia Rodrigues LPN

## 2019-12-02 ENCOUNTER — TELEPHONE (OUTPATIENT)
Dept: PEDIATRICS | Facility: OTHER | Age: 74
End: 2019-12-02

## 2019-12-02 DIAGNOSIS — K21.9 GASTROESOPHAGEAL REFLUX DISEASE, ESOPHAGITIS PRESENCE NOT SPECIFIED: ICD-10-CM

## 2019-12-02 RX ORDER — FAMOTIDINE 40 MG/1
40 TABLET, FILM COATED ORAL 2 TIMES DAILY
Qty: 180 TABLET | Refills: 3 | Status: SHIPPED | OUTPATIENT
Start: 2019-12-02 | End: 2020-01-17

## 2019-12-02 NOTE — TELEPHONE ENCOUNTER
Pt calling, has pepcid 20 mg ordered  Wasn't helping, pharmacist suggested he try 2 20mg';s    Helped, worked well     Requesting a new rx be called to [facility] for 40mg tabs.    Flavia Rodrigues LPN

## 2019-12-18 DIAGNOSIS — E11.9 TYPE 2 DIABETES MELLITUS WITHOUT COMPLICATION, WITHOUT LONG-TERM CURRENT USE OF INSULIN (H): ICD-10-CM

## 2019-12-18 DIAGNOSIS — E78.2 MIXED HYPERLIPIDEMIA: ICD-10-CM

## 2019-12-18 NOTE — TELEPHONE ENCOUNTER
Crestor      Last Written Prescription Date:  03/19/19  Last Fill Quantity: 90,   # refills: 3  Last Office Visit: 03/18/19  Future Office visit:

## 2019-12-23 ENCOUNTER — TELEPHONE (OUTPATIENT)
Dept: PEDIATRICS | Facility: OTHER | Age: 74
End: 2019-12-23

## 2019-12-23 RX ORDER — ROSUVASTATIN CALCIUM 10 MG/1
10 TABLET, COATED ORAL DAILY
Qty: 90 TABLET | Refills: 0 | Status: SHIPPED | OUTPATIENT
Start: 2019-12-23 | End: 2020-02-24

## 2019-12-23 NOTE — TELEPHONE ENCOUNTER
X received from [facility] pharmacy, they are out of Pepcid and would like to know if this can be changed to Zantac?

## 2019-12-26 DIAGNOSIS — K21.9 GASTROESOPHAGEAL REFLUX DISEASE, ESOPHAGITIS PRESENCE NOT SPECIFIED: ICD-10-CM

## 2019-12-27 RX ORDER — FAMOTIDINE 40 MG/1
40 TABLET, FILM COATED ORAL 2 TIMES DAILY
Qty: 180 TABLET | Refills: 3 | OUTPATIENT
Start: 2019-12-27

## 2019-12-27 NOTE — TELEPHONE ENCOUNTER
Pepcid   Last Office Visit: 3/18/2019  Last Refill Date:12/2/2019  # 180          Refills  3      Thank you!

## 2019-12-30 DIAGNOSIS — M1A.0720 CHRONIC IDIOPATHIC GOUT INVOLVING TOE OF LEFT FOOT WITHOUT TOPHUS: ICD-10-CM

## 2019-12-30 NOTE — TELEPHONE ENCOUNTER
allopurinol      Last Written Prescription Date:  11/14/19  Last Fill Quantity: 90,   # refills: 0  Last Office Visit: 03/18/19  Future Office visit:

## 2020-01-02 DIAGNOSIS — M1A.0720 CHRONIC IDIOPATHIC GOUT INVOLVING TOE OF LEFT FOOT WITHOUT TOPHUS: ICD-10-CM

## 2020-01-03 RX ORDER — ALLOPURINOL 100 MG/1
TABLET ORAL
Qty: 180 TABLET | Refills: 0 | Status: SHIPPED | OUTPATIENT
Start: 2020-01-03 | End: 2020-02-24

## 2020-01-03 RX ORDER — ALLOPURINOL 100 MG/1
TABLET ORAL
Qty: 90 TABLET | Refills: 0 | OUTPATIENT
Start: 2020-01-03

## 2020-01-08 DIAGNOSIS — K21.9 GASTROESOPHAGEAL REFLUX DISEASE, ESOPHAGITIS PRESENCE NOT SPECIFIED: Primary | ICD-10-CM

## 2020-01-08 RX ORDER — FAMOTIDINE 20 MG/1
20 TABLET, FILM COATED ORAL 2 TIMES DAILY
Qty: 180 TABLET | Refills: 3 | Status: SHIPPED | OUTPATIENT
Start: 2020-01-08 | End: 2020-01-17

## 2020-01-08 NOTE — TELEPHONE ENCOUNTER
Pt calling, Ranitidine was unavailable  Changed to Pepcid  Now that's unavailable, but Ranitidine is now available    Requesting new rx for Ranitidine     Ranitidine 150mg   Last visit date with prescribing provider: 3-  Last refill date: 12-2-2019, Pepcid  Quantity: 180, Refills: 3    Flavia Rodrigues LPN

## 2020-01-17 NOTE — TELEPHONE ENCOUNTER
Pt called reporting pharmacy Pepcid isn't available pt requesting to go back on zantac and discontinue Pepcid.

## 2020-01-31 NOTE — DISCHARGE INSTRUCTIONS
-Follow carbohydrate counting meal plan - 60 grams/meal, 15-30 grams/snack.   -Be as active as you can - exercise goal is 30 minutes most days of the week.   -Test your glucose 2x/day - fasting and alternate before supper with 2 hours after supper.   -Target levels are fasting and before supper , 2 hours after supper less than 180.    -Hold Metformin for now and we will determine if needed at next visit.   -Bring your book, meters to follow up session.   -Follow up in 2 weeks.   -Call with any concerns - MICHAEL Seals, [phone].    denies pain/discomfort

## 2020-02-17 PROBLEM — L57.0 AK (ACTINIC KERATOSIS): Status: ACTIVE | Noted: 2018-11-28

## 2020-02-17 NOTE — PROGRESS NOTES
Subjective     Rafa Bhatti is a 74 year old male who presents to clinic today for the following health issues:    HPI   Diabetes Follow-up      How often are you checking your blood sugar? Once a week    What concerns do you have today about your diabetes? None     Do you have any of these symptoms? (Select all that apply)  No numbness or tingling in feet.  No redness, sores or blisters on feet.  No complaints of excessive thirst.  No reports of blurry vision.  No significant changes to weight.    Have you had a diabetic eye exam in the last 12 months? No, has appointment in March        BP Readings from Last 2 Encounters:   02/20/20 130/64   08/14/19 120/67     Hemoglobin A1C (%)   Date Value   08/14/2019 5.9 (A)   03/18/2019 7.4 (H)     LDL Cholesterol Calculated (mg/dL)   Date Value   03/18/2019 108 (H)   05/09/2018 83               Hypertension Follow-up      Do you check your blood pressure regularly outside of the clinic? No     Are you following a low salt diet? Yes    Are your blood pressures ever more than 140 on the top number (systolic) OR more   than 90 on the bottom number (diastolic), for example 140/90? No      How many servings of fruits and vegetables do you eat daily?  1-2    On average, how many sweetened beverages do you drink each day (Examples: soda, juice, sweet tea, etc.  Do NOT count diet or artificially sweetened beverages)?   1    How many days per week do you exercise enough to make your heart beat faster? 5-6      How many minutes a day do you exercise enough to make your heart beat faster? 20 - 29    How many days per week do you miss taking your medication? 0      Gout:  He had his last gout attack well over one year ago in the left great toe and are of the right foot.      -------------------------------------    Patient Active Problem List   Diagnosis     Encounter for monitoring testosterone replacement therapy     Testicular hypofunction     Elevated blood pressure     Benign  essential hypertension     Hemangioma of spine     ACP (advance care planning)     Routine general medical examination at a health care facility     Chronic gout     Esophageal reflux     Type 2 diabetes mellitus without complication, without long-term current use of insulin (H)     Encounter for diabetic foot exam (H)     Type 2 diabetes mellitus with diabetic polyneuropathy, without long-term current use of insulin (H)     Basal cell carcinoma, face     Personal history of malignant neoplasm of skin     AK (actinic keratosis)     History of nonmelanoma skin cancer     Past Surgical History:   Procedure Laterality Date     cardiolyte stress test  2000    chest pain     CHOLECYSTECTOMY, COMMON BILE DUCT EXPLORATION, COMBINED  04/15/2018     COLONOSCOPY  1999     fistula in ANO       HEMORRHOIDECTOMY       removal of dermoid cyst of mediastinum       VASECTOMY         Social History     Tobacco Use     Smoking status: Never Smoker     Smokeless tobacco: Never Used   Substance Use Topics     Alcohol use: Yes     Comment: socially     Family History   Problem Relation Age of Onset     C.A.D. Father 80     C.A.D. Mother      Hypertension Mother      C.A.D. Brother      C.A.D. Other         family hx             Reviewed and updated as needed this visit by Provider         Review of Systems   ROS COMP: CONSTITUTIONAL: NEGATIVE for fever, chills, change in weight  INTEGUMENTARY/SKIN: NEGATIVE for worrisome rashes, moles or lesions  INTEGUMENTARY/SKIN: mole on th groin  EYES: NEGATIVE for vision changes or irritation  EYES: He has an upcoming appointment with [facility]  ENT/MOUTH: NEGATIVE for ear, mouth and throat problems  RESP: NEGATIVE for significant cough or SOB  CV: NEGATIVE for chest pain, palpitations or peripheral edema  GI: NEGATIVE for nausea, abdominal pain, heartburn, or change in bowel habits  : NEGATIVE for frequency, dysuria, or hematuria  MUSCULOSKELETAL:See HPI for gout.  NEURO: NEGATIVE for  "weakness, dizziness or paresthesias  ENDOCRINE: NEGATIVE for temperature intolerance, skin/hair changes  HEME: NEGATIVE for bleeding problems  PSYCHIATRIC: NEGATIVE for changes in mood or affect      Objective    /64 (BP Location: Right arm, Patient Position: Sitting, Cuff Size: Adult Regular)   Pulse 61   Temp 97.3  F (36.3  C) (Tympanic)   Resp 18   Ht 1.753 m (5' 9\")   Wt 83.5 kg (184 lb)   SpO2 95%   BMI 27.17 kg/m    Body mass index is 27.17 kg/m .  Physical Exam   GENERAL: healthy, alert and no distress  EYES: Eyes grossly normal to inspection, PERRL and conjunctivae and sclerae normal  HENT: ear canals and TM's normal, nose and mouth without ulcers or lesions  NECK: no adenopathy, no asymmetry, masses, or scars and thyroid normal to palpation  NECK: no carotid bruits  RESP: lungs clear to auscultation - no rales, rhonchi or wheezes  CV: regular rate and rhythm, normal S1 S2, no S3 or S4, no murmur, click or rub, no peripheral edema and peripheral pulses strong  ABDOMEN: soft, nontender, no hepatosplenomegaly, no masses and bowel sounds normal  ABDOMEN: no bruits heard   (male): normal male genitalia without lesions or urethral discharge, no hernia  MS: no gross musculoskeletal defects noted, no edema  SKIN: Large 3 cm nevi over right inguinal region  NEURO: Normal strength and tone, mentation intact and speech normal  NEURO: cranial nerves 3-12 intact  PSYCH: mentation appears normal, affect normal/bright  LYMPH: no cervical, supraclavicular, axillary, or inguinal adenopathy    Diagnostic Test Results:  Labs reviewed in Epic    Lab Results   Component Value Date    A1C 6.9 02/21/2020    A1C 5.9 08/14/2019    A1C 7.4 03/18/2019    A1C 5.8 08/14/2018    A1C 7.8 05/09/2018       Recent Labs   Lab Test 02/21/20  0747 03/18/19  0902    136   POTASSIUM 3.5 3.3*   CHLORIDE 99 102   CO2 34* 29   ANIONGAP 4 5   * 132*   BUN 20 19   CR 1.01 0.96   HUY 9.2 9.2     Lab Results   Component " Value Date    AST 25 02/21/2020     Lab Results   Component Value Date    ALT 46 02/21/2020     Lab Results   Component Value Date    ALKPHOS 54 02/21/2020     Lab Results   Component Value Date    ALBUMIN 4.1 02/21/2020     Lab Results   Component Value Date    PROTTOTAL 7.9 02/21/2020     Uric Acid   Date Value Ref Range Status   02/21/2020 4.4 3.5 - 7.2 mg/dL Final     PSA   Date Value Ref Range Status   02/21/2020 4.75 (H) 0 - 4 ug/L Final     Comment:     Assay Method:  Chemiluminescence using Siemens Vista analyzer         Lab Results   Component Value Date    MICROL 9 02/21/2020     No results found for: MICROALBUMIN        Recent Labs   Lab Test 02/21/20  0747 03/18/19  0902  12/22/14  0740   CHOL 114 183   < > 198   HDL 38* 37*   < > 40*   LDL 38 108*   < > 131*   TRIG 192* 191*   < > 133   CHOLHDLRATIO  --   --   --  5.0    < > = values in this interval not displayed.     Assessment & Plan   (E11.42) Type 2 diabetes mellitus with diabetic polyneuropathy, without long-term current use of insulin (H)  (primary encounter diagnosis)  Comment: At goal, but some worsening of control.   His blood pressure is at goal.  His renal function shows mild renal disease.  He is on a STATIN, ASA and not on and ACE or ARB.  Plan:   Increase metformin to 1000 mg BID.    (I10) Benign essential hypertension  Comment: At goal, but with some showing of mild renal disease.  Plan:   He will continue Atenolol 50 mg, Norvasc 5 mg and start Losartan 25 mg and discontinue HCTZ.    (E78.2) Mixed hyperlipidemia  Comment: well controlled   Plan:   He will continue Crestor 10 mg daily.    (M1A.0720) Chronic idiopathic gout involving toe of left foot without tophus  Comment: Well controlled.  Plan:   Continue allopurinol 100 mg BID.    (Z12.5) Screening for prostate cancer  Comment: His PSA shows mild elevation without change from one year ago.  This is not concerning, but may reflect a mild prostate infection.  Plan:   Treat on  antibiotics for 3 week and recheck prostate levels in 6 weeks.            FUTURE APPOINTMENTS:       - Follow-up visit in 6 months for DM and HTN with foot exam needed.    No follow-ups on file.    Santy Agustin DO,   [facility] [place]

## 2020-02-19 ENCOUNTER — TELEPHONE (OUTPATIENT)
Dept: PEDIATRICS | Facility: OTHER | Age: 75
End: 2020-02-19

## 2020-02-19 NOTE — TELEPHONE ENCOUNTER
"To: Dr. Agustin nurse  I reached out to patient today to confirm his appointment for tomorrow (02/20/2020) and he asked if labs were needed as it was for a physical.  I explained that the  set it as a \"long/annual/med\".  Will he need labs done?  If it is infact a physical the visit type was loaded incorrectly and the word \"annual\" should not have been used  this does not follow Dr. Agustin's critiria page as physicals are to be set only on 10:20 blocks.  Sorry for the confusion.  His phone is [phone]  Thank you  "

## 2020-02-20 ENCOUNTER — OFFICE VISIT (OUTPATIENT)
Dept: INTERNAL MEDICINE | Facility: OTHER | Age: 75
End: 2020-02-20
Attending: INTERNAL MEDICINE
Payer: COMMERCIAL

## 2020-02-20 VITALS
WEIGHT: 184 LBS | BODY MASS INDEX: 27.25 KG/M2 | TEMPERATURE: 97.3 F | HEART RATE: 61 BPM | HEIGHT: 69 IN | RESPIRATION RATE: 18 BRPM | DIASTOLIC BLOOD PRESSURE: 64 MMHG | SYSTOLIC BLOOD PRESSURE: 130 MMHG | OXYGEN SATURATION: 95 %

## 2020-02-20 DIAGNOSIS — Z12.5 SCREENING FOR PROSTATE CANCER: ICD-10-CM

## 2020-02-20 DIAGNOSIS — E11.9 TYPE 2 DIABETES MELLITUS WITHOUT COMPLICATION, WITHOUT LONG-TERM CURRENT USE OF INSULIN (H): Primary | ICD-10-CM

## 2020-02-20 DIAGNOSIS — E78.2 MIXED HYPERLIPIDEMIA: ICD-10-CM

## 2020-02-20 DIAGNOSIS — E11.42 TYPE 2 DIABETES MELLITUS WITH DIABETIC POLYNEUROPATHY, WITHOUT LONG-TERM CURRENT USE OF INSULIN (H): Primary | ICD-10-CM

## 2020-02-20 DIAGNOSIS — I10 BENIGN ESSENTIAL HYPERTENSION: ICD-10-CM

## 2020-02-20 DIAGNOSIS — M1A.9XX0 CHRONIC GOUT WITHOUT TOPHUS, UNSPECIFIED CAUSE, UNSPECIFIED SITE: ICD-10-CM

## 2020-02-20 DIAGNOSIS — E11.9 TYPE 2 DIABETES MELLITUS WITHOUT COMPLICATION, WITHOUT LONG-TERM CURRENT USE OF INSULIN (H): ICD-10-CM

## 2020-02-20 DIAGNOSIS — M1A.0720 CHRONIC IDIOPATHIC GOUT INVOLVING TOE OF LEFT FOOT WITHOUT TOPHUS: ICD-10-CM

## 2020-02-20 PROCEDURE — G0463 HOSPITAL OUTPT CLINIC VISIT: HCPCS

## 2020-02-20 PROCEDURE — 99214 OFFICE O/P EST MOD 30 MIN: CPT | Performed by: INTERNAL MEDICINE

## 2020-02-20 ASSESSMENT — ANXIETY QUESTIONNAIRES
2. NOT BEING ABLE TO STOP OR CONTROL WORRYING: NOT AT ALL
6. BECOMING EASILY ANNOYED OR IRRITABLE: NOT AT ALL
GAD7 TOTAL SCORE: 0
4. TROUBLE RELAXING: NOT AT ALL
5. BEING SO RESTLESS THAT IT IS HARD TO SIT STILL: NOT AT ALL
1. FEELING NERVOUS, ANXIOUS, OR ON EDGE: NOT AT ALL
7. FEELING AFRAID AS IF SOMETHING AWFUL MIGHT HAPPEN: NOT AT ALL
3. WORRYING TOO MUCH ABOUT DIFFERENT THINGS: NOT AT ALL

## 2020-02-20 ASSESSMENT — PATIENT HEALTH QUESTIONNAIRE - PHQ9: SUM OF ALL RESPONSES TO PHQ QUESTIONS 1-9: 0

## 2020-02-20 ASSESSMENT — MIFFLIN-ST. JEOR: SCORE: 1575

## 2020-02-20 ASSESSMENT — PAIN SCALES - GENERAL: PAINLEVEL: MILD PAIN (3)

## 2020-02-20 NOTE — NURSING NOTE
"Chief Complaint   Patient presents with     Physical       Initial /64 (BP Location: Right arm, Patient Position: Sitting, Cuff Size: Adult Regular)   Pulse 61   Temp 97.3  F (36.3  C) (Tympanic)   Resp 18   Ht 1.753 m (5' 9\")   Wt 83.5 kg (184 lb)   SpO2 95%   BMI 27.17 kg/m   Estimated body mass index is 27.17 kg/m  as calculated from the following:    Height as of this encounter: 1.753 m (5' 9\").    Weight as of this encounter: 83.5 kg (184 lb).  Medication Reconciliation: complete  Ruby Be LPN  "

## 2020-02-21 DIAGNOSIS — E78.2 MIXED HYPERLIPIDEMIA: ICD-10-CM

## 2020-02-21 DIAGNOSIS — E11.42 TYPE 2 DIABETES MELLITUS WITH DIABETIC POLYNEUROPATHY, WITHOUT LONG-TERM CURRENT USE OF INSULIN (H): ICD-10-CM

## 2020-02-21 DIAGNOSIS — I10 BENIGN ESSENTIAL HYPERTENSION: ICD-10-CM

## 2020-02-21 DIAGNOSIS — M1A.9XX0 CHRONIC GOUT WITHOUT TOPHUS, UNSPECIFIED CAUSE, UNSPECIFIED SITE: ICD-10-CM

## 2020-02-21 DIAGNOSIS — E11.9 TYPE 2 DIABETES MELLITUS WITHOUT COMPLICATION, WITHOUT LONG-TERM CURRENT USE OF INSULIN (H): ICD-10-CM

## 2020-02-21 DIAGNOSIS — Z12.5 SCREENING FOR PROSTATE CANCER: ICD-10-CM

## 2020-02-21 LAB
ALBUMIN SERPL-MCNC: 4.1 G/DL (ref 3.4–5)
ALP SERPL-CCNC: 54 U/L (ref 40–150)
ALT SERPL W P-5'-P-CCNC: 46 U/L (ref 0–70)
ANION GAP SERPL CALCULATED.3IONS-SCNC: 4 MMOL/L (ref 3–14)
AST SERPL W P-5'-P-CCNC: 25 U/L (ref 0–45)
BILIRUB SERPL-MCNC: 0.6 MG/DL (ref 0.2–1.3)
BUN SERPL-MCNC: 20 MG/DL (ref 7–30)
CALCIUM SERPL-MCNC: 9.2 MG/DL (ref 8.5–10.1)
CHLORIDE SERPL-SCNC: 99 MMOL/L (ref 94–109)
CHOLEST SERPL-MCNC: 114 MG/DL
CO2 SERPL-SCNC: 34 MMOL/L (ref 20–32)
CREAT SERPL-MCNC: 1.01 MG/DL (ref 0.66–1.25)
CREAT UR-MCNC: 204 MG/DL
EST. AVERAGE GLUCOSE BLD GHB EST-MCNC: 151 MG/DL
GFR SERPL CREATININE-BSD FRML MDRD: 74 ML/MIN/{1.73_M2}
GLUCOSE SERPL-MCNC: 132 MG/DL (ref 70–99)
HBA1C MFR BLD: 6.9 % (ref 0–5.6)
HDLC SERPL-MCNC: 38 MG/DL
LDLC SERPL CALC-MCNC: 38 MG/DL
MICROALBUMIN UR-MCNC: 9 MG/L
MICROALBUMIN/CREAT UR: 4.61 MG/G CR (ref 0–17)
NONHDLC SERPL-MCNC: 76 MG/DL
POTASSIUM SERPL-SCNC: 3.5 MMOL/L (ref 3.4–5.3)
PROT SERPL-MCNC: 7.9 G/DL (ref 6.8–8.8)
PSA SERPL-ACNC: 4.75 UG/L (ref 0–4)
SODIUM SERPL-SCNC: 137 MMOL/L (ref 133–144)
TRIGL SERPL-MCNC: 192 MG/DL
URATE SERPL-MCNC: 4.4 MG/DL (ref 3.5–7.2)

## 2020-02-21 PROCEDURE — 83036 HEMOGLOBIN GLYCOSYLATED A1C: CPT | Mod: ZL | Performed by: INTERNAL MEDICINE

## 2020-02-21 PROCEDURE — 80053 COMPREHEN METABOLIC PANEL: CPT | Mod: ZL | Performed by: INTERNAL MEDICINE

## 2020-02-21 PROCEDURE — 40000788 ZZHCL STATISTIC ESTIMATED AVERAGE GLUCOSE: Mod: ZL | Performed by: INTERNAL MEDICINE

## 2020-02-21 PROCEDURE — G0103 PSA SCREENING: HCPCS | Mod: ZL | Performed by: INTERNAL MEDICINE

## 2020-02-21 PROCEDURE — 82043 UR ALBUMIN QUANTITATIVE: CPT | Mod: ZL | Performed by: INTERNAL MEDICINE

## 2020-02-21 PROCEDURE — 80061 LIPID PANEL: CPT | Mod: ZL | Performed by: INTERNAL MEDICINE

## 2020-02-21 PROCEDURE — 84550 ASSAY OF BLOOD/URIC ACID: CPT | Mod: ZL | Performed by: INTERNAL MEDICINE

## 2020-02-21 PROCEDURE — 36415 COLL VENOUS BLD VENIPUNCTURE: CPT | Mod: ZL | Performed by: INTERNAL MEDICINE

## 2020-02-21 ASSESSMENT — ANXIETY QUESTIONNAIRES: GAD7 TOTAL SCORE: 0

## 2020-02-24 DIAGNOSIS — I10 BENIGN ESSENTIAL HYPERTENSION: ICD-10-CM

## 2020-02-24 DIAGNOSIS — Z12.5 SCREENING FOR PROSTATE CANCER: ICD-10-CM

## 2020-02-24 DIAGNOSIS — E11.9 TYPE 2 DIABETES MELLITUS WITHOUT COMPLICATION, WITHOUT LONG-TERM CURRENT USE OF INSULIN (H): Primary | ICD-10-CM

## 2020-02-24 DIAGNOSIS — N41.1 CHRONIC PROSTATITIS: ICD-10-CM

## 2020-02-24 DIAGNOSIS — R97.20 ELEVATED PROSTATE SPECIFIC ANTIGEN (PSA): ICD-10-CM

## 2020-02-24 RX ORDER — ROSUVASTATIN CALCIUM 10 MG/1
10 TABLET, COATED ORAL DAILY
Qty: 90 TABLET | Refills: 3 | Status: SHIPPED | OUTPATIENT
Start: 2020-02-24 | End: 2021-03-08

## 2020-02-24 RX ORDER — CIPROFLOXACIN 500 MG/1
500 TABLET, FILM COATED ORAL 2 TIMES DAILY
Qty: 42 TABLET | Refills: 0 | Status: SHIPPED | OUTPATIENT
Start: 2020-02-24 | End: 2020-04-15

## 2020-02-24 RX ORDER — AMLODIPINE BESYLATE 5 MG/1
TABLET ORAL
Qty: 90 TABLET | Refills: 3 | Status: SHIPPED | OUTPATIENT
Start: 2020-02-24 | End: 2021-03-08

## 2020-02-24 RX ORDER — ATENOLOL 50 MG/1
TABLET ORAL
Qty: 90 TABLET | Refills: 3 | Status: SHIPPED | OUTPATIENT
Start: 2020-02-24 | End: 2021-03-08

## 2020-02-24 RX ORDER — ALLOPURINOL 100 MG/1
TABLET ORAL
Qty: 180 TABLET | Refills: 3 | Status: SHIPPED | OUTPATIENT
Start: 2020-02-24 | End: 2021-03-08

## 2020-02-24 RX ORDER — LOSARTAN POTASSIUM 25 MG/1
25 TABLET ORAL DAILY
Qty: 90 TABLET | Refills: 3 | Status: SHIPPED | OUTPATIENT
Start: 2020-02-24 | End: 2020-12-08

## 2020-03-05 ENCOUNTER — TRANSFERRED RECORDS (OUTPATIENT)
Dept: HEALTH INFORMATION MANAGEMENT | Facility: CLINIC | Age: 75
End: 2020-03-05

## 2020-03-05 LAB — RETINOPATHY: NEGATIVE

## 2020-03-13 ENCOUNTER — HOSPITAL ENCOUNTER (EMERGENCY)
Facility: HOSPITAL | Age: 75
Discharge: HOME | End: 2020-03-13
Attending: EMERGENCY MEDICINE | Admitting: EMERGENCY MEDICINE
Payer: COMMERCIAL

## 2020-03-13 ENCOUNTER — APPOINTMENT (OUTPATIENT)
Dept: CT IMAGING | Facility: HOSPITAL | Age: 75
End: 2020-03-13
Attending: EMERGENCY MEDICINE
Payer: COMMERCIAL

## 2020-03-13 VITALS
RESPIRATION RATE: 14 BRPM | OXYGEN SATURATION: 94 % | HEART RATE: 56 BPM | TEMPERATURE: 97.6 F | SYSTOLIC BLOOD PRESSURE: 117 MMHG | DIASTOLIC BLOOD PRESSURE: 77 MMHG

## 2020-03-13 DIAGNOSIS — K31.89 ACQUIRED GASTRIC WALL DEFORMITY: ICD-10-CM

## 2020-03-13 DIAGNOSIS — K85.90 ACUTE PANCREATITIS, UNSPECIFIED COMPLICATION STATUS, UNSPECIFIED PANCREATITIS TYPE: Primary | ICD-10-CM

## 2020-03-13 LAB
ALBUMIN SERPL-MCNC: 3.9 G/DL (ref 3.4–5)
ALBUMIN UR-MCNC: NEGATIVE MG/DL
ALP SERPL-CCNC: 46 U/L (ref 40–150)
ALT SERPL W P-5'-P-CCNC: 44 U/L (ref 0–70)
AMYLASE SERPL-CCNC: 78 U/L (ref 30–110)
ANION GAP SERPL CALCULATED.3IONS-SCNC: 7 MMOL/L (ref 3–14)
APPEARANCE UR: CLEAR
AST SERPL W P-5'-P-CCNC: 24 U/L (ref 0–45)
BASOPHILS # BLD AUTO: 0 10E9/L (ref 0–0.2)
BASOPHILS NFR BLD AUTO: 0.5 %
BILIRUB SERPL-MCNC: 0.5 MG/DL (ref 0.2–1.3)
BILIRUB UR QL STRIP: NEGATIVE
BUN SERPL-MCNC: 20 MG/DL (ref 7–30)
CALCIUM SERPL-MCNC: 9.5 MG/DL (ref 8.5–10.1)
CHLORIDE SERPL-SCNC: 106 MMOL/L (ref 94–109)
CO2 SERPL-SCNC: 25 MMOL/L (ref 20–32)
COLOR UR AUTO: YELLOW
CREAT SERPL-MCNC: 0.8 MG/DL (ref 0.66–1.25)
CRP SERPL-MCNC: <2.9 MG/L (ref 0–8)
DIFFERENTIAL METHOD BLD: NORMAL
EOSINOPHIL # BLD AUTO: 0 10E9/L (ref 0–0.7)
EOSINOPHIL NFR BLD AUTO: 0 %
ERYTHROCYTE [DISTWIDTH] IN BLOOD BY AUTOMATED COUNT: 13.2 % (ref 10–15)
GFR SERPL CREATININE-BSD FRML MDRD: 89 ML/MIN/{1.73_M2}
GLUCOSE SERPL-MCNC: 116 MG/DL (ref 70–99)
GLUCOSE UR STRIP-MCNC: 70 MG/DL
HCT VFR BLD AUTO: 44.1 % (ref 40–53)
HGB BLD-MCNC: 15.1 G/DL (ref 13.3–17.7)
HGB UR QL STRIP: NEGATIVE
IMM GRANULOCYTES # BLD: 0 10E9/L (ref 0–0.4)
IMM GRANULOCYTES NFR BLD: 0.2 %
KETONES UR STRIP-MCNC: NEGATIVE MG/DL
LACTATE BLD-SCNC: 1.7 MMOL/L (ref 0.7–2)
LEUKOCYTE ESTERASE UR QL STRIP: NEGATIVE
LIPASE SERPL-CCNC: 556 U/L (ref 73–393)
LYMPHOCYTES # BLD AUTO: 1.4 10E9/L (ref 0.8–5.3)
LYMPHOCYTES NFR BLD AUTO: 23.5 %
MCH RBC QN AUTO: 30.5 PG (ref 26.5–33)
MCHC RBC AUTO-ENTMCNC: 34.2 G/DL (ref 31.5–36.5)
MCV RBC AUTO: 89 FL (ref 78–100)
MONOCYTES # BLD AUTO: 0.6 10E9/L (ref 0–1.3)
MONOCYTES NFR BLD AUTO: 9.7 %
NEUTROPHILS # BLD AUTO: 3.8 10E9/L (ref 1.6–8.3)
NEUTROPHILS NFR BLD AUTO: 66.1 %
NITRATE UR QL: NEGATIVE
NRBC # BLD AUTO: 0 10*3/UL
NRBC BLD AUTO-RTO: 0 /100
PH UR STRIP: 6 PH (ref 4.7–8)
PLATELET # BLD AUTO: 168 10E9/L (ref 150–450)
POTASSIUM SERPL-SCNC: 3.9 MMOL/L (ref 3.4–5.3)
PROT SERPL-MCNC: 7.9 G/DL (ref 6.8–8.8)
RBC # BLD AUTO: 4.95 10E12/L (ref 4.4–5.9)
SODIUM SERPL-SCNC: 138 MMOL/L (ref 133–144)
SOURCE: ABNORMAL
SP GR UR STRIP: 1.02 (ref 1–1.03)
UROBILINOGEN UR STRIP-MCNC: NORMAL MG/DL (ref 0–2)
WBC # BLD AUTO: 5.8 10E9/L (ref 4–11)

## 2020-03-13 PROCEDURE — 99285 EMERGENCY DEPT VISIT HI MDM: CPT | Mod: Z6 | Performed by: EMERGENCY MEDICINE

## 2020-03-13 PROCEDURE — 25800030 ZZH RX IP 258 OP 636: Performed by: EMERGENCY MEDICINE

## 2020-03-13 PROCEDURE — 99285 EMERGENCY DEPT VISIT HI MDM: CPT | Mod: 25

## 2020-03-13 PROCEDURE — 80053 COMPREHEN METABOLIC PANEL: CPT | Performed by: EMERGENCY MEDICINE

## 2020-03-13 PROCEDURE — 82150 ASSAY OF AMYLASE: CPT | Performed by: EMERGENCY MEDICINE

## 2020-03-13 PROCEDURE — 81003 URINALYSIS AUTO W/O SCOPE: CPT | Performed by: EMERGENCY MEDICINE

## 2020-03-13 PROCEDURE — 83690 ASSAY OF LIPASE: CPT | Performed by: EMERGENCY MEDICINE

## 2020-03-13 PROCEDURE — 25500064 ZZH RX 255 OP 636: Performed by: RADIOLOGY

## 2020-03-13 PROCEDURE — 36415 COLL VENOUS BLD VENIPUNCTURE: CPT | Performed by: EMERGENCY MEDICINE

## 2020-03-13 PROCEDURE — 83605 ASSAY OF LACTIC ACID: CPT | Performed by: EMERGENCY MEDICINE

## 2020-03-13 PROCEDURE — 96360 HYDRATION IV INFUSION INIT: CPT | Mod: XU

## 2020-03-13 PROCEDURE — 74177 CT ABD & PELVIS W/CONTRAST: CPT | Mod: TC

## 2020-03-13 PROCEDURE — 86140 C-REACTIVE PROTEIN: CPT | Performed by: EMERGENCY MEDICINE

## 2020-03-13 PROCEDURE — 85025 COMPLETE CBC W/AUTO DIFF WBC: CPT | Performed by: EMERGENCY MEDICINE

## 2020-03-13 RX ORDER — SODIUM CHLORIDE 9 MG/ML
INJECTION, SOLUTION INTRAVENOUS CONTINUOUS
Status: DISCONTINUED | OUTPATIENT
Start: 2020-03-13 | End: 2020-03-13 | Stop reason: HOSPADM

## 2020-03-13 RX ORDER — IOPAMIDOL 612 MG/ML
100 INJECTION, SOLUTION INTRAVASCULAR ONCE
Status: COMPLETED | OUTPATIENT
Start: 2020-03-13 | End: 2020-03-13

## 2020-03-13 RX ADMIN — SODIUM CHLORIDE 1000 ML: 9 INJECTION, SOLUTION INTRAVENOUS at 14:33

## 2020-03-13 RX ADMIN — IOPAMIDOL 100 ML: 612 INJECTION, SOLUTION INTRAVENOUS at 14:41

## 2020-03-13 ASSESSMENT — ENCOUNTER SYMPTOMS
SHORTNESS OF BREATH: 0
COLOR CHANGE: 0
EYE REDNESS: 0
FEVER: 0
ARTHRALGIAS: 0
NECK STIFFNESS: 0
ABDOMINAL PAIN: 1
DIFFICULTY URINATING: 0
CONFUSION: 0
HEADACHES: 0

## 2020-03-13 NOTE — ED AVS SNAPSHOT
[facility]  [address]  [place] [address]  Phone:  [phone]                                    Rafa Bhatti   MRN: [id number]    Department:  [facility]   Date of Visit:  3/13/2020           After Visit Summary Signature Page    I have received my discharge instructions, and my questions have been answered. I have discussed any challenges I see with this plan with the nurse or doctor.    ..........................................................................................................................................  Patient/Patient Representative Signature      ..........................................................................................................................................  Patient Representative Print Name and Relationship to Patient    ..................................................               ................................................  Date                                   Time    ..........................................................................................................................................  Reviewed by Signature/Title    ...................................................              ..............................................  Date                                               Time          22EPIC Rev 08/18

## 2020-03-13 NOTE — ED TRIAGE NOTES
Patient states he's having back pain that seems to wrap around to under his ribs. States his acid reflux is acting up as well. States he does have a hx of stones and has completed the med for acid reflux at this time.

## 2020-03-13 NOTE — ED PROVIDER NOTES
History     Chief Complaint   Patient presents with     Flank Pain     HPI  Rafa Bhatti is a 74 year old male who presents to the ED with a 3-day history of epigastric abdominal pain radiating to the back.  Pain is rated as 4/10, radiates to the back, described as sharp.  Patient suspects recurrence of pancreatitis which she has been treated for in the past.  Last time he drank alcohol was 4 days ago.  He feels nauseated but has not vomited.  No fever, chills, diarrhea, abdominal distention or recent abdominal trauma.  Denies chest pain or shortness of breath.    Allergies:  No Known Allergies    Problem List:    Patient Active Problem List    Diagnosis Date Noted     AK (actinic keratosis) 11/28/2018     Priority: Medium     Encounter for diabetic foot exam (H) 05/23/2018     Priority: Medium     Type 2 diabetes mellitus with diabetic polyneuropathy, without long-term current use of insulin (H) 05/23/2018     Priority: Medium     Type 2 diabetes mellitus without complication, without long-term current use of insulin (H) 04/11/2018     Priority: Medium     Chronic gout 10/18/2017     Priority: Medium     Esophageal reflux 10/18/2017     Priority: Medium     Personal history of malignant neoplasm of skin 11/14/2016     Priority: Medium     Overview:   Rt upper back mild 2013    Overview:   BCC right temple 2012       History of nonmelanoma skin cancer 11/14/2016     Priority: Medium     Overview:   BCC right temple 2012       ACP (advance care planning) 10/26/2016     Priority: Medium     Advance Care Planning 10/26/2016: ACP Review of Chart / Resources Provided:  Reviewed chart for advance care plan.  Rafa Bhatti has no plan or code status on file. Discussed available resources and provided with information. Confirmed code status reflects current choices pending further ACP discussions.  Confirmed/documented legally designated decision makers.  Added by Kelsy Yeager             Routine general medical  examination at a health care facility 10/26/2016     Priority: Medium     Hemangioma of spine 05/08/2015     Priority: Medium     Elevated blood pressure 12/10/2014     Priority: Medium     Benign essential hypertension 12/10/2014     Priority: Medium     Encounter for monitoring testosterone replacement therapy 09/16/2013     Priority: Medium     Testicular hypofunction 09/16/2013     Priority: Medium     Problem list name updated by automated process. Provider to review       Basal cell carcinoma, face 09/05/2012     Priority: Medium        Past Medical History:    Past Medical History:   Diagnosis Date     Benign neoplasm of unspecified site 8/1/2012     Calculus of kidney 5/20/2002     Chest pain, unspecified 3/2/2000     Diabetic eye exam (H) 06/06/2018     Gout, unspecified 8/3/2011     Hyperlipidemia      Unspecified essential hypertension 7/18/2000       Past Surgical History:    Past Surgical History:   Procedure Laterality Date     cardiolyte stress test  2000    chest pain     CHOLECYSTECTOMY, COMMON BILE DUCT EXPLORATION, COMBINED  04/15/2018     COLONOSCOPY  1999     fistula in ANO       HEMORRHOIDECTOMY       removal of dermoid cyst of mediastinum       VASECTOMY         Family History:    Family History   Problem Relation Age of Onset     C.A.D. Father 80     C.A.D. Mother      Hypertension Mother      C.A.D. Brother      C.A.D. Other         family hx       Social History:  Marital Status:   [2]  Social History     Tobacco Use     Smoking status: Never Smoker     Smokeless tobacco: Never Used   Substance Use Topics     Alcohol use: Yes     Comment: socially     Drug use: No        Medications:    allopurinol (ZYLOPRIM) 100 MG tablet  amLODIPine (NORVASC) 5 MG tablet  aspirin 81 MG tablet  atenolol (TENORMIN) 50 MG tablet  blood glucose (NO BRAND SPECIFIED) test strip  blood glucose monitoring (FREESTYLE) lancets  Calcium Carbonate Antacid (TUMS PO)  ciprofloxacin (CIPRO) 500 MG  tablet  hydrochlorothiazide (HYDRODIURIL) 25 MG tablet  losartan (COZAAR) 25 MG tablet  metFORMIN (GLUCOPHAGE) 1000 MG tablet  multivitamin, therapeutic with minerals (MULTI-VITAMIN) TABS tablet  ranitidine (ZANTAC) 150 MG tablet  ranitidine (ZANTAC) 300 MG tablet  rosuvastatin (CRESTOR) 10 MG tablet  sildenafil (REVATIO) 20 MG tablet  tamsulosin (FLOMAX) 0.4 MG capsule  colchicine (COLCYRS) 0.6 MG tablet          Review of Systems   Constitutional: Negative for fever.   HENT: Negative for congestion.    Eyes: Negative for redness.   Respiratory: Negative for shortness of breath.    Cardiovascular: Negative for chest pain.   Gastrointestinal: Positive for abdominal pain.   Genitourinary: Negative for difficulty urinating.   Musculoskeletal: Negative for arthralgias and neck stiffness.   Skin: Negative for color change.   Neurological: Negative for headaches.   Psychiatric/Behavioral: Negative for confusion.   All other systems reviewed and are negative.      Physical Exam   BP: 153/80  Pulse: 56  Heart Rate: 57  Temp: 97.6  F (36.4  C)  Resp: 16  SpO2: 98 %      Physical Exam  Vitals signs and nursing note reviewed.   Constitutional:       General: He is not in acute distress.     Appearance: He is well-developed. He is not diaphoretic.   HENT:      Head: Normocephalic and atraumatic.   Eyes:      Pupils: Pupils are equal, round, and reactive to light.   Cardiovascular:      Rate and Rhythm: Normal rate and regular rhythm.      Heart sounds: Normal heart sounds.   Pulmonary:      Effort: Pulmonary effort is normal. No respiratory distress.      Breath sounds: Normal breath sounds. No stridor.   Abdominal:      General: Bowel sounds are normal. There is no distension.      Tenderness: There is no abdominal tenderness.   Musculoskeletal:         General: No tenderness or deformity.   Neurological:      Mental Status: He is alert and oriented to person, place, and time.      Cranial Nerves: No cranial nerve deficit.          ED Course   Evaluated upon arrival to the ED laboratory tests ordered including CT scan abdomen.  Started on IV fluid hydration.  Offered pain medications but he declined.    Procedures      Results for orders placed or performed during the hospital encounter of 03/13/20 (from the past 24 hour(s))   CBC with platelets differential   Result Value Ref Range    WBC 5.8 4.0 - 11.0 10e9/L    RBC Count 4.95 4.4 - 5.9 10e12/L    Hemoglobin 15.1 13.3 - 17.7 g/dL    Hematocrit 44.1 40.0 - 53.0 %    MCV 89 78 - 100 fl    MCH 30.5 26.5 - 33.0 pg    MCHC 34.2 31.5 - 36.5 g/dL    RDW 13.2 10.0 - 15.0 %    Platelet Count 168 150 - 450 10e9/L    Diff Method Automated Method     % Neutrophils 66.1 %    % Lymphocytes 23.5 %    % Monocytes 9.7 %    % Eosinophils 0.0 %    % Basophils 0.5 %    % Immature Granulocytes 0.2 %    Nucleated RBCs 0 0 /100    Absolute Neutrophil 3.8 1.6 - 8.3 10e9/L    Absolute Lymphocytes 1.4 0.8 - 5.3 10e9/L    Absolute Monocytes 0.6 0.0 - 1.3 10e9/L    Absolute Eosinophils 0.0 0.0 - 0.7 10e9/L    Absolute Basophils 0.0 0.0 - 0.2 10e9/L    Abs Immature Granulocytes 0.0 0 - 0.4 10e9/L    Absolute Nucleated RBC 0.0    Lactic acid whole blood   Result Value Ref Range    Lactic Acid 1.7 0.7 - 2.0 mmol/L   Lipase   Result Value Ref Range    Lipase 556 (H) 73 - 393 U/L   Comprehensive metabolic panel   Result Value Ref Range    Sodium 138 133 - 144 mmol/L    Potassium 3.9 3.4 - 5.3 mmol/L    Chloride 106 94 - 109 mmol/L    Carbon Dioxide 25 20 - 32 mmol/L    Anion Gap 7 3 - 14 mmol/L    Glucose 116 (H) 70 - 99 mg/dL    Urea Nitrogen 20 7 - 30 mg/dL    Creatinine 0.80 0.66 - 1.25 mg/dL    GFR Estimate 89 >60 mL/min/[1.73_m2]    GFR Estimate If Black >90 >60 mL/min/[1.73_m2]    Calcium 9.5 8.5 - 10.1 mg/dL    Bilirubin Total 0.5 0.2 - 1.3 mg/dL    Albumin 3.9 3.4 - 5.0 g/dL    Protein Total 7.9 6.8 - 8.8 g/dL    Alkaline Phosphatase 46 40 - 150 U/L    ALT 44 0 - 70 U/L    AST 24 0 - 45 U/L   CRP  inflammation   Result Value Ref Range    CRP Inflammation <2.9 0.0 - 8.0 mg/L   Amylase   Result Value Ref Range    Amylase 78 30 - 110 U/L   UA reflex to Microscopic and Culture    Specimen: Midstream Urine   Result Value Ref Range    Color Urine Yellow     Appearance Urine Clear     Glucose Urine 70 (A) NEG^Negative mg/dL    Bilirubin Urine Negative NEG^Negative    Ketones Urine Negative NEG^Negative mg/dL    Specific Gravity Urine 1.024 1.003 - 1.035    Blood Urine Negative NEG^Negative    pH Urine 6.0 4.7 - 8.0 pH    Protein Albumin Urine Negative NEG^Negative mg/dL    Urobilinogen mg/dL Normal 0.0 - 2.0 mg/dL    Nitrite Urine Negative NEG^Negative    Leukocyte Esterase Urine Negative NEG^Negative    Source Midstream Urine    CT Abdomen Pelvis w Contrast    Narrative    PROCEDURE:  CT ABDOMEN PELVIS W CONTRAST    HISTORY: Abdominal pain with pancreatitis    TECHNIQUE:  Helical CT of the abdomen and pelvis was performed  following injection of intravenous contrast.     COMPARISON:  4/12/2018    MEDS/CONTRAST: Isovue 300, 100ml    FINDINGS:      Limited images through the lung bases demonstrate no focal  consolidation or mass.      Focal peripancreatic inflammation has resolved when compared to the  prior. The calcific stone in the distal common bile duct is no longer  seen. The gallbladder is surgically absent. There is a 1.6 cm area of  focal low density along the wall of the gastric cardia. There is a  left renal cortical cysts. No hydronephrosis or collecting system  calculus is identified. No intrahepatic mass is seen. The spleen and  adrenal glands are normal. The bowel is normal in caliber. The  prostate is enlarged.    No free fluid, free air or adenopathy is present.  No suspicious  osseous lesions are identified.      Impression    IMPRESSION:      Pancreatitis secondary to choledocholithiasis appears resolved when  compared to the prior from 2018. No imaging evidence of pancreatitis  at this  time.    Focal 1.6 cm low-density area along the margin of the gastric cardia  raises the possibility of ulcer or neoplasm. Consider EGD for further  assessment.    THADDEUS GONGORA MD       Medications   0.9% sodium chloride BOLUS (0 mLs Intravenous Stopped 3/13/20 1538)     Followed by   0.9% sodium chloride BOLUS (has no administration in time range)     Followed by   sodium chloride 0.9% infusion (has no administration in time range)   sodium chloride (PF) 0.9% PF flush 60 mL (60 mLs Intravenous Given 3/13/20 1441)   iopamidol (ISOVUE-300) IV solution 61% 100 mL (100 mLs Intravenous Given 3/13/20 1441)       Assessments & Plan (with Medical Decision Making)   Pancreatitis: Unsure whether this is acute or chronic.  Patient was treated for obstructive pancreatitis in 2018 secondary to choledocholithiasis which was appropriately treated.  His lipase is slightly elevated at 556 units/L.  This could explain patient's epigastric abdominal pain.  Normal amylase.  Normal CBC and CMP.  Encouraged to push fluids and not drink alcohol until fully recovered.  He has an appointment follow-up with PCP next week.    Gastric wall abnormality: CT abdomen showed 1.6 cm low-density area along the margin of the gastric cardia raising the possibility of an ulcer or neoplasm.  General surgeon on-call consulted on the phone Dr. Welsh was consulted on the phone and he recommends patient follow-up as outpatient with him in the clinic next week.  Meanwhile patient will continue with Zantac daily until reviewed in the office next week.    I have reviewed the nursing notes.    I have reviewed the findings, diagnosis, plan and need for follow up with the patient.    New Prescriptions    RANITIDINE (ZANTAC) 300 MG TABLET    Take 1 tablet (300 mg) by mouth At Bedtime       Final diagnoses:   Acute pancreatitis, unspecified complication status, unspecified pancreatitis type   Acquired gastric wall deformity       3/13/2020   [facility]     Yfn Hummel MD  03/13/20 [id number]

## 2020-04-06 ENCOUNTER — TELEPHONE (OUTPATIENT)
Dept: PEDIATRICS | Facility: OTHER | Age: 75
End: 2020-04-06

## 2020-04-06 DIAGNOSIS — K21.9 GASTROESOPHAGEAL REFLUX DISEASE, ESOPHAGITIS PRESENCE NOT SPECIFIED: Primary | ICD-10-CM

## 2020-04-06 RX ORDER — FAMOTIDINE 20 MG/1
20 TABLET, FILM COATED ORAL 2 TIMES DAILY
Qty: 180 TABLET | Refills: 1 | Status: SHIPPED | OUTPATIENT
Start: 2020-04-06 | End: 2020-06-11

## 2020-04-06 NOTE — TELEPHONE ENCOUNTER
[facility] new medication to replace ranitidine (Zantac), as it has been recalled and is no longer available.    Thank you, Dr. Agustin.  Mary Lewis RN on 4/6/2020 at 2:42 PM

## 2020-04-08 DIAGNOSIS — E11.9 TYPE 2 DIABETES MELLITUS WITHOUT COMPLICATION, WITHOUT LONG-TERM CURRENT USE OF INSULIN (H): Primary | ICD-10-CM

## 2020-04-08 RX ORDER — BLOOD-GLUCOSE METER
KIT MISCELLANEOUS
Qty: 100 EACH | Refills: 2 | Status: SHIPPED | OUTPATIENT
Start: 2020-04-08 | End: 2021-07-30

## 2020-04-08 NOTE — TELEPHONE ENCOUNTER
FreeStyle   Lite Test Strips  Last Written Prescription Date:  08/14/2019  Last Fill Quantity: 0,   # refills: 0  Last Office Visit: 02/20/2020

## 2020-04-15 ENCOUNTER — OFFICE VISIT (OUTPATIENT)
Dept: SURGERY | Facility: OTHER | Age: 75
End: 2020-04-15
Attending: EMERGENCY MEDICINE
Payer: COMMERCIAL

## 2020-04-15 VITALS
SYSTOLIC BLOOD PRESSURE: 122 MMHG | BODY MASS INDEX: 27.4 KG/M2 | HEIGHT: 69 IN | HEART RATE: 65 BPM | WEIGHT: 185 LBS | DIASTOLIC BLOOD PRESSURE: 68 MMHG | TEMPERATURE: 96.5 F | OXYGEN SATURATION: 97 % | RESPIRATION RATE: 16 BRPM

## 2020-04-15 DIAGNOSIS — K31.89 ACQUIRED GASTRIC WALL DEFORMITY: ICD-10-CM

## 2020-04-15 DIAGNOSIS — K31.89 GASTRIC MASS: Primary | ICD-10-CM

## 2020-04-15 PROCEDURE — G0463 HOSPITAL OUTPT CLINIC VISIT: HCPCS

## 2020-04-15 PROCEDURE — 99204 OFFICE O/P NEW MOD 45 MIN: CPT | Performed by: SURGERY

## 2020-04-15 ASSESSMENT — PAIN SCALES - GENERAL: PAINLEVEL: NO PAIN (0)

## 2020-04-15 ASSESSMENT — MIFFLIN-ST. JEOR: SCORE: 1579.53

## 2020-04-15 NOTE — NURSING NOTE
"Chief Complaint   Patient presents with     Consult For     gastric wall abnormality on CT scan. ER referral from Dr. Hummel 3/13/2020-ER visit for epigastric pain and pancreatitis       Initial /68   Pulse 65   Temp 96.5  F (35.8  C) (Tympanic)   Resp 16   Ht 1.753 m (5' 9\")   Wt 83.9 kg (185 lb)   SpO2 97%   BMI 27.32 kg/m   Estimated body mass index is 27.32 kg/m  as calculated from the following:    Height as of this encounter: 1.753 m (5' 9\").    Weight as of this encounter: 83.9 kg (185 lb).  Medication Reconciliation: complete  Chelle Abarca LPN    "

## 2020-04-15 NOTE — PROGRESS NOTES
CLINIC NOTE - CONSULT  4/15/2020    Patient:Rafa Bhatti  Referring Physician: Yfn Hummel    Reason for Referral: Abdominal Pain    This is a 74 year old male with a past history of pancreatitis.  Presented to the emergency room approximately a month ago because of similar symptoms.at that time a CT was obtained which showed an abnormality in the fundus of the stomach.  Was referred to general surgery for evaluation.  He otherwise has no complaints.  No history of hematemesis.  No history of melena.  Did have a CT prior which showed no evidence of this lesion.  No real history of Gastrosoft reflux disease.  Minnie history of gastric cancer or other GI cancers.    Past Medical History:  Past Medical History:   Diagnosis Date     Benign neoplasm of unspecified site 8/1/2012    Dermoid cyst     Calculus of kidney 5/20/2002     Chest pain, unspecified 3/2/2000     Diabetic eye exam (H) 06/06/2018    normal     Gout, unspecified 8/3/2011     Hyperlipidemia      Unspecified essential hypertension 7/18/2000       Past Surgical History:  Past Surgical History:   Procedure Laterality Date     cardiolyte stress test  2000    chest pain     CHOLECYSTECTOMY, COMMON BILE DUCT EXPLORATION, COMBINED  04/15/2018     COLONOSCOPY  1999     fistula in ANO       HEMORRHOIDECTOMY       removal of dermoid cyst of mediastinum       VASECTOMY         Family History History:  Family History   Problem Relation Age of Onset     C.A.D. Father 80     C.A.D. Mother      Hypertension Mother      C.A.D. Brother      C.A.D. Other         family hx       History of Tobacco Use:  History   Smoking Status     Never Smoker   Smokeless Tobacco     Never Used       Current Medications:  Current Outpatient Medications   Medication Sig Dispense Refill     allopurinol (ZYLOPRIM) 100 MG tablet TAKE 1 TABLET BY MOUTH TWICE DAILY 180 tablet 3     amLODIPine (NORVASC) 5 MG tablet TAKE 1 TABLET DAILY BY MOUTH - GENERIC FOR NORVASC 90 tablet 3     atenolol  "(TENORMIN) 50 MG tablet TAKE 1 TABLET DAILY BY MOUTH 90 tablet 3     blood glucose monitoring (FREESTYLE) lancets Use to test blood sugar 1 times daily. 100 each 3     Calcium Carbonate Antacid (TUMS PO) tums 200 mg calcium (500 mg) chewable  Take 1 tablet by mouth prn       FREESTYLE LITE test strip USE TO TEST BLOOD SUGARS ONCE A DAY. 100 each 2     losartan (COZAAR) 25 MG tablet Take 1 tablet (25 mg) by mouth daily 90 tablet 3     metFORMIN (GLUCOPHAGE) 1000 MG tablet Take 1 tablet (1,000 mg) by mouth 2 times daily (with meals) 180 tablet 3     rosuvastatin (CRESTOR) 10 MG tablet Take 1 tablet (10 mg) by mouth daily 90 tablet 3     sildenafil (REVATIO) 20 MG tablet Take 1-2 tablets (20-40 mg) by mouth daily as needed (erectile dysfunction) 60 tablet 1     tamsulosin (FLOMAX) 0.4 MG capsule TAKE 1 CAPSULE (0.4 MG) BY MOUTH DAILY 90 capsule 1     aspirin 81 MG tablet Take 81 mg by mouth daily       colchicine (COLCYRS) 0.6 MG tablet TAKE 2 TABLETS AT THE ONSET OF GOUT PAIN. MAY TAKE ONE TABLET AGAIN IN ONE HOUR. MAX 4 TABS IN 24 HRS. (Patient not taking: Reported on 4/15/2020) 20 tablet 1     famotidine (PEPCID) 20 MG tablet Take 1 tablet (20 mg) by mouth 2 times daily (Patient not taking: Reported on 4/15/2020) 180 tablet 1     hydrochlorothiazide (HYDRODIURIL) 25 MG tablet TAKE 1 TABLET BY MOUTH DAILY (Patient not taking: Reported on 4/15/2020) 90 tablet 3     multivitamin, therapeutic with minerals (MULTI-VITAMIN) TABS tablet Take 1 tablet by mouth daily         Allergies:  No Known Allergies    ROS:  Pertinent items are noted in HPI.  All other systems are negative.    PHYSICAL EXAM:     Vital signs: /68   Pulse 65   Temp 96.5  F (35.8  C) (Tympanic)   Resp 16   Ht 1.753 m (5' 9\")   Wt 83.9 kg (185 lb)   SpO2 97%   BMI 27.32 kg/m     Weight: [unfilled]   BMI: Body mass index is 27.32 kg/m .   General: Normal, healthy, cooperative   Skin: no jaundice   HEENT: PERRLA and EOMI   Neck: supple   Lungs: " clear to auscultation   CV: Regular rate and rhythm without murmer   Abdominal: abdomen is soft without significant tenderness, masses, organomegaly or guarding   Extremities: No cyanosis, clubbing or edema noted bilaterally in Upper and Lower Extremities   Neurological: without deficit    LABORATORY:  CBC RESULTS:   Recent Labs   Lab Test 03/13/20  1347   WBC 5.8   RBC 4.95   HGB 15.1   HCT 44.1   MCV 89   MCH 30.5   MCHC 34.2   RDW 13.2          Last Comprehensive Metabolic Panel:  Sodium   Date Value Ref Range Status   03/13/2020 138 133 - 144 mmol/L Final     Potassium   Date Value Ref Range Status   03/13/2020 3.9 3.4 - 5.3 mmol/L Final     Chloride   Date Value Ref Range Status   03/13/2020 106 94 - 109 mmol/L Final     Carbon Dioxide   Date Value Ref Range Status   03/13/2020 25 20 - 32 mmol/L Final     Anion Gap   Date Value Ref Range Status   03/13/2020 7 3 - 14 mmol/L Final     Glucose   Date Value Ref Range Status   03/13/2020 116 (H) 70 - 99 mg/dL Final     Urea Nitrogen   Date Value Ref Range Status   03/13/2020 20 7 - 30 mg/dL Final     Creatinine   Date Value Ref Range Status   03/13/2020 0.80 0.66 - 1.25 mg/dL Final     GFR Estimate   Date Value Ref Range Status   03/13/2020 89 >60 mL/min/[1.73_m2] Final     Comment:     Non  GFR Calc  Starting 12/18/2018, serum creatinine based estimated GFR (eGFR) will be   calculated using the Chronic Kidney Disease Epidemiology Collaboration   (CKD-EPI) equation.       Calcium   Date Value Ref Range Status   03/13/2020 9.5 8.5 - 10.1 mg/dL Final     Bilirubin Total   Date Value Ref Range Status   03/13/2020 0.5 0.2 - 1.3 mg/dL Final     Alkaline Phosphatase   Date Value Ref Range Status   03/13/2020 46 40 - 150 U/L Final     ALT   Date Value Ref Range Status   03/13/2020 44 0 - 70 U/L Final     AST   Date Value Ref Range Status   03/13/2020 24 0 - 45 U/L Final       PRIOR PERTINENT RADIOLOGY STUDIES:     Results for orders placed during the  hospital encounter of 03/13/20   CT Abdomen Pelvis w Contrast    Narrative PROCEDURE:  CT ABDOMEN PELVIS W CONTRAST    HISTORY: Abdominal pain with pancreatitis    TECHNIQUE:  Helical CT of the abdomen and pelvis was performed  following injection of intravenous contrast.     COMPARISON:  4/12/2018    MEDS/CONTRAST: Isovue 300, 100ml    FINDINGS:      Limited images through the lung bases demonstrate no focal  consolidation or mass.      Focal peripancreatic inflammation has resolved when compared to the  prior. The calcific stone in the distal common bile duct is no longer  seen. The gallbladder is surgically absent. There is a 1.6 cm area of  focal low density along the wall of the gastric cardia. There is a  left renal cortical cysts. No hydronephrosis or collecting system  calculus is identified. No intrahepatic mass is seen. The spleen and  adrenal glands are normal. The bowel is normal in caliber. The  prostate is enlarged.    No free fluid, free air or adenopathy is present.  No suspicious  osseous lesions are identified.      Impression IMPRESSION:      Pancreatitis secondary to choledocholithiasis appears resolved when  compared to the prior from 2018. No imaging evidence of pancreatitis  at this time.    Focal 1.6 cm low-density area along the margin of the gastric cardia  raises the possibility of ulcer or neoplasm. Consider EGD for further  assessment.    THADDEUS GONGORA MD         ASSESSMENT:   74 year old male with a new lesion within the fundus of the stomach.      PLAN:   Discussion with the radiologist and my looking at the CAT scan this most likely represents a submucosal mass.  Out that I will be able to see it and will biopsy it with just an upper endoscopy.  I did discuss this with him.  My recommendations are to refer him to GI for upper endoscopy and EUS.  They can use the EUS to better localize and take core biopsies of the lesion.  He is comfortable with that.  We will go ahead and  arrange for him to see GI for upper endoscopy with EUS and biopsy.

## 2020-04-15 NOTE — PATIENT INSTRUCTIONS
Thank you for allowing Dr. Jorgensen and our surgical team to participate in your care. Please call our health unit coordinator at [phone] with scheduling questions or the nurse at [phone] with any other questions or concerns.

## 2020-04-28 ENCOUNTER — TRANSFERRED RECORDS (OUTPATIENT)
Dept: HEALTH INFORMATION MANAGEMENT | Facility: CLINIC | Age: 75
End: 2020-04-28

## 2020-05-11 ENCOUNTER — TELEPHONE (OUTPATIENT)
Dept: SURGERY | Facility: OTHER | Age: 75
End: 2020-05-11

## 2020-05-11 NOTE — TELEPHONE ENCOUNTER
We received a notice from [facility] on 5/11/20 that patient had an appointment for Endoscopy on 4/28/20 with Dr. Genao.    NPO

## 2020-05-12 ENCOUNTER — TRANSFERRED RECORDS (OUTPATIENT)
Dept: HEALTH INFORMATION MANAGEMENT | Facility: CLINIC | Age: 75
End: 2020-05-12

## 2020-05-18 ENCOUNTER — OFFICE VISIT (OUTPATIENT)
Dept: FAMILY MEDICINE | Facility: OTHER | Age: 75
End: 2020-05-18
Attending: NURSE PRACTITIONER
Payer: COMMERCIAL

## 2020-05-18 VITALS
DIASTOLIC BLOOD PRESSURE: 60 MMHG | OXYGEN SATURATION: 96 % | WEIGHT: 179 LBS | HEART RATE: 55 BPM | SYSTOLIC BLOOD PRESSURE: 128 MMHG | BODY MASS INDEX: 26.51 KG/M2 | HEIGHT: 69 IN | TEMPERATURE: 97.5 F

## 2020-05-18 DIAGNOSIS — E11.9 TYPE 2 DIABETES MELLITUS WITHOUT COMPLICATION, WITHOUT LONG-TERM CURRENT USE OF INSULIN (H): ICD-10-CM

## 2020-05-18 DIAGNOSIS — Z87.19 HISTORY OF PANCREATITIS: ICD-10-CM

## 2020-05-18 DIAGNOSIS — Z01.818 PREOP GENERAL PHYSICAL EXAM: Primary | ICD-10-CM

## 2020-05-18 DIAGNOSIS — D13.2 DUODENAL ADENOMA: ICD-10-CM

## 2020-05-18 LAB
ALBUMIN SERPL-MCNC: 4.1 G/DL (ref 3.4–5)
ALP SERPL-CCNC: 52 U/L (ref 40–150)
ALT SERPL W P-5'-P-CCNC: 35 U/L (ref 0–70)
AMYLASE SERPL-CCNC: 43 U/L (ref 30–110)
ANION GAP SERPL CALCULATED.3IONS-SCNC: 4 MMOL/L (ref 3–14)
AST SERPL W P-5'-P-CCNC: 23 U/L (ref 0–45)
BASOPHILS # BLD AUTO: 0 10E9/L (ref 0–0.2)
BASOPHILS NFR BLD AUTO: 0.6 %
BILIRUB SERPL-MCNC: 0.4 MG/DL (ref 0.2–1.3)
BUN SERPL-MCNC: 21 MG/DL (ref 7–30)
CALCIUM SERPL-MCNC: 9.4 MG/DL (ref 8.5–10.1)
CHLORIDE SERPL-SCNC: 106 MMOL/L (ref 94–109)
CO2 SERPL-SCNC: 28 MMOL/L (ref 20–32)
CREAT SERPL-MCNC: 0.83 MG/DL (ref 0.66–1.25)
DIFFERENTIAL METHOD BLD: NORMAL
EOSINOPHIL # BLD AUTO: 0.1 10E9/L (ref 0–0.7)
EOSINOPHIL NFR BLD AUTO: 1.8 %
ERYTHROCYTE [DISTWIDTH] IN BLOOD BY AUTOMATED COUNT: 13.4 % (ref 10–15)
EST. AVERAGE GLUCOSE BLD GHB EST-MCNC: 137 MG/DL
GFR SERPL CREATININE-BSD FRML MDRD: 87 ML/MIN/{1.73_M2}
GLUCOSE SERPL-MCNC: 114 MG/DL (ref 70–99)
HBA1C MFR BLD: 6.4 % (ref 0–5.6)
HCT VFR BLD AUTO: 41.4 % (ref 40–53)
HGB BLD-MCNC: 13.8 G/DL (ref 13.3–17.7)
IMM GRANULOCYTES # BLD: 0 10E9/L (ref 0–0.4)
IMM GRANULOCYTES NFR BLD: 0.2 %
LIPASE SERPL-CCNC: 56 U/L (ref 73–393)
LYMPHOCYTES # BLD AUTO: 1.6 10E9/L (ref 0.8–5.3)
LYMPHOCYTES NFR BLD AUTO: 31.9 %
MCH RBC QN AUTO: 30.2 PG (ref 26.5–33)
MCHC RBC AUTO-ENTMCNC: 33.3 G/DL (ref 31.5–36.5)
MCV RBC AUTO: 91 FL (ref 78–100)
MONOCYTES # BLD AUTO: 0.5 10E9/L (ref 0–1.3)
MONOCYTES NFR BLD AUTO: 10.4 %
NEUTROPHILS # BLD AUTO: 2.7 10E9/L (ref 1.6–8.3)
NEUTROPHILS NFR BLD AUTO: 55.1 %
NRBC # BLD AUTO: 0 10*3/UL
NRBC BLD AUTO-RTO: 0 /100
PLATELET # BLD AUTO: 160 10E9/L (ref 150–450)
POTASSIUM SERPL-SCNC: 4.3 MMOL/L (ref 3.4–5.3)
PROT SERPL-MCNC: 7.7 G/DL (ref 6.8–8.8)
RBC # BLD AUTO: 4.57 10E12/L (ref 4.4–5.9)
SODIUM SERPL-SCNC: 138 MMOL/L (ref 133–144)
WBC # BLD AUTO: 5 10E9/L (ref 4–11)

## 2020-05-18 PROCEDURE — 82150 ASSAY OF AMYLASE: CPT | Mod: ZL | Performed by: NURSE PRACTITIONER

## 2020-05-18 PROCEDURE — 99214 OFFICE O/P EST MOD 30 MIN: CPT | Mod: 25 | Performed by: NURSE PRACTITIONER

## 2020-05-18 PROCEDURE — 40000788 ZZHCL STATISTIC ESTIMATED AVERAGE GLUCOSE: Mod: ZL | Performed by: NURSE PRACTITIONER

## 2020-05-18 PROCEDURE — 83690 ASSAY OF LIPASE: CPT | Mod: ZL | Performed by: NURSE PRACTITIONER

## 2020-05-18 PROCEDURE — 83036 HEMOGLOBIN GLYCOSYLATED A1C: CPT | Mod: ZL | Performed by: NURSE PRACTITIONER

## 2020-05-18 PROCEDURE — 36415 COLL VENOUS BLD VENIPUNCTURE: CPT | Mod: ZL | Performed by: NURSE PRACTITIONER

## 2020-05-18 PROCEDURE — 80053 COMPREHEN METABOLIC PANEL: CPT | Mod: ZL | Performed by: NURSE PRACTITIONER

## 2020-05-18 PROCEDURE — 85025 COMPLETE CBC W/AUTO DIFF WBC: CPT | Mod: ZL | Performed by: NURSE PRACTITIONER

## 2020-05-18 PROCEDURE — G0463 HOSPITAL OUTPT CLINIC VISIT: HCPCS

## 2020-05-18 PROCEDURE — 93010 ELECTROCARDIOGRAM REPORT: CPT | Performed by: INTERNAL MEDICINE

## 2020-05-18 PROCEDURE — 93005 ELECTROCARDIOGRAM TRACING: CPT

## 2020-05-18 ASSESSMENT — PAIN SCALES - GENERAL: PAINLEVEL: NO PAIN (0)

## 2020-05-18 ASSESSMENT — MIFFLIN-ST. JEOR: SCORE: 1552.32

## 2020-05-18 NOTE — NURSING NOTE
"Chief Complaint   Patient presents with     Pre-Op Exam       Initial /60   Pulse 55   Temp 97.5  F (36.4  C)   Ht 1.753 m (5' 9\")   Wt 81.2 kg (179 lb)   SpO2 96%   BMI 26.43 kg/m   Estimated body mass index is 26.43 kg/m  as calculated from the following:    Height as of this encounter: 1.753 m (5' 9\").    Weight as of this encounter: 81.2 kg (179 lb).  Medication Reconciliation: complete  Joseph Vicente LPN  "

## 2020-05-18 NOTE — PROGRESS NOTES
[facility] - [place]  [address]  [place] [address]  [phone]  Dept: [phone]    PRE-OP EVALUATION:  Today's date: 2020        Rafa Bhatti (: 1945) presents for pre-operative evaluation assessment as requested by Dr. Rey.  He requires evaluation and anesthesia risk assessment prior to undergoing surgery/procedure for treatment of adenomatous polyp .         Ricky Rey   General Surgery   [facility]   Source Organization   partial resection of duodenum with loop gastrojejunostomy, possible pancreatoduodenectomy   Comments        Providers      Ricky Rey MD    General Surgery    NPI: [id number]    [address] [place] [id number]         Phone: [phone]    Fax: [phone]         Proposed Surgery/ Procedure: Partial Resection of Duodennum  Date of Surgery/ Procedure: 20  Time of Surgery/ Procedure: unknown  Hospital/Surgical Facility: [facility]  Primary Physician: Santy Agustin  Type of Anesthesia Anticipated: to be determined  Patient has a Health Care Directive or Living Will:  NO      1. NO - Do you have a history of heart attack, stroke, stent, bypass or surgery on an artery in the head, neck, heart or legs?  2. NO - Do you ever have any pain or discomfort in your chest?  3. NO - Do you have a history of  Heart Failure?  4. NO - Are you troubled by shortness of breath when: walking on the level, up a slight hill or at night?  5. NO - Do you currently have a cold, bronchitis or other respiratory infection?  6. NO - Do you have a cough, shortness of breath or wheezing?  7. NO - Do you sometimes get pains in the calves of your legs when you walk?  8. NO - Do you or anyone in your family have previous history of blood clots?  9. NO - Do you or does anyone in your family have a serious bleeding problem such as prolonged bleeding following surgeries or cuts?  10. NO - Have  you ever had problems with anemia or been told to take iron pills?  11. NO - Have you had any abnormal blood loss such as black, tarry or bloody stools, or abnormal vaginal bleeding?  12. YES - HAVE YOU EVER HAD A BLOOD TRANSFUSION?  After surgery - many years ago  13. YES - HAVE YOU OR ANY OF YOUR RELATIVES EVER HAD PROBLEMS WITH ANESTHESIA? Has problems coming out  14. NO - Do you have sleep apnea, excessive snoring or daytime drowsiness?  15. NO - Do you have any prosthetic heart valves?  16. NO - Do you have prosthetic joints?        HPI:     HPI related to upcoming procedure:   Duodenal adenoma        See attached note below from Surgical Oncology:    Ricky Rey MD - 05/12/2020 9:00 AM CDT  Formatting of this note might be different from the original.  Surgical Oncology Consult    Referral Source  Dr. Genao    Chief Complaint  Duodenal adenoma    History of Present Illness  Rafa is a pleasant 74 year old man, and my visit with him today is with he and his wife via telephone call. In March, he had a visit to the ED with epigastric and back pain, and was found to have uncomplicated pancreatitis. On 4/28, he was here to have an EUS to evaluate a 1.5cm gastric mass noted on a CT scan from [facility]. 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 Incidentally, a large duodenal adenoma was noted in the second portion of the duodenum. A biopsy from this did not show cancer. The lesion was too extensive to be endoscopically resected. The ampulla was not involved sonographically with the adenoma. Dr. Genao tattooed the lesion, and referred him to me for surgical management. The patient notes that two years ago, he had an ERCP at [facility] for sludge or stones blocking his duct. He has a cholecystectomy as well. No adenoma was apparently noted at that time. He has no family history of polyposis syndromes. He can eat and drink fine at present, and is eager to learn more about  having this adenoma removed.      Imaging  CT reviewed. The adenoma is subtle. No signs of regional adenopathy.    Impression & Plan  1. 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 Gastric GIST tumor  3. Recent attack of pancreatitis  4. Type II diabetes mellitus    Dr. Genao suspects based on endoscopic findings that this adenoma can be locally resected. If so, this will involve removal of a small segmental portion of duodenum, and loop gastrojejunostomy. The expected course for this was discussed with the patient.    I can't help but feel that there are signs in his history suggesting the ampulla may be involved:  - history of biliary obstruction two years ago  - presentation with idiopathic pancreatitis in March despite prior sphincterotomy     If this is the case, or if the specimen shows cancer on frozen section, then I counseled him he would need a Whipple procedure in either case. I also described this operation. Risks, benefits, and alternatives were discussed, and he wants to proceed. Will get him scheduled. Risks of COVID perioperatively in hospital, and role for COVID testing explained. Possible worsening of diabetes was discussed.    I agree with Dr. Genao that the gastric GIST can be left alone. At his age, surveillance is probably not even necessary.    60 minutes were spent providing care for this patient, and greater than 50% was devoted to face-to-face discussion with the patient about the nature of their disease, the different treatment options, and answering their questions.    Electronically signed by Ricky Rey MD at 05/12/2020 11:38 AM CDT          See problem list for active medical problems.    Problems all longstanding and stable, except as noted/documented.    See ROS for pertinent symptoms related to these conditions.        MEDICAL HISTORY:     Patient Active Problem List    Diagnosis Date Noted     AK (actinic keratosis) 11/28/2018      Priority: Medium     Encounter for diabetic foot exam (H) 05/23/2018     Priority: Medium     Type 2 diabetes mellitus with diabetic polyneuropathy, without long-term current use of insulin (H) 05/23/2018     Priority: Medium     Type 2 diabetes mellitus without complication, without long-term current use of insulin (H) 04/11/2018     Priority: Medium     Chronic gout 10/18/2017     Priority: Medium     Esophageal reflux 10/18/2017     Priority: Medium     Personal history of malignant neoplasm of skin 11/14/2016     Priority: Medium     Overview:   Rt upper back mild 2013    Overview:   BCC right temple 2012       History of nonmelanoma skin cancer 11/14/2016     Priority: Medium     Overview:   BCC right temple 2012       ACP (advance care planning) 10/26/2016     Priority: Medium     Advance Care Planning 10/26/2016: ACP Review of Chart / Resources Provided:  Reviewed chart for advance care plan.  Rafa Bhatti has no plan or code status on file. Discussed available resources and provided with information. Confirmed code status reflects current choices pending further ACP discussions.  Confirmed/documented legally designated decision makers.  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 Provider to review       Basal cell carcinoma, face 09/05/2012     Priority: Medium          Past Medical History:   Diagnosis Date     Benign neoplasm of unspecified site 8/1/2012    Dermoid cyst     Calculus of kidney 5/20/2002     Chest pain, unspecified 3/2/2000     Diabetic eye exam (H) 06/06/2018    normal     Gout, unspecified 8/3/2011     Hyperlipidemia      Unspecified essential hypertension 7/18/2000         Past  Surgical History:   Procedure Laterality Date     cardiolyte stress test  2000    chest pain     CHOLECYSTECTOMY, COMMON BILE DUCT EXPLORATION, COMBINED  04/15/2018     COLONOSCOPY  1999     fistula in ANO       HEMORRHOIDECTOMY       removal of dermoid cyst of mediastinum       VASECTOMY           Current Outpatient Medications   Medication Sig Dispense Refill     allopurinol (ZYLOPRIM) 100 MG tablet TAKE 1 TABLET BY MOUTH TWICE DAILY 180 tablet 3     amLODIPine (NORVASC) 5 MG tablet TAKE 1 TABLET DAILY BY MOUTH - GENERIC FOR NORVASC 90 tablet 3     aspirin 81 MG tablet Take 81 mg by mouth daily       atenolol (TENORMIN) 50 MG tablet TAKE 1 TABLET DAILY BY MOUTH 90 tablet 3     blood glucose monitoring (FREESTYLE) lancets Use to test blood sugar 1 times daily. 100 each 3     Calcium Carbonate Antacid (TUMS PO) tums 200 mg calcium (500 mg) chewable  Take 1 tablet by mouth prn       colchicine (COLCYRS) 0.6 MG tablet TAKE 2 TABLETS AT THE ONSET OF GOUT PAIN. MAY TAKE ONE TABLET AGAIN IN ONE HOUR. MAX 4 TABS IN 24 HRS. 20 tablet 1     famotidine (PEPCID) 20 MG tablet Take 1 tablet (20 mg) by mouth 2 times daily 180 tablet 1     FREESTYLE LITE test strip USE TO TEST BLOOD SUGARS ONCE A DAY. 100 each 2     losartan (COZAAR) 25 MG tablet Take 1 tablet (25 mg) by mouth daily 90 tablet 3     metFORMIN (GLUCOPHAGE) 1000 MG tablet Take 1 tablet (1,000 mg) by mouth 2 times daily (with meals) 180 tablet 3     rosuvastatin (CRESTOR) 10 MG tablet Take 1 tablet (10 mg) by mouth daily 90 tablet 3     sildenafil (REVATIO) 20 MG tablet Take 1-2 tablets (20-40 mg) by mouth daily as needed (erectile dysfunction) 60 tablet 1     tamsulosin (FLOMAX) 0.4 MG capsule TAKE 1 CAPSULE (0.4 MG) BY MOUTH DAILY 90 capsule 1     OTC products: None, except as noted above      No Known Allergies       Latex Allergy: NO      Social History     Tobacco Use     Smoking status: Never Smoker     Smokeless tobacco: Never Used   Substance Use Topics      "Alcohol use: Yes     Comment: socially       History   Drug Use No       REVIEW OF SYSTEMS:   CONSTITUTIONAL: NEGATIVE for fever, chills, change in weight  INTEGUMENTARY/SKIN: NEGATIVE for worrisome rashes, moles or lesions  EYES: NEGATIVE for vision changes or irritation  ENT/MOUTH: NEGATIVE for ear, mouth and throat problems  RESP: NEGATIVE for significant cough or SOB  CV: NEGATIVE for chest pain, palpitations or peripheral edema  GI: NEGATIVE for nausea, abdominal pain, heartburn, or change in bowel habits  : NEGATIVE for frequency, dysuria, or hematuria  MUSCULOSKELETAL: NEGATIVE for significant arthralgias or myalgia  NEURO: NEGATIVE for weakness, dizziness or paresthesias  ENDOCRINE: NEGATIVE for temperature intolerance, skin/hair changes  HEME: NEGATIVE for bleeding problems  PSYCHIATRIC: NEGATIVE for changes in mood or affect        EXAM:   /60   Pulse 55   Temp 97.5  F (36.4  C)   Ht 1.753 m (5' 9\")   Wt 81.2 kg (179 lb)   SpO2 96%   BMI 26.43 kg/m           GENERAL APPEARANCE: healthy, alert and no distress     EYES: EOMI,  PERRL     HENT: ear canals and TM's normal and nose and mouth without ulcers or lesions     NECK: no adenopathy, no asymmetry, masses, or scars and thyroid normal to palpation     RESP: lungs clear to auscultation - no rales, rhonchi or wheezes     CV: regular rates and rhythm, normal S1 S2, no S3 or S4 and no murmur, click or rub     ABDOMEN:  soft, nontender, no HSM or masses and bowel sounds normal     MS: extremities normal- no gross deformities noted, no evidence of inflammation in joints, FROM in all extremities.     SKIN: no suspicious lesions or rashes     NEURO: Normal strength and tone, sensory exam grossly normal, mentation intact and speech normal     PSYCH: mentation appears normal. and affect normal/bright     LYMPHATICS: No cervical adenopathy      DIAGNOSTICS:     EKG - normal    Recent Labs   Lab Test 03/13/20  1347 02/21/20  0747 08/14/19 05/09/18  0730 "  04/12/18 1945   HGB 15.1  --   --   --  14.0   < > 16.4     --   --   --  222   < > 214   INR  --   --   --   --   --   --  1.14    137  --    < > 138   < > 136   POTASSIUM 3.9 3.5  --    < > 3.8   < > 3.5   CR 0.80 1.01  --    < > 0.94   < > 1.01   A1C  --  6.9* 5.9*   < > 7.8*  --   --     < > = values in this interval not displayed.        Results for orders placed or performed in visit on 05/18/20   CBC with platelets differential     Status: None   Result Value Ref Range    WBC 5.0 4.0 - 11.0 10e9/L    RBC Count 4.57 4.4 - 5.9 10e12/L    Hemoglobin 13.8 13.3 - 17.7 g/dL    Hematocrit 41.4 40.0 - 53.0 %    MCV 91 78 - 100 fl    MCH 30.2 26.5 - 33.0 pg    MCHC 33.3 31.5 - 36.5 g/dL    RDW 13.4 10.0 - 15.0 %    Platelet Count 160 150 - 450 10e9/L    Diff Method Automated Method     % Neutrophils 55.1 %    % Lymphocytes 31.9 %    % Monocytes 10.4 %    % Eosinophils 1.8 %    % Basophils 0.6 %    % Immature Granulocytes 0.2 %    Nucleated RBCs 0 0 /100    Absolute Neutrophil 2.7 1.6 - 8.3 10e9/L    Absolute Lymphocytes 1.6 0.8 - 5.3 10e9/L    Absolute Monocytes 0.5 0.0 - 1.3 10e9/L    Absolute Eosinophils 0.1 0.0 - 0.7 10e9/L    Absolute Basophils 0.0 0.0 - 0.2 10e9/L    Abs Immature Granulocytes 0.0 0 - 0.4 10e9/L    Absolute Nucleated RBC 0.0    Comprehensive metabolic panel     Status: Abnormal   Result Value Ref Range    Sodium 138 133 - 144 mmol/L    Potassium 4.3 3.4 - 5.3 mmol/L    Chloride 106 94 - 109 mmol/L    Carbon Dioxide 28 20 - 32 mmol/L    Anion Gap 4 3 - 14 mmol/L    Glucose 114 (H) 70 - 99 mg/dL    Urea Nitrogen 21 7 - 30 mg/dL    Creatinine 0.83 0.66 - 1.25 mg/dL    GFR Estimate 87 >60 mL/min/[1.73_m2]    GFR Estimate If Black >90 >60 mL/min/[1.73_m2]    Calcium 9.4 8.5 - 10.1 mg/dL    Bilirubin Total 0.4 0.2 - 1.3 mg/dL    Albumin 4.1 3.4 - 5.0 g/dL    Protein Total 7.7 6.8 - 8.8 g/dL    Alkaline Phosphatase 52 40 - 150 U/L    ALT 35 0 - 70 U/L    AST 23 0 - 45 U/L   Amylase      Status: None   Result Value Ref Range    Amylase 43 30 - 110 U/L   Lipase     Status: Abnormal   Result Value Ref Range    Lipase 56 (L) 73 - 393 U/L   Hemoglobin A1c     Status: Abnormal   Result Value Ref Range    Hemoglobin A1C 6.4 (H) 0 - 5.6 %       IMPRESSION:   Reason for surgery/procedure: Duodenal Adenoma    The proposed surgical procedure is considered LOW risk.    REVISED CARDIAC RISK INDEX  The patient has the following serious cardiovascular risks for perioperative complications such as (MI, PE, VFib and 3  AV Block):  No serious cardiac risks  INTERPRETATION: 0 risks: Class I (very low risk - 0.4% complication rate)    The patient has the following additional risks for perioperative complications:  No identified additional risks      ICD-10-CM    1. Preop general physical exam  Z01.818 CBC with platelets differential     Comprehensive metabolic panel     EKG 12-lead complete w/read - (Clinic Performed)   2. Duodenal adenoma  D13.2 CBC with platelets differential     Comprehensive metabolic panel   3. History of pancreatitis  Z87.19 Amylase     Lipase   4. Type 2 diabetes mellitus without complication, without long-term current use of insulin (H)  E11.9 Hemoglobin A1c       RECOMMENDATIONS:       COVID test is pre ordered by his surgeon      APPROVAL GIVEN to proceed with proposed procedure, without further diagnostic evaluation       Signed Electronically by: Cata Strange CNP      Copy of this evaluation report is provided to requesting physician.    [place] Preop Guidelines    Revised Cardiac Risk Index

## 2020-05-18 NOTE — RESULT ENCOUNTER NOTE
Labs look really good.  A1C is down to 6.4  Metabolic profile normal    Pre op signed    Cata [facility]  [phone]

## 2020-05-27 ENCOUNTER — TRANSFERRED RECORDS (OUTPATIENT)
Dept: HEALTH INFORMATION MANAGEMENT | Facility: CLINIC | Age: 75
End: 2020-05-27

## 2020-06-05 DIAGNOSIS — K21.9 GASTROESOPHAGEAL REFLUX DISEASE, ESOPHAGITIS PRESENCE NOT SPECIFIED: ICD-10-CM

## 2020-06-05 RX ORDER — FAMOTIDINE 20 MG/1
20 TABLET, FILM COATED ORAL 2 TIMES DAILY
Qty: 180 TABLET | Refills: 1 | OUTPATIENT
Start: 2020-06-05

## 2020-06-06 DIAGNOSIS — K21.9 GASTROESOPHAGEAL REFLUX DISEASE, ESOPHAGITIS PRESENCE NOT SPECIFIED: ICD-10-CM

## 2020-06-08 RX ORDER — FAMOTIDINE 40 MG/1
TABLET, FILM COATED ORAL
Qty: 90 TABLET | OUTPATIENT
Start: 2020-06-08

## 2020-06-08 NOTE — TELEPHONE ENCOUNTER
Pepcid      Last Written Prescription Date:  04/06/20  Last Fill Quantity: 180,   # refills: 1  Last Office Visit: 05/18/20  Future Office visit:       Routing refill request to provider for review/approval because:

## 2020-06-09 DIAGNOSIS — R33.9 URINARY RETENTION: ICD-10-CM

## 2020-06-09 RX ORDER — TAMSULOSIN HYDROCHLORIDE 0.4 MG/1
0.4 CAPSULE ORAL DAILY
Qty: 90 CAPSULE | Refills: 0 | Status: SHIPPED | OUTPATIENT
Start: 2020-06-09 | End: 2020-09-13

## 2020-06-09 NOTE — TELEPHONE ENCOUNTER
Flomax      Last Written Prescription Date:  12/13/19  Last Fill Quantity: 90,   # refills: 1  Last Office Visit: 05/18/20  Future Office visit:       Routing refill request to provider for review/approval because:

## 2020-06-09 NOTE — TELEPHONE ENCOUNTER
Alpha Blockers [id number]/09/2020 09:31 AM   Patient does not have Tadalafil, Vardenafil, or Sildenafil on their medication list   Pended.    Alicia Robert RN     bilateral lower extremity ROM was WFL (within functional limits)

## 2020-06-10 ENCOUNTER — TELEPHONE (OUTPATIENT)
Dept: PEDIATRICS | Facility: OTHER | Age: 75
End: 2020-06-10

## 2020-06-10 DIAGNOSIS — K21.9 GASTROESOPHAGEAL REFLUX DISEASE, ESOPHAGITIS PRESENCE NOT SPECIFIED: ICD-10-CM

## 2020-06-10 NOTE — TELEPHONE ENCOUNTER
Pt spouse calling and they cannot get Pepcid RX as they do not have any at pharmacy. Patient will need an alternative medication.    FARNAZ Carver.    [phone]    Alicia Robert RN

## 2020-06-11 ENCOUNTER — TELEPHONE (OUTPATIENT)
Dept: PEDIATRICS | Facility: OTHER | Age: 75
End: 2020-06-11

## 2020-06-11 ENCOUNTER — TRANSFERRED RECORDS (OUTPATIENT)
Dept: HEALTH INFORMATION MANAGEMENT | Facility: CLINIC | Age: 75
End: 2020-06-11

## 2020-06-11 RX ORDER — FAMOTIDINE 20 MG/1
20 TABLET, FILM COATED ORAL 2 TIMES DAILY
Qty: 180 TABLET | Refills: 1 | Status: ON HOLD | OUTPATIENT
Start: 2020-06-11 | End: 2021-06-09

## 2020-06-11 NOTE — TELEPHONE ENCOUNTER
Call from Kiera, Nurse in the Surgery Department at [facility].     Dr. Ricky Rey ordering an Amylase to be drawn from body fluid out of the patients right abdominal drain .    Kiera inquiring if this draw would be something that could be completed at [facility].     Please advise.     Kiera may be reached at [phone] for any questions and an update.

## 2020-06-12 ENCOUNTER — TRANSFERRED RECORDS (OUTPATIENT)
Dept: HEALTH INFORMATION MANAGEMENT | Facility: CLINIC | Age: 75
End: 2020-06-12

## 2020-06-24 ENCOUNTER — TRANSFERRED RECORDS (OUTPATIENT)
Dept: HEALTH INFORMATION MANAGEMENT | Facility: CLINIC | Age: 75
End: 2020-06-24

## 2020-06-30 ENCOUNTER — TRANSFERRED RECORDS (OUTPATIENT)
Dept: HEALTH INFORMATION MANAGEMENT | Facility: CLINIC | Age: 75
End: 2020-06-30

## 2020-07-09 ENCOUNTER — TRANSFERRED RECORDS (OUTPATIENT)
Dept: HEALTH INFORMATION MANAGEMENT | Facility: CLINIC | Age: 75
End: 2020-07-09

## 2020-07-24 ENCOUNTER — TELEPHONE (OUTPATIENT)
Dept: PEDIATRICS | Facility: OTHER | Age: 75
End: 2020-07-24

## 2020-07-24 NOTE — TELEPHONE ENCOUNTER
Have him continue checking through the weekend and if thesee remains high I will need to adjust this on Monday.  Lázaro will call he back to inquire about his blood glucose levels.

## 2020-07-24 NOTE — TELEPHONE ENCOUNTER
Pt called, reports increased blood glucose the past few days. Pt just checked blood sugar before eating lunch and blood sugar 194. Pt states that he has not eaten since breakfast at approx 830am. Pt takes metformin and has not missed any doses. States that his sugars usually run 100s-120s. Offered telephone appt with PCP but pt declines. Pt would like PCP's input regarding blood sugars. Please advise. Thank you!

## 2020-08-10 PROBLEM — D13.2 ADENOMATOUS DUODENAL POLYP: Status: ACTIVE | Noted: 2020-05-13

## 2020-08-10 PROBLEM — R09.02 HYPOXIA: Status: ACTIVE | Noted: 2020-06-24

## 2020-08-10 PROBLEM — R79.89 ABNORMAL LFTS: Status: ACTIVE | Noted: 2020-06-24

## 2020-08-10 NOTE — PROGRESS NOTES
Subjective     Rafa Bhatti is a 75 year old male who presents to clinic today for the following health issues:    HPI       New Patient/Transfer of Care    Numbness       Duration: May 27, 2020    Description (location/character/radiation): left foot and lower leg,  low back     Intensity:  moderate    Accompanying signs and symptoms: numbness    History (similar episodes/previous evaluation): surgery on 5/27/2020,     Precipitating or alleviating factors: None    Therapies tried and outcome: None     PROBLEMS TO ADD ON...    Patient Active Problem List   Diagnosis     Encounter for monitoring testosterone replacement therapy     Testicular hypofunction     Benign essential hypertension     Hemangioma of spine     ACP (advance care planning)     Chronic gout     Esophageal reflux     Type 2 diabetes mellitus without complication, without long-term current use of insulin (H)     Encounter for diabetic foot exam (H)     Type 2 diabetes mellitus with diabetic polyneuropathy, without long-term current use of insulin (H)     Basal cell carcinoma, face     Personal history of malignant neoplasm of skin     AK (actinic keratosis)     History of nonmelanoma skin cancer     Abnormal LFTs     Adenomatous duodenal polyp     Hypoxia     Past Surgical History:   Procedure Laterality Date     cardiolyte stress test  2000    chest pain     CHOLECYSTECTOMY, COMMON BILE DUCT EXPLORATION, COMBINED  04/15/2018     COLONOSCOPY  1999     fistula in ANO       HEMORRHOIDECTOMY       removal of dermoid cyst of mediastinum       VASECTOMY         Social History     Tobacco Use     Smoking status: Never Smoker     Smokeless tobacco: Never Used   Substance Use Topics     Alcohol use: Yes     Comment: socially     Family History   Problem Relation Age of Onset     C.A.D. Father 80     C.A.D. Mother      Hypertension Mother      C.A.D. Brother      C.A.D. Other         family hx         Current Outpatient Medications   Medication Sig Dispense  "Refill     allopurinol (ZYLOPRIM) 100 MG tablet TAKE 1 TABLET BY MOUTH TWICE DAILY 180 tablet 3     amLODIPine (NORVASC) 5 MG tablet TAKE 1 TABLET DAILY BY MOUTH - GENERIC FOR NORVASC 90 tablet 3     aspirin 81 MG tablet Take 81 mg by mouth daily       atenolol (TENORMIN) 50 MG tablet TAKE 1 TABLET DAILY BY MOUTH 90 tablet 3     blood glucose monitoring (FREESTYLE) lancets Use to test blood sugar 1 times daily. 100 each 3     Calcium Carbonate Antacid (TUMS PO) tums 200 mg calcium (500 mg) chewable  Take 1 tablet by mouth prn       colchicine (COLCYRS) 0.6 MG tablet TAKE 2 TABLETS AT THE ONSET OF GOUT PAIN. MAY TAKE ONE TABLET AGAIN IN ONE HOUR. MAX 4 TABS IN 24 HRS. 20 tablet 1     famotidine (PEPCID) 20 MG tablet Take 1 tablet (20 mg) by mouth 2 times daily 180 tablet 1     FREESTYLE LITE test strip USE TO TEST BLOOD SUGARS ONCE A DAY. 100 each 2     losartan (COZAAR) 25 MG tablet Take 1 tablet (25 mg) by mouth daily 90 tablet 3     metFORMIN (GLUCOPHAGE) 1000 MG tablet Take 1 tablet (1,000 mg) by mouth 2 times daily (with meals) 180 tablet 3     oxyCODONE (ROXICODONE) 5 MG tablet Take 5 mg by mouth every 4 hours as needed       rosuvastatin (CRESTOR) 10 MG tablet Take 1 tablet (10 mg) by mouth daily 90 tablet 3     sildenafil (REVATIO) 20 MG tablet Take 1-2 tablets (20-40 mg) by mouth daily as needed (erectile dysfunction) 60 tablet 1     tamsulosin (FLOMAX) 0.4 MG capsule TAKE 1 CAPSULE (0.4 MG) BY MOUTH DAILY 90 capsule 0     Allergies   Allergen Reactions     Lactose Unknown     LACTOSE INTOLERANT       Reviewed and updated as needed this visit by Provider         Review of Systems   Constitutional, HEENT, cardiovascular, pulmonary, gi and gu systems are negative, except as otherwise noted.      Objective    /62   Pulse 71   Temp 96.9  F (36.1  C)   Ht 1.715 m (5' 7.5\")   Wt 71.2 kg (157 lb)   SpO2 99%   BMI 24.23 kg/m    Body mass index is 24.23 kg/m .  Physical Exam   GENERAL: healthy, alert and " no distress  NECK: no adenopathy, no asymmetry, masses, or scars and thyroid normal to palpation  RESP: lungs clear to auscultation - no rales, rhonchi or wheezes  CV: regular rate and rhythm, normal S1 S2, no S3 or S4, no murmur, click or rub, no peripheral edema and peripheral pulses strong  ABDOMEN: soft, nontender, no hepatosplenomegaly, no masses and bowel sounds normal  MS: no gross musculoskeletal defects noted, no edema    Diagnostic Test Results:  Labs reviewed in Epic        Assessment & Plan       ICD-10-CM    1. Type 2 diabetes mellitus with diabetic polyneuropathy, without long-term current use of insulin (H)  E11.42 Hemoglobin A1c     Basic metabolic panel   2. Adenomatous duodenal polyp  D13.2    3. Lumbar radiculopathy  M54.16           Doing great.  Still some pain and weakness.  Reviewed surgeries in detail, and ongoing cares.  Due for dm cares.  I became his doctor today.  Reviewed findings with the surgery, back, foot drop on the left.  Clearly improving since the surgery, and MRI has been done.  I stressed f/u promptly if the foot drop worsens or stops improving.      No follow-ups on file.    Harry Olson MD  [facility]

## 2020-08-12 ENCOUNTER — OFFICE VISIT (OUTPATIENT)
Dept: FAMILY MEDICINE | Facility: OTHER | Age: 75
End: 2020-08-12
Attending: FAMILY MEDICINE
Payer: COMMERCIAL

## 2020-08-12 VITALS
WEIGHT: 157 LBS | BODY MASS INDEX: 23.79 KG/M2 | OXYGEN SATURATION: 99 % | HEIGHT: 68 IN | DIASTOLIC BLOOD PRESSURE: 62 MMHG | HEART RATE: 71 BPM | SYSTOLIC BLOOD PRESSURE: 118 MMHG | TEMPERATURE: 96.9 F

## 2020-08-12 DIAGNOSIS — M54.16 LUMBAR RADICULOPATHY: ICD-10-CM

## 2020-08-12 DIAGNOSIS — D13.2 ADENOMATOUS DUODENAL POLYP: ICD-10-CM

## 2020-08-12 DIAGNOSIS — E11.42 TYPE 2 DIABETES MELLITUS WITH DIABETIC POLYNEUROPATHY, WITHOUT LONG-TERM CURRENT USE OF INSULIN (H): Primary | ICD-10-CM

## 2020-08-12 PROCEDURE — 40000788 ZZHCL STATISTIC ESTIMATED AVERAGE GLUCOSE: Mod: ZL | Performed by: FAMILY MEDICINE

## 2020-08-12 PROCEDURE — 99214 OFFICE O/P EST MOD 30 MIN: CPT | Performed by: FAMILY MEDICINE

## 2020-08-12 PROCEDURE — 80048 BASIC METABOLIC PNL TOTAL CA: CPT | Mod: ZL | Performed by: FAMILY MEDICINE

## 2020-08-12 PROCEDURE — 83036 HEMOGLOBIN GLYCOSYLATED A1C: CPT | Mod: ZL | Performed by: FAMILY MEDICINE

## 2020-08-12 PROCEDURE — G0463 HOSPITAL OUTPT CLINIC VISIT: HCPCS

## 2020-08-12 PROCEDURE — 36415 COLL VENOUS BLD VENIPUNCTURE: CPT | Mod: ZL | Performed by: FAMILY MEDICINE

## 2020-08-12 RX ORDER — OXYCODONE HYDROCHLORIDE 5 MG/1
5 TABLET ORAL EVERY 4 HOURS PRN
Status: ON HOLD | COMMUNITY
Start: 2020-07-02 | End: 2021-06-09

## 2020-08-12 ASSESSMENT — PAIN SCALES - GENERAL: PAINLEVEL: NO PAIN (0)

## 2020-08-12 ASSESSMENT — MIFFLIN-ST. JEOR: SCORE: 1423.71

## 2020-08-12 NOTE — NURSING NOTE
"Chief Complaint   Patient presents with     Establish Care       Initial /62   Pulse 71   Temp 96.9  F (36.1  C)   Ht 1.715 m (5' 7.5\")   Wt 71.2 kg (157 lb)   SpO2 99%   BMI 24.23 kg/m   Estimated body mass index is 24.23 kg/m  as calculated from the following:    Height as of this encounter: 1.715 m (5' 7.5\").    Weight as of this encounter: 71.2 kg (157 lb).  Medication Reconciliation: complete  Maliha Paz LPN  "

## 2020-08-13 LAB
ANION GAP SERPL CALCULATED.3IONS-SCNC: 5 MMOL/L (ref 3–14)
BUN SERPL-MCNC: 21 MG/DL (ref 7–30)
CALCIUM SERPL-MCNC: 8.9 MG/DL (ref 8.5–10.1)
CHLORIDE SERPL-SCNC: 106 MMOL/L (ref 94–109)
CO2 SERPL-SCNC: 27 MMOL/L (ref 20–32)
CREAT SERPL-MCNC: 0.76 MG/DL (ref 0.66–1.25)
EST. AVERAGE GLUCOSE BLD GHB EST-MCNC: 137 MG/DL
GFR SERPL CREATININE-BSD FRML MDRD: >90 ML/MIN/{1.73_M2}
GLUCOSE SERPL-MCNC: 115 MG/DL (ref 70–99)
HBA1C MFR BLD: 6.4 % (ref 0–5.6)
POTASSIUM SERPL-SCNC: 4.2 MMOL/L (ref 3.4–5.3)
SODIUM SERPL-SCNC: 138 MMOL/L (ref 133–144)

## 2020-08-17 ENCOUNTER — TELEPHONE (OUTPATIENT)
Dept: EDUCATION SERVICES | Facility: HOSPITAL | Age: 75
End: 2020-08-17

## 2020-08-17 ENCOUNTER — HOSPITAL ENCOUNTER (OUTPATIENT)
Dept: EDUCATION SERVICES | Facility: HOSPITAL | Age: 75
Discharge: HOME | End: 2020-08-17
Attending: NURSE PRACTITIONER | Admitting: FAMILY MEDICINE
Payer: COMMERCIAL

## 2020-08-17 VITALS
RESPIRATION RATE: 16 BRPM | BODY MASS INDEX: 24.12 KG/M2 | HEART RATE: 67 BPM | OXYGEN SATURATION: 99 % | WEIGHT: 156.3 LBS

## 2020-08-17 DIAGNOSIS — E11.9 TYPE 2 DIABETES MELLITUS WITHOUT COMPLICATION, WITHOUT LONG-TERM CURRENT USE OF INSULIN (H): Primary | ICD-10-CM

## 2020-08-17 PROCEDURE — G0108 DIAB MANAGE TRN  PER INDIV: HCPCS | Performed by: DIETITIAN, REGISTERED

## 2020-08-17 RX ORDER — METFORMIN HCL 500 MG
1000 TABLET, EXTENDED RELEASE 24 HR ORAL 2 TIMES DAILY WITH MEALS
Qty: 120 TABLET | Refills: 3 | Status: SHIPPED | OUTPATIENT
Start: 2020-08-17 | End: 2020-12-10

## 2020-08-17 ASSESSMENT — PAIN SCALES - GENERAL: PAINLEVEL: SEVERE PAIN (7)

## 2020-08-17 NOTE — LETTER
"    8/17/2020        RE: Rafa Bhatti  [address] [facility] [address]        Diabetes Self-Management Education & Support    Presents for: Individual review    SUBJECTIVE/OBJECTIVE:  Presents for: Individual review  Accompanied by: Self  Diabetes education in the past 24mo: Yes  Focus of Visit: Monitoring  Diabetes type: Type 2  Date of diagnosis: 4/2018  Disease course: Stable  How confident are you filling out medical forms by yourself:: Extremely  Diabetes management related comments/concerns: No concerns   Transportation concerns: No  Difficulty affording diabetes medication?: No  Difficulty affording diabetes testing supplies?: No  Other concerns:: None  Cultural Influences/Ethnic Background:  American    Diabetes Symptoms & Complications:  Fatigue: No  Neuropathy: No  Polydipsia: No  Polyphagia: No  Polyuria: No  Visual change: No  Slow healing wounds: No  Symptom course: Stable  Weight trend: Decreasing(Weight is down 23# over the past year - surgery in May - duodenal resection r/t polyp.)  Complications assessed today?: Yes  Autonomic neuropathy: No  CVA: No  Heart disease: No  Nephropathy: No  Peripheral neuropathy: No  Foot ulcerations: No  Peripheral Vascular Disease: No  Retinopathy: No  Sexual dysfunction: Yes    Patient Problem List and Family Medical History reviewed for relevant medical history, current medical status, and diabetes risk factors.    Vitals:  Pulse 67   Resp 16   Wt 70.9 kg (156 lb 4.8 oz)   SpO2 99%   BMI 24.12 kg/m    Estimated body mass index is 24.12 kg/m  as calculated from the following:    Height as of 8/12/20: 1.715 m (5' 7.5\").    Weight as of this encounter: 70.9 kg (156 lb 4.8 oz).   Last 3 BP:   BP Readings from Last 3 Encounters:   08/12/20 118/62   05/18/20 128/60   04/15/20 122/68       History   Smoking Status     Never Smoker   Smokeless Tobacco     Never Used       Labs:  Lab Results   Component Value Date    A1C 6.4 08/12/2020     Lab Results   Component " Value Date     08/12/2020     Lab Results   Component Value Date    LDL 38 02/21/2020     HDL Cholesterol   Date Value Ref Range Status   02/21/2020 38 (L) >39 mg/dL Final   ]  GFR Estimate   Date Value Ref Range Status   08/12/2020 >90 >60 mL/min/[1.73_m2] Final     Comment:     Non  GFR Calc  Starting 12/18/2018, serum creatinine based estimated GFR (eGFR) will be   calculated using the Chronic Kidney Disease Epidemiology Collaboration   (CKD-EPI) equation.       GFR Estimate If Black   Date Value Ref Range Status   08/12/2020 >90 >60 mL/min/[1.73_m2] Final     Comment:      GFR Calc  Starting 12/18/2018, serum creatinine based estimated GFR (eGFR) will be   calculated using the Chronic Kidney Disease Epidemiology Collaboration   (CKD-EPI) equation.       Lab Results   Component Value Date    CR 0.76 08/12/2020     No results found for: MICROALBUMIN    Healthy Eating:  Healthy Eating Assessed Today: Yes  Cultural/Adventist diet restrictions?: No  How many times a week on average do you eat food made away from home (restaurant/take-out)?: 1  Meals include: Breakfast, Lunch, Dinner, Morning Snack, Afternoon Snack, Evening Snack  Breakfast: cold cereal with fruit, coffee  Lunch: soup or sandwich with a few chips - water  Dinner: meat, starch, veggies - water   Snacks: Snacks - am-bagel with butter, afternoon-peanuts unsalted, licorice, evening-popcorn  Other: Pt has been drinking Ensure supplement 1x/day since surgery.  Beverages: Water, Coffee, Diet soda, Alcohol(occasional beer)  Has patient met with a dietitian in the past?: Yes    Being Active:  Being Active Assessed Today: Yes  Exercise:: Yes(cuts grass, walks, kayaking)  Days per week of moderate to strenuous exercise (like a brisk walk): 7  On average, minutes per day of exercise at this level: 40  How intense was your typical exercise? : Moderate (like brisk walking)  Exercise Minutes per Week: 280  Barrier to exercise:  None    Monitoring:  Monitoring Assessed Today: Yes  Did patient bring glucose meter to appointment? : Yes  Blood Glucose Meter: FreeStyle  Times checking blood sugar at home (number): Other(Few times per week - am or when feels low)  Blood glucose trend: No change  Pt had two fasting levels in the past two weeks - 106, 99.     Taking Medications:  Diabetes Medication(s)     Biguanides       metFORMIN (GLUCOPHAGE) 1000 MG tablet    Take 1 tablet (1,000 mg) by mouth 2 times daily (with meals)          Taking Medication Assessed Today: Yes  Current Treatments: Diet, Oral Medication (taken by mouth)(Metformin 1000 bid)  Problems taking diabetes medications regularly?: No  Diabetes medication side effects?: Yes(diarrhea 3x/week)    Problem Solving:  Problem Solving Assessed Today: Yes  Is the patient at risk for hypoglycemia?: No  Hypoglycemia Frequency: Rarely(When pt feels low he is usually in the 90's and eats and feels better.)  Hypoglycemia Treatment: Other food  Patient carries a carbohydrate source: Yes  Is the patient at risk for DKA?: No    Reducing Risks:  Reducing Risks Assessed Today: No  Diabetes Risks: Age over 45 years, Hyperlipidemia, Family History  CAD Risks: Diabetes Mellitus, Male sex, Family history  Has dilated eye exam at least once a year?: Yes  Sees dentist every 6 months?: Yes  Feet checked by healthcare provider in the last year?: No    Healthy Coping:  Healthy Coping Assessed Today: Yes  Emotional response to diabetes: Acceptance, Confidence diabetes can be controlled  Informal Support system:: Family  Stage of change: ACTION (Actively working towards change)  Support resources: None  Patient Activation Measure Survey Score:  ALICIA Score (Last Two) 2/20/2020   ALICIA Raw Score 38   Activation Score 83.7   ALICIA Level 4     Diabetes knowledge and skills assessment:   Patient is knowledgeable in diabetes management concepts related to: Healthy Eating, Being Active, Monitoring, Taking Medication,  Problem Solving, Reducing Risks and Healthy Coping    Patient needs further education on the following diabetes management concepts: No concerns.  Pt is doing well.     Based on learning assessment above, most appropriate setting for further diabetes education would be: Individual setting.      INTERVENTIONS:    Education provided today on:  AADE Self-Care Behaviors:  Healthy Eating: Discussed small, frequent meals/snacks in effort to promote weight gain along with high protein foods r/t low albumin level.   Monitoring: individual blood glucose targets and frequency of monitoring  Taking Medication: Pt would like to try changing to Metformin ER r/t diarrhea.   Reducing Risks: foot care, annual eye exam and A1C - goals, relating to blood glucose levels, how often to check    Opportunities for ongoing education and support in diabetes-self management were discussed.    Pt verbalized understanding of concepts discussed and recommendations provided today.       Education Materials Provided:  No new materials today.     ASSESSMENT:  Recent A1c in target at 6.4%. Weight is down 23# since last year.  Pt states he has lost weight since May when he had surgery.  He is regaining now and appetite is good.  Pt has diarrhea 3x/week which may be r/t Metformin.  He would like to try ER formula.     Patient's most recent   Lab Results   Component Value Date    A1C 6.4 08/12/2020    is meeting goal of <7.0    PLAN  Try to eat small, frequent meals/snacks.  Increase protein in diet.  Continue Ensure daily or alternate low carbohydrate supplement if Ensure causes glucose elevations.  Keep up with walking regimen.  Test glucose a few times per week.  Alternate times.  Message sent to provider to change to Metformin ER.  Awaiting response.   See Patient Instructions for co-developed, patient-stated behavior change goals.  AVS printed and provided to patient today.   Follow up annually or sooner if needed.     Time Spent: 30  minutes  Encounter Type: Individual    Any diabetes medication dose changes were made via the CDE Protocol and Collaborative Practice Agreement with the patient's primary care provider. A copy of this encounter was shared with the provider.        Sincerely,        Ava Templeton RD

## 2020-08-17 NOTE — PATIENT INSTRUCTIONS
-Try to eat small meals/snacks throughout the day in effort to gain weight.    -Keep up with your walking regimen.    -Test glucose a few times per week.  Good times are fasting or 2 hours after a meal.  -Target levels are fasting  and 2 hours after a meal less than 180.  -I will call you regarding change to Metformin ER.   -Recent A1c was 6.4%.  Good job!  Goal is to keep A1c less than 7.0%.    -Eye exam and foot exam should be done yearly.  -Follow up in one year or sooner if needed.  -Call with any questions - MICHAEL Seals, -[phone].

## 2020-08-17 NOTE — PROGRESS NOTES
"Diabetes Self-Management Education & Support    Presents for: Individual review    SUBJECTIVE/OBJECTIVE:  Presents for: Individual review  Accompanied by: Self  Diabetes education in the past 24mo: Yes  Focus of Visit: Monitoring  Diabetes type: Type 2  Date of diagnosis: 4/2018  Disease course: Stable  How confident are you filling out medical forms by yourself:: Extremely  Diabetes management related comments/concerns: No concerns   Transportation concerns: No  Difficulty affording diabetes medication?: No  Difficulty affording diabetes testing supplies?: No  Other concerns:: None  Cultural Influences/Ethnic Background:  American    Diabetes Symptoms & Complications:  Fatigue: No  Neuropathy: No  Polydipsia: No  Polyphagia: No  Polyuria: No  Visual change: No  Slow healing wounds: No  Symptom course: Stable  Weight trend: Decreasing(Weight is down 23# over the past year - surgery in May - duodenal resection r/t polyp.)  Complications assessed today?: Yes  Autonomic neuropathy: No  CVA: No  Heart disease: No  Nephropathy: No  Peripheral neuropathy: No  Foot ulcerations: No  Peripheral Vascular Disease: No  Retinopathy: No  Sexual dysfunction: Yes    Patient Problem List and Family Medical History reviewed for relevant medical history, current medical status, and diabetes risk factors.    Vitals:  Pulse 67   Resp 16   Wt 70.9 kg (156 lb 4.8 oz)   SpO2 99%   BMI 24.12 kg/m    Estimated body mass index is 24.12 kg/m  as calculated from the following:    Height as of 8/12/20: 1.715 m (5' 7.5\").    Weight as of this encounter: 70.9 kg (156 lb 4.8 oz).   Last 3 BP:   BP Readings from Last 3 Encounters:   08/12/20 118/62   05/18/20 128/60   04/15/20 122/68       History   Smoking Status     Never Smoker   Smokeless Tobacco     Never Used       Labs:  Lab Results   Component Value Date    A1C 6.4 08/12/2020     Lab Results   Component Value Date     08/12/2020     Lab Results   Component Value Date    LDL 38 " 02/21/2020     HDL Cholesterol   Date Value Ref Range Status   02/21/2020 38 (L) >39 mg/dL Final   ]  GFR Estimate   Date Value Ref Range Status   08/12/2020 >90 >60 mL/min/[1.73_m2] Final     Comment:     Non  GFR Calc  Starting 12/18/2018, serum creatinine based estimated GFR (eGFR) will be   calculated using the Chronic Kidney Disease Epidemiology Collaboration   (CKD-EPI) equation.       GFR Estimate If Black   Date Value Ref Range Status   08/12/2020 >90 >60 mL/min/[1.73_m2] Final     Comment:      GFR Calc  Starting 12/18/2018, serum creatinine based estimated GFR (eGFR) will be   calculated using the Chronic Kidney Disease Epidemiology Collaboration   (CKD-EPI) equation.       Lab Results   Component Value Date    CR 0.76 08/12/2020     No results found for: MICROALBUMIN    Healthy Eating:  Healthy Eating Assessed Today: Yes  Cultural/[place] diet restrictions?: No  How many times a week on average do you eat food made away from home (restaurant/take-out)?: 1  Meals include: Breakfast, Lunch, Dinner, Morning Snack, Afternoon Snack, Evening Snack  Breakfast: cold cereal with fruit, coffee  Lunch: soup or sandwich with a few chips - water  Dinner: meat, starch, veggies - water   Snacks: Snacks - am-bagel with butter, afternoon-peanuts unsalted, licorice, evening-popcorn  Other: Pt has been drinking Ensure supplement 1x/day since surgery.  Beverages: Water, Coffee, Diet soda, Alcohol(occasional beer)  Has patient met with a dietitian in the past?: Yes    Being Active:  Being Active Assessed Today: Yes  Exercise:: Yes(cuts grass, walks, kayaking)  Days per week of moderate to strenuous exercise (like a brisk walk): 7  On average, minutes per day of exercise at this level: 40  How intense was your typical exercise? : Moderate (like brisk walking)  Exercise Minutes per Week: 280  Barrier to exercise: None    Monitoring:  Monitoring Assessed Today: Yes  Did patient bring glucose  meter to appointment? : Yes  Blood Glucose Meter: FreeStyle  Times checking blood sugar at home (number): Other(Few times per week - am or when feels low)  Blood glucose trend: No change  Pt had two fasting levels in the past two weeks - 106, 99.     Taking Medications:  Diabetes Medication(s)     Biguanides       metFORMIN (GLUCOPHAGE) 1000 MG tablet    Take 1 tablet (1,000 mg) by mouth 2 times daily (with meals)          Taking Medication Assessed Today: Yes  Current Treatments: Diet, Oral Medication (taken by mouth)(Metformin 1000 bid)  Problems taking diabetes medications regularly?: No  Diabetes medication side effects?: Yes(diarrhea 3x/week)    Problem Solving:  Problem Solving Assessed Today: Yes  Is the patient at risk for hypoglycemia?: No  Hypoglycemia Frequency: Rarely(When pt feels low he is usually in the 90's and eats and feels better.)  Hypoglycemia Treatment: Other food  Patient carries a carbohydrate source: Yes  Is the patient at risk for DKA?: No    Reducing Risks:  Reducing Risks Assessed Today: No  Diabetes Risks: Age over 45 years, Hyperlipidemia, Family History  CAD Risks: Diabetes Mellitus, Male sex, Family history  Has dilated eye exam at least once a year?: Yes  Sees dentist every 6 months?: Yes  Feet checked by healthcare provider in the last year?: No    Healthy Coping:  Healthy Coping Assessed Today: Yes  Emotional response to diabetes: Acceptance, Confidence diabetes can be controlled  Informal Support system:: Family  Stage of change: ACTION (Actively working towards change)  Support resources: None  Patient Activation Measure Survey Score:  ALICIA Score (Last Two) 2/20/2020   ALICIA Raw Score 38   Activation Score 83.7   ALICIA Level 4     Diabetes knowledge and skills assessment:   Patient is knowledgeable in diabetes management concepts related to: Healthy Eating, Being Active, Monitoring, Taking Medication, Problem Solving, Reducing Risks and Healthy Coping    Patient needs further  education on the following diabetes management concepts: No concerns.  Pt is doing well.     Based on learning assessment above, most appropriate setting for further diabetes education would be: Individual setting.      INTERVENTIONS:    Education provided today on:  AADE Self-Care Behaviors:  Healthy Eating: Discussed small, frequent meals/snacks in effort to promote weight gain along with high protein foods r/t low albumin level.   Monitoring: individual blood glucose targets and frequency of monitoring  Taking Medication: Pt would like to try changing to Metformin ER r/t diarrhea.   Reducing Risks: foot care, annual eye exam and A1C - goals, relating to blood glucose levels, how often to check    Opportunities for ongoing education and support in diabetes-self management were discussed.    Pt verbalized understanding of concepts discussed and recommendations provided today.       Education Materials Provided:  No new materials today.     ASSESSMENT:  Recent A1c in target at 6.4%. Weight is down 23# since last year.  Pt states he has lost weight since May when he had surgery.  He is regaining now and appetite is good.  Pt has diarrhea 3x/week which may be r/t Metformin.  He would like to try ER formula.     Patient's most recent   Lab Results   Component Value Date    A1C 6.4 08/12/2020    is meeting goal of <7.0    PLAN  Try to eat small, frequent meals/snacks.  Increase protein in diet.  Continue Ensure daily or alternate low carbohydrate supplement if Ensure causes glucose elevations.  Keep up with walking regimen.  Test glucose a few times per week.  Alternate times.  Message sent to provider to change to Metformin ER.  Awaiting response.   See Patient Instructions for co-developed, patient-stated behavior change goals.  AVS printed and provided to patient today.   Follow up annually or sooner if needed.     Time Spent: 30 minutes  Encounter Type: Individual    Any diabetes medication dose changes were made  via the CDE Protocol and Collaborative Practice Agreement with the patient's primary care provider. A copy of this encounter was shared with the provider.

## 2020-10-30 ENCOUNTER — APPOINTMENT (OUTPATIENT)
Dept: CT IMAGING | Facility: HOSPITAL | Age: 75
End: 2020-10-30
Attending: FAMILY MEDICINE
Payer: COMMERCIAL

## 2020-10-30 ENCOUNTER — APPOINTMENT (OUTPATIENT)
Dept: GENERAL RADIOLOGY | Facility: HOSPITAL | Age: 75
End: 2020-10-30
Attending: FAMILY MEDICINE
Payer: COMMERCIAL

## 2020-10-30 ENCOUNTER — HOSPITAL ENCOUNTER (EMERGENCY)
Facility: HOSPITAL | Age: 75
Discharge: HOME | End: 2020-10-30
Attending: FAMILY MEDICINE | Admitting: FAMILY MEDICINE
Payer: COMMERCIAL

## 2020-10-30 ENCOUNTER — NURSE TRIAGE (OUTPATIENT)
Dept: FAMILY MEDICINE | Facility: OTHER | Age: 75
End: 2020-10-30

## 2020-10-30 VITALS
OXYGEN SATURATION: 95 % | SYSTOLIC BLOOD PRESSURE: 125 MMHG | RESPIRATION RATE: 16 BRPM | TEMPERATURE: 98.3 F | HEART RATE: 70 BPM | DIASTOLIC BLOOD PRESSURE: 67 MMHG

## 2020-10-30 DIAGNOSIS — R10.13 ABDOMINAL PAIN, EPIGASTRIC: ICD-10-CM

## 2020-10-30 LAB
ALBUMIN SERPL-MCNC: 3 G/DL (ref 3.4–5)
ALBUMIN UR-MCNC: 100 MG/DL
ALP SERPL-CCNC: 69 U/L (ref 40–150)
ALT SERPL W P-5'-P-CCNC: 20 U/L (ref 0–70)
ANION GAP SERPL CALCULATED.3IONS-SCNC: 9 MMOL/L (ref 3–14)
APPEARANCE UR: ABNORMAL
AST SERPL W P-5'-P-CCNC: 13 U/L (ref 0–45)
BACTERIA #/AREA URNS HPF: ABNORMAL /HPF
BASOPHILS # BLD AUTO: 0 10E9/L (ref 0–0.2)
BASOPHILS NFR BLD AUTO: 0.2 %
BILIRUB SERPL-MCNC: 0.4 MG/DL (ref 0.2–1.3)
BILIRUB UR QL STRIP: NEGATIVE
BUN SERPL-MCNC: 24 MG/DL (ref 7–30)
CALCIUM SERPL-MCNC: 9 MG/DL (ref 8.5–10.1)
CHLORIDE SERPL-SCNC: 101 MMOL/L (ref 94–109)
CO2 SERPL-SCNC: 27 MMOL/L (ref 20–32)
COLOR UR AUTO: YELLOW
CREAT SERPL-MCNC: 0.94 MG/DL (ref 0.66–1.25)
DIFFERENTIAL METHOD BLD: ABNORMAL
EOSINOPHIL # BLD AUTO: 0 10E9/L (ref 0–0.7)
EOSINOPHIL NFR BLD AUTO: 0 %
ERYTHROCYTE [DISTWIDTH] IN BLOOD BY AUTOMATED COUNT: 13.1 % (ref 10–15)
GFR SERPL CREATININE-BSD FRML MDRD: 80 ML/MIN/{1.73_M2}
GLUCOSE SERPL-MCNC: 181 MG/DL (ref 70–99)
GLUCOSE UR STRIP-MCNC: NEGATIVE MG/DL
HCT VFR BLD AUTO: 38.1 % (ref 40–53)
HGB BLD-MCNC: 12.2 G/DL (ref 13.3–17.7)
HGB UR QL STRIP: NEGATIVE
HYALINE CASTS #/AREA URNS LPF: 21 /LPF
IMM GRANULOCYTES # BLD: 0 10E9/L (ref 0–0.4)
IMM GRANULOCYTES NFR BLD: 0.3 %
KETONES UR STRIP-MCNC: NEGATIVE MG/DL
LEUKOCYTE ESTERASE UR QL STRIP: NEGATIVE
LIPASE SERPL-CCNC: 977 U/L (ref 73–393)
LYMPHOCYTES # BLD AUTO: 0.9 10E9/L (ref 0.8–5.3)
LYMPHOCYTES NFR BLD AUTO: 10.6 %
MCH RBC QN AUTO: 29.2 PG (ref 26.5–33)
MCHC RBC AUTO-ENTMCNC: 32 G/DL (ref 31.5–36.5)
MCV RBC AUTO: 91 FL (ref 78–100)
MONOCYTES # BLD AUTO: 0.7 10E9/L (ref 0–1.3)
MONOCYTES NFR BLD AUTO: 8 %
MUCOUS THREADS #/AREA URNS LPF: PRESENT /LPF
NEUTROPHILS # BLD AUTO: 7.1 10E9/L (ref 1.6–8.3)
NEUTROPHILS NFR BLD AUTO: 80.9 %
NITRATE UR QL: NEGATIVE
NRBC # BLD AUTO: 0 10*3/UL
NRBC BLD AUTO-RTO: 0 /100
PH UR STRIP: 5.5 PH (ref 4.7–8)
PLATELET # BLD AUTO: 145 10E9/L (ref 150–450)
POTASSIUM SERPL-SCNC: 3.7 MMOL/L (ref 3.4–5.3)
PROT SERPL-MCNC: 7.6 G/DL (ref 6.8–8.8)
RBC # BLD AUTO: 4.18 10E12/L (ref 4.4–5.9)
RBC #/AREA URNS AUTO: 3 /HPF (ref 0–2)
SODIUM SERPL-SCNC: 137 MMOL/L (ref 133–144)
SOURCE: ABNORMAL
SP GR UR STRIP: 1.03 (ref 1–1.03)
SQUAMOUS #/AREA URNS AUTO: 0 /HPF (ref 0–1)
UROBILINOGEN UR STRIP-MCNC: 2 MG/DL (ref 0–2)
WBC # BLD AUTO: 8.8 10E9/L (ref 4–11)
WBC #/AREA URNS AUTO: 6 /HPF (ref 0–5)

## 2020-10-30 PROCEDURE — 74177 CT ABD & PELVIS W/CONTRAST: CPT

## 2020-10-30 PROCEDURE — 99284 EMERGENCY DEPT VISIT MOD MDM: CPT | Performed by: FAMILY MEDICINE

## 2020-10-30 PROCEDURE — 81001 URINALYSIS AUTO W/SCOPE: CPT | Performed by: FAMILY MEDICINE

## 2020-10-30 PROCEDURE — 83690 ASSAY OF LIPASE: CPT | Performed by: FAMILY MEDICINE

## 2020-10-30 PROCEDURE — 74019 RADEX ABDOMEN 2 VIEWS: CPT

## 2020-10-30 PROCEDURE — 85025 COMPLETE CBC W/AUTO DIFF WBC: CPT | Performed by: FAMILY MEDICINE

## 2020-10-30 PROCEDURE — 255N000002 HC RX 255 OP 636: Performed by: RADIOLOGY

## 2020-10-30 PROCEDURE — 99285 EMERGENCY DEPT VISIT HI MDM: CPT | Mod: 25

## 2020-10-30 PROCEDURE — 36415 COLL VENOUS BLD VENIPUNCTURE: CPT | Performed by: FAMILY MEDICINE

## 2020-10-30 PROCEDURE — 80053 COMPREHEN METABOLIC PANEL: CPT | Performed by: FAMILY MEDICINE

## 2020-10-30 RX ORDER — HYDROCODONE BITARTRATE AND ACETAMINOPHEN 5; 325 MG/1; MG/1
1 TABLET ORAL EVERY 6 HOURS PRN
Qty: 10 TABLET | Refills: 0 | Status: SHIPPED | OUTPATIENT
Start: 2020-10-30 | End: 2020-11-02

## 2020-10-30 RX ORDER — IOPAMIDOL 612 MG/ML
100 INJECTION, SOLUTION INTRAVASCULAR ONCE
Status: COMPLETED | OUTPATIENT
Start: 2020-10-30 | End: 2020-10-30

## 2020-10-30 RX ADMIN — IOPAMIDOL 100 ML: 612 INJECTION, SOLUTION INTRAVENOUS at 12:44

## 2020-10-30 NOTE — ED NOTES
Patient presents with right lower quadrant pain that started 3 days ago. States it has gradually gotten better over the last few days. He states he had some oxycodone from after his duodenum surgery so he did utilize one of those the first night, but nothing since. He states his pain is at about a 4 now and he did have a bowel movement today that started normal but turned loose. States it's not very uncommon for him though. He does endorse a fever and not feeling well when it first started but is feeling better overall today.

## 2020-10-30 NOTE — ED AVS SNAPSHOT
[facility]  [address]  [place] [address]  Phone: [phone]                                    Rafa Bhatti   MRN: [id number]    Department: [facility]   Date of Visit: 10/30/2020           After Visit Summary Signature Page    I have received my discharge instructions, and my questions have been answered. I have discussed any challenges I see with this plan with the nurse or doctor.    ..........................................................................................................................................  Patient/Patient Representative Signature      ..........................................................................................................................................  Patient Representative Print Name and Relationship to Patient    ..................................................               ................................................  Date                                   Time    ..........................................................................................................................................  Reviewed by Signature/Title    ...................................................              ..............................................  Date                                               Time          22EPIC Rev 08/18

## 2020-10-30 NOTE — ED NOTES
Pt discharged at this time with wife. Discharge instructions reviewed with pt and wife. Instructed to return with any new or worsening symptoms. Verbalized understanding.    Referred by: Jailyn Hernandez 

## 2020-10-30 NOTE — ED PROVIDER NOTES
History     Chief Complaint   Patient presents with     Abdominal Pain     HPI  Rafa Bhatti is a 75 year old male who has a complicated recent history regarding a benign polyposis/Whipple procedure.  He had some surgical complications.  I was able to speak directly with his surgeon,  at [facility], who knows him well.    Rafa has had some abdominal pain in his epigastric area and RUQ for 4 days, actually getting quite a bit better today.  No appetite, but eating does not make significantly worse.  Some nausea, no vomiting, no diarrhea.    No melena.    No fevers.     No urinary symptoms.    Hx of cholecystectomy 2018 as other pertinent surgical hx.    Allergies:  Allergies   Allergen Reactions     Lactose Unknown     LACTOSE INTOLERANT       Problem List:    Patient Active Problem List    Diagnosis Date Noted     Abnormal LFTs 06/24/2020     Priority: Medium     Hypoxia 06/24/2020     Priority: Medium     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     Priority: Medium     Added automatically from request for surgery [id number]       AK (actinic keratosis) 11/28/2018     Priority: Medium     Encounter for diabetic foot exam (H) 05/23/2018     Priority: Medium     Type 2 diabetes mellitus with diabetic polyneuropathy, without long-term current use of insulin (H) 05/23/2018     Priority: Medium     Type 2 diabetes mellitus without complication, without long-term current use of insulin (H) 04/11/2018     Priority: Medium     Chronic gout 10/18/2017     Priority: Medium     Esophageal reflux 10/18/2017     Priority: Medium     Personal history of malignant neoplasm of skin 11/14/2016     Priority: Medium     Overview:   Rt upper back mild 2013    Overview:   BCC right temple 2012       History of nonmelanoma skin cancer 11/14/2016     Priority: Medium     Overview:   BCC right temple 2012       ACP (advance care planning) 10/26/2016     Priority: Medium     Advance Care Planning 10/26/2016: ACP Review of Chart /  Resources Provided:  Reviewed chart for advance care plan.  Rafa Bhatti has no plan or code status on file. Discussed available resources and provided with information. Confirmed code status reflects current choices pending further ACP discussions.  Confirmed/documented legally designated decision makers.  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 Provider to review       Basal cell carcinoma, face 09/05/2012     Priority: Medium        Past Medical History:    Past Medical History:   Diagnosis Date     Benign neoplasm of unspecified site 8/1/2012     Calculus of kidney 5/20/2002     Chest pain, unspecified 3/2/2000     Diabetic eye exam (H) 06/06/2018     Gout, unspecified 8/3/2011     Hyperlipidemia      Unspecified essential hypertension 7/18/2000       Past Surgical History:    Past Surgical History:   Procedure Laterality Date     cardiolyte stress test  2000    chest pain     CHOLECYSTECTOMY, COMMON BILE DUCT EXPLORATION, COMBINED  04/15/2018     COLONOSCOPY  1999     fistula in ANO       HEMORRHOIDECTOMY       removal of dermoid cyst of mediastinum       VASECTOMY         Family History:    Family History   Problem Relation Age of Onset     C.A.D. Father 80     C.A.D. Mother      Hypertension Mother      C.A.D. Brother      C.A.D. Other         family hx       Social History:  Marital Status:   [2]  Social History     Tobacco Use     Smoking status: Never Smoker     Smokeless tobacco: Never Used   Substance Use Topics     Alcohol use: Yes     Comment: socially     Drug use: No        Medications:         allopurinol (ZYLOPRIM) 100 MG tablet       amLODIPine (NORVASC) 5 MG tablet        aspirin 81 MG tablet       atenolol (TENORMIN) 50 MG tablet       blood glucose monitoring (FREESTYLE) lancets       Calcium Carbonate Antacid (TUMS PO)       colchicine (COLCYRS) 0.6 MG tablet       famotidine (PEPCID) 20 MG tablet       FREESTYLE LITE test strip       losartan (COZAAR) 25 MG tablet       metFORMIN (GLUCOPHAGE-XR) 500 MG 24 hr tablet       oxyCODONE (ROXICODONE) 5 MG tablet       rosuvastatin (CRESTOR) 10 MG tablet       sildenafil (REVATIO) 20 MG tablet       tamsulosin (FLOMAX) 0.4 MG capsule          Review of Systems  No fevers, HEENT, SOB, cough, chest pain, flank pains, urinary symptoms  Physical Exam   BP: 122/71  Pulse: 79  Temp: 97.4  F (36.3  C)  Resp: 16  SpO2: 98 %      Physical Examwell man.  Heart reg.  Lungs clear.  abd tender in the epigastrium and positive Perez's.  Otherwise, soft, NT, ND, NABS, no pulsatile abd mass.  No CVAT.      diagnotics show an isolated elevated lipase with normal LFTs.    WBC normal.  Doubt UTI.  CT abd shows a dilated pancreatic duct.  Air in biliary tree considered normal for him.      ED Course        Procedures               Critical Care time:  none               Results for orders placed or performed during the hospital encounter of 10/30/20 (from the past 24 hour(s))   CBC with platelets differential   Result Value Ref Range    WBC 8.8 4.0 - 11.0 10e9/L    RBC Count 4.18 (L) 4.4 - 5.9 10e12/L    Hemoglobin 12.2 (L) 13.3 - 17.7 g/dL    Hematocrit 38.1 (L) 40.0 - 53.0 %    MCV 91 78 - 100 fl    MCH 29.2 26.5 - 33.0 pg    MCHC 32.0 31.5 - 36.5 g/dL    RDW 13.1 10.0 - 15.0 %    Platelet Count 145 (L) 150 - 450 10e9/L    Diff Method Automated Method     % Neutrophils 80.9 %    % Lymphocytes 10.6 %    % Monocytes 8.0 %    % Eosinophils 0.0 %    % Basophils 0.2 %    % Immature Granulocytes 0.3 %    Nucleated RBCs 0 0 /100    Absolute Neutrophil 7.1 1.6 - 8.3 10e9/L    Absolute Lymphocytes 0.9 0.8 - 5.3 10e9/L    Absolute Monocytes 0.7 0.0 - 1.3 10e9/L     Absolute Eosinophils 0.0 0.0 - 0.7 10e9/L    Absolute Basophils 0.0 0.0 - 0.2 10e9/L    Abs Immature Granulocytes 0.0 0 - 0.4 10e9/L    Absolute Nucleated RBC 0.0    Comprehensive metabolic panel   Result Value Ref Range    Sodium 137 133 - 144 mmol/L    Potassium 3.7 3.4 - 5.3 mmol/L    Chloride 101 94 - 109 mmol/L    Carbon Dioxide 27 20 - 32 mmol/L    Anion Gap 9 3 - 14 mmol/L    Glucose 181 (H) 70 - 99 mg/dL    Urea Nitrogen 24 7 - 30 mg/dL    Creatinine 0.94 0.66 - 1.25 mg/dL    GFR Estimate 80 >60 mL/min/[1.73_m2]    GFR Estimate If Black >90 >60 mL/min/[1.73_m2]    Calcium 9.0 8.5 - 10.1 mg/dL    Bilirubin Total 0.4 0.2 - 1.3 mg/dL    Albumin 3.0 (L) 3.4 - 5.0 g/dL    Protein Total 7.6 6.8 - 8.8 g/dL    Alkaline Phosphatase 69 40 - 150 U/L    ALT 20 0 - 70 U/L    AST 13 0 - 45 U/L   Lipase   Result Value Ref Range    Lipase 977 (H) 73 - 393 U/L   XR Abdomen 2 Views    Narrative    PROCEDURE: XR ABDOMEN 2 VW 10/30/2020 11:14 AM    HISTORY: abd pain    COMPARISONS: None.    TECHNIQUE: Flat and upright    FINDINGS: Intestinal gas pattern is normal. There is no extraluminal  gas or pathologic intra-abdominal calcifications. Surgical clips are  seen in the right upper quadrant.         Impression    IMPRESSION: Normal abdominal gas pattern    MÓNICA GARCIA MD   UA with Microscopic   Result Value Ref Range    Color Urine Yellow     Appearance Urine Slightly Cloudy     Glucose Urine Negative NEG^Negative mg/dL    Bilirubin Urine Negative NEG^Negative    Ketones Urine Negative NEG^Negative mg/dL    Specific Gravity Urine 1.033 1.003 - 1.035    Blood Urine Negative NEG^Negative    pH Urine 5.5 4.7 - 8.0 pH    Protein Albumin Urine 100 (A) NEG^Negative mg/dL    Urobilinogen mg/dL 2.0 0.0 - 2.0 mg/dL    Nitrite Urine Negative NEG^Negative    Leukocyte Esterase Urine Negative NEG^Negative    Source Midstream Urine     WBC Urine 6 (H) 0 - 5 /HPF    RBC Urine 3 (H) 0 - 2 /HPF    Bacteria Urine None (A) NEG^Negative /HPF     Squamous Epithelial /HPF Urine 0 0 - 1 /HPF    Mucous Urine Present (A) NEG^Negative /LPF    Hyaline Casts 21 (A) OTO2^O - 2 /LPF   CT Abdomen Pelvis w Contrast    Narrative    EXAMINATION: CT ABDOMEN PELVIS W CONTRAST, 10/30/2020 12:58 PM    TECHNIQUE:  Helical CT images from the lung bases through the  symphysis pubis were obtained  with IV contrast. Contrast dose: ISOVUE  300  100ML    COMPARISON: March 2020    HISTORY: Abd pain, acute, generalized    FINDINGS:    There is dependent atelectasis at the lung bases.    Gas is seen within the biliary tree. The gallbladder has been removed.  There are no liver masses.    The spleen appears normal. The pancreatic duct is dilated. No  pancreatic masses are seen.    The adrenal glands are normal.    There is a benign-appearing cyst seen in the left kidney. There is no  hydronephrosis. There is a retroaortic accessory renal vein which is a  normal variant    The periaortic lymph nodes are normal in caliber.    Postoperative changes are seen in the duodenum and around the  pancreatic head. This represents a previous examination. There is some  edema seen in the mesentery. I'm uncertain if this represents acute  inflammation or if this represents postoperative change.    In the pelvis the bladder and rectum appear normal.    Degenerative change are present in the lumbar spine      Impression    IMPRESSION: Postoperative changes are seen in the duodenum and  surrounding the head of the pancreas. The pancreatic duct is dilated  as compared to the previous examination and gas is seen within the  biliary tree.    There is some edema seen in the mesentery to the right of midline. I'm  uncertain if this represents postoperative change or acute  inflammation.     MÓNICA GARCIA MD       Medications   sodium chloride (PF) 0.9% PF flush 60 mL (60 mLs Intravenous Given 10/30/20 1244)   iopamidol (ISOVUE-300) IV solution 61% 100 mL (100 mLs Intravenous Given 10/30/20 1244)        Assessments & Plan (with Medical Decision Making)     I have reviewed the nursing notes.    I have reviewed the findings, diagnosis, plan and need for follow up with the patient.   spoke with GI and with Dr.Greshel Martin.  Patient is not acutely ill and will not need hospitalization.  's team is going to organize an outpatient ERCP for the patient.    Will give him some lortab for pain, recommend stool softener if he takes the pain med, and to come back to the ER with worsening symptoms.    Likely, he is developing some mild stricture or anatomic scarring that is leading to his mild pancreatitis, and this will need near-future outpatient ERCP for evaluation.    No other acute, emergent condition is noted at this time.      New Prescriptions    No medications on file       Final diagnoses:   Abdominal pain, epigastric       10/30/2020   [facility]     Enrico Cee MD  10/30/20 [id number]

## 2020-10-30 NOTE — TELEPHONE ENCOUNTER
Patient called stating he has been experiencing right abdominal pain for the past couple days. Patient states pain is severe today. Nurse advised patient to be seen in ED. Patient verbalized understanding.

## 2020-10-30 NOTE — DISCHARGE INSTRUCTIONS

## 2020-10-30 NOTE — ED NOTES
Pt to desk wondering about pain medications, new prescription for Norco printed and handed to patient, instructions reviewed with pt with verbalized understanding.

## 2020-11-05 ENCOUNTER — TRANSFERRED RECORDS (OUTPATIENT)
Dept: HEALTH INFORMATION MANAGEMENT | Facility: CLINIC | Age: 75
End: 2020-11-05

## 2020-12-08 DIAGNOSIS — E11.42 TYPE 2 DIABETES MELLITUS WITH DIABETIC POLYNEUROPATHY, WITHOUT LONG-TERM CURRENT USE OF INSULIN (H): ICD-10-CM

## 2020-12-08 DIAGNOSIS — I10 BENIGN ESSENTIAL HYPERTENSION: ICD-10-CM

## 2020-12-08 RX ORDER — LOSARTAN POTASSIUM 25 MG/1
TABLET ORAL
Qty: 90 TABLET | Refills: 2 | Status: SHIPPED | OUTPATIENT
Start: 2020-12-08 | End: 2021-08-24

## 2020-12-12 DIAGNOSIS — R33.9 URINARY RETENTION: ICD-10-CM

## 2020-12-14 DIAGNOSIS — M1A.0720 CHRONIC IDIOPATHIC GOUT INVOLVING TOE OF LEFT FOOT WITHOUT TOPHUS: ICD-10-CM

## 2020-12-14 RX ORDER — COLCHICINE 0.6 MG/1
TABLET ORAL
Qty: 20 TABLET | Refills: 1 | Status: SHIPPED | OUTPATIENT
Start: 2020-12-14

## 2020-12-14 RX ORDER — TAMSULOSIN HYDROCHLORIDE 0.4 MG/1
CAPSULE ORAL
Qty: 90 CAPSULE | Refills: 3 | Status: SHIPPED | OUTPATIENT
Start: 2020-12-14 | End: 2022-01-19

## 2020-12-14 NOTE — TELEPHONE ENCOUNTER
Colchicine  Last Written Prescription Date: 10/11/18  Last Fill Quantity: 20 # of Refills: 1  Last Office Visit: 8/12/20

## 2021-03-08 DIAGNOSIS — M1A.0720 CHRONIC IDIOPATHIC GOUT INVOLVING TOE OF LEFT FOOT WITHOUT TOPHUS: ICD-10-CM

## 2021-03-08 DIAGNOSIS — E11.9 TYPE 2 DIABETES MELLITUS WITHOUT COMPLICATION, WITHOUT LONG-TERM CURRENT USE OF INSULIN (H): ICD-10-CM

## 2021-03-08 DIAGNOSIS — E78.2 MIXED HYPERLIPIDEMIA: ICD-10-CM

## 2021-03-08 DIAGNOSIS — I10 BENIGN ESSENTIAL HYPERTENSION: ICD-10-CM

## 2021-03-08 RX ORDER — ATENOLOL 50 MG/1
TABLET ORAL
Qty: 90 TABLET | Refills: 3 | Status: ON HOLD | OUTPATIENT
Start: 2021-03-08 | End: 2021-06-10

## 2021-03-08 RX ORDER — AMLODIPINE BESYLATE 5 MG/1
TABLET ORAL
Qty: 90 TABLET | Refills: 3 | Status: SHIPPED | OUTPATIENT
Start: 2021-03-08 | End: 2022-03-11

## 2021-03-08 RX ORDER — ALLOPURINOL 100 MG/1
TABLET ORAL
Qty: 180 TABLET | Refills: 3 | Status: SHIPPED | OUTPATIENT
Start: 2021-03-08 | End: 2022-03-11

## 2021-03-08 RX ORDER — ROSUVASTATIN CALCIUM 10 MG/1
TABLET, COATED ORAL
Qty: 90 TABLET | Refills: 0 | Status: SHIPPED | OUTPATIENT
Start: 2021-03-08 | End: 2021-06-16

## 2021-03-10 ENCOUNTER — TRANSFERRED RECORDS (OUTPATIENT)
Dept: HEALTH INFORMATION MANAGEMENT | Facility: CLINIC | Age: 76
End: 2021-03-10

## 2021-03-10 LAB — RETINOPATHY: NEGATIVE

## 2021-06-09 ENCOUNTER — HOSPITAL ENCOUNTER (OUTPATIENT)
Facility: HOSPITAL | Age: 76
Setting detail: OBSERVATION
Discharge: HOME | End: 2021-06-10
Attending: PHYSICIAN ASSISTANT | Admitting: INTERNAL MEDICINE
Payer: COMMERCIAL

## 2021-06-09 DIAGNOSIS — R55 NEAR SYNCOPE: ICD-10-CM

## 2021-06-09 DIAGNOSIS — R00.1 SINUS BRADYCARDIA: ICD-10-CM

## 2021-06-09 LAB
ALBUMIN SERPL-MCNC: 4 G/DL (ref 3.4–5)
ALP SERPL-CCNC: 72 U/L (ref 40–150)
ALT SERPL W P-5'-P-CCNC: 31 U/L (ref 0–70)
ANION GAP SERPL CALCULATED.3IONS-SCNC: 6 MMOL/L (ref 3–14)
AST SERPL W P-5'-P-CCNC: 15 U/L (ref 0–45)
BASOPHILS # BLD AUTO: 0 10E9/L (ref 0–0.2)
BASOPHILS NFR BLD AUTO: 0.5 %
BILIRUB SERPL-MCNC: 0.6 MG/DL (ref 0.2–1.3)
BUN SERPL-MCNC: 18 MG/DL (ref 7–30)
CALCIUM SERPL-MCNC: 9.2 MG/DL (ref 8.5–10.1)
CHLORIDE SERPL-SCNC: 103 MMOL/L (ref 94–109)
CO2 SERPL-SCNC: 27 MMOL/L (ref 20–32)
CREAT SERPL-MCNC: 0.97 MG/DL (ref 0.66–1.25)
DIFFERENTIAL METHOD BLD: ABNORMAL
EOSINOPHIL # BLD AUTO: 0 10E9/L (ref 0–0.7)
EOSINOPHIL NFR BLD AUTO: 0 %
ERYTHROCYTE [DISTWIDTH] IN BLOOD BY AUTOMATED COUNT: 13.4 % (ref 10–15)
GFR SERPL CREATININE-BSD FRML MDRD: 77 ML/MIN/{1.73_M2}
GLUCOSE SERPL-MCNC: 339 MG/DL (ref 70–99)
HCT VFR BLD AUTO: 38.7 % (ref 40–53)
HGB BLD-MCNC: 13.1 G/DL (ref 13.3–17.7)
IMM GRANULOCYTES # BLD: 0 10E9/L (ref 0–0.4)
IMM GRANULOCYTES NFR BLD: 0 %
LABORATORY COMMENT REPORT: NORMAL
LYMPHOCYTES # BLD AUTO: 1.1 10E9/L (ref 0.8–5.3)
LYMPHOCYTES NFR BLD AUTO: 27.5 %
MAGNESIUM SERPL-MCNC: 1.6 MG/DL (ref 1.6–2.3)
MCH RBC QN AUTO: 30.3 PG (ref 26.5–33)
MCHC RBC AUTO-ENTMCNC: 33.9 G/DL (ref 31.5–36.5)
MCV RBC AUTO: 90 FL (ref 78–100)
MONOCYTES # BLD AUTO: 0.3 10E9/L (ref 0–1.3)
MONOCYTES NFR BLD AUTO: 7.3 %
NEUTROPHILS # BLD AUTO: 2.6 10E9/L (ref 1.6–8.3)
NEUTROPHILS NFR BLD AUTO: 64.7 %
NRBC # BLD AUTO: 0 10*3/UL
NRBC BLD AUTO-RTO: 0 /100
PLATELET # BLD AUTO: 158 10E9/L (ref 150–450)
POTASSIUM SERPL-SCNC: 4.1 MMOL/L (ref 3.4–5.3)
PROT SERPL-MCNC: 7.7 G/DL (ref 6.8–8.8)
RBC # BLD AUTO: 4.32 10E12/L (ref 4.4–5.9)
SARS-COV-2 RNA RESP QL NAA+PROBE: NEGATIVE
SODIUM SERPL-SCNC: 136 MMOL/L (ref 133–144)
SPECIMEN SOURCE: NORMAL
TROPONIN I SERPL-MCNC: <0.015 UG/L (ref 0–0.04)
TROPONIN I SERPL-MCNC: <0.015 UG/L (ref 0–0.04)
WBC # BLD AUTO: 4 10E9/L (ref 4–11)

## 2021-06-09 PROCEDURE — 99285 EMERGENCY DEPT VISIT HI MDM: CPT | Performed by: PHYSICIAN ASSISTANT

## 2021-06-09 PROCEDURE — 84484 ASSAY OF TROPONIN QUANT: CPT | Mod: 91 | Performed by: INTERNAL MEDICINE

## 2021-06-09 PROCEDURE — 99285 EMERGENCY DEPT VISIT HI MDM: CPT | Mod: 25 | Performed by: INTERNAL MEDICINE

## 2021-06-09 PROCEDURE — U0005 INFEC AGEN DETEC AMPLI PROBE: HCPCS | Performed by: PHYSICIAN ASSISTANT

## 2021-06-09 PROCEDURE — G0378 HOSPITAL OBSERVATION PER HR: HCPCS

## 2021-06-09 PROCEDURE — 93010 ELECTROCARDIOGRAM REPORT: CPT | Performed by: INTERNAL MEDICINE

## 2021-06-09 PROCEDURE — U0003 INFECTIOUS AGENT DETECTION BY NUCLEIC ACID (DNA OR RNA); SEVERE ACUTE RESPIRATORY SYNDROME CORONAVIRUS 2 (SARS-COV-2) (CORONAVIRUS DISEASE [COVID-19]), AMPLIFIED PROBE TECHNIQUE, MAKING USE OF HIGH THROUGHPUT TECHNOLOGIES AS DESCRIBED BY CMS-2020-01-R: HCPCS | Performed by: PHYSICIAN ASSISTANT

## 2021-06-09 PROCEDURE — 84484 ASSAY OF TROPONIN QUANT: CPT | Performed by: PHYSICIAN ASSISTANT

## 2021-06-09 PROCEDURE — C9803 HOPD COVID-19 SPEC COLLECT: HCPCS | Performed by: INTERNAL MEDICINE

## 2021-06-09 PROCEDURE — 250N000013 HC RX MED GY IP 250 OP 250 PS 637: Performed by: INTERNAL MEDICINE

## 2021-06-09 PROCEDURE — 80053 COMPREHEN METABOLIC PANEL: CPT | Performed by: PHYSICIAN ASSISTANT

## 2021-06-09 PROCEDURE — 83735 ASSAY OF MAGNESIUM: CPT | Performed by: PHYSICIAN ASSISTANT

## 2021-06-09 PROCEDURE — 93005 ELECTROCARDIOGRAM TRACING: CPT | Performed by: INTERNAL MEDICINE

## 2021-06-09 PROCEDURE — 99219 PR INITIAL OBSERVATION CARE,LEVEL II: CPT | Performed by: INTERNAL MEDICINE

## 2021-06-09 PROCEDURE — 258N000003 HC RX IP 258 OP 636: Performed by: PHYSICIAN ASSISTANT

## 2021-06-09 PROCEDURE — 96360 HYDRATION IV INFUSION INIT: CPT | Performed by: INTERNAL MEDICINE

## 2021-06-09 PROCEDURE — 96361 HYDRATE IV INFUSION ADD-ON: CPT | Performed by: INTERNAL MEDICINE

## 2021-06-09 PROCEDURE — 85025 COMPLETE CBC W/AUTO DIFF WBC: CPT | Performed by: PHYSICIAN ASSISTANT

## 2021-06-09 PROCEDURE — 36415 COLL VENOUS BLD VENIPUNCTURE: CPT | Performed by: INTERNAL MEDICINE

## 2021-06-09 RX ORDER — TAMSULOSIN HYDROCHLORIDE 0.4 MG/1
0.4 CAPSULE ORAL DAILY
Status: DISCONTINUED | OUTPATIENT
Start: 2021-06-10 | End: 2021-06-09

## 2021-06-09 RX ORDER — ONDANSETRON 2 MG/ML
4 INJECTION INTRAMUSCULAR; INTRAVENOUS EVERY 6 HOURS PRN
Status: DISCONTINUED | OUTPATIENT
Start: 2021-06-09 | End: 2021-06-10 | Stop reason: HOSPADM

## 2021-06-09 RX ORDER — ROSUVASTATIN CALCIUM 10 MG/1
10 TABLET, COATED ORAL DAILY
Status: DISCONTINUED | OUTPATIENT
Start: 2021-06-10 | End: 2021-06-10 | Stop reason: HOSPADM

## 2021-06-09 RX ORDER — FAMOTIDINE 20 MG/1
20 TABLET, FILM COATED ORAL 2 TIMES DAILY
Status: ON HOLD | COMMUNITY
Start: 2020-06-12 | End: 2021-06-09

## 2021-06-09 RX ORDER — TAMSULOSIN HYDROCHLORIDE 0.4 MG/1
0.4 CAPSULE ORAL EVERY EVENING
Status: DISCONTINUED | OUTPATIENT
Start: 2021-06-09 | End: 2021-06-10 | Stop reason: HOSPADM

## 2021-06-09 RX ORDER — ACETAMINOPHEN 650 MG/1
650 SUPPOSITORY RECTAL EVERY 4 HOURS PRN
Status: DISCONTINUED | OUTPATIENT
Start: 2021-06-09 | End: 2021-06-10 | Stop reason: HOSPADM

## 2021-06-09 RX ORDER — PANTOPRAZOLE SODIUM 40 MG/1
40 TABLET, DELAYED RELEASE ORAL DAILY
COMMUNITY
Start: 2021-06-07

## 2021-06-09 RX ORDER — PANTOPRAZOLE SODIUM 40 MG/1
40 TABLET, DELAYED RELEASE ORAL DAILY
Status: DISCONTINUED | OUTPATIENT
Start: 2021-06-10 | End: 2021-06-10 | Stop reason: HOSPADM

## 2021-06-09 RX ORDER — SODIUM CHLORIDE, SODIUM LACTATE, POTASSIUM CHLORIDE, CALCIUM CHLORIDE 600; 310; 30; 20 MG/100ML; MG/100ML; MG/100ML; MG/100ML
INJECTION, SOLUTION INTRAVENOUS ONCE
Status: COMPLETED | OUTPATIENT
Start: 2021-06-09 | End: 2021-06-09

## 2021-06-09 RX ORDER — ONDANSETRON 4 MG/1
4 TABLET, ORALLY DISINTEGRATING ORAL EVERY 6 HOURS PRN
Status: DISCONTINUED | OUTPATIENT
Start: 2021-06-09 | End: 2021-06-10 | Stop reason: HOSPADM

## 2021-06-09 RX ORDER — METFORMIN HCL 500 MG
500 TABLET, EXTENDED RELEASE 24 HR ORAL 2 TIMES DAILY WITH MEALS
Status: DISCONTINUED | OUTPATIENT
Start: 2021-06-09 | End: 2021-06-10 | Stop reason: HOSPADM

## 2021-06-09 RX ORDER — LIDOCAINE 40 MG/G
CREAM TOPICAL
Status: DISCONTINUED | OUTPATIENT
Start: 2021-06-09 | End: 2021-06-10 | Stop reason: HOSPADM

## 2021-06-09 RX ORDER — ACETAMINOPHEN 325 MG/1
650 TABLET ORAL EVERY 4 HOURS PRN
Status: DISCONTINUED | OUTPATIENT
Start: 2021-06-09 | End: 2021-06-10 | Stop reason: HOSPADM

## 2021-06-09 RX ORDER — METFORMIN HCL 500 MG
1000 TABLET, EXTENDED RELEASE 24 HR ORAL 2 TIMES DAILY WITH MEALS
Status: DISCONTINUED | OUTPATIENT
Start: 2021-06-09 | End: 2021-06-09

## 2021-06-09 RX ORDER — ALLOPURINOL 100 MG/1
100 TABLET ORAL 2 TIMES DAILY
Status: DISCONTINUED | OUTPATIENT
Start: 2021-06-09 | End: 2021-06-10 | Stop reason: HOSPADM

## 2021-06-09 RX ORDER — FAMOTIDINE 20 MG/1
20 TABLET, FILM COATED ORAL 2 TIMES DAILY
Status: DISCONTINUED | OUTPATIENT
Start: 2021-06-09 | End: 2021-06-09

## 2021-06-09 RX ADMIN — SODIUM CHLORIDE, POTASSIUM CHLORIDE, SODIUM LACTATE AND CALCIUM CHLORIDE: 600; 310; 30; 20 INJECTION, SOLUTION INTRAVENOUS at 11:59

## 2021-06-09 RX ADMIN — ALLOPURINOL 100 MG: 100 TABLET ORAL at 21:16

## 2021-06-09 RX ADMIN — METFORMIN ER 500 MG 500 MG: 500 TABLET ORAL at 17:08

## 2021-06-09 RX ADMIN — TAMSULOSIN HYDROCHLORIDE 0.4 MG: 0.4 CAPSULE ORAL at 21:16

## 2021-06-09 ASSESSMENT — ENCOUNTER SYMPTOMS
VOMITING: 0
BACK PAIN: 0
FEVER: 0
SPEECH DIFFICULTY: 0
ALLERGIC/IMMUNOLOGIC COMMENTS: DIABETES
DIAPHORESIS: 1
HEADACHES: 0
LIGHT-HEADEDNESS: 1
ABDOMINAL PAIN: 0
BRUISES/BLEEDS EASILY: 0
NECK STIFFNESS: 0
PHOTOPHOBIA: 0
DIZZINESS: 1
FATIGUE: 0
APPETITE CHANGE: 0
CHEST TIGHTNESS: 0
COUGH: 0
WEAKNESS: 1
NECK PAIN: 0
PALPITATIONS: 0
SHORTNESS OF BREATH: 0
CHILLS: 0
PSYCHIATRIC NEGATIVE: 1
NAUSEA: 0

## 2021-06-09 ASSESSMENT — MIFFLIN-ST. JEOR: SCORE: 1489.25

## 2021-06-09 NOTE — ED PROVIDER NOTES
History     Chief Complaint   Patient presents with     Syncope     The history is provided by the patient.     Rafa Bhatti is a 76 year old male who presented to the emergency department via EMS for evaluation of a near syncopal episode.  The patient reports that he was working outside on his driveway and began to experience rather significant diaphoresis as well as a near syncopal episode.  Denied any headaches, chest pain, chest pressures, palpitations, fevers, chills, vomiting, visual field loss, or other concerns.  Reported some significant fatigue and weakness.  He denied any true syncope.  Does carry history of diabetes.  On arrival to emergency department the patient reports feeling significantly improved.  He denies any significant concerns for me.  The patient did report taking glucose tablets as he thought his blood sugar might be low.  He did endorse bowel incontinence during the episode.  However, he did not check his blood sugar prior to taking these.    Allergies:  Allergies   Allergen Reactions     Lactose Unknown     LACTOSE INTOLERANT       Problem List:    Patient Active Problem List    Diagnosis Date Noted     Sinus bradycardia 06/09/2021     Priority: Medium     Near syncope 06/09/2021     Priority: Medium     Abnormal LFTs 06/24/2020     Priority: Medium     Hypoxia 06/24/2020     Priority: Medium     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     Priority: Medium     Added automatically from request for surgery [id number]       AK (actinic keratosis) 11/28/2018     Priority: Medium     Encounter for diabetic foot exam (H) 05/23/2018     Priority: Medium     Type 2 diabetes mellitus with diabetic polyneuropathy, without long-term current use of insulin (H) 05/23/2018     Priority: Medium     Type 2 diabetes mellitus without complication, without long-term current use of insulin (H) 04/11/2018     Priority: Medium     Chronic gout 10/18/2017     Priority: Medium     Esophageal reflux 10/18/2017      Priority: Medium     Personal history of malignant neoplasm of skin 11/14/2016     Priority: Medium     Overview:   Rt upper back mild 2013    Overview:   BCC right temple 2012       History of nonmelanoma skin cancer 11/14/2016     Priority: Medium     Overview:   BCC right temple 2012       ACP (advance care planning) 10/26/2016     Priority: Medium     Advance Care Planning 10/26/2016: ACP Review of Chart / Resources Provided:  Reviewed chart for advance care plan.  Rafa Bhatti has no plan or code status on file. Discussed available resources and provided with information. Confirmed code status reflects current choices pending further ACP discussions.  Confirmed/documented legally designated decision makers.  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 Provider to review       Basal cell carcinoma, face 09/05/2012     Priority: Medium        Past Medical History:    Past Medical History:   Diagnosis Date     Benign neoplasm of unspecified site 8/1/2012     Calculus of kidney 5/20/2002     Chest pain, unspecified 3/2/2000     Diabetic eye exam (H) 06/06/2018     Gout, unspecified 8/3/2011     Hyperlipidemia      Unspecified essential hypertension 7/18/2000       Past Surgical History:    Past Surgical History:   Procedure Laterality Date     cardiolyte stress test  2000    chest pain     CHOLECYSTECTOMY, COMMON BILE DUCT EXPLORATION, COMBINED  04/15/2018     COLONOSCOPY  1999     fistula in ANO       HEMORRHOIDECTOMY       removal of dermoid cyst of mediastinum       VASECTOMY         Family History:    Family History   Problem Relation Age of Onset     C.A.D. Father 80     C.A.D. Mother       Hypertension Mother      C.A.D. Brother      C.A.D. Other         family hx       Social History:  Marital Status:   [2]  Social History     Tobacco Use     Smoking status: Never Smoker     Smokeless tobacco: Never Used   Substance Use Topics     Alcohol use: Yes     Comment: socially     Drug use: No        Medications:    allopurinol (ZYLOPRIM) 100 MG tablet  amLODIPine (NORVASC) 5 MG tablet  aspirin 81 MG tablet  atenolol (TENORMIN) 50 MG tablet  blood glucose monitoring (FREESTYLE) lancets  Calcium Carbonate Antacid (TUMS PO)  colchicine (COLCYRS) 0.6 MG tablet  famotidine (PEPCID) 20 MG tablet  FREESTYLE LITE test strip  losartan (COZAAR) 25 MG tablet  metFORMIN (GLUCOPHAGE-XR) 500 MG 24 hr tablet  oxyCODONE (ROXICODONE) 5 MG tablet  pantoprazole (PROTONIX) 40 MG EC tablet  rosuvastatin (CRESTOR) 10 MG tablet  sildenafil (REVATIO) 20 MG tablet  tamsulosin (FLOMAX) 0.4 MG capsule          Review of Systems   Constitutional: Positive for diaphoresis. Negative for appetite change, chills, fatigue and fever.   HENT: Negative.    Eyes: Negative for photophobia and visual disturbance.   Respiratory: Negative for cough, chest tightness and shortness of breath.    Cardiovascular: Negative for chest pain, palpitations and leg swelling.   Gastrointestinal: Negative for abdominal pain, nausea and vomiting.   Genitourinary: Negative.    Musculoskeletal: Negative for back pain, neck pain and neck stiffness.   Skin: Negative.    Allergic/Immunologic:        Diabetes   Neurological: Positive for dizziness, weakness and light-headedness. Negative for syncope, speech difficulty and headaches.   Hematological: Does not bruise/bleed easily.   Psychiatric/Behavioral: Negative.        Physical Exam   BP: 132/84  Pulse: 61  Temp: 97.6  F (36.4  C)  Resp: 18  SpO2: 97 %      Physical Exam  Vitals signs and nursing note reviewed.   Constitutional:       General: He is not in acute distress.     Appearance: Normal appearance. He  is normal weight. He is not ill-appearing, toxic-appearing or diaphoretic.      Comments: This is a smiling and talkative 76-year-old male who appears younger than his stated age found semireclined on the exam bed in no distress.   HENT:      Head: Normocephalic and atraumatic.   Neck:      Musculoskeletal: Normal range of motion and neck supple.   Cardiovascular:      Rate and Rhythm: Normal rate and regular rhythm.   Pulmonary:      Effort: Pulmonary effort is normal.      Breath sounds: Normal breath sounds.   Abdominal:      Palpations: Abdomen is soft.   Musculoskeletal:      Right lower leg: No edema.      Left lower leg: No edema.   Skin:     General: Skin is warm and dry.      Capillary Refill: Capillary refill takes less than 2 seconds.   Neurological:      General: No focal deficit present.      Mental Status: He is alert and oriented to person, place, and time.   Psychiatric:         Mood and Affect: Mood normal.         ED Course        Procedures          EKG shows sinus bradycardia rate of 55.  Normal UT interval.  Slightly prolonged QRS duration 118 ms.  Normal QTC.  There is left axis deviation of -31 degrees.  Normal P wave duration.  Does have evidence of left ventricular hypertrophy.  There is no concerning ST segments.  No concerning T waves.  There is no evidence of ectopy, preexcitation, or ischemia.  Comparison the previous EKGs shows no significant change.    Critical Care time:  none               Results for orders placed or performed during the hospital encounter of 06/09/21 (from the past 24 hour(s))   CBC with platelets differential   Result Value Ref Range    WBC 4.0 4.0 - 11.0 10e9/L    RBC Count 4.32 (L) 4.4 - 5.9 10e12/L    Hemoglobin 13.1 (L) 13.3 - 17.7 g/dL    Hematocrit 38.7 (L) 40.0 - 53.0 %    MCV 90 78 - 100 fl    MCH 30.3 26.5 - 33.0 pg    MCHC 33.9 31.5 - 36.5 g/dL    RDW 13.4 10.0 - 15.0 %    Platelet Count 158 150 - 450 10e9/L    Diff Method Automated Method     %  Neutrophils 64.7 %    % Lymphocytes 27.5 %    % Monocytes 7.3 %    % Eosinophils 0.0 %    % Basophils 0.5 %    % Immature Granulocytes 0.0 %    Nucleated RBCs 0 0 /100    Absolute Neutrophil 2.6 1.6 - 8.3 10e9/L    Absolute Lymphocytes 1.1 0.8 - 5.3 10e9/L    Absolute Monocytes 0.3 0.0 - 1.3 10e9/L    Absolute Eosinophils 0.0 0.0 - 0.7 10e9/L    Absolute Basophils 0.0 0.0 - 0.2 10e9/L    Abs Immature Granulocytes 0.0 0 - 0.4 10e9/L    Absolute Nucleated RBC 0.0    Comprehensive metabolic panel   Result Value Ref Range    Sodium 136 133 - 144 mmol/L    Potassium 4.1 3.4 - 5.3 mmol/L    Chloride 103 94 - 109 mmol/L    Carbon Dioxide 27 20 - 32 mmol/L    Anion Gap 6 3 - 14 mmol/L    Glucose 339 (H) 70 - 99 mg/dL    Urea Nitrogen 18 7 - 30 mg/dL    Creatinine 0.97 0.66 - 1.25 mg/dL    GFR Estimate 77 >60 mL/min/[1.73_m2]    GFR Estimate If Black 89 >60 mL/min/[1.73_m2]    Calcium 9.2 8.5 - 10.1 mg/dL    Bilirubin Total 0.6 0.2 - 1.3 mg/dL    Albumin 4.0 3.4 - 5.0 g/dL    Protein Total 7.7 6.8 - 8.8 g/dL    Alkaline Phosphatase 72 40 - 150 U/L    ALT 31 0 - 70 U/L    AST 15 0 - 45 U/L   Troponin I   Result Value Ref Range    Troponin I ES <0.015 0.000 - 0.045 ug/L       Medications   lactated ringers infusion ( Intravenous New Bag 6/9/21 1159)       Assessments & Plan (with Medical Decision Making)   Findings as above.  76-year-old male who presented to the emergency department via EMS for evaluation of profound diaphoresis, weakness, fatigue, and near syncope while working outside.  Observation the emergency department shows relatively unremarkable laboratory evaluation other than elevated glucose.  The patient did have a sinus bradycardia in rates of the 40s.  He is on a multitude of antihypertensives.  Given the rather profound bradycardia as well as history of present illness I do believe that it would be in his best medical interest for protracted observation and telemetry in the hospital.  The patient and wife were  quite happy and agreeable with the plan.  Patient was discussed with and graciously accepted by Dr. Luna.     This document was prepared using a combination of typing and voice generated software.  While every attempt was made for accuracy, spelling and grammatical errors may exist.    I have reviewed the nursing notes.    I have reviewed the findings, diagnosis, plan and need for follow up with the patient.       New Prescriptions    No medications on file       Final diagnoses:   Near syncope   Sinus bradycardia       6/9/2021   [facility]     Christen Laws PA-C  06/09/21 [id number]

## 2021-06-09 NOTE — ED NOTES
Pt comes into ED today via EMS due to a almost syncopal episode while working outside. States that he all of a sudden felt very weak and dizzy. Denies LOC but states that he did lose continence of his bowels. States that it was hot out and he was not drinking much water. States that he is a DM and took a sugar tablet without checking his BG just in case. Denies any pain currently or during the episode. States he feels fine now. Denies any cardiac issues, past seizures, or episodes similar to this. Denies any SOB. Monitors and IV placed. PA at bedside.

## 2021-06-09 NOTE — H&P
Telephone Encounter by Karie Calzada at 01/05/18 12:43 PM     Author:  Karie Calzada Service:  (none) Author Type:  Patient      Filed:  01/05/18 12:45 PM Encounter Date:  1/5/2018 Status:  Signed     :  Karie Calzada (Patient )              GAY DÍAZ    Patient Age: 18 year old    ACCT STATUS: COPAY  MESSAGE:[MM1.1T]   Patient's sister Brenna calling stating patient had an appointment last Thursday and was prescribed medication  Brenna states patient started taking medication on Friday and has been experiencing dizziness and diarrhea for the past 4-5 days and isn't sure if this is being caused by the medication  Brenna requesting to speak with clinical staff for further advice at this time[MM1.1M]  Message confirmed with caller.  Call connected to [facility][MM1.1T] ADD[MM1.1M] Triage queue.  Routed to[MM1.1T] ADD[MM1.1M] Seq RN Pool.     Next and Last Visit with Provider and Department  Next visit with ERNIE HIGH is on No match found  Next visit with [facility] is on 02/19/2018 at  5:40 PM in [facility]  Last visit with ERNIE HIGH was on 05/28/2015 at  3:40 PM in [facility]  Last visit with ADD was on 12/28/2017 at  1:30 PM [facility]      WEIGHT AND HEIGHT: As of 12/28/2017 weight is 191 lbs.(86.637 kg). Height is 5' 5.5\"(1.664 m).   BMI is 31.29 kg/(m^2) calculated from:     Height 5' 5.5\" (1.664 m) as of 12/28/17     Weight 191 lb (86.637 kg) as of 12/28/17      No Known Allergies  Current outpatient prescriptions       Medication  Sig Dispense Refill   • dexmethylphenidate (FOCALIN XR) 10 MG 24 hr Cap Take 1 Cap by mouth daily. 30 Cap 0   • albuterol (PROVENTIL HFA) 108 (90 BASE) MCG/ACT inhaler Inhale 2 Puffs by mouth 3 (three) times daily. For 5 days 1 Inhaler 0   • fluticasone (FLONASE) 50 MCG/ACT nasal spray Use 1 Spray in each nostril 2 (two) times daily. for 1 month 1 Bottle 2   • albuterol (PROVENTIL) (2.5 MG/3ML) 0.083%  [facility]    History and Physical  Hospitalist       Date of Admission:  6/9/2021  Date of Service (when I saw the patient): 06/09/21    Assessment & Plan   Rafa Bhatti is a 76 year old male who presents with near syncope.    Sinus bradycardia: Most likely the cause of his near syncope.  Patient states that he may have overexerted himself in the very hot ambient weather.  He did take his beta-blocker today, which was atenolol 50 mg tablet.  He is also on losartan 25 mg, Norvasc 5 mg.  -Telemetry monitoring  -Serial troponins  -We will hold cardiovascular medications    Non-insulin-dependent diabetes mellitus: With hyperglycemia today, blood sugars 339.  He did take some glucose tabs prior to coming to the hospital for fear of some hypoglycemia causing his symptoms.  His blood glucose during his event was unknown, but if high likely contributed to dehydration.  -Patient is on metformin at baseline  -Accu-Cheks, sliding scale coverage if indicated    Hyperlipidemia: Continue Crestor    History of gout: He takes maintenance allopurinol    FEN: Electrolytes within normal limits.  Oral diet as tolerated.    DVT Prophylaxis: Low Risk/Ambulatory with no VTE prophylaxis indicated    Code Status: Full Code    Disposition: Expected discharge tomorrow once HR returns to normal.    Arya Luna MD      Primary Care Physician   Harry Olson    Chief Complaint   Near syncope    History is obtained from the patient    History of Present Illness   Rafa Bhatti is a 76 year old male with a history of non-insulin-dependent diabetes mellitus, hypertension on atenolol at baseline, hyperlipidemia who presents with near syncopal episode today.  Patient was outside in his driveway in the hot weather putting sealant onto his driveway when he suddenly became dizzy, wobbly and lightheaded.  He had difficulty walking and seeing.  He called out to his wife who in turn called the ambulance.  Patient apparently had an  episode of stool incontinence during all this as well.  He was brought to the emergency department for evaluation.  Patient was placed on telemetry and found that his heart rate was in the high 30s to 40s.  He states that he did take his beta-blocker today.  Air temperature outside today is 84 degrees.  He is feeling better after 1 L of fluid, although his heart rate is persistently now in the 40s.  He denies any history of myocardial infarction, denies any history of congestive heart failure, denies strokes.  He is a non-smoker.  Occasional social drinker.  Laboratory values are relatively unremarkable.  Troponin is negative x1.  His glucose is elevated at 339.  He is admitted at this time for observation for his persistent bradycardia and near syncope.    Past Medical History    I have reviewed this patient's medical history and updated it with pertinent information if needed.   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  Past Surgical History:   Procedure Laterality Date     cardiolyte stress test  2000    chest pain     CHOLECYSTECTOMY, COMMON BILE DUCT EXPLORATION, COMBINED  04/15/2018     COLONOSCOPY  1999     fistula in ANO       HEMORRHOIDECTOMY       removal of dermoid cyst of mediastinum       VASECTOMY         Prior to Admission Medications   Prior to Admission Medications   Prescriptions Last Dose Informant Patient Reported? Taking?   Calcium Carbonate Antacid (TUMS PO)  Self Yes No   Sig: tums 200 mg calcium (500 mg) chewable  Take 1 tablet by mouth prn   FREESTYLE LITE test strip  Self No No   Sig:  nebulizer solution One vial every 4 hours for cough 30 Vial 3   • pantoprazole (PROTONIX) 20 MG tablet Take 1 Tab by mouth daily. 30 Tab 2   • ergocalciferol (ERGOCALCIFEROL) 42286 UNITS Cap Take 1 Cap by mouth once a week. Take 1 Cap by mouth once a week for six weeks. 6 Cap 0   • albuterol (PROAIR HFA) 108 (90 BASE) MCG/ACT inhaler 2 puffs three times a day for 5-7 days or as needed 1 Inhaler 0   • fexofenadine-pseudoephedrine (ALLEGRA-D ALLERGY & CONGESTION) 180-240 MG per 24 hr tablet Take 1 Tab by mouth daily. One tab in the morning for congestion 14 Tab 0      PHARMACY to use:[MM1.1T]      Ask pt if needed[MM1.1M]     Pharmacy preference(s) on file: [facility]    CALL BACK INFO:[MM1.1T] Ok to leave response (including medical information) with family member or on answering machine[MM1.1M]  ROUTING:[MM1.1T] Patient's physician/staff[MM1.1M]        PCP: Livier Harrison MD         INS: Payor: [employer] / Plan: B  / Product Type: *No Product type* / Note: This is the primary coverage, but no account was found for this location or the patient's primary location.   [address]  [id number] [facility] [address][MM1.1T]       Revision History        User Key Date/Time User Provider Type Action    > MM1.1 01/05/18 12:45 PM Karie Calzada Patient  Sign    M - Manual, T - Template             USE TO TEST BLOOD SUGARS ONCE A DAY.   allopurinol (ZYLOPRIM) 100 MG tablet   No No   Sig: TAKE 1 TABLET BY MOUTH TWICE A DAY   amLODIPine (NORVASC) 5 MG tablet   No No   Sig: TAKE 1 TABLET BY MOUTH EVERY DAY   aspirin 81 MG tablet  Self Yes No   Sig: Take 81 mg by mouth daily   atenolol (TENORMIN) 50 MG tablet   No No   Sig: TAKE 1 TABLET BY MOUTH EVERY DAY   blood glucose monitoring (FREESTYLE) lancets  Self No No   Sig: Use to test blood sugar 1 times daily.   colchicine (COLCYRS) 0.6 MG tablet   No No   Sig: TAKE 2 TABLETS AT THE ONSET OF GOUT PAIN. MAY TAKE ONE TABLET AGAIN IN ONE HOUR. MAX 4 TABS IN 24 HRS.   famotidine (PEPCID) 20 MG tablet  Self No No   Sig: Take 1 tablet (20 mg) by mouth 2 times daily   losartan (COZAAR) 25 MG tablet   No No   Sig: TAKE 1 TABLET BY MOUTH EVERY DAY   metFORMIN (GLUCOPHAGE-XR) 500 MG 24 hr tablet   No No   Sig: TAKE 2 TABLETS (1000MG) BY TWICE DAILY WITH MEALS   oxyCODONE (ROXICODONE) 5 MG tablet   Yes No   Sig: Take 5 mg by mouth every 4 hours as needed   pantoprazole (PROTONIX) 40 MG EC tablet   Yes No   Sig: Take 40 mg by mouth daily   rosuvastatin (CRESTOR) 10 MG tablet   No No   Sig: TAKE 1 TABLET BY MOUTH EVERY DAY   sildenafil (REVATIO) 20 MG tablet  Self No No   Sig: Take 1-2 tablets (20-40 mg) by mouth daily as needed (erectile dysfunction)   tamsulosin (FLOMAX) 0.4 MG capsule   No No   Sig: TAKE 1 CAPSULE DAILY BY MOUTH      Facility-Administered Medications: None     Allergies   Allergies   Allergen Reactions     Lactose Unknown     LACTOSE INTOLERANT       Social History   I have reviewed this patient's social history and updated it with pertinent information if needed. Rafa Bhatti  reports that he has never smoked. He has never used smokeless tobacco. He reports current alcohol use. He reports that he does not use drugs.    Family History   I have reviewed this patient's family history and updated it with pertinent information if needed.   Family History    Problem Relation Age of Onset     C.A.D. Father 80     C.A.D. Mother      Hypertension Mother      C.A.D. Brother      C.A.D. Other         family hx       Review of Systems   The 10 point Review of Systems is negative other than noted in the HPI or here.    Physical Exam   Temp: 97.6  F (36.4  C) Temp src: Tympanic BP: 120/65 Pulse: 56   Resp: 18 SpO2: 95 % O2 Device: None (Room air)    Vital Signs with Ranges  Temp:  [97.6  F (36.4  C)] 97.6  F (36.4  C)  Pulse:  [49-61] 56  Resp:  [18] 18  BP: (114-132)/(65-84) 120/65  SpO2:  [95 %-98 %] 95 %  0 lbs 0 oz    Constitutional: AA, NAD  Eyes: PERRLA, no injection, no icterus  HEENT: atraumatic, normocephalic  Respiratory: CTA b/l  Cardiovascular: S1 S2 regular rhythm, bradycardic  GI: soft, NT, ND, + bowel sounds  Lymph/Hematologic: no palpable lymphadenopathy  Skin: no rashes, no lesions  Musculoskeletal: No edema, good tone, no deformities  Neurologic: oriented x 3, no focal deficits  Psychiatric: appropriate affect    Data   Data reviewed today:  I personally reviewed imaging reports.  Recent Labs   Lab 06/09/21  1156   WBC 4.0   HGB 13.1*   MCV 90         POTASSIUM 4.1   CHLORIDE 103   CO2 27   BUN 18   CR 0.97   ANIONGAP 6   HUY 9.2   *   ALBUMIN 4.0   PROTTOTAL 7.7   BILITOTAL 0.6   ALKPHOS 72   ALT 31   AST 15   TROPI <0.015     Lactic Acid   Date Value Ref Range Status   03/13/2020 1.7 0.7 - 2.0 mmol/L Final   04/12/2018 1.8 0.4 - 2.0 mmol/L Final       No results found for this or any previous visit (from the past 24 hour(s)).

## 2021-06-09 NOTE — PLAN OF CARE
[facility] Inpatient Admission Note:    Patient admitted to 3226/3226-1 at approximately 1351 via wheel chair accompanied by transport tech from emergency room . Report received from Maliha DOYLE in SBAR format at 1346 via telephone. Patient ambulated to bed via self.. Patient is alert and oriented X 3, denies pain; rates at 0 on 0-10 scale.  Patient oriented to room, unit, hourly rounding, and plan of care. Explained admission packet and patient handbook with patient bill of rights brochure. Will continue to monitor and document as needed.     Inpatient Nursing criteria listed below was met:    Health care directives status obtained and documented: No    Care Everywhere authorization obtained No    MRSA swab completed for patient 65 years and older: N/A    Patient identifies a surrogate decision maker: Yes If yes, who:Ashley Contact Information:see face sheet     If initial lactic acid >2.0, repeat lactic acid drawn within one hour of arrival to unit: NA. If no, state reason: n/a    Vaccination assessment and education completed: Yes   Vaccinations received prior to admission: Pneumovax yes  Influenza(seasonal)  N/A   Vaccination(s) ordered: not given today because up to date    Clergy visit ordered if patient requests: N/A    Skin issues/needs documented: N/A    Isolation Patient: no Education given, correct sign in place and documentation row added to PCS:  N/A    Fall Prevention Yes: Care plan updated, education given and documented, sticker and magnet in place: Yes    Care Plan initiated: Yes    Education Documented (including assessment): Yes    Patient has discharge needs : No If yes, please explain:n/a      Denies pain, nausea and dizziness.  Up in room on own.  Steady on feet.  Telemetry in place, pulse bradycardic.      Face to face report given with opportunity to observe patient.    Report given to Keily Blanca RN   6/9/2021  3:21 PM

## 2021-06-09 NOTE — PLAN OF CARE
Prior to Admission Medication Reconciliation:     Medications added:   [] None  [x] As listed below:    protonix- [facility] prescribed medication     Medications deleted:   [] None  [x] As listed below:    Asa 81 mg- per pt- told to start taking 4 years ago, told to stop taking 2 years ago. Pt has not been taking PTA    Oxycodone- therapy completed    pepcid- stopped by [facility] provider after surgery 2020, pt confirmed    Medications marked for review/removal by attending:  [x] None  [] As listed below:    Changes made to existing medications:   [] None  [x] As listed below:    Metformin- pt takes differently than prescribed, takes 1 tab BID    tamsulosin- pt takes nightly    Last times/dates taken verified with patient:  [x] Yes- completed myself  [] Prepared PTA medlist for review only. (will not be available to review personally)  [] Did not review with patient. Rx verification only. Review completed by nursing.    [] Nurse completed no changes made (double checked entries)  [] Unable to review with patient at this time:  [] Nurse completed/changes made:     Allergies listed at another location:  []Not applicable   []See below:    Allergy review:    []Did not review: reviewed by nursing  []Did not review: pt unable at this time  []Patient/MAR verified NKDA  [x]Patient/MAR verified current existing allergies: no changes made    Medication reconciliation sources:   [x]Patient  []Patient family member/emergency contact: **  [x][facility] Report Review: 2018  []Epic Chart Review  [x]Care Everywhere review:  Medication Sig Dispensed Refills Start Date End Date Status   atenolol (TENORMIN) 50 MG tablet   Take 50 mg by mouth one time a day.   0     Active   tadalafil (CIALIS) 20 MG tablet   Take 20 mg by mouth as needed for Erectile dysfunction.   0     Active   allopurinol (ZYLOPRIM) 100 MG tablet   Take 100 mg by mouth two times a day.   0     Active   amLODIPine (NORVASC) 5 MG tablet   Take 5 mg by mouth one time a  day.   0     Active   colchicine 0.6 MG tablet   TAKE 2 TABLETS AT THE ONSET OF GOUT PAIN. MAY TAKE ONE TABLET AGAIN IN ONE HOUR. MAX 4 TABS IN 24 HRS.   0 10/11/2018   Active   metFORMIN (GLUCOPHAGE) 500 MG tablet   Take 500 mg by mouth two times a day with meals.   0 03/18/2019   Active   glucose blood VI test (FREESTYLE LITE)   USE TO TEST BLOOD SUGARS ONCE A DAY.   0 04/08/2020   Active   lancets, Freestyle, (Freestyle)   Use to test blood sugar 1 times daily.   0 05/31/2018   Active   rosuvastatin (Crestor) 10 MG tablet   Take 10 mg by mouth one time a day.   0 03/20/2020   Active   tamsulosin (Flomax) 0.4 MG 24 hour capsule   Take 0.4 mg by mouth one time a day.   0 03/20/2020   Active   losartan (Cozaar) 25 MG tablet   Take 25 mg by mouth one time a day.   0     Active   furosemide (Lasix) 20 MG tablet   Take 1 Tab by mouth one time a day. 5 Tab   0 06/04/2020   Active   pantoprazole (Protonix) 40 MG delayed-release tablet   TAKE 1 TABLET DAILY BY MOUTH - *DO NOT CRUSH* 90 Tablet   3 05/26/2021   Active       []Pharmacy med list: **  []Pharmacy phone call  [x]Outside meds dispense report: see below  []Nursing home or Assisted Living MAR:  []Other: **    Pharmacy desired at discharge: [facility]    Is patient on coumadin?  [x]No    Requests for consultation by provider or pharmacist:   [x] Patient understands why all of their meds were prescribed and how to take them. No questions.   [] Managing party has no questions.   [] Patient/ managing party has questions about the following:  [] Did not review with patient. Cannot assess.     Fill dates and reported compliancy:  [x] Fill dates coincide with compliancy for all/most maintenance meds.   [] Fill dates do not coincide with compliancy with maintenance meds. See notes in PTA medlist and in comments.    [] Fill dates do not coincide with the following medications but pt reports compliancy:  [] Did not review with patient. Cannot assess.     Historian accuracy:  []  Excellent- alert and oriented, understands why meds were prescribed and how to take, able to answer specifics  [x] Good- alert and oriented, understands why meds were prescribed and how to take, some confusion   [] Fair- alert and oriented, doesn't know medications without list, cannot answer specifics about medications, but has a decent process for which to take at home  [] Poor- does not know medications, may not have a process to take at home, may be cognitively unable to review at this time  []Medication management done by family member or facility, no concerns about historian accuracy.   [] Did not review with patient. Cannot assess.     Medication Management:  [x] Manages meds independently  [] Family member/ other party manages meds:  [] Meds managed by staff at facility  [] Meds set up by home care, family/other party helps administer  [] Meds set up by home care, self administers  [] Did not review with patient. Cannot assess.     Other medications aside from PTA:  [x] Denies taking any medications aside from those listed in PTA meds  [] Reports taking another medication(s) but cannot recall the name(s)  [] Refuses to say.  [] Did not review with patient. Cannot assess.     Comments: none.     Cori Carreno on 6/9/2021 at 2:34 PM       Discrepancies: [x] No []Not Applicable []Yes: listed below      Time spent on medication reconciliation:   []5-20 mins  [x]20-40 mins  []> 40 mins    Issues completing PTA medication reconciliation:  [] On hold for a long time  [] Waited for a call back  [] Fax didn't come through  [] Fax took a long time  [] Other:    Notifying appropriate party of changes/additions/discrepancies:  []No pertinent changes made, notification not necessary.   [x] Notified attending provider via text page/phone call  [] Notified attending provider in person  [] Notified pharmacy  [] Notified nurse  [] Attending provider not available, left detailed notes  [] Pt is not admitted to floor yet, PTA  meds completed before admission.   Medications Prior to Admission   Medication Sig Dispense Refill Last Dose     allopurinol (ZYLOPRIM) 100 MG tablet TAKE 1 TABLET BY MOUTH TWICE A  tablet 3 6/9/2021 at 0730     amLODIPine (NORVASC) 5 MG tablet TAKE 1 TABLET BY MOUTH EVERY DAY 90 tablet 3 6/9/2021 at 0730     atenolol (TENORMIN) 50 MG tablet TAKE 1 TABLET BY MOUTH EVERY DAY 90 tablet 3 6/9/2021 at 0730     losartan (COZAAR) 25 MG tablet TAKE 1 TABLET BY MOUTH EVERY DAY 90 tablet 2 6/9/2021 at 0730     metFORMIN (GLUCOPHAGE-XR) 500 MG 24 hr tablet TAKE 2 TABLETS (1000MG) BY TWICE DAILY WITH MEALS (Patient taking differently: Take 500 mg by mouth 2 times daily (with meals) ) 120 tablet 3 6/9/2021 at 0730     pantoprazole (PROTONIX) 40 MG EC tablet Take 40 mg by mouth daily   6/9/2021 at 0730     rosuvastatin (CRESTOR) 10 MG tablet TAKE 1 TABLET BY MOUTH EVERY DAY 90 tablet 0 6/9/2021 at 0730     tamsulosin (FLOMAX) 0.4 MG capsule TAKE 1 CAPSULE DAILY BY MOUTH (Patient taking differently: Take 0.4 mg by mouth At Bedtime ) 90 capsule 3 6/8/2021 at HS     blood glucose monitoring (FREESTYLE) lancets Use to test blood sugar 1 times daily. 100 each 3 Unknown at Unknown time     calcium carbonate (TUMS) 500 MG chewable tablet Take 1 tablet by mouth daily as needed    More than a month at Unknown time     colchicine (COLCYRS) 0.6 MG tablet TAKE 2 TABLETS AT THE ONSET OF GOUT PAIN. MAY TAKE ONE TABLET AGAIN IN ONE HOUR. MAX 4 TABS IN 24 HRS. 20 tablet 1 More than a month at Unknown time     FREESTYLE LITE test strip USE TO TEST BLOOD SUGARS ONCE A DAY. 100 each 2 Unknown at Unknown time     sildenafil (REVATIO) 20 MG tablet Take 1-2 tablets (20-40 mg) by mouth daily as needed (erectile dysfunction) 60 tablet 1 More than a month at Unknown time       Medication Dispense History (from 6/9/2020 to 6/9/2021)  Expand All  Collapse All  Allopurinol   Dispensed Days Supply Quantity Provider Pharmacy   ALLOPURINOL  TAB 100MG  03/20/2021 90 180 each [facility] Pharm[facility]...   ALLOPURINOL 100MG TABLET 03/20/2021 90 180 Units MICHAEL VASQUEZ #61 - Fa...   ALLOPURINOL 100MG TABLET 12/17/2020 90 180 [facility]...   ALLOPURINOL  TAB 100MG 09/16/2020 90 [facility]   ALLOPURINOL 100MG TABLET 09/16/2020 90 [facility]   ALLOPURINOL  TAB 100MG 06/18/2020 90 180 each  [facility]   ALLOPURINOL 100MG TABLET 06/18/2020 90 180 [facility]         Amoxicillin-Pot Clavulanate   Dispensed Days Supply Quantity Provider Pharmacy   AMOX/K CLAV  -125 06/17/2020 21 42 each  [address]...         Atenolol   Dispensed Days Supply Quantity Provider Pharmacy   ATENOLOL     TAB 50MG 03/20/2021 90 90 each [facility]AS[facility]   ATENOLOL 50MG TABLET 03/20/2021 90 90 Units MICHAEL VASQUEZ #61 - Fa...   ATENOLOL 50MG TABLET 12/17/2020 90 90 [facility]   ATENOLOL     TAB 50MG 09/16/2020 90 90 each CHRISTOJOSE F CRAWFORD [facility]...   ATENOLOL 50MG TABLET 09/16/2020 90 90 [facility]   ATENOLOL     TAB 50MG 06/18/2020 90 90 each  [facility]   ATENOLOL 50MG TABLET 06/18/2020 90 90 [facility]...         Colchicine   Dispensed Days Supply Quantity Provider Pharmacy   COLCHICINE 0.6MG TABLET 12/14/2020 5 20 Units MICHAEL VASQUEZ [facility]...         Famotidine   Dispensed Days Supply Quantity Provider Pharmacy   FAMOTIDINE 20MG     TAB 06/12/2020 90 180 Units JOSE F [id number] ...         HYDROcodone-Acetaminophen   Dispensed Days Supply Quantity Provider Pharmacy   HYDROCODONE/APAP 5MG-325MG TAB 10/30/2020 3 10 Units THADDEUS MELENDEZ [facility]...         Influenza Vac  A&B SA Adj Quad   Dispensed Days Supply Quantity Provider Pharmacy   FLUAD QUAD 4114-1291 PF INJ 10/19/2020 1 0.5 mL LUCINA SALMON [facility]...         Losartan Potassium   Dispensed Days Supply Quantity Provider Pharmacy   LOSARTAN 25MG TABLET 06/07/2021 90 90 Units [facility]   LOSARTAN POT TAB 25MG 06/07/2021 90 90 [facility]   LOSARTAN 25MG TABLET 03/09/2021 90 90 [facility]...   LOSARTAN POT TAB 25MG 03/09/2021 90 90 each [facility]   LOSARTAN 25MG TABLET 12/08/2020 90 90 Units [facility]   LOSARTAN 25MG TABLET 09/16/2020 90 90 Units [facility]...   LOSARTAN POT TAB 25MG 09/16/2020 90 90 [facility]   LOSARTAN 25MG TABLET 06/18/2020 90 90 Units [facility]...   LOSARTAN POT TAB 25MG 06/18/2020 90 [facility]         Pantoprazole Sodium   Dispensed Days Supply Quantity Provider Pharmacy   PANTOPRAZOLE 40MG DR TABLET 06/07/2021 90 90 Units OCTAVIANO STEVENSON #61 - Fa...   PANTOPRAZOLE TAB 40MG 06/07/2021 90 90 each OCTAVIANO STEVENSON Pharmacy...   PANTOPRAZOLE 40MG DR TABLET 03/02/2021 90 90 Units OCTAVIANO STEVENSON #61 - Fa...   PANTOPRAZOLE TAB 40MG 03/02/2021 90 90 each OCTAVIANO STEVENSON Pharmacy...   PANTOPRAZOLE 40MG DR TABLET 12/01/2020 90 90 Units OCTAVIANO STEVENSON #61 - Fa...   PANTOPRAZOLE TAB 40MG 12/01/2020 90 90 each OCTAVIANO STEVENSON Pharmacy...   PANTOPRAZOLE 40MG DR TABLET 09/11/2020 90 90 Units OCTAVIANO STEVENSON Pharmacy...   PANTOPRAZOLE 40MG DR TABLET 06/11/2020 90 90 Units OCTAVIANO STEVENSON Pharmacy...         Rosuvastatin Calcium   Dispensed Days Supply Quantity Provider Pharmacy   ROSUVASTATIN 10MG TABLET 03/20/2021 90 90 Units  MICHAEL VASQUEZ #61 - Fa...   ROSUVASTATIN TAB 10MG 03/20/2021 90 90 each MICHAEL VASQUEZ Pharmacy...   ROSUVASTATIN 10MG TABLET 12/17/2020 90 90 Units CHRISTOJOSE F CRAWFORD [place] #61 - Fa...   ROSUVASTATIN 10MG TABLET 09/16/2020 90 90 [facility]   ROSUVASTATIN TAB 10MG 09/16/2020 [facility]   ROSUVASTATIN 10MG TABLET 06/18/2020 90 90 [facility]...   ROSUVASTATIN TAB 10MG 06/18/2020 90 90 each  [facility]...         Sodium Chloride Flush   Dispensed Days Supply Quantity Provider Pharmacy   BD POSIFLUSH NS 0.9% FOR INJ 06/25/2020 30 400 mL [facility]         Tamsulosin HCl   Dispensed Days Supply Quantity Provider Pharmacy   TAMSULOSIN 0.4MG CAPSULE 12/17/2020 90 90 Units MICHAEL VASQUEZ #61 - Fa...   TAMSULOSIN   CAP 0.4MG 09/16/2020 90 [facility]   TAMSULOSIN 0.4MG CAPSULE 09/16/2020 90 90 Units CHELO MORSE [place] #61 - Fa...   TAMSULOSIN   CAP 0.4MG 06/18/2020 90 90 each  [facility]...   TAMSULOSIN 0.4MG CAPSULE 06/18/2020 90 90 Units JOSE F VILLAFUERTE [facility] #61 - Fa...         Other   Dispensed Days Supply Quantity Provider Pharmacy   NORMAL SALIN INJ 0.9% 06/17/2020 20 400 each  [address]...   NORMAL SALIN INJ 0.9% 06/17/2020 20 400 each OCTAVIANO STEVENSON [address]...         amLODIPine Besylate   Dispensed Days Supply Quantity Provider Pharmacy   AMLODIPINE   TAB 5MG 03/20/2021 90 90 each MICHAEL VASQUEZ Pharmacy...   AMLODIPINE 5MG TABLET 03/20/2021 90 90 Units MIHCAEL VASQUEZ #61 - Fa...   AMLODIPINE 5MG TABLET 12/17/2020 90 90 Units JOSE F VILLAFUERTE #61 - Fa...   AMLODIPINE   TAB 5MG 09/16/2020 90 90 each JOSE F VILLAFUERTE Pharmacy...   AMLODIPINE 5MG TABLET 09/16/2020 90 90  Units JOSE F [facility] #61 - Fa...   AMLODIPINE   TAB 5MG 06/18/2020 90 90 each  [facility]...   AMLODIPINE 5MG TABLET 06/18/2020 90 90 Units JOSE F VILLAF[facility] #61 - Fa...         metFORMIN HCl   Dispensed Days Supply Quantity Provider Pharmacy   METFORMIN 500MG ER TABLET 12/17/2020 30 120 Units KEATON CASTELLANOS #61 - Fa...   METFORMIN    TAB 500MG ER 11/19/2020 30 120 each KEATON CASTELLANOS Pharmacy...   METFORMIN 500MG ER TABLET 11/19/2020 30 120 Units KEATON CASTELLANOS #61 - Fa...   METFORMIN    TAB 500MG ER 10/19/2020 30 120 each KEATON CASTELLANOS Pharmacy...   METFORMIN 500MG ER TABLET 10/19/2020 30 120 Units KEATON CASTELLANOS #61 - Fa...   METFORMIN    TAB 500MG ER 09/16/2020 30 120 each KEATON CASTELLANOS Pharmacy...   METFORMIN 500MG ER TABLET 09/16/2020 30 120 Units KEATON CASTELLANOS #61 - Fa...   METFORMIN 500MG ER TABLET 08/17/2020 30 120 Units VERSICH,KEATON A. [facility]...   METFORMIN    TAB 1000MG 06/18/2020 90 180 each  [facility]...   METFORMIN 1000MG TABLET 06/18/2020 90 180 Units JOSE F VILLAFUERTE #61 - Fa...         oxyCODONE HCl   Dispensed Days Supply Quantity Provider Pharmacy   OXYCODONE    TAB 5MG 07/02/2020 4 16 each  [facility]...   OXYCODONE 5MG TABLET 07/02/2020 3 16 Units OCTAVIANO STEVENSON Pharmacy...

## 2021-06-10 VITALS
RESPIRATION RATE: 18 BRPM | SYSTOLIC BLOOD PRESSURE: 139 MMHG | DIASTOLIC BLOOD PRESSURE: 63 MMHG | WEIGHT: 163.8 LBS | BODY MASS INDEX: 23.45 KG/M2 | OXYGEN SATURATION: 97 % | TEMPERATURE: 96.7 F | HEART RATE: 62 BPM | HEIGHT: 70 IN

## 2021-06-10 LAB
GLUCOSE BLDC GLUCOMTR-MCNC: 199 MG/DL (ref 70–99)
TROPONIN I SERPL-MCNC: <0.015 UG/L (ref 0–0.04)
TROPONIN I SERPL-MCNC: <0.015 UG/L (ref 0–0.04)

## 2021-06-10 PROCEDURE — G0378 HOSPITAL OBSERVATION PER HR: HCPCS

## 2021-06-10 PROCEDURE — 250N000013 HC RX MED GY IP 250 OP 250 PS 637: Performed by: INTERNAL MEDICINE

## 2021-06-10 PROCEDURE — 84484 ASSAY OF TROPONIN QUANT: CPT | Performed by: INTERNAL MEDICINE

## 2021-06-10 PROCEDURE — 999N001017 HC STATISTIC GLUCOSE BY METER IP

## 2021-06-10 PROCEDURE — 36415 COLL VENOUS BLD VENIPUNCTURE: CPT | Performed by: INTERNAL MEDICINE

## 2021-06-10 PROCEDURE — 99217 PR OBSERVATION CARE DISCHARGE: CPT | Performed by: INTERNAL MEDICINE

## 2021-06-10 RX ORDER — ATENOLOL 50 MG/1
50 TABLET ORAL DAILY
COMMUNITY
Start: 2021-03-20 | End: 2021-06-16 | Stop reason: ALTCHOICE

## 2021-06-10 RX ORDER — LOSARTAN POTASSIUM 25 MG/1
25 TABLET ORAL DAILY
Status: DISCONTINUED | OUTPATIENT
Start: 2021-06-10 | End: 2021-06-10 | Stop reason: HOSPADM

## 2021-06-10 RX ORDER — AMLODIPINE BESYLATE 5 MG/1
5 TABLET ORAL DAILY
Status: DISCONTINUED | OUTPATIENT
Start: 2021-06-10 | End: 2021-06-10 | Stop reason: HOSPADM

## 2021-06-10 RX ADMIN — ROSUVASTATIN CALCIUM 10 MG: 10 TABLET, FILM COATED ORAL at 08:21

## 2021-06-10 RX ADMIN — PANTOPRAZOLE SODIUM 40 MG: 40 TABLET, DELAYED RELEASE ORAL at 08:21

## 2021-06-10 RX ADMIN — METFORMIN ER 500 MG 500 MG: 500 TABLET ORAL at 08:21

## 2021-06-10 RX ADMIN — ALLOPURINOL 100 MG: 100 TABLET ORAL at 08:21

## 2021-06-10 NOTE — PLAN OF CARE
Face to face report given with opportunity to observe patient.    Report given to Veronica Jacob RN   6/9/2021  11:46 PM

## 2021-06-10 NOTE — PLAN OF CARE
Patient discharged at 10:40 AM via ambulation accompanied by spouse and staff. Prescriptions - None ordered for discharge. All belongings sent with patient.     Discharge instructions reviewed with patient. Listed belongings gathered and returned to patient. yes    Patient discharged to home.   Report called to NA    Core Measures and Vaccines  Core Measures applicable during stay: No If yes, state diagnosis:  NA  Pneumonia and Influenza given prior to discharge, if indicated: N/A    Surgical Patient   Surgical Procedures during stay: no  Did patient receive discharge instruction on wound care and recognition of infection symptoms? N/A    MISC  Follow up appointment made:  Yes  Home and hospital aquired medications returned to patient: N/A  Patient reports pain was well managed at discharge: Yes

## 2021-06-10 NOTE — PLAN OF CARE
1020 - Skin assessed head to toe with pts permission during monthly skin rounds. No pressure areas noted.

## 2021-06-10 NOTE — PROGRESS NOTES
Medical record and Phillip Score reviewed. Participated in interdisciplinary rounds.  No apparent needs noted at this time. Care Transitions will remain available if needs arise.

## 2021-06-10 NOTE — PLAN OF CARE
Pt is A&O x 4. VS as charted, afebrile, denies pain. Sinus dysrhythmia 50's per ICU tele. Report. Independent in room. Denies feeling light headed or having dizziness.  Call light in reach, makes needs known.      Face to face report given with opportunity to observe patient.    Report given to YANIRA Persaud RN   6/10/2021  7:33 AM

## 2021-06-10 NOTE — DISCHARGE SUMMARY
[facility]    Discharge Summary  Hospitalist    Date of Admission:  6/9/2021  Date of Discharge:  6/10/2021  Discharging Provider: Arya Luna MD  Date of Service (when I saw the patient): 06/10/21    Discharge Diagnoses   Active Problems:    Sinus bradycardia (6/9/2021)    Near syncope (6/9/2021)  Non-insulin-dependent diabetes mellitus  Hyperlipidemia  History of gout    History of Present Illness   Rafa Bhatti is an 76 year old male who presented with near syncope.  Please see admission H+P for additional details.    Hospital Course   Rafa Bhatti was admitted on 6/9/2021.  76-year-old male with history of non-insulin-dependent diabetes mellitus, hyperlipidemia, history of gout who presented with a near syncopal episode.  Patient was working outside in the heat.  Coming to the emergency department, patient was found to have bradycardia into the 30s and 40s.  EKG showed sinus bradycardia, no ischemic changes were appreciated.  He is on atenolol at baseline.  He was given some IV fluids and admitted for observation and rule out of myocardial infarction.  Troponins negative x4.  Blood glucoses have improved.  Holding his atenolol, his heart rate at the time of discharge is 81 bpm.  His blood pressure is also mildly elevated, so his other blood pressure medications were restarted prior to discharge including amlodipine and losartan.  He is instructed to hold his atenolol until he can follow-up with his primary care physician in approximately 1 week.  He now appears stable to be discharged in acute care.    Arya Luna MD      Significant Results and Procedures   See below    Pending Results   These results will be followed up by Harry Olson    Unresulted Labs Ordered in the Past 30 Days of this Admission     No orders found for last 31 day(s).          Code Status   Full Code       Primary Care Physician   Harry Olson    Physical Exam   Temp: 96.7  F (35.9  C) Temp src: Tympanic BP:  139/63 Pulse: 81   Resp: 16 SpO2: 97 % O2 Device: None (Room air)    Vitals:    06/09/21 1355   Weight: 74.3 kg (163 lb 12.8 oz)     Vital Signs with Ranges  Temp:  [96.1  F (35.6  C)-97.6  F (36.4  C)] 96.7  F (35.9  C)  Pulse:  [49-81] 81  Resp:  [16-18] 16  BP: (114-153)/(63-85) 139/63  SpO2:  [95 %-99 %] 97 %  I/O last 3 completed shifts:  In: 1740 [P.O.:740; I.V.:1000]  Out: -     Constitutional: AA, NAD  Eyes: PERRLA, no injection, no icterus  HEENT: atraumatic, normocephalic  Respiratory: CTA b/l  Cardiovascular: S1 S2 RRR  GI: soft, NT, ND, + bowel sounds  Lymph/Hematologic: no palpable lymphadenopathy  Skin: no rashes, no lesions  Musculoskeletal: No edema, good tone, no deformities  Neurologic: oriented x 3, no focal deficits  Psychiatric: appropriate affect    Discharge Disposition   Discharged to home  Condition at discharge: Stable    Consultations This Hospital Stay   None    Time Spent on this Encounter   I, Arya Luna MD, personally saw the patient today and spent less than or equal to 30 minutes discharging this patient.    Discharge Orders      Reason for your hospital stay    Near syncope     Follow-up and recommended labs and tests     Follow up with primary care provider, Harry Olson, within 7 days for hospital follow- up.  No follow up labs or test are needed.     Activity    Your activity upon discharge: activity as tolerated     When to contact your care team    Call your primary doctor if you have any of the following: dizziness or lightheadedness.     Diet    Follow this diet upon discharge: Orders Placed This Encounter      Moderate Consistent CHO Diet     Discharge Medications   Current Discharge Medication List      CONTINUE these medications which have NOT CHANGED    Details   allopurinol (ZYLOPRIM) 100 MG tablet TAKE 1 TABLET BY MOUTH TWICE A DAY  Qty: 180 tablet, Refills: 3    Comments: Patient enrolled in our Rx Med Sync service to improveadherence. We are requesting a  refill authorization inadvance to ensure an active prescription is on file.  Associated Diagnoses: Chronic idiopathic gout involving toe of left foot without tophus      amLODIPine (NORVASC) 5 MG tablet TAKE 1 TABLET BY MOUTH EVERY DAY  Qty: 90 tablet, Refills: 3    Comments: Patient enrolled in our Rx Med Sync service to improveadherence. We are requesting a refill authorization inadvance to ensure an active prescription is on file.  Associated Diagnoses: Benign essential hypertension      losartan (COZAAR) 25 MG tablet TAKE 1 TABLET BY MOUTH EVERY DAY  Qty: 90 tablet, Refills: 2    Comments: Patient enrolled in our Rx Med Sync service to improveadherence. We are requesting a refill authorization inadvance to ensure an active prescription is on file.  Associated Diagnoses: Type 2 diabetes mellitus with diabetic polyneuropathy, without long-term current use of insulin (H); Benign essential hypertension      metFORMIN (GLUCOPHAGE-XR) 500 MG 24 hr tablet TAKE 2 TABLETS (1000MG) BY TWICE DAILY WITH MEALS  Qty: 120 tablet, Refills: 3    Comments: Patient enrolled in our Rx Med Sync service to improveadherence. We are requesting a refill authorization inadvance to ensure an active prescription is on file.  Associated Diagnoses: Type 2 diabetes mellitus without complication, without long-term current use of insulin (H)      pantoprazole (PROTONIX) 40 MG EC tablet Take 40 mg by mouth daily      rosuvastatin (CRESTOR) 10 MG tablet TAKE 1 TABLET BY MOUTH EVERY DAY  Qty: 90 tablet, Refills: 0    Comments: DUE FOR OFFICE VISIT AND FASTING LABS  Associated Diagnoses: Mixed hyperlipidemia; Type 2 diabetes mellitus without complication, without long-term current use of insulin (H)      tamsulosin (FLOMAX) 0.4 MG capsule TAKE 1 CAPSULE DAILY BY MOUTH  Qty: 90 capsule, Refills: 3    Comments: Patient enrolled in our Rx Med Sync service to improveadherence. We are requesting a refill authorization inadvance to ensure an active  prescription is on file.  Associated Diagnoses: Urinary retention      blood glucose monitoring (FREESTYLE) lancets Use to test blood sugar 1 times daily.  Qty: 100 each, Refills: 3    Associated Diagnoses: Type 2 diabetes mellitus without complication, without long-term current use of insulin (H)      calcium carbonate (TUMS) 500 MG chewable tablet Take 1 tablet by mouth daily as needed       colchicine (COLCYRS) 0.6 MG tablet TAKE 2 TABLETS AT THE ONSET OF GOUT PAIN. MAY TAKE ONE TABLET AGAIN IN ONE HOUR. MAX 4 TABS IN 24 HRS.  Qty: 20 tablet, Refills: 1    Associated Diagnoses: Chronic idiopathic gout involving toe of left foot without tophus      FREESTYLE LITE test strip USE TO TEST BLOOD SUGARS ONCE A DAY.  Qty: 100 each, Refills: 2    Associated Diagnoses: Type 2 diabetes mellitus without complication, without long-term current use of insulin (H)      sildenafil (REVATIO) 20 MG tablet Take 1-2 tablets (20-40 mg) by mouth daily as needed (erectile dysfunction)  Qty: 60 tablet, Refills: 1    Associated Diagnoses: Erectile dysfunction, unspecified erectile dysfunction type         STOP taking these medications       atenolol (TENORMIN) 50 MG tablet Comments:   Reason for Stopping:             Allergies   Allergies   Allergen Reactions     Lactose Unknown     LACTOSE INTOLERANT     Data   Most Recent 3 CBC's:  Recent Labs   Lab Test 06/09/21  1156 10/30/20  1104 05/18/20  1059   WBC 4.0 8.8 5.0   HGB 13.1* 12.2* 13.8   MCV 90 91 91    145* 160      Most Recent 3 BMP's:  Recent Labs   Lab Test 06/09/21  1156 10/30/20  1104 08/12/20  1401    137 138   POTASSIUM 4.1 3.7 4.2   CHLORIDE 103 101 106   CO2 27 27 27   BUN 18 24 21   CR 0.97 0.94 0.76   ANIONGAP 6 9 5   HUY 9.2 9.0 8.9   * 181* 115*     Most Recent 2 LFT's:  Recent Labs   Lab Test 06/09/21  1156 10/30/20  1104   AST 15 13   ALT 31 20   ALKPHOS 72 69   BILITOTAL 0.6 0.4     Most Recent INR's and Anticoagulation Dosing  History:  Anticoagulation Dose History     Recent Dosing and Labs Latest Ref Rng & Units 4/12/2018    INR 0.80 - 1.20 1.14        Most Recent 3 Troponin's:  Recent Labs   Lab Test 06/10/21  0539 06/10/21  0016 06/09/21  1818   TROPI <0.015 <0.015 <0.015     Most Recent Cholesterol Panel:  Recent Labs   Lab Test 02/21/20  0747   CHOL 114   LDL 38   HDL 38*   TRIG 192*     Most Recent 6 Bacteria Isolates From Any Culture (See EPIC Reports for Culture Details):  Recent Labs   Lab Test 10/16/15  1344   CULT No beta hemolytic Streptococcus Group A isolated     Most Recent TSH, T4 and A1c Labs:  Recent Labs   Lab Test 08/12/20  1401 04/24/18  1015 04/24/18  1015   TSH  --   --  1.44   A1C 6.4*   < >  --     < > = values in this interval not displayed.     Results for orders placed or performed during the hospital encounter of 10/30/20   XR Abdomen 2 Views    Narrative    PROCEDURE: XR ABDOMEN 2 VW 10/30/2020 11:14 AM    HISTORY: abd pain    COMPARISONS: None.    TECHNIQUE: Flat and upright    FINDINGS: Intestinal gas pattern is normal. There is no extraluminal  gas or pathologic intra-abdominal calcifications. Surgical clips are  seen in the right upper quadrant.         Impression    IMPRESSION: Normal abdominal gas pattern    MÓNICA GARCIA MD   CT Abdomen Pelvis w Contrast    Narrative    EXAMINATION: CT ABDOMEN PELVIS W CONTRAST, 10/30/2020 12:58 PM    TECHNIQUE:  Helical CT images from the lung bases through the  symphysis pubis were obtained  with IV contrast. Contrast dose: ISOVUE  300  100ML    COMPARISON: March 2020    HISTORY: Abd pain, acute, generalized    FINDINGS:    There is dependent atelectasis at the lung bases.    Gas is seen within the biliary tree. The gallbladder has been removed.  There are no liver masses.    The spleen appears normal. The pancreatic duct is dilated. No  pancreatic masses are seen.    The adrenal glands are normal.    There is a benign-appearing cyst seen in the left kidney.  There is no  hydronephrosis. There is a retroaortic accessory renal vein which is a  normal variant    The periaortic lymph nodes are normal in caliber.    Postoperative changes are seen in the duodenum and around the  pancreatic head. This represents a previous examination. There is some  edema seen in the mesentery. I'm uncertain if this represents acute  inflammation or if this represents postoperative change.    In the pelvis the bladder and rectum appear normal.    Degenerative change are present in the lumbar spine      Impression    IMPRESSION: Postoperative changes are seen in the duodenum and  surrounding the head of the pancreas. The pancreatic duct is dilated  as compared to the previous examination and gas is seen within the  biliary tree.    There is some edema seen in the mesentery to the right of midline. I'm  uncertain if this represents postoperative change or acute  inflammation.     MÓNICA GARCIA MD

## 2021-06-10 NOTE — PLAN OF CARE
Pt alert and orientated.  Ate well for supper. Up ad jenise in room.  No dizziness.  Saline lock.

## 2021-06-10 NOTE — PROGRESS NOTES
Assessment & Plan   Problem List Items Addressed This Visit        Endocrine    Type 2 diabetes mellitus without complication, without long-term current use of insulin (H) - Primary    Relevant Orders    We reviewed the order for metformin and encouraged him to take as ordered. Currently, he is taking 500 MG twice daily Metformin XR vs 1000 mg twice a day. I will recheck his A1C due to recent elevation of glucose labs.     Hemoglobin A1c (Completed)    Albumin Random Urine Quantitative with Creat Ratio (Completed)    MI FOOT EXAM NO CHARGE (Completed)    DIABETES EDUCATION REFERRAL ([place])    CBC with platelets (Completed)    Estimated Average Glucose (Completed)       Circulatory    Benign essential hypertension    Relevant Orders    Lipid Profile (Completed)    Comprehensive metabolic panel (Completed)    CBC with platelets (Completed)    Near syncope    Relevant Orders    Stop your atenolol for now until you see Cardiology. This is likely causing the low heart rate. Take BP at home with automatic cuff daily and bring these values with you to cardiology appointment. To ED/UC if any SOB, chest pain, syncope, or new severe symptoms.   CARDIOLOGY EVAL ADULT REFERRAL    CBC with platelets (Completed)       Other    Encounter for diabetic foot exam (H)           Return in about 3 months (around 9/16/2021).    Judith Bran, CNP  [facility]    Terrance Craig is a 76 year old who presents for the following health issues     HPI       Hospital Follow-up Visit:    Hospital/Nursing Home/IP Rehab Facility: [facility]  Date of Admission: 6/9/21  Date of Discharge: 6/10/21  Reason(s) for Admission: Sinus bradycardia,   Near syncope      Was your hospitalization related to COVID-19? No   Problems taking medications regularly:  None  Medication changes since discharge: hold atenolol  Since er visit   Problems adhering to non-medication therapy:  None    Summary of hospitalization:   [facility] discharge summary reviewed  Diagnostic Tests/Treatments reviewed.  Follow up needed: 1 week follow up without need for additional tests or studies noted.   Other Healthcare Providers Involved in Patient s Care:         Dr. Olson  Update since discharge: improved. Had one episode about 3 days after discharging home while bending over sealing his driveway. He became dizzy, and foggy and had to rest. He denies that he passed out. Related history and follow up    Hx of pancreatic surgery May 2020 at [facility].    -  Operation on 5/27/2020  1. S/p Pancreas-preserving duodenectomy, choledochojejunostomy, duodenojejunostomy, and resection of remnant cystic duct  Routinely has regular stools, soft and deneis constipation. Does report that the day he when to the ED, he was due for his bowel movement and episode then and the one 3 days after discharge were accompanied by a strong urge to have a BM.   He reports he is due for follow up colonoscopy and plans to set this up. Follows with Dr. Ricky Rey at [facility].     Hx of diabetes previously controlled on metformin and lifestyle changes: Was averaging 105-115 fasting levels prior to his hospital stay. He reports that he has not changed his routine or diet but now his fasting 200. Was 206 this AM. Rafa does report that he has a tooth on left front on the bottom he needs to go to the dentist for. Denies drainage or foul taste. Takes metformin 1000 mg twice a day. Does not have any other known or suspected infection or hx of illness recently that could contribute to elevated glucose. No medication changes prior to  ED visit. No known steroids during his stay.     Hx hypertension: Reports longstanding but controlled htn. Denies any other cardiac related history such as MI, heart failure, SOB, or chest pain. Has been on [facility] medicaiton for htn: losartan (Appears he started around Feb 2020), amlodipine (appears he started around  "2017), and atenolol (started pre 2014). Note in chart from 2/21/20 from Dr. Agustin's office states: \"His kidney function is slightly off normal, so I am placing him on Losartan and taking him off of hydrochlorothiazide as the later can have a negative effect on his gout control.\" I see that historically, his BP has been well controlled 118-130's /60-70's with pulse running 50-60's generally.      Post Discharge Medication Reconciliation: discharge medications reconciled and changed, per note/orders.  Plan of care communicated with patient and family- PCP        Diabetic foot exam   1. foot deformity   No  2. Current or previous foot ulceration     No  3. Current or previous pre-ulcerative calluses     No  4. previous partial amputation of one or both feet or complete amputation of one foot     No  5. peripheral neuropathy with evidence of callus formation     No  6. poor circulation     No      Review of Systems   Constitutional, HEENT, cardiovascular, pulmonary, GI, , musculoskeletal, neuro, skin, endocrine and psych systems are negative, except as otherwise noted.    Patient Active Problem List   Diagnosis     Encounter for monitoring testosterone replacement therapy     Testicular hypofunction     Benign essential hypertension     Hemangioma of spine     ACP (advance care planning)     Chronic gout     Esophageal reflux     Type 2 diabetes mellitus without complication, without long-term current use of insulin (H)     Encounter for diabetic foot exam (H)     Type 2 diabetes mellitus with diabetic polyneuropathy, without long-term current use of insulin (H)     Basal cell carcinoma, face     Personal history of malignant neoplasm of skin     AK (actinic keratosis)     History of nonmelanoma skin cancer     Abnormal LFTs     Adenomatous duodenal polyp     Hypoxia     Sinus bradycardia     Near syncope     Past Surgical History:   Procedure Laterality Date     cardiolyte stress test  2000    chest pain     " CHOLECYSTECTOMY, COMMON BILE DUCT EXPLORATION, COMBINED  04/15/2018     COLONOSCOPY  1999     fistula in ANO       HEMORRHOIDECTOMY       removal of dermoid cyst of mediastinum       VASECTOMY         Social History     Tobacco Use     Smoking status: Never Smoker     Smokeless tobacco: Never Used   Substance Use Topics     Alcohol use: Yes     Comment: socially     Family History   Problem Relation Age of Onset     C.A.D. Father 80     C.A.D. Mother      Hypertension Mother      C.A.D. Brother      C.A.D. Other         family hx           Current Outpatient Medications   Medication Sig Dispense Refill     allopurinol (ZYLOPRIM) 100 MG tablet TAKE 1 TABLET BY MOUTH TWICE A  tablet 3     amLODIPine (NORVASC) 5 MG tablet TAKE 1 TABLET BY MOUTH EVERY DAY 90 tablet 3     blood glucose monitoring (FREESTYLE) lancets Use to test blood sugar 1 times daily. 100 each 3     calcium carbonate (TUMS) 500 MG chewable tablet Take 1 tablet by mouth daily as needed        colchicine (COLCYRS) 0.6 MG tablet TAKE 2 TABLETS AT THE ONSET OF GOUT PAIN. MAY TAKE ONE TABLET AGAIN IN ONE HOUR. MAX 4 TABS IN 24 HRS. 20 tablet 1     FREESTYLE LITE test strip USE TO TEST BLOOD SUGARS ONCE A DAY. 100 each 2     losartan (COZAAR) 25 MG tablet TAKE 1 TABLET BY MOUTH EVERY DAY 90 tablet 2     metFORMIN (GLUCOPHAGE-XR) 500 MG 24 hr tablet TAKE 2 TABLETS (1000MG) BY TWICE DAILY WITH MEALS (Patient taking differently: Take 500 mg by mouth 2 times daily (with meals) ) 120 tablet 3     pantoprazole (PROTONIX) 40 MG EC tablet Take 40 mg by mouth daily       rosuvastatin (CRESTOR) 10 MG tablet TAKE 1 TABLET BY MOUTH EVERY DAY 90 tablet 0     sildenafil (REVATIO) 20 MG tablet Take 1-2 tablets (20-40 mg) by mouth daily as needed (erectile dysfunction) 60 tablet 1     tamsulosin (FLOMAX) 0.4 MG capsule TAKE 1 CAPSULE DAILY BY MOUTH (Patient taking differently: Take 0.4 mg by mouth At Bedtime ) 90 capsule 3     atenolol (TENORMIN) 50 MG tablet Take  50 mg by mouth daily       Allergies   Allergen Reactions     Lactose Unknown     LACTOSE INTOLERANT     Recent Labs   Lab Test 06/09/21  1156 10/30/20  1104 08/12/20  1401 05/18/20  1059 02/21/20  0747 02/21/20  0747 03/18/19  0902 03/18/19  0902 05/09/18  0730 05/09/18  0730 04/24/18  1015 10/18/17  0951 10/18/17  0951   A1C  --   --  6.4* 6.4*  --  6.9*   < > 7.4*   < > 7.8*  --    < >  --    LDL  --   --   --   --   --  38  --  108*  --  83  --   --   --    HDL  --   --   --   --   --  38*  --  37*  --  39*  --   --   --    TRIG  --   --   --   --   --  192*  --  191*  --  186*  --   --   --    ALT 31 20  --  35   < > 46  --  49  --  44 88*   < > 42   CR 0.97 0.94 0.76 0.83   < > 1.01  --  0.96   < > 0.94 0.96   < > 0.90   GFRESTIMATED 77 80 >90 87   < > 74  --  79   < > 79 77   < > 83   GFRESTBLACK 89 >90 >90 >90   < > 85  --  >90   < > >90 >90   < > >90   POTASSIUM 4.1 3.7 4.2 4.3   < > 3.5  --  3.3*   < > 3.8 4.0   < > 3.5   TSH  --   --   --   --   --   --   --   --   --   --  1.44  --  1.52    < > = values in this interval not displayed.        BP Readings from Last 3 Encounters:   06/16/21 (!) 152/82   06/10/21 139/63   10/30/20 125/67    Wt Readings from Last 3 Encounters:   06/16/21 76.2 kg (168 lb)   06/09/21 74.3 kg (163 lb 12.8 oz)   08/17/20 70.9 kg (156 lb 4.8 oz)                        Objective    BP (!) 152/82 (BP Location: Right arm, Patient Position: Sitting, Cuff Size: Adult Regular)   Pulse 76   Temp 97.5  F (36.4  C)   Resp 18   Wt 76.2 kg (168 lb)   SpO2 96%   BMI 24.11 kg/m    Body mass index is 24.11 kg/m .  Physical Exam   GENERAL: healthy, alert and no distress  EYES: Eyes grossly normal to inspection, PERRL and conjunctivae and sclerae normal  NECK: no adenopathy, no asymmetry, masses, or scars and thyroid normal to palpation  RESP: lungs clear to auscultation - no rales, rhonchi or wheezes  CV: regular rate and rhythm, normal S1 S2, no S3 or S4, no murmur, click or rub, no  peripheral edema and peripheral pulses strong  ABDOMEN: soft, nontender, no hepatosplenomegaly, no masses and bowel sounds normal  MS: no gross musculoskeletal defects noted, no edema  NEURO: Normal strength and tone, mentation intact and speech normal  PSYCH: mentation appears normal, affect normal/bright  Diabetic foot exam: normal DP and PT pulses, no trophic changes or ulcerative lesions, normal sensory exam and normal monofilament exam    Results for orders placed or performed in visit on 06/16/21   Hemoglobin A1c     Status: Abnormal   Result Value Ref Range    Hemoglobin A1C 10.9 (H) 0 - 5.6 %   Lipid Profile     Status: Abnormal   Result Value Ref Range    Cholesterol 87 <200 mg/dL    Triglycerides 113 <150 mg/dL    HDL Cholesterol 32 (L) >39 mg/dL    LDL Cholesterol Calculated 32 <100 mg/dL    Non HDL Cholesterol 55 <130 mg/dL   Comprehensive metabolic panel     Status: Abnormal   Result Value Ref Range    Sodium 132 (L) 133 - 144 mmol/L    Potassium 4.6 3.4 - 5.3 mmol/L    Chloride 100 94 - 109 mmol/L    Carbon Dioxide 26 20 - 32 mmol/L    Anion Gap 6 3 - 14 mmol/L    Glucose 344 (H) 70 - 99 mg/dL    Urea Nitrogen 13 7 - 30 mg/dL    Creatinine 0.75 0.66 - 1.25 mg/dL    GFR Estimate >90 >60 mL/min/[1.73_m2]    GFR Estimate If Black >90 >60 mL/min/[1.73_m2]    Calcium 9.2 8.5 - 10.1 mg/dL    Bilirubin Total 0.4 0.2 - 1.3 mg/dL    Albumin 4.0 3.4 - 5.0 g/dL    Protein Total 7.9 6.8 - 8.8 g/dL    Alkaline Phosphatase 80 40 - 150 U/L    ALT 30 0 - 70 U/L    AST 21 0 - 45 U/L   Albumin Random Urine Quantitative with Creat Ratio     Status: Abnormal   Result Value Ref Range    Creatinine Urine 38 mg/dL    Albumin Urine mg/L 9 mg/L    Albumin Urine mg/g Cr 24.59 (H) 0 - 17 mg/g Cr   CBC with platelets     Status: Abnormal   Result Value Ref Range    WBC 4.0 4.0 - 11.0 10e9/L    RBC Count 4.30 (L) 4.4 - 5.9 10e12/L    Hemoglobin 13.5 13.3 - 17.7 g/dL    Hematocrit 39.6 (L) 40.0 - 53.0 %    MCV 92 78 - 100 fl    MCH  31.4 26.5 - 33.0 pg    MCHC 34.1 31.5 - 36.5 g/dL    RDW 13.9 10.0 - 15.0 %    Platelet Count 137 (L) 150 - 450 10e9/L   Estimated Average Glucose     Status: None   Result Value Ref Range    Estimated Average Glucose 266 mg/dL

## 2021-06-10 NOTE — DISCHARGE INSTRUCTIONS
You have a follow up appointment with Radha Obrien on Tuesday, June 15th at 10:30 AM at the [facility]. Call [phone] with questions or to reschedule this appointment.

## 2021-06-10 NOTE — PLAN OF CARE
Pt is A&O. Pleasant and cooperative. VSS except HR goes bradycardic at times as low as 53 this a.m. telemetry reading SR 60's per ICU tele report. Denies dizziness or feeling light headed. Denies chest pain. Denies shortness of breath. LS clear and equal bilaterally. Independent in room. IV saline locked and removed. Good appetite.

## 2021-06-16 ENCOUNTER — OFFICE VISIT (OUTPATIENT)
Dept: FAMILY MEDICINE | Facility: OTHER | Age: 76
End: 2021-06-16
Attending: FAMILY MEDICINE
Payer: COMMERCIAL

## 2021-06-16 VITALS
WEIGHT: 168 LBS | OXYGEN SATURATION: 96 % | HEART RATE: 76 BPM | BODY MASS INDEX: 24.11 KG/M2 | DIASTOLIC BLOOD PRESSURE: 82 MMHG | TEMPERATURE: 97.5 F | SYSTOLIC BLOOD PRESSURE: 152 MMHG | RESPIRATION RATE: 18 BRPM

## 2021-06-16 DIAGNOSIS — E11.9 TYPE 2 DIABETES MELLITUS WITHOUT COMPLICATION, WITHOUT LONG-TERM CURRENT USE OF INSULIN (H): Primary | ICD-10-CM

## 2021-06-16 DIAGNOSIS — E11.9 ENCOUNTER FOR DIABETIC FOOT EXAM (H): ICD-10-CM

## 2021-06-16 DIAGNOSIS — E78.2 MIXED HYPERLIPIDEMIA: ICD-10-CM

## 2021-06-16 DIAGNOSIS — I10 BENIGN ESSENTIAL HYPERTENSION: ICD-10-CM

## 2021-06-16 DIAGNOSIS — R55 NEAR SYNCOPE: ICD-10-CM

## 2021-06-16 DIAGNOSIS — E11.9 TYPE 2 DIABETES MELLITUS WITHOUT COMPLICATION, WITHOUT LONG-TERM CURRENT USE OF INSULIN (H): ICD-10-CM

## 2021-06-16 LAB
ALBUMIN SERPL-MCNC: 4 G/DL (ref 3.4–5)
ALP SERPL-CCNC: 80 U/L (ref 40–150)
ALT SERPL W P-5'-P-CCNC: 30 U/L (ref 0–70)
ANION GAP SERPL CALCULATED.3IONS-SCNC: 6 MMOL/L (ref 3–14)
AST SERPL W P-5'-P-CCNC: 21 U/L (ref 0–45)
BILIRUB SERPL-MCNC: 0.4 MG/DL (ref 0.2–1.3)
BUN SERPL-MCNC: 13 MG/DL (ref 7–30)
CALCIUM SERPL-MCNC: 9.2 MG/DL (ref 8.5–10.1)
CHLORIDE SERPL-SCNC: 100 MMOL/L (ref 94–109)
CHOLEST SERPL-MCNC: 87 MG/DL
CO2 SERPL-SCNC: 26 MMOL/L (ref 20–32)
CREAT SERPL-MCNC: 0.75 MG/DL (ref 0.66–1.25)
CREAT UR-MCNC: 38 MG/DL
ERYTHROCYTE [DISTWIDTH] IN BLOOD BY AUTOMATED COUNT: 13.9 % (ref 10–15)
EST. AVERAGE GLUCOSE BLD GHB EST-MCNC: 266 MG/DL
GFR SERPL CREATININE-BSD FRML MDRD: >90 ML/MIN/{1.73_M2}
GLUCOSE SERPL-MCNC: 344 MG/DL (ref 70–99)
HBA1C MFR BLD: 10.9 % (ref 0–5.6)
HCT VFR BLD AUTO: 39.6 % (ref 40–53)
HDLC SERPL-MCNC: 32 MG/DL
HGB BLD-MCNC: 13.5 G/DL (ref 13.3–17.7)
LDLC SERPL CALC-MCNC: 32 MG/DL
MCH RBC QN AUTO: 31.4 PG (ref 26.5–33)
MCHC RBC AUTO-ENTMCNC: 34.1 G/DL (ref 31.5–36.5)
MCV RBC AUTO: 92 FL (ref 78–100)
MICROALBUMIN UR-MCNC: 9 MG/L
MICROALBUMIN/CREAT UR: 24.59 MG/G CR (ref 0–17)
NONHDLC SERPL-MCNC: 55 MG/DL
PLATELET # BLD AUTO: 137 10E9/L (ref 150–450)
POTASSIUM SERPL-SCNC: 4.6 MMOL/L (ref 3.4–5.3)
PROT SERPL-MCNC: 7.9 G/DL (ref 6.8–8.8)
RBC # BLD AUTO: 4.3 10E12/L (ref 4.4–5.9)
SODIUM SERPL-SCNC: 132 MMOL/L (ref 133–144)
TRIGL SERPL-MCNC: 113 MG/DL
WBC # BLD AUTO: 4 10E9/L (ref 4–11)

## 2021-06-16 PROCEDURE — 99495 TRANSJ CARE MGMT MOD F2F 14D: CPT | Performed by: NURSE PRACTITIONER

## 2021-06-16 PROCEDURE — 80061 LIPID PANEL: CPT | Mod: ZL | Performed by: NURSE PRACTITIONER

## 2021-06-16 PROCEDURE — 999N001182 HC STATISTIC ESTIMATED AVERAGE GLUCOSE: Mod: ZL | Performed by: NURSE PRACTITIONER

## 2021-06-16 PROCEDURE — 36415 COLL VENOUS BLD VENIPUNCTURE: CPT | Mod: ZL | Performed by: NURSE PRACTITIONER

## 2021-06-16 PROCEDURE — 82043 UR ALBUMIN QUANTITATIVE: CPT | Mod: ZL | Performed by: NURSE PRACTITIONER

## 2021-06-16 PROCEDURE — 99214 OFFICE O/P EST MOD 30 MIN: CPT | Performed by: NURSE PRACTITIONER

## 2021-06-16 PROCEDURE — G0463 HOSPITAL OUTPT CLINIC VISIT: HCPCS

## 2021-06-16 PROCEDURE — 85027 COMPLETE CBC AUTOMATED: CPT | Mod: ZL | Performed by: NURSE PRACTITIONER

## 2021-06-16 PROCEDURE — 83036 HEMOGLOBIN GLYCOSYLATED A1C: CPT | Mod: ZL | Performed by: NURSE PRACTITIONER

## 2021-06-16 PROCEDURE — 80053 COMPREHEN METABOLIC PANEL: CPT | Mod: ZL | Performed by: NURSE PRACTITIONER

## 2021-06-16 RX ORDER — ROSUVASTATIN CALCIUM 10 MG/1
TABLET, COATED ORAL
Qty: 90 TABLET | Refills: 0 | Status: SHIPPED | OUTPATIENT
Start: 2021-06-16 | End: 2021-09-15

## 2021-06-16 ASSESSMENT — PAIN SCALES - GENERAL: PAINLEVEL: NO PAIN (0)

## 2021-06-16 NOTE — TELEPHONE ENCOUNTER
[facility]      Last Written Prescription Date:  3/8/21  Last Fill Quantity: 90,   # refills: 0  Last Office Visit: 8/12/2020  Future Office visit:    Next 5 appointments (look out 90 days)    Jun 16, 2021 10:30 AM  (Arrive by 10:15 AM)  SHORT with Judith Bran CNP  [facility] ([facility] ) [address] [facility] [address]  [phone]

## 2021-06-16 NOTE — NURSING NOTE
"Chief Complaint   Patient presents with     Hospital F/U       Initial BP (!) 152/82 (BP Location: Right arm, Patient Position: Sitting, Cuff Size: Adult Regular)   Pulse 76   Temp 97.5  F (36.4  C)   Resp 18   Wt 76.2 kg (168 lb)   SpO2 96%   BMI 24.11 kg/m   Estimated body mass index is 24.11 kg/m  as calculated from the following:    Height as of 6/9/21: 1.778 m (5' 10\").    Weight as of this encounter: 76.2 kg (168 lb).  Medication Reconciliation: complete  Abigail Carrion  "

## 2021-06-16 NOTE — Clinical Note
VEGAI only: I saw Rafa today. Please see note. Referred to diabetes for 2 week CGM and also to cardiology for follow up of near syncope and bradycardia. Also recommended 3 month follow up with you. He is planning on routine follow up colonoscopy soon as well.

## 2021-06-16 NOTE — PATIENT INSTRUCTIONS
Cardiology follow up  Diabetic Education follow up.     Start taking loratadine 10 mg daily for your allergies. This is over the counter.       Patient Education   Goals for Your Diabetes Care  A1c   Goal:  Less than 7 percent--ask your doctor if this is right for you  Check it: Every 3 to 6 months  Why:  Shows how well your blood glucose was controlled over the past 3 months  Blood pressure   Goal: Under 140/90  Check it:  At every clinic check up for diabetes  Why:  May prevent stroke, heart disease and eye and kidney diseases  Cholesterol   Goal:  Discuss cholesterol management with your doctor, and consider taking a statin medicine  Check it:  Every year  Why:  Helps prevent heart attacks and stroke  Kidney test (urine microalbumin)  Goal:  Under 30  Do it:  Every year  Why:  Finds kidney problems before they get worse  Foot exam   Goal:  No cuts or sores, normal sensation  Do it: Remove shoes and socks at every visit; have a monofilament test every year  Why: Finds foot problems before they get worse  Eye exam   Goal:  No changes in your eyes  Do it: Every year  Why:  Finds eye problems before they get worse  Exercise  Goal:  150 minutes of aerobic exercises and 2 to 3 sessions of strength training  Do it: Every week  Why:  Helps manage diabetes and improve health  Aspirin  Goal:  If you are age 50 or older, ask your doctor if you should take aspirin  Take it: Every day, or as prescribed  Why: Lowers the risk of heart attack and stroke  Smoking and tobacco  Goal: No tobacco use  Why: Tobacco is a major risk for heart disease  Diabetes education  Goal: Learn how to manage your diabetes  Do it: At least once a year--ask your doctor for a referral  Why: The more you know, the better you can manage your diabetes  Your goals may be different from what you see here. Your care team will tell you what your goals should be.   For informational purposes only. Not to replace the advice of your health care provider.    Copyright   2009 [facility]. All rights reserved. [employer] [id number] - Rev 01/16.       Patient Education     High Blood Sugar (Hyperglycemia)  Too much sugar (glucose) in your blood is called high blood sugar (hyperglycemia). This can lead to a dangerous condition called ketoacidosis. In severe cases, it can lead to fluid loss (dehydration) and coma.   Possible causes of high blood sugar     Having a poor treatment plan for diabetes     Being sick    Being under stress    Taking certain medicines, such as steroids    Eating too much food, especially carbohydrates    Being less active than normal    Not taking enough diabetes medicine    Symptoms of high blood sugar   High blood sugar may not cause symptoms. If you do have symptoms, they may include:     Thirst    Frequent need to urinate    Feeling tired or drowsy    Nausea and vomiting    Itchy, dry skin    Blurry vision    Fast breathing and breath that smells fruity     Weakness    Dizziness    Wounds or skin infections that don t heal    Unexplained weight loss if hyperglycemia lasts for more than a few days   What to do   Do the following:     Check your blood sugar.    Drink plenty of sugar-free, caffeine-free liquids such as water. Don t drink fruit juice.    Check your blood sugar again every 4 hours. If you take insulin or diabetes medicines, follow your sick-day plan for taking medicine. Call your healthcare provider if you are not able to eat.    Check your blood or urine for ketones as directed.    Call your provider if your blood sugar and ketones don't go back to your target range.    If the value is high, take your diabetes medicines as prescribed. And check your blood sugar more often. Doses of medicines such as insulin can be increased slightly if blood sugars stay high. But your provider must approve this.     When you have hyperglycemia, drink plenty of water or other sugar-free, caffeine-free liquids.   Preventing high blood  sugar  To help keep your blood sugar from getting too high:    Control stress.    When you're ill, follow your sick-day plan.     Follow your meal plan. Eat only the amount of food on your meal plan.    Stick to your exercise plan.    Take your insulin or diabetes medicines as directed by your healthcare team. Also test your blood sugar as directed. If the plan is not working for you, discuss it with your healthcare provider.  Other things to do    Carry a medical ID card or a compact USB drive. Or wear a medical alert bracelet or necklace. It should say that you have diabetes. It should also say what to do in case you pass out or go into a coma.    Make sure family, friends, and coworkers know the signs of high blood sugar. Tell them what to do if your blood sugar gets very high and you can t help yourself.    Talk with your healthcare team about other things you can do to prevent high blood sugar.       Special note: Drink plenty of sugar-free and caffeine-free liquids when you feel symptoms of hyperglycemia. Call your healthcare provider if you keep having episodes of high blood sugar.   [employer] last reviewed this educational content on 11/1/2018 2000-2021 [employer]. All rights reserved. This information is not intended as a substitute for professional medical care. Always follow your healthcare professional's instructions.           Patient Education     Low Blood Sugar (Hypoglycemia)     Fast-acting sugar can be a glass of nonfat milk.        Having too little sugar (glucose) in your blood is called low blood sugar (hypoglycemia). Low blood sugar often means anything lower than 70 mg/dL. Talk with your healthcare provider about your target range. Ask what level is too low for you. Diabetes itself doesn t cause low blood sugar. But some treatments for diabetes may raise your risk for it. These include pills or insulin. Low blood sugar may make you pass out or have a seizure. So always treat  low blood sugar right away. But don't overeat.  Special note: Always carry a source of fast-acting sugar and a snack in case of hypoglycemia.  What you may notice  If you have low blood sugar, you may have one or more of these symptoms:    Shakiness or dizziness    Cold, clammy skin or sweating    Feeling hungry    Headache    Nervousness    A hard, fast heartbeat    Weakness    Confusion or irritability    Blurred eyesight    Having nightmares or waking up confused or sweating    Numbness or tingling in the lips or tongue  What you should do  Here are tips to follow if you have hypoglycemia:     First check your blood sugar. If it's too low (out of your target range), eat or drink 15 to 20 grams of fast-acting sugar. This may be 3 to 4 glucose tablets, 4 ounces (half a cup) of fruit juice or regular (nondiet) soda, or 1 tablespoon of honey. Don t take more than this, or your blood sugar may go too high.    Don't eat foods high in protein such as milk or nuts to treat hypoglycemia. Protein may increase your insulin response. It may lower your blood sugar even more.    Wait 15 minutes. Then recheck your blood sugar if you can.    If your blood sugar is still too low, repeat the steps above and check your blood sugar again. If your blood sugar still has not gone back to your target range, contact your healthcare provider. Or seek emergency care.    Once your blood sugar is back at target range, eat a snack or meal.  Preventing low blood sugar  Things you can do include the following:     If your condition needs a strict treatment plan, eat meals and snacks at the same times each day. Don t skip meals!    If your treatment plan lets you change when and what you eat, learn how to change the time and dose of your rapid-acting insulin to match this.     Ask your healthcare provider if it's safe to drink alcohol. Never drink on an empty stomach.    Take your medicine at the prescribed times.    Always carry a source of  fast-acting sugar and a snack when you re away from home.    If you have had several hypoglycemic episodes, talk with your healthcare provider. See if you may be able to take less medicine. You also may have a condition where you no longer recognize the symptoms of low blood sugar until the value falls to dangerous levels.  Other things to do  Other tips include:    Carry a medical ID card or wear a medical alert bracelet or necklace. It should say that you have diabetes. It should also say what to do if you pass out or have a seizure.    Make sure your family, friends, and coworkers know the signs of low blood sugar. Tell them what to do if your blood sugar falls very low and you can t treat yourself.    Keep a glucagon emergency kit handy. Be sure your family, friends, and coworkers know how and when to use it. Check it often. Replace the glucagon before it expires.    Talk with your healthcare team about other things you can do to prevent low blood sugar.  If you have unexplained hypoglycemia or hypoglycemia several times, call your healthcare provider.  [email or web address] last reviewed this educational content on 11/1/2018 2000-2021 [employer]. All rights reserved. This information is not intended as a substitute for professional medical care. Always follow your healthcare professional's instructions.           Patient Education     Understanding Bradycardia    Your heart has an electrical system that sends signals to control the heartbeat. Any abnormal change in the speed or pattern of the heartbeat is called an arrhythmia. An arrhythmia that causes the heart to beat slower than normal is called bradycardia. There are many types of bradycardia. In healthy children and adults, bradycardia is often normal, particularly during sleep. Sometimes bradycardia is caused by failure of the heart s natural timer or failure of the electrical pathways within the heart. Depending on the type you have and how severe  it is, you may need treatment.   What causes bradycardia?   Many things can cause bradycardia, including:     The natural aging  process    Coronary artery disease    Heart attacks    Heart muscle disease    Problems with the SA node. This is the heart s natural pacemaker that starts each heartbeat.    Problems with the electrical pathways in the heart    Problems with the structure of the heart that you are born with    Infection    Use of certain medicines    Electrolyte imbalances    Underactive thyroid     Sleep apnea    Increased pressure in the brain or stroke   Well-conditioned athletes often have a naturally slow heart rate.  What are the symptoms of bradycardia?   Bradycardia can cause an irregular heartbeat. It can also make it harder for the heart to pump blood to the body. This may cause symptoms such as:     Tiredness (fatigue)    Weakness    Loss of ability to exercise    Shortness of breath    Dizziness or fainting    Chest pain    Swelling in your legs and feet  Some people with bradycardia have no symptoms at all.  How is bradycardia treated?   Treatment for bradycardia depends on the cause. It also depends on the type you have and how severe your symptoms are. If you need treatment, your options may include:     Treatment of the underlying cause, if the cause can be fixed. For instance, if a medicine is causing bradycardia, stopping the medicine, under your doctor s guidance, may correct the problem. Or if a condition such as an underactive thyroid is the cause, treating the thyroid may keep bradycardia from coming back.    Medicines. Medicines may be used to treat conditions that cause bradycardia. Some medicines can also be used in the short-term to increase the heart rate. These are generally given with an IV (intravenous) and therefore are not long-term solutions.     Pacemaker. This is a device that is placed permanently under the skin (usually in your chest) and connected to your heart. The  device monitors your heart, and when the heart beats too slowly, the device sends electrical impulses to keep the heart beating at the right pace.    Temporary pacemaker. A temporary pacemaker may be connected to the heart using wires guided through a blood vessel in your neck or leg to the heart. This is also sometimes done using special pads placed on the chest. This may be used in an emergency, as a bridge to permanent pacing, when pacing is only needed short-term, or to further evaluate your condition.  What are possible complications of bradycardia?   These can include:    Development of other types of arrhythmias    Heart failure. This problem occurs when the heart weakens so much that it no longer pumps blood well.    Sudden cardiac arrest. This is when the heart suddenly stops beating.  When should I call my healthcare provider?   Call your healthcare provider right away if you have any of these:    Symptoms that don t get better with treatment, or get worse    New symptoms  [employer] last reviewed this educational content on 5/1/2019 2000-2021 [employer]. All rights reserved. This information is not intended as a substitute for professional medical care. Always follow your healthcare professional's instructions.

## 2021-06-20 ENCOUNTER — APPOINTMENT (OUTPATIENT)
Dept: ULTRASOUND IMAGING | Facility: HOSPITAL | Age: 76
End: 2021-06-20
Attending: NURSE PRACTITIONER
Payer: COMMERCIAL

## 2021-06-20 ENCOUNTER — HOSPITAL ENCOUNTER (OUTPATIENT)
Facility: HOSPITAL | Age: 76
Setting detail: OBSERVATION
Discharge: HOME | End: 2021-06-21
Attending: NURSE PRACTITIONER | Admitting: INTERNAL MEDICINE
Payer: COMMERCIAL

## 2021-06-20 ENCOUNTER — APPOINTMENT (OUTPATIENT)
Dept: GENERAL RADIOLOGY | Facility: HOSPITAL | Age: 76
End: 2021-06-20
Attending: NURSE PRACTITIONER
Payer: COMMERCIAL

## 2021-06-20 DIAGNOSIS — R79.89 ELEVATED LFTS: ICD-10-CM

## 2021-06-20 DIAGNOSIS — R50.9 FEVER AND CHILLS: ICD-10-CM

## 2021-06-20 LAB
ALBUMIN SERPL-MCNC: 3.2 G/DL (ref 3.4–5)
ALBUMIN UR-MCNC: 100 MG/DL
ALP SERPL-CCNC: 164 U/L (ref 40–150)
ALT SERPL W P-5'-P-CCNC: 508 U/L (ref 0–70)
ANION GAP SERPL CALCULATED.3IONS-SCNC: 9 MMOL/L (ref 3–14)
APAP SERPL-MCNC: 6 MG/L (ref 10–30)
APPEARANCE UR: ABNORMAL
AST SERPL W P-5'-P-CCNC: 150 U/L (ref 0–45)
BACTERIA #/AREA URNS HPF: ABNORMAL /HPF
BASOPHILS # BLD AUTO: 0 10E9/L (ref 0–0.2)
BASOPHILS NFR BLD AUTO: 0.2 %
BILIRUB SERPL-MCNC: 1.3 MG/DL (ref 0.2–1.3)
BILIRUB UR QL STRIP: NEGATIVE
BUN SERPL-MCNC: 18 MG/DL (ref 7–30)
CALCIUM SERPL-MCNC: 8.7 MG/DL (ref 8.5–10.1)
CHLORIDE SERPL-SCNC: 101 MMOL/L (ref 94–109)
CO2 SERPL-SCNC: 24 MMOL/L (ref 20–32)
COLOR UR AUTO: YELLOW
CREAT SERPL-MCNC: 0.95 MG/DL (ref 0.66–1.25)
DIFFERENTIAL METHOD BLD: ABNORMAL
EOSINOPHIL # BLD AUTO: 0 10E9/L (ref 0–0.7)
EOSINOPHIL NFR BLD AUTO: 0 %
ERYTHROCYTE [DISTWIDTH] IN BLOOD BY AUTOMATED COUNT: 13.2 % (ref 10–15)
GFR SERPL CREATININE-BSD FRML MDRD: 78 ML/MIN/{1.73_M2}
GLUCOSE SERPL-MCNC: 308 MG/DL (ref 70–99)
GLUCOSE UR STRIP-MCNC: >1000 MG/DL
HCT VFR BLD AUTO: 37.2 % (ref 40–53)
HETEROPH AB SER QL: NEGATIVE
HGB BLD-MCNC: 12.6 G/DL (ref 13.3–17.7)
HGB UR QL STRIP: ABNORMAL
IMM GRANULOCYTES # BLD: 0 10E9/L (ref 0–0.4)
IMM GRANULOCYTES NFR BLD: 0.4 %
KETONES UR STRIP-MCNC: 5 MG/DL
LABORATORY COMMENT REPORT: NORMAL
LACTATE BLD-SCNC: 1.7 MMOL/L (ref 0.7–2)
LACTATE BLD-SCNC: 2.7 MMOL/L (ref 0.7–2)
LEUKOCYTE ESTERASE UR QL STRIP: NEGATIVE
LIPASE SERPL-CCNC: 46 U/L (ref 73–393)
LYMPHOCYTES # BLD AUTO: 0.4 10E9/L (ref 0.8–5.3)
LYMPHOCYTES NFR BLD AUTO: 7.9 %
MCH RBC QN AUTO: 30.7 PG (ref 26.5–33)
MCHC RBC AUTO-ENTMCNC: 33.9 G/DL (ref 31.5–36.5)
MCV RBC AUTO: 91 FL (ref 78–100)
MONOCYTES # BLD AUTO: 0.4 10E9/L (ref 0–1.3)
MONOCYTES NFR BLD AUTO: 7.6 %
MUCOUS THREADS #/AREA URNS LPF: PRESENT /LPF
NEUTROPHILS # BLD AUTO: 4 10E9/L (ref 1.6–8.3)
NEUTROPHILS NFR BLD AUTO: 83.9 %
NITRATE UR QL: NEGATIVE
NRBC # BLD AUTO: 0 10*3/UL
NRBC BLD AUTO-RTO: 0 /100
PH UR STRIP: 5.5 PH (ref 4.7–8)
PLATELET # BLD AUTO: 104 10E9/L (ref 150–450)
POTASSIUM SERPL-SCNC: 3.4 MMOL/L (ref 3.4–5.3)
PROCALCITONIN SERPL-MCNC: 0.44 NG/ML
PROT SERPL-MCNC: 7.3 G/DL (ref 6.8–8.8)
RBC # BLD AUTO: 4.1 10E12/L (ref 4.4–5.9)
RBC #/AREA URNS AUTO: 5 /HPF (ref 0–2)
SALICYLATES SERPL-MCNC: <2 MG/DL
SARS-COV-2 RNA RESP QL NAA+PROBE: NEGATIVE
SODIUM SERPL-SCNC: 134 MMOL/L (ref 133–144)
SOURCE: ABNORMAL
SP GR UR STRIP: >1.05 (ref 1–1.03)
SPECIMEN SOURCE: NORMAL
SQUAMOUS #/AREA URNS AUTO: 1 /HPF (ref 0–1)
TROPONIN I SERPL-MCNC: <0.015 UG/L (ref 0–0.04)
UROBILINOGEN UR STRIP-MCNC: 4 MG/DL (ref 0–2)
WBC # BLD AUTO: 4.7 10E9/L (ref 4–11)
WBC #/AREA URNS AUTO: 5 /HPF (ref 0–5)

## 2021-06-20 PROCEDURE — 86803 HEPATITIS C AB TEST: CPT | Performed by: EMERGENCY MEDICINE

## 2021-06-20 PROCEDURE — U0005 INFEC AGEN DETEC AMPLI PROBE: HCPCS | Performed by: EMERGENCY MEDICINE

## 2021-06-20 PROCEDURE — 86708 HEPATITIS A ANTIBODY: CPT | Performed by: EMERGENCY MEDICINE

## 2021-06-20 PROCEDURE — 84145 PROCALCITONIN (PCT): CPT | Performed by: EMERGENCY MEDICINE

## 2021-06-20 PROCEDURE — U0003 INFECTIOUS AGENT DETECTION BY NUCLEIC ACID (DNA OR RNA); SEVERE ACUTE RESPIRATORY SYNDROME CORONAVIRUS 2 (SARS-COV-2) (CORONAVIRUS DISEASE [COVID-19]), AMPLIFIED PROBE TECHNIQUE, MAKING USE OF HIGH THROUGHPUT TECHNOLOGIES AS DESCRIBED BY CMS-2020-01-R: HCPCS | Performed by: EMERGENCY MEDICINE

## 2021-06-20 PROCEDURE — 81001 URINALYSIS AUTO W/SCOPE: CPT | Performed by: NURSE PRACTITIONER

## 2021-06-20 PROCEDURE — 99285 EMERGENCY DEPT VISIT HI MDM: CPT | Mod: 25

## 2021-06-20 PROCEDURE — 84484 ASSAY OF TROPONIN QUANT: CPT | Performed by: NURSE PRACTITIONER

## 2021-06-20 PROCEDURE — 99284 EMERGENCY DEPT VISIT MOD MDM: CPT | Performed by: NURSE PRACTITIONER

## 2021-06-20 PROCEDURE — 86705 HEP B CORE ANTIBODY IGM: CPT | Performed by: EMERGENCY MEDICINE

## 2021-06-20 PROCEDURE — 87086 URINE CULTURE/COLONY COUNT: CPT | Performed by: PHYSICIAN ASSISTANT

## 2021-06-20 PROCEDURE — 36415 COLL VENOUS BLD VENIPUNCTURE: CPT | Performed by: NURSE PRACTITIONER

## 2021-06-20 PROCEDURE — 86706 HEP B SURFACE ANTIBODY: CPT | Mod: GZ | Performed by: EMERGENCY MEDICINE

## 2021-06-20 PROCEDURE — 76705 ECHO EXAM OF ABDOMEN: CPT

## 2021-06-20 PROCEDURE — 86704 HEP B CORE ANTIBODY TOTAL: CPT | Performed by: EMERGENCY MEDICINE

## 2021-06-20 PROCEDURE — 80179 DRUG ASSAY SALICYLATE: CPT | Performed by: NURSE PRACTITIONER

## 2021-06-20 PROCEDURE — 96360 HYDRATION IV INFUSION INIT: CPT

## 2021-06-20 PROCEDURE — 80053 COMPREHEN METABOLIC PANEL: CPT | Performed by: NURSE PRACTITIONER

## 2021-06-20 PROCEDURE — 86308 HETEROPHILE ANTIBODY SCREEN: CPT | Performed by: EMERGENCY MEDICINE

## 2021-06-20 PROCEDURE — 87186 SC STD MICRODIL/AGAR DIL: CPT | Performed by: NURSE PRACTITIONER

## 2021-06-20 PROCEDURE — 96361 HYDRATE IV INFUSION ADD-ON: CPT

## 2021-06-20 PROCEDURE — 258N000003 HC RX IP 258 OP 636: Performed by: NURSE PRACTITIONER

## 2021-06-20 PROCEDURE — C9803 HOPD COVID-19 SPEC COLLECT: HCPCS

## 2021-06-20 PROCEDURE — 83690 ASSAY OF LIPASE: CPT | Performed by: NURSE PRACTITIONER

## 2021-06-20 PROCEDURE — 71046 X-RAY EXAM CHEST 2 VIEWS: CPT

## 2021-06-20 PROCEDURE — 87040 BLOOD CULTURE FOR BACTERIA: CPT | Performed by: NURSE PRACTITIONER

## 2021-06-20 PROCEDURE — 83605 ASSAY OF LACTIC ACID: CPT | Mod: 91 | Performed by: NURSE PRACTITIONER

## 2021-06-20 PROCEDURE — 87340 HEPATITIS B SURFACE AG IA: CPT | Performed by: EMERGENCY MEDICINE

## 2021-06-20 PROCEDURE — 85025 COMPLETE CBC W/AUTO DIFF WBC: CPT | Performed by: NURSE PRACTITIONER

## 2021-06-20 PROCEDURE — 87077 CULTURE AEROBIC IDENTIFY: CPT | Performed by: NURSE PRACTITIONER

## 2021-06-20 PROCEDURE — 80143 DRUG ASSAY ACETAMINOPHEN: CPT | Performed by: NURSE PRACTITIONER

## 2021-06-20 RX ADMIN — SODIUM CHLORIDE 1000 ML: 9 INJECTION, SOLUTION INTRAVENOUS at 20:17

## 2021-06-20 ASSESSMENT — ENCOUNTER SYMPTOMS
DIARRHEA: 1
CHILLS: 1
DIZZINESS: 0
VOMITING: 0
COUGH: 0
ACTIVITY CHANGE: 1
FATIGUE: 1
SHORTNESS OF BREATH: 0
APPETITE CHANGE: 1
NAUSEA: 0
FLANK PAIN: 1
FEVER: 1
HEADACHES: 0
LIGHT-HEADEDNESS: 1
BACK PAIN: 1

## 2021-06-20 NOTE — ED TRIAGE NOTES
"Patient noted back pain, feels \"sharp pains\" all over back in different areas at different times. Patient has also had an intermittent fever since Friday.   "

## 2021-06-21 ENCOUNTER — HOSPITAL ENCOUNTER (OUTPATIENT)
Facility: HOSPITAL | Age: 76
Setting detail: OBSERVATION
Discharge: HOME | End: 2021-06-23
Attending: PHYSICIAN ASSISTANT
Payer: COMMERCIAL

## 2021-06-21 ENCOUNTER — APPOINTMENT (OUTPATIENT)
Dept: CT IMAGING | Facility: HOSPITAL | Age: 76
End: 2021-06-21
Attending: EMERGENCY MEDICINE
Payer: COMMERCIAL

## 2021-06-21 VITALS
TEMPERATURE: 102.5 F | SYSTOLIC BLOOD PRESSURE: 130 MMHG | WEIGHT: 159 LBS | OXYGEN SATURATION: 95 % | HEART RATE: 98 BPM | RESPIRATION RATE: 17 BRPM | BODY MASS INDEX: 22.81 KG/M2 | DIASTOLIC BLOOD PRESSURE: 72 MMHG

## 2021-06-21 DIAGNOSIS — D72.819 LEUKOPENIA: ICD-10-CM

## 2021-06-21 DIAGNOSIS — A77.49 HUMAN ANAPLASMOSIS: Primary | ICD-10-CM

## 2021-06-21 DIAGNOSIS — D69.6 THROMBOCYTOPENIA (H): ICD-10-CM

## 2021-06-21 DIAGNOSIS — D72.819 LEUKOPENIA, UNSPECIFIED TYPE: ICD-10-CM

## 2021-06-21 DIAGNOSIS — R78.81 BACTEREMIA DUE TO GRAM-NEGATIVE BACTERIA: ICD-10-CM

## 2021-06-21 DIAGNOSIS — R74.01 ELEVATED TRANSAMINASE LEVEL: ICD-10-CM

## 2021-06-21 PROBLEM — R50.9 FEVER AND CHILLS: Status: ACTIVE | Noted: 2021-06-21

## 2021-06-21 PROBLEM — R79.89 ELEVATED LFTS: Status: ACTIVE | Noted: 2021-06-21

## 2021-06-21 LAB
ALBUMIN SERPL-MCNC: 3.6 G/DL (ref 3.4–5)
ALP SERPL-CCNC: 173 U/L (ref 40–150)
ALT SERPL W P-5'-P-CCNC: 424 U/L (ref 0–70)
ANION GAP SERPL CALCULATED.3IONS-SCNC: 7 MMOL/L (ref 3–14)
AST SERPL W P-5'-P-CCNC: 90 U/L (ref 0–45)
BASOPHILS # BLD AUTO: 0 10E9/L (ref 0–0.2)
BASOPHILS NFR BLD AUTO: 0.3 %
BILIRUB SERPL-MCNC: 1.3 MG/DL (ref 0.2–1.3)
BUN SERPL-MCNC: 16 MG/DL (ref 7–30)
CALCIUM SERPL-MCNC: 8.8 MG/DL (ref 8.5–10.1)
CHLORIDE SERPL-SCNC: 102 MMOL/L (ref 94–109)
CO2 SERPL-SCNC: 26 MMOL/L (ref 20–32)
CREAT SERPL-MCNC: 0.95 MG/DL (ref 0.66–1.25)
DIFFERENTIAL METHOD BLD: ABNORMAL
EOSINOPHIL # BLD AUTO: 0 10E9/L (ref 0–0.7)
EOSINOPHIL NFR BLD AUTO: 0 %
ERYTHROCYTE [DISTWIDTH] IN BLOOD BY AUTOMATED COUNT: 13.5 % (ref 10–15)
GFR SERPL CREATININE-BSD FRML MDRD: 79 ML/MIN/{1.73_M2}
GLUCOSE SERPL-MCNC: 301 MG/DL (ref 70–99)
HCT VFR BLD AUTO: 40.5 % (ref 40–53)
HGB BLD-MCNC: 13.2 G/DL (ref 13.3–17.7)
IMM GRANULOCYTES # BLD: 0 10E9/L (ref 0–0.4)
IMM GRANULOCYTES NFR BLD: 0.3 %
LYMPHOCYTES # BLD AUTO: 0.3 10E9/L (ref 0.8–5.3)
LYMPHOCYTES NFR BLD AUTO: 10.9 %
MCH RBC QN AUTO: 29.9 PG (ref 26.5–33)
MCHC RBC AUTO-ENTMCNC: 32.6 G/DL (ref 31.5–36.5)
MCV RBC AUTO: 92 FL (ref 78–100)
MONOCYTES # BLD AUTO: 0.2 10E9/L (ref 0–1.3)
MONOCYTES NFR BLD AUTO: 7.4 %
NEUTROPHILS # BLD AUTO: 2.5 10E9/L (ref 1.6–8.3)
NEUTROPHILS NFR BLD AUTO: 81.1 %
NRBC # BLD AUTO: 0 10*3/UL
NRBC BLD AUTO-RTO: 0 /100
PLATELET # BLD AUTO: 114 10E9/L (ref 150–450)
POTASSIUM SERPL-SCNC: 3.4 MMOL/L (ref 3.4–5.3)
PROCALCITONIN SERPL-MCNC: 0.44 NG/ML
PROT SERPL-MCNC: 7.8 G/DL (ref 6.8–8.8)
RBC # BLD AUTO: 4.41 10E12/L (ref 4.4–5.9)
SODIUM SERPL-SCNC: 135 MMOL/L (ref 133–144)
WBC # BLD AUTO: 3.1 10E9/L (ref 4–11)

## 2021-06-21 PROCEDURE — 99222 1ST HOSP IP/OBS MODERATE 55: CPT | Mod: AI | Performed by: NURSE PRACTITIONER

## 2021-06-21 PROCEDURE — 120N000001 HC R&B MED SURG/OB

## 2021-06-21 PROCEDURE — 99285 EMERGENCY DEPT VISIT HI MDM: CPT | Performed by: PHYSICIAN ASSISTANT

## 2021-06-21 PROCEDURE — 36415 COLL VENOUS BLD VENIPUNCTURE: CPT | Performed by: STUDENT IN AN ORGANIZED HEALTH CARE EDUCATION/TRAINING PROGRAM

## 2021-06-21 PROCEDURE — 87040 BLOOD CULTURE FOR BACTERIA: CPT | Performed by: STUDENT IN AN ORGANIZED HEALTH CARE EDUCATION/TRAINING PROGRAM

## 2021-06-21 PROCEDURE — G0378 HOSPITAL OBSERVATION PER HR: HCPCS

## 2021-06-21 PROCEDURE — 250N000011 HC RX IP 250 OP 636: Performed by: PHYSICIAN ASSISTANT

## 2021-06-21 PROCEDURE — 250N000013 HC RX MED GY IP 250 OP 250 PS 637: Performed by: NURSE PRACTITIONER

## 2021-06-21 PROCEDURE — 255N000002 HC RX 255 OP 636: Performed by: EMERGENCY MEDICINE

## 2021-06-21 PROCEDURE — 96365 THER/PROPH/DIAG IV INF INIT: CPT

## 2021-06-21 PROCEDURE — 86618 LYME DISEASE ANTIBODY: CPT | Performed by: PHYSICIAN ASSISTANT

## 2021-06-21 PROCEDURE — 85025 COMPLETE CBC W/AUTO DIFF WBC: CPT | Performed by: STUDENT IN AN ORGANIZED HEALTH CARE EDUCATION/TRAINING PROGRAM

## 2021-06-21 PROCEDURE — 80053 COMPREHEN METABOLIC PANEL: CPT | Performed by: STUDENT IN AN ORGANIZED HEALTH CARE EDUCATION/TRAINING PROGRAM

## 2021-06-21 PROCEDURE — 87798 DETECT AGENT NOS DNA AMP: CPT | Mod: 59 | Performed by: PHYSICIAN ASSISTANT

## 2021-06-21 PROCEDURE — 99285 EMERGENCY DEPT VISIT HI MDM: CPT | Mod: 25

## 2021-06-21 PROCEDURE — 258N000003 HC RX IP 258 OP 636: Performed by: NURSE PRACTITIONER

## 2021-06-21 PROCEDURE — 71260 CT THORAX DX C+: CPT

## 2021-06-21 PROCEDURE — 84145 PROCALCITONIN (PCT): CPT | Performed by: PHYSICIAN ASSISTANT

## 2021-06-21 RX ORDER — CEFTRIAXONE SODIUM 2 G/50ML
2 INJECTION, SOLUTION INTRAVENOUS ONCE
Status: COMPLETED | OUTPATIENT
Start: 2021-06-21 | End: 2021-06-21

## 2021-06-21 RX ORDER — ACETAMINOPHEN 325 MG/1
650 TABLET ORAL EVERY 4 HOURS PRN
Status: DISCONTINUED | OUTPATIENT
Start: 2021-06-21 | End: 2021-06-23 | Stop reason: HOSPADM

## 2021-06-21 RX ORDER — DOXYCYCLINE 100 MG/1
100 CAPSULE ORAL EVERY 12 HOURS SCHEDULED
Status: DISCONTINUED | OUTPATIENT
Start: 2021-06-21 | End: 2021-06-23 | Stop reason: HOSPADM

## 2021-06-21 RX ORDER — CALCIUM CARBONATE 500 MG/1
1000 TABLET, CHEWABLE ORAL 4 TIMES DAILY PRN
Status: DISCONTINUED | OUTPATIENT
Start: 2021-06-21 | End: 2021-06-23 | Stop reason: HOSPADM

## 2021-06-21 RX ORDER — ONDANSETRON 4 MG/1
4 TABLET, ORALLY DISINTEGRATING ORAL EVERY 6 HOURS PRN
Status: DISCONTINUED | OUTPATIENT
Start: 2021-06-21 | End: 2021-06-23 | Stop reason: HOSPADM

## 2021-06-21 RX ORDER — LIDOCAINE 40 MG/G
CREAM TOPICAL
Status: DISCONTINUED | OUTPATIENT
Start: 2021-06-21 | End: 2021-06-23 | Stop reason: HOSPADM

## 2021-06-21 RX ORDER — IOPAMIDOL 612 MG/ML
100 INJECTION, SOLUTION INTRAVASCULAR ONCE
Status: COMPLETED | OUTPATIENT
Start: 2021-06-21 | End: 2021-06-21

## 2021-06-21 RX ORDER — SODIUM CHLORIDE 9 MG/ML
INJECTION, SOLUTION INTRAVENOUS CONTINUOUS
Status: DISCONTINUED | OUTPATIENT
Start: 2021-06-21 | End: 2021-06-22

## 2021-06-21 RX ORDER — ONDANSETRON 2 MG/ML
4 INJECTION INTRAMUSCULAR; INTRAVENOUS EVERY 6 HOURS PRN
Status: DISCONTINUED | OUTPATIENT
Start: 2021-06-21 | End: 2021-06-23 | Stop reason: HOSPADM

## 2021-06-21 RX ORDER — CEFTRIAXONE SODIUM 1 G/50ML
1 INJECTION, SOLUTION INTRAVENOUS EVERY 24 HOURS
Status: DISCONTINUED | OUTPATIENT
Start: 2021-06-22 | End: 2021-06-23 | Stop reason: HOSPADM

## 2021-06-21 RX ADMIN — SODIUM CHLORIDE: 9 INJECTION, SOLUTION INTRAVENOUS at 17:33

## 2021-06-21 RX ADMIN — IOPAMIDOL 100 ML: 612 INJECTION, SOLUTION INTRAVENOUS at 00:44

## 2021-06-21 RX ADMIN — CEFTRIAXONE SODIUM 2 G: 2 INJECTION, SOLUTION INTRAVENOUS at 12:05

## 2021-06-21 RX ADMIN — DOXYCYCLINE HYCLATE 100 MG: 100 CAPSULE ORAL at 19:06

## 2021-06-21 ASSESSMENT — ENCOUNTER SYMPTOMS
NECK STIFFNESS: 0
DIARRHEA: 0
BRUISES/BLEEDS EASILY: 0
ACTIVITY CHANGE: 1
WEAKNESS: 0
BACK PAIN: 1
APPETITE CHANGE: 1
PHOTOPHOBIA: 0
VOMITING: 0
SHORTNESS OF BREATH: 0
ALLERGIC/IMMUNOLOGIC COMMENTS: DIABETIC
FATIGUE: 1
NAUSEA: 0
DIAPHORESIS: 1
NECK PAIN: 0
CHEST TIGHTNESS: 0
COUGH: 0
FEVER: 1
HEADACHES: 0
DIZZINESS: 0
CHILLS: 1
ABDOMINAL PAIN: 0

## 2021-06-21 ASSESSMENT — ACTIVITIES OF DAILY LIVING (ADL): ADLS_ACUITY_SCORE: 13

## 2021-06-21 ASSESSMENT — MIFFLIN-ST. JEOR: SCORE: 1486.25

## 2021-06-21 NOTE — DISCHARGE INSTRUCTIONS
What to expect when you have contrast    During your exam, we will inject  contrast  into your vein or artery. (Contrast is a clear liquid with iodine in it. It shows up on X-rays.)    You may feel warm or hot. You may have a metal taste in your mouth and a slight upset stomach. You may also feel pressure near the kidneys and bladder. These effects will last about 1 to 3 minutes.    Please tell us if you have:    Sneezing     Itching    Hives     Swelling in the face    A hoarse voice    Breathing problems    Other new symptoms    Serious problems are rare.  They may include:    Irregular heartbeat     Seizures    Kidney failure              Tissue damage    Shock      Death    If you have any problems during the exam, we  will treat them right away.    When you get home    Call your hospital if you have any new symptoms in the next 2 days, like hives or swelling. (Phone numbers are at the bottom of this page.) Or call your family doctor.     If you have wheezing or trouble breathing, call 911.      The contrast will pass out of your body in your  Urine(pee). This will happen in the next 24 hours. You  will not feel this. Your urine will not  change color.    If you have kidney problems or take metformin    Drink 4 to 8 large glasses of water for the next  2 days, if you are not on a fluid restriction.    ?If you take metformin (Glucophage or Glucovance) for diabetes, keep taking it.        I have read and understand the above information.    Patient Sign Here:______________________________________Date:________Time:______    Staff Sign Here:________________________________________Date:_______Time:______      Radiology Departments:     ?[facility]: [phone] ?[place]: [phone]     ?[place]: [phone] ?[facility]:[phone]      ?[place]: [phone]  ?[facility]: [phone]  ?[place]:[phone]    ?[facility]:[phone]  ?[facility]:[phone]

## 2021-06-21 NOTE — ED NOTES
6/21/21 0804    Positive 1/2 blood culture. Bottle 1/2 positive for gram - rods. Result given to Dr. Maier who attempted to call pt. Message left. Will attempt to call again.

## 2021-06-21 NOTE — ED NOTES
Pt transferred to med/surg via [facility]. Hand off given to Thuy DOYLE. Wife accompanied pt. All belongs sent with pt.

## 2021-06-21 NOTE — ED NOTES
6/21/21 0840    Pt contacted. Stating that he is continuing to have a fever. Will return to the ED for reevaluation.

## 2021-06-21 NOTE — ED NOTES
Pt comes into  ED today due to back and neck pain that feels like pins and needles that has been increasingly worse for the last 2 weeks. States he has also been having fevers. Pt was in yesterday and was told to come back today due to abnormal lab values. IV and monitors placed. Pt states he is feeling good today. PA at bedside.

## 2021-06-21 NOTE — ED PROVIDER NOTES
History     Chief Complaint   Patient presents with     Fever     up to 101 at home     Back Pain     HPI Rafa Bhatti is a 76 year old male who is accompanied per his wife. One week ago he was hospitalized for near syncope and bradycardia. His wife states he was working out in the heat. He followed-up with Dr. Bran in [place] this past Thursday 6/16, and everything was fine. His blood sugar was elevated at 344 and it was discovered he was not taking his metformin as ordered. He increased it to 1000 mg bid at that time. He now complains of back pain, diaphoresis, fevers, chills, fatigue, decreased appetite, diarrhea once in 24 hours, intermittent flank pain, and light headedness. He has taken acetaminophen at home for his symptoms with little relief. Denies previous episodes of back pain. denies sick contacts. Has had covid vaccine in February. Has history of kidney stones and pancreatitis. Has stress test scheduled in August. Denies nausea, vomiting, shortness of breath, and headaches.    Allergies:  Allergies   Allergen Reactions     Lactose Unknown     LACTOSE INTOLERANT       Problem List:    Patient Active Problem List    Diagnosis Date Noted     Fever and chills 06/21/2021     Priority: Medium     Elevated LFTs 06/21/2021     Priority: Medium     Thrombocytopenia (H) 06/21/2021     Priority: Medium     Pancytopenia (H) 06/21/2021     Priority: Medium     Elevated transaminase level 06/21/2021     Priority: Medium     Bacteremia due to Gram-negative bacteria 06/21/2021     Priority: Medium     Sinus bradycardia 06/09/2021     Priority: Medium     Near syncope 06/09/2021     Priority: Medium     Abnormal LFTs 06/24/2020     Priority: Medium     Hypoxia 06/24/2020     Priority: Medium     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     Priority: Medium     Added automatically from request for surgery [id number]       AK (actinic keratosis) 11/28/2018     Priority: Medium     Encounter for diabetic foot exam (H)  05/23/2018     Priority: Medium     Type 2 diabetes mellitus with diabetic polyneuropathy, without long-term current use of insulin (H) 05/23/2018     Priority: Medium     Type 2 diabetes mellitus without complication, without long-term current use of insulin (H) 04/11/2018     Priority: Medium     Chronic gout 10/18/2017     Priority: Medium     Esophageal reflux 10/18/2017     Priority: Medium     Personal history of malignant neoplasm of skin 11/14/2016     Priority: Medium     Overview:   Rt upper back mild 2013    Overview:   BCC right temple 2012       History of nonmelanoma skin cancer 11/14/2016     Priority: Medium     Overview:   BCC right temple 2012       ACP (advance care planning) 10/26/2016     Priority: Medium     Advance Care Planning 10/26/2016: ACP Review of Chart / Resources Provided:  Reviewed chart for advance care plan.  Rafa Bhatti has no plan or code status on file. Discussed available resources and provided with information. Confirmed code status reflects current choices pending further ACP discussions.  Confirmed/documented legally designated decision makers.  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 Provider to review       Basal cell carcinoma, face 09/05/2012     Priority: Medium        Past Medical History:    Past Medical History:   Diagnosis Date     Benign neoplasm of unspecified site 8/1/2012     Calculus of kidney 5/20/2002     Chest pain, unspecified 3/2/2000     Diabetic eye exam (H) 06/06/2018     Gout, unspecified 8/3/2011     Hyperlipidemia      Unspecified essential hypertension 7/18/2000       Past Surgical History:    Past Surgical History:   Procedure  Laterality Date     cardiolyte stress test  2000    chest pain     CHOLECYSTECTOMY, COMMON BILE DUCT EXPLORATION, COMBINED  04/15/2018     COLONOSCOPY  1999     fistula in ANO       HEMORRHOIDECTOMY       removal of dermoid cyst of mediastinum       VASECTOMY         Family History:    Family History   Problem Relation Age of Onset     C.A.D. Father 80     C.A.D. Mother      Hypertension Mother      C.A.D. Brother      C.A.D. Other         family hx       Social History:  Marital Status:   [2]  Social History     Tobacco Use     Smoking status: Never Smoker     Smokeless tobacco: Never Used   Substance Use Topics     Alcohol use: Yes     Comment: socially     Drug use: No        Medications:    allopurinol (ZYLOPRIM) 100 MG tablet  amLODIPine (NORVASC) 5 MG tablet  blood glucose monitoring (FREESTYLE) lancets  calcium carbonate (TUMS) 500 MG chewable tablet  colchicine (COLCYRS) 0.6 MG tablet  FREESTYLE LITE test strip  losartan (COZAAR) 25 MG tablet  metFORMIN (GLUCOPHAGE-XR) 500 MG 24 hr tablet  pantoprazole (PROTONIX) 40 MG EC tablet  rosuvastatin (CRESTOR) 10 MG tablet  sildenafil (REVATIO) 20 MG tablet  tamsulosin (FLOMAX) 0.4 MG capsule          Review of Systems   Constitutional: Positive for activity change, appetite change, chills, fatigue and fever.   Respiratory: Negative for cough and shortness of breath.    Gastrointestinal: Positive for diarrhea (last bm two days ago.). Negative for nausea and vomiting.   Genitourinary: Positive for flank pain.   Musculoskeletal: Positive for back pain.   Neurological: Positive for light-headedness (loosing balance  no falls.). Negative for dizziness and headaches.       Physical Exam   BP: 116/80  Pulse: 116  Temp: 100.7  F (38.2  C)  Resp: 18  Weight: 72.1 kg (159 lb)  SpO2: 95 %      Physical Exam  Vitals signs and nursing note reviewed.   Constitutional:       General: He is in acute distress (mild to moderate).   HENT:      Head: Normocephalic.   Neck:       Musculoskeletal: No muscular tenderness.   Cardiovascular:      Rate and Rhythm: Normal rate and regular rhythm.      Heart sounds: Normal heart sounds. No murmur.   Pulmonary:      Effort: Pulmonary effort is normal. No respiratory distress.      Breath sounds: Normal breath sounds. No rales.   Abdominal:      General: Abdomen is flat. Bowel sounds are normal. There is no distension.      Palpations: Abdomen is soft. There is no hepatomegaly or splenomegaly.      Tenderness: There is no abdominal tenderness. There is right CVA tenderness. There is no left CVA tenderness or guarding.      Hernia: No hernia is present.   Musculoskeletal:      Cervical back: He exhibits no tenderness.      Thoracic back: He exhibits no tenderness.      Lumbar back: He exhibits no tenderness.   Skin:     General: Skin is warm and dry.      Capillary Refill: Capillary refill takes less than 2 seconds.   Neurological:      Mental Status: He is alert and oriented to person, place, and time.   Psychiatric:         Behavior: Behavior normal.         ED Course        Procedures             No results found for this or any previous visit (from the past 24 hour(s)).    Medications   0.9% sodium chloride BOLUS (0 mLs Intravenous Stopped 6/20/21 2103)   iopamidol (ISOVUE-300) IV solution 61% 100 mL (100 mLs Intravenous Given 6/21/21 0044)   sodium chloride (PF) 0.9% PF flush 60 mL (50 mLs Intravenous Given 6/21/21 0044)       Assessments & Plan (with Medical Decision Making)     I have reviewed the nursing notes.    I have reviewed the findings, diagnosis, plan and need for follow up with the patient.  (R79.89) Elevated LFTs  Comment: 76 year old male who is accompanied per his wife. One week ago he was hospitalized for near syncope and bradycardia. His wife states he was working out in the heat. He followed-up with Dr. Bran in [place] this past Thursday 6/16, and everything was fine. His blood sugar was elevated at 344 and it was discovered  he was not taking his metformin as ordered. He increased it to 1000 mg bid at that time. He now complains of back pain, diaphoresis, fevers, chills, fatigue, decreased appetite, diarrhea once in 24 hours, intermittent flank pain, and light headedness. He has taken acetaminophen at home for his symptoms with little relief. Denies previous episodes of back pain. denies sick contacts. Has had covid vaccine in February. Has history of kidney stones and pancreatitis. Has stress test scheduled in August. Denies nausea, vomiting, shortness of breath, and headaches.  MDM: NHT. Lungs CTA  Abdominal assessment negative except for slight tenderness right CVS.  Salicylate level <2; acetaminophen level <6  Mononucleosis negative  Troponin negative  Lactic acid 2.7;  1.7 after one liter fluid  CMP: elevated liver functions tests; were normal four days ago; Alk phos 164; : and   CBC thrombocytopenia 104 (normal 11 days ago)  UA reviewed  CXR reviewed and per radiology: no acute cardiopulmonary disease.  Waiting US results.  Plan: Dr. Milner to resume care.     Reassessment by Dr Milner: RUQ US w/o obstruction, likely d/t infection (elev LFT), given abx, stable for admission, no indication to transfer at this time.      US RUQ cannot see   Discharge Medication List as of 6/21/2021  2:28 AM          Final diagnoses:   Elevated LFTs   Fever and chills       6/20/2021   [facility]     Alyson Best, RUSSELL  06/20/21 [id number]       Tom Milner MD  07/31/21 0303

## 2021-06-21 NOTE — ED NOTES
DATE:  6/21/2021   TIME OF RECEIPT FROM LAB:  1544  LAB TEST:  Blood culture bottle, first set from Sunday's test.  LAB VALUE:  Aerobic bottle with gram negative rods.  RESULTS GIVEN WITH READ-BACK TO [place]   TIME LAB VALUE REPORTED TO PROVIDER:   1546

## 2021-06-21 NOTE — ED PROVIDER NOTES
History     Chief Complaint   Patient presents with     Fever     The history is provided by the patient.     Rafa Bhatti is a 76 year old male who presented to the emergency department ambulatory along with wife for evaluation of fevers, chills, abnormal laboratory evaluation, as well as positive blood cultures. The patient was seen and treated in this emergency department last night and discharged early this morning. He had a multitude of laboratory evaluations including a CT scan of the chest abdomen and pelvis. No particular infectious focality noted and the patient was discharged with strict instructions to return. This morning 1 of 2 bottles returned positive for gram-negative rods. The patient was advised to come back to the emergency department. Today he reports feeling improved. He has not started antibiotics. He has no known tick bites. Symptoms began after release from the hospital earlier this month due to admission for a syncopal episode and bradycardia. He noted the usual constellation of symptoms of a nonspecific viral illness including fevers, chills, body aches, back pain, diarrhea, etc. Denies any lower urinary tract symptoms. He has no gallbladder.    Allergies:  Allergies   Allergen Reactions     Lactose Unknown     LACTOSE INTOLERANT       Problem List:    Patient Active Problem List    Diagnosis Date Noted     Fever and chills 06/21/2021     Priority: Medium     Elevated LFTs 06/21/2021     Priority: Medium     Thrombocytopenia (H) 06/21/2021     Priority: Medium     Leukopenia 06/21/2021     Priority: Medium     Elevated transaminase level 06/21/2021     Priority: Medium     Bacteremia due to Gram-negative bacteria 06/21/2021     Priority: Medium     Sinus bradycardia 06/09/2021     Priority: Medium     Near syncope 06/09/2021     Priority: Medium     Abnormal LFTs 06/24/2020     Priority: Medium     Hypoxia 06/24/2020     Priority: Medium     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      Priority: Medium     Added automatically from request for surgery [id number]       AK (actinic keratosis) 11/28/2018     Priority: Medium     Encounter for diabetic foot exam (H) 05/23/2018     Priority: Medium     Type 2 diabetes mellitus with diabetic polyneuropathy, without long-term current use of insulin (H) 05/23/2018     Priority: Medium     Type 2 diabetes mellitus without complication, without long-term current use of insulin (H) 04/11/2018     Priority: Medium     Chronic gout 10/18/2017     Priority: Medium     Esophageal reflux 10/18/2017     Priority: Medium     Personal history of malignant neoplasm of skin 11/14/2016     Priority: Medium     Overview:   Rt upper back mild 2013    Overview:   BCC right temple 2012       History of nonmelanoma skin cancer 11/14/2016     Priority: Medium     Overview:   BCC right temple 2012       ACP (advance care planning) 10/26/2016     Priority: Medium     Advance Care Planning 10/26/2016: ACP Review of Chart / Resources Provided:  Reviewed chart for advance care plan.  Rafa Bhatti has no plan or code status on file. Discussed available resources and provided with information. Confirmed code status reflects current choices pending further ACP discussions.  Confirmed/documented legally designated decision makers.  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 Provider to review       Basal cell carcinoma, face 09/05/2012     Priority: Medium        Past Medical History:    Past Medical History:   Diagnosis Date     Benign neoplasm of unspecified site 8/1/2012     Calculus of kidney 5/20/2002     Chest pain, unspecified 3/2/2000     Diabetic eye exam (H)  06/06/2018     Gout, unspecified 8/3/2011     Hyperlipidemia      Unspecified essential hypertension 7/18/2000       Past Surgical History:    Past Surgical History:   Procedure Laterality Date     cardiolyte stress test  2000    chest pain     CHOLECYSTECTOMY, COMMON BILE DUCT EXPLORATION, COMBINED  04/15/2018     COLONOSCOPY  1999     fistula in ANO       HEMORRHOIDECTOMY       removal of dermoid cyst of mediastinum       VASECTOMY         Family History:    Family History   Problem Relation Age of Onset     C.A.D. Father 80     C.A.D. Mother      Hypertension Mother      C.A.D. Brother      C.A.D. Other         family hx       Social History:  Marital Status:   [2]  Social History     Tobacco Use     Smoking status: Never Smoker     Smokeless tobacco: Never Used   Substance Use Topics     Alcohol use: Yes     Comment: socially     Drug use: No        Medications:    allopurinol (ZYLOPRIM) 100 MG tablet  amLODIPine (NORVASC) 5 MG tablet  blood glucose monitoring (FREESTYLE) lancets  calcium carbonate (TUMS) 500 MG chewable tablet  colchicine (COLCYRS) 0.6 MG tablet  FREESTYLE LITE test strip  losartan (COZAAR) 25 MG tablet  metFORMIN (GLUCOPHAGE-XR) 500 MG 24 hr tablet  pantoprazole (PROTONIX) 40 MG EC tablet  rosuvastatin (CRESTOR) 10 MG tablet  sildenafil (REVATIO) 20 MG tablet  tamsulosin (FLOMAX) 0.4 MG capsule          Review of Systems   Constitutional: Positive for activity change, appetite change, chills, diaphoresis, fatigue and fever.        Symptoms have improved   HENT: Negative.    Eyes: Negative for photophobia and visual disturbance.   Respiratory: Negative for cough, chest tightness and shortness of breath.    Cardiovascular: Negative for chest pain.   Gastrointestinal: Negative for abdominal pain, diarrhea, nausea and vomiting.        Denies any GI complaints currently.   Genitourinary:        Urine seems to be darker than normal but otherwise denies any dysuria, frequency, urgency. Did  have some bilateral flank pain that is intermittent in nature.   Musculoskeletal: Positive for back pain. Negative for neck pain and neck stiffness.   Skin: Negative.    Allergic/Immunologic:        Diabetic   Neurological: Negative for dizziness, weakness and headaches.   Hematological: Does not bruise/bleed easily.       Physical Exam   BP: 115/75  Pulse: 98  Temp: 97.3  F (36.3  C)  Resp: 16  SpO2: 98 %      Physical Exam  Vitals signs and nursing note reviewed.   Constitutional:       General: He is not in acute distress.     Appearance: Normal appearance. He is normal weight. He is not ill-appearing, toxic-appearing or diaphoretic.      Comments: This is a pleasant and talkative 76-year-old male found semireclined on the exam bed in no distress.   Neck:      Musculoskeletal: Normal range of motion and neck supple.   Cardiovascular:      Rate and Rhythm: Normal rate and regular rhythm.   Pulmonary:      Effort: Pulmonary effort is normal.      Breath sounds: Normal breath sounds.   Abdominal:      General: There is no distension.      Palpations: Abdomen is soft.      Tenderness: There is no abdominal tenderness. There is no guarding.   Musculoskeletal:      Right lower leg: No edema.      Left lower leg: No edema.   Skin:     General: Skin is warm and dry.      Capillary Refill: Capillary refill takes less than 2 seconds.   Neurological:      General: No focal deficit present.      Mental Status: He is alert and oriented to person, place, and time.   Psychiatric:         Mood and Affect: Mood normal.         ED Course     ED Course as of Jun 21 1230   Mon Jun 21, 2021   1229 No beds at [facility].         Procedures               Critical Care time:  none     The Lactic acid level is elevated due to multiple etiologies , at this time there is no sign of severe sepsis or septic shock.           Results for orders placed or performed during the hospital encounter of 06/21/21 (from the past 24 hour(s))   CBC with  platelets differential   Result Value Ref [facility]    WBC 3.1 (L) 4.0 - 11.0 10e9/L    RBC Count 4.41 4.4 - 5.9 10e12/L    Hemoglobin 13.2 (L) 13.3 - 17.7 g/dL    Hematocrit 40.5 40.0 - 53.0 %    MCV 92 78 - 100 fl    MCH 29.9 26.5 - 33.0 pg    MCHC 32.6 31.5 - 36.5 g/dL    RDW 13.5 10.0 - 15.0 %    Platelet Count 114 (L) 150 - 450 10e9/L    Diff Method Automated Method     % Neutrophils 81.1 %    % Lymphocytes 10.9 %    % Monocytes 7.4 %    % Eosinophils 0.0 %    % Basophils 0.3 %    % Immature Granulocytes 0.3 %    Nucleated RBCs 0 0 /100    Absolute Neutrophil 2.5 1.6 - 8.3 10e9/L    Absolute Lymphocytes 0.3 (L) 0.8 - 5.3 10e9/L    Absolute Monocytes 0.2 0.0 - 1.3 10e9/L    Absolute Eosinophils 0.0 0.0 - 0.7 10e9/L    Absolute Basophils 0.0 0.0 - 0.2 10e9/L    Abs Immature Granulocytes 0.0 0 - 0.4 10e9/L    Absolute Nucleated RBC 0.0    Comprehensive metabolic panel   Result Value Ref [facility]    Sodium 135 133 - 144 mmol/L    Potassium 3.4 3.4 - 5.3 mmol/L    Chloride 102 94 - 109 mmol/L    Carbon Dioxide 26 20 - 32 mmol/L    Anion Gap 7 3 - 14 mmol/L    Glucose 301 (H) 70 - 99 mg/dL    Urea Nitrogen 16 7 - 30 mg/dL    Creatinine 0.95 0.66 - 1.25 mg/dL    GFR Estimate 79 >60 mL/min/[1.73_m2]    GFR Estimate If Black >90 >60 mL/min/[1.73_m2]    Calcium 8.8 8.5 - 10.1 mg/dL    Bilirubin Total 1.3 0.2 - 1.3 mg/dL    Albumin 3.6 3.4 - 5.0 g/dL    Protein Total 7.8 6.8 - 8.8 g/dL    Alkaline Phosphatase 173 (H) 40 - 150 U/L     (H) 0 - 70 U/L    AST 90 (H) 0 - 45 U/L   Procalcitonin   Result Value Ref [facility]    Procalcitonin 0.44 ng/ml       Medications   cefTRIAXone IN D5W (ROCEPHIN) intermittent infusion 2 g (2 g Intravenous New Bag 6/21/21 1205)       Assessments & Plan (with Medical Decision Making)   Findings as above. 76-year-old diabetic patient who was seen in this emergency department for multiple symptoms that were suggestive of a nonspecific viral illness. Found to have a positive blood culture and advised  to return. Subjectively feeling better today. Now has leukopenia, thrombocytopenia, and persistent elevated transaminases. Tick panel was added. He will be treated with IV Rocephin and admitted to the hospital for further evaluation and treatment. Patient was discussed with and graciously accepted by Dr. Alcantara.    Addendum at 12:30.  Was advised there are no beds available at [facility].  Plan will be to transfer to closest facility.      This document was prepared using a combination of typing and voice generated software.  While every attempt was made for accuracy, spelling and grammatical errors may exist.    I have reviewed the nursing notes.    I have reviewed the findings, diagnosis, plan and need for follow up with the patient.        New Prescriptions    No medications on file       Final diagnoses:   Bacteremia due to Gram-negative bacteria   Thrombocytopenia (H)   Leukopenia   Elevated transaminase level       6/21/2021   [facility]     Christen Laws PA-C  06/21/21 1207       Christen Laws PA-C  06/21/21 1230

## 2021-06-21 NOTE — H&P
[facility]    History and Physical  Hospitalist       Date of Admission:  6/21/2021  Date of Service (when I saw the patient): 06/21/21    Assessment & Plan       Bacteremia due to Gram-negative bacteria: 1/4 bottles positive with high fever and flu-like symptoms along with some right CVA tenderness. He was started on IV rocephin, will continue pending ID and sensitivity. Most likely due to acute cystitis, urine culture is pending.       Possible acute cystitis without hematuria: Right CVA tenderness, high fevers. Will cover with Rocephin, UC and BC pending.       Elevated LFTs: Further elevation on recheck today compared to yesterday. He was out camping over the last 2 weeks. Will cover with doxycycline pending tick panel returning. He has not had any nausea/vomiting or abdominal pain though he has been febrile with general malaise as above.       Benign essential hypertension: Stable, takes norvasc at home, will continue as blood pressure requires.       Type 2 diabetes mellitus with diabetic polyneuropathy, without long-term current use of insulin (H): Taking metformin alone, will continue.      Thrombocytopenia/Leukopenia (H): Worsening over the last several days. Suspect tick borne illness as he also has elevating LFT's. Will cover with doxycyline as above.         DVT Prophylaxis: Pneumatic Compression Devices  Code Status: Full Code    Disposition: Expected discharge in 1-3 days once fever free x24 hours, I&D complete, tick panel back.    Yessy Funk, CNP    Primary Care Physician   Harry Olson    Chief Complaint   Flu-like symptoms    History is obtained from the patient    History of Present Illness   Rafa Bhatti is a 76 year old male who presents with fever and flu-like symptoms. Relates that he started to feel ill 5 days ago, just general malaise and some body aches. However, this worsened over the weekend and he developed high fevers on Saturday along with worsening back pain.  He presented to the ED late in the night with 102 degree fever. Work-up showed no leukocytosis but he did have elevated LFT's. Blood cultures were drawn, 1/4 cam back positive fro gram negative rods so he was called back for IV antibiotics. This morning he states he feels quite a lot better just from the IVF he received in the ED on his first visit. He was give 2 grams of Rocephin in the ED and admitted for further care.     Past Medical History    I have reviewed this patient's medical history and updated it with pertinent information if needed.   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  Past Surgical History:   Procedure Laterality Date     cardiolyte stress test  2000    chest pain     CHOLECYSTECTOMY, COMMON BILE DUCT EXPLORATION, COMBINED  04/15/2018     COLONOSCOPY  1999     fistula in ANO       HEMORRHOIDECTOMY       removal of dermoid cyst of mediastinum       VASECTOMY         Prior to Admission Medications   Prior to Admission Medications   Prescriptions Last Dose Informant Patient Reported? Taking?   FREESTYLE LITE test strip  Self No No   Sig: USE TO TEST BLOOD SUGARS ONCE A DAY.   allopurinol (ZYLOPRIM) 100 MG tablet  Self No No   Sig: TAKE 1 TABLET BY MOUTH TWICE A DAY   amLODIPine (NORVASC) 5 MG tablet  Self No No   Sig: TAKE 1 TABLET BY MOUTH EVERY DAY   blood glucose monitoring (FREESTYLE) lancets  Self No No   Sig: Use to test blood sugar 1 times daily.   calcium carbonate (TUMS) 500 MG chewable tablet  Self Yes No   Sig: Take 1 tablet by mouth daily as needed    colchicine (COLCYRS) 0.6 MG  tablet  Self No No   Sig: TAKE 2 TABLETS AT THE ONSET OF GOUT PAIN. MAY TAKE ONE TABLET AGAIN IN ONE HOUR. MAX 4 TABS IN 24 HRS.   losartan (COZAAR) 25 MG tablet  Self No No   Sig: TAKE 1 TABLET BY MOUTH EVERY DAY   metFORMIN (GLUCOPHAGE-XR) 500 MG 24 hr tablet  Self No No   Sig: TAKE 2 TABLETS (1000MG) BY TWICE DAILY WITH MEALS   Patient taking differently: Take 500 mg by mouth 2 times daily (with meals)    pantoprazole (PROTONIX) 40 MG EC tablet  Self Yes No   Sig: Take 40 mg by mouth daily   rosuvastatin (CRESTOR) 10 MG tablet   No No   Sig: TAKE 1 TABLET BY MOUTH EVERY DAY   sildenafil (REVATIO) 20 MG tablet  Self No No   Sig: Take 1-2 tablets (20-40 mg) by mouth daily as needed (erectile dysfunction)   tamsulosin (FLOMAX) 0.4 MG capsule  Self No No   Sig: TAKE 1 CAPSULE DAILY BY MOUTH   Patient taking differently: Take 0.4 mg by mouth At Bedtime       Facility-Administered Medications: None     Allergies   Allergies   Allergen Reactions     Lactose Unknown     LACTOSE INTOLERANT       Social History   I have reviewed this patient's social history and updated it with pertinent information if needed. Rafa Bhatti  reports that he has never smoked. He has never used smokeless tobacco. He reports current alcohol use. He reports that he does not use drugs.    Family History   I have reviewed this patient's family history and updated it with pertinent information if needed.   Family History   Problem Relation Age of Onset     C.A.D. Father 80     C.A.D. Mother      Hypertension Mother      C.A.D. Brother      C.A.D. Other         family hx       Review of Systems   CONSTITUTIONAL:  positive for  fevers, chills, sweats, fatigue and malaise  negative for  anorexia  HEENT:  negative for  ear drainage, earaches, nasal congestion and sore throat  RESPIRATORY:  negative for  cough with sputum, dyspnea, wheezing, hemoptysis, chest pain and pleuritic pain  CARDIOVASCULAR:  negative for  chest pain, dyspnea, palpitations,  orthopnea, exertional chest pressure/discomfort, edema, syncope  GASTROINTESTINAL:  negative for nausea, vomiting, diarrhea and abdominal pain  GENITOURINARY:  negative for frequency and dysuria  MUSCULOSKELETAL:  positive for  myalgias and arthralgias  negative for  joint swelling, stiff joints and muscle weakness  NEUROLOGICAL:  positive for headaches  negative for dizziness, memory problems, dysphagia, weakness and numbness    Physical Exam   Temp: 98.3  F (36.8  C) Temp src: Tympanic BP: 133/84 Pulse: 100   Resp: 16 SpO2: 98 % O2 Device: None (Room air)    Vital Signs with Ranges  Temp:  [97.3  F (36.3  C)-102.5  F (39.2  C)] 98.3  F (36.8  C)  Pulse:  [] 100  Resp:  [11-33] 16  BP: (105-138)/(65-84) 133/84  SpO2:  [93 %-98 %] 98 %  0 lbs 0 oz    Constitutional: Awake,alert, no acute distress.  HEENT: Normocephalic, mucous membranes are pink and moist. No cervical lymphadenopathy.   Respiratory: Clear bilaterally, no crackles, wheezes, rhonchi.   Cardiovascular: HRR, no murmurs, rubs, thrills. No peripheral edema.   GI: Soft, nontender, bowel sounds are positive.   Skin: No unusual rashes, open areas or bruising noted.   Musculoskeletal: Moving all extremities equally, no weakness, deformities.   Neurologic: AOx3, following commands easily       Data   Data reviewed today:  I personally reviewed .  Recent Labs   Lab 06/21/21  1028 06/20/21  1940 06/16/21  1112   WBC 3.1* 4.7 4.0   HGB 13.2* 12.6* 13.5   MCV 92 91 92   * 104* 137*    134 132*   POTASSIUM 3.4 3.4 4.6   CHLORIDE 102 101 100   CO2 26 24 26   BUN 16 18 13   CR 0.95 0.95 0.75   ANIONGAP 7 9 6   HUY 8.8 8.7 9.2   * 308* 344*   ALBUMIN 3.6 3.2* 4.0   PROTTOTAL 7.8 7.3 7.9   BILITOTAL 1.3 1.3 0.4   ALKPHOS 173* 164* 80   * 508* 30   AST 90* 150* 21   LIPASE  --  46*  --    TROPI  --  <0.015  --        Recent Results (from the past 24 hour(s))   Chest XR,  PA & LAT    Narrative    PROCEDURE:  XR CHEST 2 VW    HISTORY:   fever unknown origin. crackles RLL.     COMPARISON:  2018    FINDINGS:   The cardiac silhouette is normal in size. The pulmonary vasculature is  normal.  The lungs are clear. No pleural effusion or pneumothorax.  There is mild elevation of the right hemidiaphragm      Impression    IMPRESSION:  No acute cardiopulmonary disease.      MÓNICA GARCIA MD   Abdomen US, limited (RUQ only)    Narrative    PROCEDURES: US ABDOMEN LIMITED    HISTORY: Male, age 76 years, elevated LFTs. Normal four days ago.,  elevated LFTs. Normal four days ago.    TECHNIQUE: Ultrasound of the upper abdomen was performed.    COMPARISON: CT scan abdomen and pelvis 10/30/2020     FINDINGS:    LIVER: Linear echogenic foci within the parenchyma likely representing  gas in the biliary tree as documented on the 10/30/2020 CT scan.    GALLBLADDER: Surgically absent    Common bile duct diameter: 1.8 mm.    ABDOMINAL AORTA AND IVC: The visualized portions of the abdominal  aorta are unremarkable.    PANCREAS:The visualized portions of the pancreas are normal.    RIGHT KIDNEY: The right kidney is normal. The right kidney measures  10.0 centimeters in length.    OTHER: There is no free fluid in the upper abdomen.      Impression    IMPRESSION: No acute abnormality. Echogenic areas within liver likely  represent gas in the biliary tree from previous sphincterotomy.    This report is in agreement with the preliminary report.    MOISÉS RENO MD   CT Chest/Abdomen/Pelvis w Contrast    Narrative    PROCEDURE: CT CHEST/ABDOMEN/PELVIS W CONTRAST 6/21/2021 1:12 AM    HISTORY: Right flank pain    COMPARISONS: March 2020    Meds/Dose Given: ISOVUE 300 100ML    TECHNIQUE: CT scan of the chest abdomen and pelvis with IV contrast  sagittal coronal reconstructions were obtained    FINDINGS: There is a 2 mm diameter right upper lobe nodule seen on  series 9 and axial image 33.  There is some interstitial thickening  seen in the right middle lobe. The hilar and  mediastinal lymph nodes  are normal in caliber. No pleural abnormalities are noted. The heart  is normal in size. Axillary and supraclavicular lymph nodes appear  normal. Degenerative changes are present in the lower cervical and  thoracic spine.    CT scan of the abdomen and pelvis: The liver is free of masses or  biliary ductal enlargement. Gas is seen within the biliary tree. The  spleen appears normal. The pancreatic duct is dilated and gas is seen  within the pancreatic duct. Postoperative changes are seen at the site  of duodenal resection    The adrenal glands are normal. There is a benign-appearing cyst seen  in the left kidney. There is no hydronephrosis.    The periaortic lymph nodes are normal in caliber. No intraperitoneal  masses or inflammatory changes are noted. The bladder and rectum are  normal. degenerative changes are present in the thoracic and lumbar  spine         Impression    IMPRESSION: Postop changes of a duodenal resection. There is some  increased density in the peripancreatic fat, uncertain if this  represents postoperative scarring or if this represents acute  inflammation. The pancreatic duct is dilated with gas in the  pancreatic duct.    MÓNICA GARCIA MD

## 2021-06-22 PROBLEM — D61.818 PANCYTOPENIA (H): Status: ACTIVE | Noted: 2021-06-21

## 2021-06-22 LAB
ALBUMIN SERPL-MCNC: 2.9 G/DL (ref 3.4–5)
ALP SERPL-CCNC: 159 U/L (ref 40–150)
ALT SERPL W P-5'-P-CCNC: 258 U/L (ref 0–70)
ANION GAP SERPL CALCULATED.3IONS-SCNC: 5 MMOL/L (ref 3–14)
AST SERPL W P-5'-P-CCNC: 47 U/L (ref 0–45)
B BURGDOR IGG+IGM SER QL: 0.05 (ref 0–0.89)
BILIRUB SERPL-MCNC: 0.5 MG/DL (ref 0.2–1.3)
BUN SERPL-MCNC: 11 MG/DL (ref 7–30)
CALCIUM SERPL-MCNC: 8.2 MG/DL (ref 8.5–10.1)
CHLORIDE SERPL-SCNC: 107 MMOL/L (ref 94–109)
CO2 SERPL-SCNC: 26 MMOL/L (ref 20–32)
CREAT SERPL-MCNC: 0.93 MG/DL (ref 0.66–1.25)
ERYTHROCYTE [DISTWIDTH] IN BLOOD BY AUTOMATED COUNT: 13.5 % (ref 10–15)
GFR SERPL CREATININE-BSD FRML MDRD: 81 ML/MIN/{1.73_M2}
GLUCOSE SERPL-MCNC: 159 MG/DL (ref 70–99)
HAV IGG SER QL IA: NONREACTIVE
HBV CORE AB SERPL QL IA: NONREACTIVE
HBV CORE IGM SERPL QL IA: NONREACTIVE
HBV SURFACE AB SERPL IA-ACNC: 0 M[IU]/ML
HBV SURFACE AG SERPL QL IA: NONREACTIVE
HCT VFR BLD AUTO: 34.6 % (ref 40–53)
HCV AB SERPL QL IA: NONREACTIVE
HGB BLD-MCNC: 11.3 G/DL (ref 13.3–17.7)
MCH RBC QN AUTO: 30 PG (ref 26.5–33)
MCHC RBC AUTO-ENTMCNC: 32.7 G/DL (ref 31.5–36.5)
MCV RBC AUTO: 92 FL (ref 78–100)
PLATELET # BLD AUTO: 117 10E9/L (ref 150–450)
POTASSIUM SERPL-SCNC: 3.8 MMOL/L (ref 3.4–5.3)
PROT SERPL-MCNC: 6.5 G/DL (ref 6.8–8.8)
RBC # BLD AUTO: 3.77 10E12/L (ref 4.4–5.9)
SODIUM SERPL-SCNC: 138 MMOL/L (ref 133–144)
WBC # BLD AUTO: 3 10E9/L (ref 4–11)

## 2021-06-22 PROCEDURE — 96376 TX/PRO/DX INJ SAME DRUG ADON: CPT

## 2021-06-22 PROCEDURE — 99232 SBSQ HOSP IP/OBS MODERATE 35: CPT | Performed by: NURSE PRACTITIONER

## 2021-06-22 PROCEDURE — 250N000013 HC RX MED GY IP 250 OP 250 PS 637: Performed by: NURSE PRACTITIONER

## 2021-06-22 PROCEDURE — G0378 HOSPITAL OBSERVATION PER HR: HCPCS

## 2021-06-22 PROCEDURE — 250N000011 HC RX IP 250 OP 636: Performed by: NURSE PRACTITIONER

## 2021-06-22 PROCEDURE — 85027 COMPLETE CBC AUTOMATED: CPT | Performed by: NURSE PRACTITIONER

## 2021-06-22 PROCEDURE — 36415 COLL VENOUS BLD VENIPUNCTURE: CPT | Performed by: NURSE PRACTITIONER

## 2021-06-22 PROCEDURE — 80053 COMPREHEN METABOLIC PANEL: CPT | Performed by: NURSE PRACTITIONER

## 2021-06-22 PROCEDURE — 120N000001 HC R&B MED SURG/OB

## 2021-06-22 PROCEDURE — 258N000003 HC RX IP 258 OP 636: Performed by: INTERNAL MEDICINE

## 2021-06-22 RX ORDER — PANTOPRAZOLE SODIUM 40 MG/1
40 TABLET, DELAYED RELEASE ORAL DAILY
Status: DISCONTINUED | OUTPATIENT
Start: 2021-06-22 | End: 2021-06-23 | Stop reason: HOSPADM

## 2021-06-22 RX ORDER — SODIUM CHLORIDE 9 MG/ML
INJECTION, SOLUTION INTRAVENOUS CONTINUOUS
Status: DISCONTINUED | OUTPATIENT
Start: 2021-06-22 | End: 2021-06-22

## 2021-06-22 RX ORDER — TAMSULOSIN HYDROCHLORIDE 0.4 MG/1
0.4 CAPSULE ORAL AT BEDTIME
Status: DISCONTINUED | OUTPATIENT
Start: 2021-06-22 | End: 2021-06-23 | Stop reason: HOSPADM

## 2021-06-22 RX ORDER — LOSARTAN POTASSIUM 25 MG/1
25 TABLET ORAL DAILY
Status: DISCONTINUED | OUTPATIENT
Start: 2021-06-22 | End: 2021-06-23 | Stop reason: HOSPADM

## 2021-06-22 RX ORDER — ROSUVASTATIN CALCIUM 10 MG/1
10 TABLET, COATED ORAL DAILY
Status: DISCONTINUED | OUTPATIENT
Start: 2021-06-22 | End: 2021-06-23 | Stop reason: HOSPADM

## 2021-06-22 RX ORDER — AMLODIPINE BESYLATE 5 MG/1
5 TABLET ORAL DAILY
Status: DISCONTINUED | OUTPATIENT
Start: 2021-06-22 | End: 2021-06-23 | Stop reason: HOSPADM

## 2021-06-22 RX ORDER — ALLOPURINOL 100 MG/1
100 TABLET ORAL 2 TIMES DAILY
Status: DISCONTINUED | OUTPATIENT
Start: 2021-06-22 | End: 2021-06-23 | Stop reason: HOSPADM

## 2021-06-22 RX ORDER — METFORMIN HCL 500 MG
1000 TABLET, EXTENDED RELEASE 24 HR ORAL 2 TIMES DAILY WITH MEALS
Status: DISCONTINUED | OUTPATIENT
Start: 2021-06-22 | End: 2021-06-23 | Stop reason: HOSPADM

## 2021-06-22 RX ADMIN — DOXYCYCLINE HYCLATE 100 MG: 100 CAPSULE ORAL at 08:34

## 2021-06-22 RX ADMIN — ALLOPURINOL 100 MG: 100 TABLET ORAL at 20:10

## 2021-06-22 RX ADMIN — ALLOPURINOL 100 MG: 100 TABLET ORAL at 08:35

## 2021-06-22 RX ADMIN — AMLODIPINE BESYLATE 5 MG: 5 TABLET ORAL at 08:35

## 2021-06-22 RX ADMIN — TAMSULOSIN HYDROCHLORIDE 0.4 MG: 0.4 CAPSULE ORAL at 20:10

## 2021-06-22 RX ADMIN — PANTOPRAZOLE SODIUM 40 MG: 40 TABLET, DELAYED RELEASE ORAL at 08:34

## 2021-06-22 RX ADMIN — DOXYCYCLINE HYCLATE 100 MG: 100 CAPSULE ORAL at 20:10

## 2021-06-22 RX ADMIN — CEFTRIAXONE SODIUM 1 G: 1 INJECTION, SOLUTION INTRAVENOUS at 12:15

## 2021-06-22 RX ADMIN — METFORMIN HYDROCHLORIDE 1000 MG: 500 TABLET, EXTENDED RELEASE ORAL at 17:26

## 2021-06-22 RX ADMIN — METFORMIN HYDROCHLORIDE 1000 MG: 500 TABLET, EXTENDED RELEASE ORAL at 08:35

## 2021-06-22 RX ADMIN — LOSARTAN POTASSIUM 25 MG: 25 TABLET, FILM COATED ORAL at 08:35

## 2021-06-22 RX ADMIN — ROSUVASTATIN CALCIUM 10 MG: 10 TABLET, FILM COATED ORAL at 08:35

## 2021-06-22 RX ADMIN — SODIUM CHLORIDE: 9 INJECTION, SOLUTION INTRAVENOUS at 03:09

## 2021-06-22 ASSESSMENT — ACTIVITIES OF DAILY LIVING (ADL)
ADLS_ACUITY_SCORE: 13

## 2021-06-22 NOTE — PROVIDER NOTIFICATION
Notified Dr. Nguyen that patient's IV fluids have a stop time at 0259, MD placing order to continue IV fluids.

## 2021-06-22 NOTE — PLAN OF CARE
Alert and oriented x3.  Afebrile.  Denies pain.  Lungs clear, Pulse regular.  Up in room independently.  Continues Doxy and Rocephin.  Tolerating appetite.  LFTs improving.      Face to face report given with opportunity to observe patient.    Report given to Sowmya Blanca RN   6/22/2021  3:05 PM

## 2021-06-22 NOTE — PLAN OF CARE
Face to face report given with opportunity to observe patient.    Report given to Maliha Cole RN   6/22/2021  7:10 AM

## 2021-06-22 NOTE — PROGRESS NOTES
[facility]    Hospitalist Progress Note    Date of Service (when I saw the patient): 06/22/2021    Assessment & Plan     Bacteremia due to Gram-negative bacteria: 2/4 bottles positive with high fever and flu-like symptoms along with some right CVA tenderness. He was started on IV rocephin, will continue pending ID and sensitivity. Most likely due to acute cystitis, urine culture is pending.  -6/22: Feels good, no leukocytosis, mild fever last night but resolved.         Possible acute cystitis without hematuria: Right CVA tenderness, high fevers. Will cover with Rocephin, UC and BC pending.        Elevated LFTs:  He was out camping over the last 2 weeks. Suspect tick borne illness as he also has pancytopenia. Will cover with doxycycline pending tick panel returning. He has not had any nausea/vomiting or abdominal pain though he has been febrile with general malaise as above.   -6/22: Improving, negative lyme and Hep A/B/C. Erlich/anaplasmosis still pending.        Benign essential hypertension: Stable, takes norvasc at home, will continue as blood pressure requires.        Type 2 diabetes mellitus with diabetic polyneuropathy, without long-term current use of insulin (H): Taking metformin alone, will continue.       Pancytopenia (H): Worsening over the last several days. Suspect tick borne illness as he also has elevating LFT's. Will cover with doxycyline as above.       DVT Prophylaxis: Pneumatic Compression Devices  Code Status: Full Code    Disposition: Expected discharge in 1-2 days once tick panel back, no fevers x24 hours.    Yessy Funk,  CNP    Interval History   Feels well, denies pain, dyspnea, nausea/abdominal pain    -Data reviewed today: I reviewed all new labs and imaging results over the last 24 hours.     Physical Exam   Temp: 97.9  F (36.6  C) Temp src: Tympanic BP: 128/74 Pulse: 75   Resp: 18 SpO2: 96 % O2 Device: None (Room air)    Vitals:    06/21/21 1643   Weight: 74 kg (163  lb 2.3 oz)     Vital Signs with Ranges  Temp:  [97.9  F (36.6  C)-100  F (37.8  C)] 97.9  F (36.6  C)  Pulse:  [63-86] 75  Resp:  [16-18] 18  BP: (119-130)/(66-76) 128/74  SpO2:  [94 %-97 %] 96 %  I/O last 3 completed shifts:  In: 1354 [P.O.:60; I.V.:1294]  Out: 450 [Urine:450]    Peripheral IV 06/21/21 Left Upper forearm (Active)   Site Assessment WDL 06/22/21 0833   Line Status Saline locked 06/22/21 0833   Dressing Intervention New dressing  06/21/21 1149   Phlebitis Scale 0-->no symptoms 06/22/21 0833   Infiltration Scale 0 06/22/21 0833   Number of days: 1     Line/device assessment(s) completed for medical necessity    Constitutional: Awake,alert, no acute distress  Respiratory: Clear bilaterally, no wheezes, crackles, rhonchi.   Cardiovascular: HRR, no murmurs,rubs,thrills.   GI: Soft,nontender, bowel sounds positive.   Skin/Integumen: No unusual rashes, open areas or bruising.       Medications       allopurinol  100 mg Oral BID     amLODIPine  5 mg Oral Daily     cefTRIAXone  1 g Intravenous Q24H     doxycycline hyclate  100 mg Oral Q12H NATHALY     losartan  25 mg Oral Daily     metFORMIN  1,000 mg Oral BID w/meals     pantoprazole  40 mg Oral Daily     rosuvastatin  10 mg Oral Daily     sodium chloride (PF)  3 mL Intracatheter Q8H     tamsulosin  0.4 mg Oral At Bedtime       Data   Recent Labs   Lab 06/22/21  0506 06/21/21  1028 06/20/21  1940   WBC 3.0* 3.1* 4.7   HGB 11.3* 13.2* 12.6*   MCV 92 92 91   * 114* 104*    135 134   POTASSIUM 3.8 3.4 3.4   CHLORIDE 107 102 101   CO2 26 26 24   BUN 11 16 18   CR 0.93 0.95 0.95   ANIONGAP 5 7 9   HUY 8.2* 8.8 8.7   * 301* 308*   ALBUMIN 2.9* 3.6 3.2*   PROTTOTAL 6.5* 7.8 7.3   BILITOTAL 0.5 1.3 1.3   ALKPHOS 159* 173* 164*   * 424* 508*   AST 47* 90* 150*   LIPASE  --   --  46*   TROPI  --   --  <0.015       No results found for this or any previous visit (from the past 24 hour(s)).

## 2021-06-22 NOTE — PROGRESS NOTES
Care Transitions focused note:      Pt assessment was done face to face. Pt's PCP is Dr. Olson. Pt gets medications through [facility] in [place]. Pt is independent w/all adl's. Pt DX for hospital stay is bacteremia. Pt states that his spouse/Ashley will provide transportation to home post hospital stay. This writer did let pt know that [facility] does ' Healthy packs' packaging of medications if he was interested, pt could inquire about this. Otherwise pt states that he has no other needs at this time for CTS. CTS will remain available if any discharge needs may arise.

## 2021-06-22 NOTE — PLAN OF CARE
Patient A&O, afebrile, VSS, denies pain. IVF @ 100 ml/hr, saline locked this morning per orders. Continues with IV Rocephin and PO Doxycycline. Up independently. Call light within reach, makes needs known.

## 2021-06-22 NOTE — PHARMACY
Pharmacy Antimicrobial Stewardship Assessment     Current Antimicrobial Therapy:  Anti-infectives (From now, onward)    Start     Dose/Rate Route Frequency Ordered Stop    06/22/21 1200  cefTRIAXone in d5w (ROCEPHIN) intermittent infusion 1 g      1 g  over 30 Minutes Intravenous EVERY 24 HOURS 06/21/21 1649      06/21/21 1800  doxycycline hyclate (VIBRAMYCIN) capsule 100 mg      100 mg Oral EVERY 12 HOURS SCHEDULED 06/21/21 1649            Indication: SSTI (doxy), Bacteremia (Rocephin)    Days of Therapy: 2     Pertinent Labs:  WBC   Date Value Ref Range Status   06/22/2021 3.0 (L) 4.0 - 11.0 10e9/L Final   06/21/2021 3.1 (L) 4.0 - 11.0 10e9/L Final   06/20/2021 4.7 4.0 - 11.0 10e9/L Final     Procalcitonin   Date Value Ref Range Status   06/21/2021 0.44 ng/ml Final     Comment:     0.25-0.49 ng/ml  Possible early systemic infection or localized infection.     Recommendation: Encourage antibiotics only in the correct clinical context.   Consider obtaining blood cultures or other relevant cultures. Recheck PCT in   6-12 hours to ensure baseline low level. If repeat PCT is rising, consider   early systemic infection and consider starting antibiotics.     06/20/2021 0.44 ng/ml Final     Comment:     0.25-0.49 ng/ml  Possible early systemic infection or localized infection.     Recommendation: Encourage antibiotics only in the correct clinical context.   Consider obtaining blood cultures or other relevant cultures. Recheck PCT in   6-12 hours to ensure baseline low level. If repeat PCT is rising, consider   early systemic infection and consider starting antibiotics.       CRP Inflammation   Date Value Ref Range Status       Culture Results:   Collected Updated Procedure Result Status    06/21/2021 1027 06/22/2021 0614 Blood culture [[id number]]   Blood    Preliminary result Component Value   Specimen Description Blood   Culture Micro No growth after 19 hours P           06/20/2021 2125 06/20/2021 2226 Asymptomatic  SARS-CoV-2 COVID-19 Virus (Coronavirus) by PCR [[id number]]   Nasopharyngeal    Edited Result - FINAL Component Value   SARS-CoV-2 Virus Specimen Source Nasopharyngeal   SARS-CoV-2 PCR Result NEGATIVE   SARS-CoV2 (COVID-19) RNA not detected, presumed negative.        06/20/2021 2010 06/22/2021 0729 Urine Culture [[id number]]   Midstream Urine    Preliminary result Component Value   Specimen Description Midstream Urine   Culture Micro Culture in progress P           06/20/2021 1946 06/22/2021 0614 Blood culture [[id number]]   Blood   Right Arm    Preliminary result Component Value   Specimen Description Blood Right Arm   Culture Micro No growth after 2 days P           06/20/2021 1941 06/22/2021 0951 Blood culture [[id number]]   (Abnormal)   Blood   Left Arm    Preliminary result Component Value   Specimen Description Blood Left Arm   Culture Micro 2 of 2 bottles   Gram negative rods   Identification and susceptibility to follow.   Abnormal P   Culture Micro Critical Value:   Gram stain result of bottle 1 called to and read back by Tia Dixon at 0802 on 6/21/21 by   Cynthia Choudhury.   Gram stain result of bottle 2 called to and read back by Mary Trevino at 1542 on 6/21/21   by Victorina FIGUEERDO           Recommendations/Interventions:  1. Tick panel pending. No recommendations at this time.    Katie Stewart, [facility]  June 22, 2021

## 2021-06-22 NOTE — PLAN OF CARE
Reviewed medications with patient.     Patient reports has not been taking rosuvastatin or metformin for the past 3-5 days. He stated that he was advised to hold them until he got his lab results back from the ER (which was 3 days ago).     No other concerns. Extensive med rec performed on patient on 6/9/21.      Cori Carreno on 6/22/2021 at 8:38 AM

## 2021-06-22 NOTE — PLAN OF CARE
Rec'd to this unit 1800. Temp max this shift: 99.4. IVF: NS @ 100. Appetite good for dinner. Denies pain. Takes PO meds without diff.   .Face to face report given with opportunity to observe patient.    Report given to Rowena Hillman RN   6/21/2021  11:10 PM

## 2021-06-23 VITALS
SYSTOLIC BLOOD PRESSURE: 124 MMHG | WEIGHT: 163.14 LBS | TEMPERATURE: 97.1 F | HEIGHT: 70 IN | OXYGEN SATURATION: 96 % | BODY MASS INDEX: 23.36 KG/M2 | HEART RATE: 72 BPM | DIASTOLIC BLOOD PRESSURE: 70 MMHG | RESPIRATION RATE: 16 BRPM

## 2021-06-23 LAB
ANION GAP SERPL CALCULATED.3IONS-SCNC: 5 MMOL/L (ref 3–14)
BACTERIA SPEC CULT: ABNORMAL
BACTERIA SPEC CULT: NORMAL
BASOPHILS # BLD AUTO: 0 10E9/L (ref 0–0.2)
BASOPHILS NFR BLD AUTO: 0.5 %
BUN SERPL-MCNC: 17 MG/DL (ref 7–30)
CALCIUM SERPL-MCNC: 8.6 MG/DL (ref 8.5–10.1)
CHLORIDE SERPL-SCNC: 106 MMOL/L (ref 94–109)
CO2 SERPL-SCNC: 26 MMOL/L (ref 20–32)
CREAT SERPL-MCNC: 0.79 MG/DL (ref 0.66–1.25)
DIFFERENTIAL METHOD BLD: ABNORMAL
EOSINOPHIL # BLD AUTO: 0.1 10E9/L (ref 0–0.7)
EOSINOPHIL NFR BLD AUTO: 2.8 %
ERYTHROCYTE [DISTWIDTH] IN BLOOD BY AUTOMATED COUNT: 13.4 % (ref 10–15)
GFR SERPL CREATININE-BSD FRML MDRD: 88 ML/MIN/{1.73_M2}
GLUCOSE SERPL-MCNC: 157 MG/DL (ref 70–99)
HCT VFR BLD AUTO: 34 % (ref 40–53)
HGB BLD-MCNC: 11.3 G/DL (ref 13.3–17.7)
IMM GRANULOCYTES # BLD: 0 10E9/L (ref 0–0.4)
IMM GRANULOCYTES NFR BLD: 0.3 %
LYMPHOCYTES # BLD AUTO: 1.2 10E9/L (ref 0.8–5.3)
LYMPHOCYTES NFR BLD AUTO: 30.9 %
MCH RBC QN AUTO: 30.3 PG (ref 26.5–33)
MCHC RBC AUTO-ENTMCNC: 33.2 G/DL (ref 31.5–36.5)
MCV RBC AUTO: 91 FL (ref 78–100)
MONOCYTES # BLD AUTO: 0.4 10E9/L (ref 0–1.3)
MONOCYTES NFR BLD AUTO: 9.7 %
NEUTROPHILS # BLD AUTO: 2.2 10E9/L (ref 1.6–8.3)
NEUTROPHILS NFR BLD AUTO: 55.8 %
NRBC # BLD AUTO: 0 10*3/UL
NRBC BLD AUTO-RTO: 0 /100
PLATELET # BLD AUTO: 143 10E9/L (ref 150–450)
POTASSIUM SERPL-SCNC: 3.6 MMOL/L (ref 3.4–5.3)
RBC # BLD AUTO: 3.73 10E12/L (ref 4.4–5.9)
SODIUM SERPL-SCNC: 137 MMOL/L (ref 133–144)
SPECIMEN SOURCE: ABNORMAL
SPECIMEN SOURCE: NORMAL
WBC # BLD AUTO: 3.9 10E9/L (ref 4–11)

## 2021-06-23 PROCEDURE — 250N000013 HC RX MED GY IP 250 OP 250 PS 637: Performed by: INTERNAL MEDICINE

## 2021-06-23 PROCEDURE — 80048 BASIC METABOLIC PNL TOTAL CA: CPT | Performed by: NURSE PRACTITIONER

## 2021-06-23 PROCEDURE — G0378 HOSPITAL OBSERVATION PER HR: HCPCS

## 2021-06-23 PROCEDURE — 250N000011 HC RX IP 250 OP 636: Performed by: NURSE PRACTITIONER

## 2021-06-23 PROCEDURE — 36415 COLL VENOUS BLD VENIPUNCTURE: CPT | Performed by: NURSE PRACTITIONER

## 2021-06-23 PROCEDURE — 99239 HOSP IP/OBS DSCHRG MGMT >30: CPT | Performed by: NURSE PRACTITIONER

## 2021-06-23 PROCEDURE — 250N000013 HC RX MED GY IP 250 OP 250 PS 637: Performed by: NURSE PRACTITIONER

## 2021-06-23 PROCEDURE — 96376 TX/PRO/DX INJ SAME DRUG ADON: CPT

## 2021-06-23 PROCEDURE — 85025 COMPLETE CBC W/AUTO DIFF WBC: CPT | Performed by: NURSE PRACTITIONER

## 2021-06-23 RX ORDER — CEFDINIR 300 MG/1
300 CAPSULE ORAL 2 TIMES DAILY
Qty: 10 CAPSULE | Refills: 0 | Status: SHIPPED | OUTPATIENT
Start: 2021-06-24 | End: 2021-06-30

## 2021-06-23 RX ORDER — DOXYCYCLINE 100 MG/1
100 CAPSULE ORAL EVERY 12 HOURS
Qty: 16 CAPSULE | Refills: 0 | Status: SHIPPED | OUTPATIENT
Start: 2021-06-23 | End: 2021-07-01

## 2021-06-23 RX ORDER — SENNOSIDES 8.6 MG
2 TABLET ORAL 2 TIMES DAILY PRN
Status: DISCONTINUED | OUTPATIENT
Start: 2021-06-23 | End: 2021-06-23 | Stop reason: HOSPADM

## 2021-06-23 RX ORDER — CEFDINIR 300 MG/1
300 CAPSULE ORAL 2 TIMES DAILY
Qty: 10 CAPSULE | Refills: 0 | Status: SHIPPED | OUTPATIENT
Start: 2021-06-23 | End: 2021-06-23

## 2021-06-23 RX ADMIN — LOSARTAN POTASSIUM 25 MG: 25 TABLET, FILM COATED ORAL at 09:44

## 2021-06-23 RX ADMIN — ROSUVASTATIN CALCIUM 10 MG: 10 TABLET, FILM COATED ORAL at 09:43

## 2021-06-23 RX ADMIN — CEFTRIAXONE SODIUM 1 G: 1 INJECTION, SOLUTION INTRAVENOUS at 11:08

## 2021-06-23 RX ADMIN — AMLODIPINE BESYLATE 5 MG: 5 TABLET ORAL at 09:44

## 2021-06-23 RX ADMIN — PANTOPRAZOLE SODIUM 40 MG: 40 TABLET, DELAYED RELEASE ORAL at 09:44

## 2021-06-23 RX ADMIN — SENNOSIDES 2 TABLET: 8.6 TABLET, FILM COATED ORAL at 00:35

## 2021-06-23 RX ADMIN — ALLOPURINOL 100 MG: 100 TABLET ORAL at 09:44

## 2021-06-23 RX ADMIN — METFORMIN HYDROCHLORIDE 1000 MG: 500 TABLET, EXTENDED RELEASE ORAL at 09:45

## 2021-06-23 RX ADMIN — DOXYCYCLINE HYCLATE 100 MG: 100 CAPSULE ORAL at 09:44

## 2021-06-23 ASSESSMENT — ACTIVITIES OF DAILY LIVING (ADL)
ADLS_ACUITY_SCORE: 13

## 2021-06-23 NOTE — PHARMACY
Pharmacy Antimicrobial Stewardship Assessment     Current Antimicrobial Therapy:  Anti-infectives (From now, onward)    Start     Dose/Rate Route Frequency Ordered Stop    06/22/21 1200  cefTRIAXone in d5w (ROCEPHIN) intermittent infusion 1 g      1 g  over 30 Minutes Intravenous EVERY 24 HOURS 06/21/21 1649      06/21/21 1800  doxycycline hyclate (VIBRAMYCIN) capsule 100 mg      100 mg Oral EVERY 12 HOURS SCHEDULED 06/21/21 1649          Indication: Bacteremia (Rocephin), SSTI/possible tick-borne illness (doxycycline)    Days of Therapy: 3     Pertinent Labs:  WBC   Date Value Ref Range Status   06/23/2021 3.9 (L) 4.0 - 11.0 10e9/L Final   06/22/2021 3.0 (L) 4.0 - 11.0 10e9/L Final   06/21/2021 3.1 (L) 4.0 - 11.0 10e9/L Final     Procalcitonin   Date Value Ref Range Status   06/21/2021 0.44 ng/ml Final     Comment:     0.25-0.49 ng/ml  Possible early systemic infection or localized infection.     Recommendation: Encourage antibiotics only in the correct clinical context.   Consider obtaining blood cultures or other relevant cultures. Recheck PCT in   6-12 hours to ensure baseline low level. If repeat PCT is rising, consider   early systemic infection and consider starting antibiotics.     06/20/2021 0.44 ng/ml Final     Comment:     0.25-0.49 ng/ml  Possible early systemic infection or localized infection.     Recommendation: Encourage antibiotics only in the correct clinical context.   Consider obtaining blood cultures or other relevant cultures. Recheck PCT in   6-12 hours to ensure baseline low level. If repeat PCT is rising, consider   early systemic infection and consider starting antibiotics.       CRP Inflammation   Date Value Ref Range Status     Culture Results:   Collected Updated Procedure Result Status    06/21/2021 1027 06/23/2021 0626 Blood culture [[id number]]   Blood    Preliminary result Component Value   Specimen Description Blood   Culture Micro No growth after 2 days P           06/20/2021 1043  06/20/2021 2226 Asymptomatic SARS-CoV-2 COVID-19 Virus (Coronavirus) by PCR [[id number]]   Nasopharyngeal    Edited Result - FINAL Component Value   SARS-CoV-2 Virus Specimen Source Nasopharyngeal   SARS-CoV-2 PCR Result NEGATIVE   SARS-CoV2 (COVID-19) RNA not detected, presumed negative        06/20/2021 2010 06/23/2021 0648 Urine Culture [[id number]]   Midstream Urine    Final result Component Value   Specimen Description Midstream Urine   Culture Micro >100,000 colonies/mL   mixed urogenital kenn   No further identification or sensitivity done            06/20/2021 1946 06/23/2021 0626 Blood culture [[id number]]   Blood   Right Arm    Preliminary result Component Value   Specimen Description Blood Right Arm   Culture Micro No growth after 3 days P           06/20/2021 1941 06/23/2021 0631 Blood culture [[id number]]    (Abnormal)   Blood   Left Arm    Final result Component Value   Specimen Description Blood Left Arm   Culture Micro 2 of 2 bottles   Escherichia coli   Abnormal    Culture Micro Critical Value:   Gram stain result of bottle 1 called to and read back by Tia Dixon at 0802 on 6/21/21 by   Cynthia Choudhury.   Gram stain result of bottle 2 called to and read back by Mary Trevino at 1542 on 6/21/21   by Victorina Cisse    Culture Micro Gram stain review consistent with reported results.   Reviewed by [facility] 06/22/21 0630.         Escherichia coli       STEVEN     Amoxicillin/Clav 4 ug/mL Sensitive*     AMPICILLIN >=32 ug/mL Resistant     AMPICILLIN/SULBACTAM 4 ug/mL Sensitive     CEFAZOLIN <=4 ug/mL Sensitive*     CEFEPIME <=1 ug/mL Sensitive     CEFTAZIDIME <=1 ug/mL Sensitive     CEFTRIAXONE <=1 ug/mL Sensitive     CIPROFLOXACIN <=0.25 ug/mL Sensitive     Ext Spect B Lac Prod Negative  Sensitive*     GENTAMICIN <=1 ug/mL Sensitive     IMIPENEM <=0.25 ug/mL Sensitive     LEVOFLOXACIN <=0.12 ug/mL Sensitive     Piperacillin/Tazo <=4 ug/mL Sensitive     TOBRAMYCIN <=1 ug/mL Sensitive     Trimethoprim/Sulfa >=16/304 ug/mL Resistant         6/21/21:   Ehrlichia anaplasma: still pending  Lyme's: negative    Recommendations/Interventions:  1. No recommendations at this time    Katie Stewart, [facility]  June 23, 2021

## 2021-06-23 NOTE — PLAN OF CARE
Patient discharged at 12:16 PM via ambulation accompanied by spouse and staff. Prescriptions sent to patients preferred pharmacy. All belongings sent with patient.     Discharge instructions reviewed with patient. Listed belongings gathered and returned to patient.     Patient discharged to home.   Report called to n/a    Surgical Patient   Surgical Procedures during stay: n/a  Did patient receive discharge instruction on wound care and recognition of infection symptoms? N/A    MISC  Follow up appointment made:  Yes  Home medications returned to patient: N/A  Patient reports pain was well managed at discharge: Yes

## 2021-06-23 NOTE — DISCHARGE SUMMARY
[facility]    Discharge Summary  Hospitalist    Date of Admission:  6/21/2021  Date of Discharge:  6/23/2021 12:13 PM  Discharging Provider: Yessy Funk CNP  Date of Service (when I saw the patient): 06/23/21    Discharge Diagnoses   Principal Problem:    Bacteremia due to Gram-negative bacteria  Active Problems:    Elevated LFTs    Benign essential hypertension    Type 2 diabetes mellitus with diabetic polyneuropathy, without long-term current use of insulin (H)    Pancytopenia (H)      History of Present Illness   From admission: Rafa Bhatti is a 76 year old male who presents with fever and flu-like symptoms. Relates that he started to feel ill 5 days ago, just general malaise and some body aches. However, this worsened over the weekend and he developed high fevers on Saturday along with worsening back pain. He presented to the ED late in the night with 102 degree fever. Work-up showed no leukocytosis but he did have elevated LFT's. Blood cultures were drawn, 1/4 cam back positive fro gram negative rods so he was called back for IV antibiotics. This morning he states he feels quite a lot better just from the IVF he received in the ED on his first visit. He was give 2 grams of Rocephin in the ED and admitted for further care.     Hospital Course     Bacteremia due to Gram-negative bacteria: E. Coli bacteremia. 2/4 bottles positive with high fever and flu-like symptoms along with some right CVA tenderness. He was started on IV rocephin, will continue pending ID and sensitivity. Most likely due to acute cystitis, urine culture is pending.  -6/22: Feels good, no leukocytosis, mild fever last night but resolved.    -6/23: No fevers x>24 hours, feels back to baseline. Discharge home on oral omnicef for remainder of 7 day course.         Possible acute cystitis without hematuria: Right CVA tenderness, high fevers. Will cover with Rocephin, UC and BC pending.  -6/23: UC was negative, however still  most liekly source of e. Coli abcteremia as he had no abdominal symptoms prior to or during hospital stay.         Elevated LFTs:  He was out camping over the last 2 weeks. Suspect tick borne illness as he also has pancytopenia. Will cover with doxycycline pending tick panel returning. He has not had any nausea/vomiting or abdominal pain though he has been febrile with general malaise as above.   -6/22: Improving, negative lyme and Hep A/B/C. Erlich/anaplasmosis still pending.   -6/23: Discharge to finish 10 day course of doxy, erlich/anaplasmosis still pending. LFT's continue to improve.         Benign essential hypertension: Stable, takes norvasc at home, will continue as blood pressure requires.        Type 2 diabetes mellitus with diabetic polyneuropathy, without long-term current use of insulin (H): Taking metformin alone, will continue.       Pancytopenia (H): Worsening over the last several days. Suspect tick borne illness as he also has elevating LFT's. Will cover with doxycyline as above.   -6/23: WBC and platelets starting to recover today, no fever x24 hours.       Yessy Funk, CNP        Pending Results     Unresulted Labs Ordered in the Past 30 Days of this Admission     Date and Time Order Name Status Description    6/21/2021 1139 Ehrlichia Anaplasma Sp by PCR In process     6/21/2021 1015 Blood culture Preliminary     6/20/2021 1951 Blood culture Preliminary           Code Status   Full Code       Primary Care Physician   Harry Olson    Discharge Disposition   Discharged to home  Condition at discharge: Stable    Consultations This Hospital Stay   None    Time Spent on this Encounter   I, Yessy Funk NP, personally saw the patient today and spent greater than 30 minutes discharging this patient.    Discharge Orders      Reason for your hospital stay    Bacteremia from bladder infection and separate probable tick borne illness (erlichiosis/anaplasmosis)     Follow-up and  recommended labs and tests     Follow up with primary care provider, Harry Olson, within 5-7 days for hospital follow- up.  The following labs/tests are recommended: CMP,CBC.     Activity    Your activity upon discharge: activity as tolerated, avoid dangerous activities until after follow-up.     When to contact your care team    Call your primary doctor if you have any of the following: fever > 100.4, new headache, joint aches, shortness of breath, chest pain.     Full Code     Diet    Follow this diet upon discharge: Orders Placed This Encounter      Regular Diet Adult     Discharge Medications   Current Discharge Medication List      START taking these medications    Details   cefdinir (OMNICEF) 300 MG capsule Take 1 capsule (300 mg) by mouth 2 times daily  Qty: 10 capsule, Refills: 0    Associated Diagnoses: Bacteremia due to Gram-negative bacteria      doxycycline hyclate (VIBRAMYCIN) 100 MG capsule Take 1 capsule (100 mg) by mouth every 12 hours for 8 days  Qty: 16 capsule, Refills: 0    Associated Diagnoses: Human anaplasmosis         CONTINUE these medications which have NOT CHANGED    Details   allopurinol (ZYLOPRIM) 100 MG tablet TAKE 1 TABLET BY MOUTH TWICE A DAY  Qty: 180 tablet, Refills: 3    Comments: Patient enrolled in our Rx Med Sync service to improveadherence. We are requesting a refill authorization inadvance to ensure an active prescription is on file.  Associated Diagnoses: Chronic idiopathic gout involving toe of left foot without tophus      amLODIPine (NORVASC) 5 MG tablet TAKE 1 TABLET BY MOUTH EVERY DAY  Qty: 90 tablet, Refills: 3    Comments: Patient enrolled in our Rx Med Sync service to improveadherence. We are requesting a refill authorization inadvance to ensure an active prescription is on file.  Associated Diagnoses: Benign essential hypertension      blood glucose monitoring (FREESTYLE) lancets Use to test blood sugar 1 times daily.  Qty: 100 each, Refills: 3    Associated  Diagnoses: Type 2 diabetes mellitus without complication, without long-term current use of insulin (H)      FREESTYLE LITE test strip USE TO TEST BLOOD SUGARS ONCE A DAY.  Qty: 100 each, Refills: 2    Associated Diagnoses: Type 2 diabetes mellitus without complication, without long-term current use of insulin (H)      losartan (COZAAR) 25 MG tablet TAKE 1 TABLET BY MOUTH EVERY DAY  Qty: 90 tablet, Refills: 2    Comments: Patient enrolled in our Rx Med Sync service to improveadherence. We are requesting a refill authorization inadvance to ensure an active prescription is on file.  Associated Diagnoses: Type 2 diabetes mellitus with diabetic polyneuropathy, without long-term current use of insulin (H); Benign essential hypertension      pantoprazole (PROTONIX) 40 MG EC tablet Take 40 mg by mouth daily      tamsulosin (FLOMAX) 0.4 MG capsule TAKE 1 CAPSULE DAILY BY MOUTH  Qty: 90 capsule, Refills: 3    Comments: Patient enrolled in our Rx Med Sync service to improveadherence. We are requesting a refill authorization inadvance to ensure an active prescription is on file.  Associated Diagnoses: Urinary retention      calcium carbonate (TUMS) 500 MG chewable tablet Take 1 tablet by mouth daily as needed       colchicine (COLCYRS) 0.6 MG tablet TAKE 2 TABLETS AT THE ONSET OF GOUT PAIN. MAY TAKE ONE TABLET AGAIN IN ONE HOUR. MAX 4 TABS IN 24 HRS.  Qty: 20 tablet, Refills: 1    Associated Diagnoses: Chronic idiopathic gout involving toe of left foot without tophus      metFORMIN (GLUCOPHAGE-XR) 500 MG 24 hr tablet TAKE 2 TABLETS (1000MG) BY TWICE DAILY WITH MEALS  Qty: 120 tablet, Refills: 3    Comments: Patient enrolled in our Rx Med Sync service to improveadherence. We are requesting a refill authorization inadvance to ensure an active prescription is on file.  Associated Diagnoses: Type 2 diabetes mellitus without complication, without long-term current use of insulin (H)      rosuvastatin (CRESTOR) 10 MG tablet TAKE 1  TABLET BY MOUTH EVERY DAY  Qty: 90 tablet, Refills: 0    Comments: Patient enrolled in our Rx Med Sync service to improveadherence. We are requesting a refill authorization inadvance to ensure an active prescription is on file.  Associated Diagnoses: Mixed hyperlipidemia; Type 2 diabetes mellitus without complication, without long-term current use of insulin (H)      sildenafil (REVATIO) 20 MG tablet Take 1-2 tablets (20-40 mg) by mouth daily as needed (erectile dysfunction)  Qty: 60 tablet, Refills: 1    Associated Diagnoses: Erectile dysfunction, unspecified erectile dysfunction type           Allergies   Allergies   Allergen Reactions     Lactose Unknown     LACTOSE INTOLERANT     Data   Most Recent 3 CBC's:  Recent Labs   Lab Test 06/23/21  0552 06/22/21  0506 06/21/21  1028   WBC 3.9* 3.0* 3.1*   HGB 11.3* 11.3* 13.2*   MCV 91 92 92   * 117* 114*      Most Recent 3 BMP's:  Recent Labs   Lab Test 06/23/21  0552 06/22/21  0506 06/21/21  1028    138 135   POTASSIUM 3.6 3.8 3.4   CHLORIDE 106 107 102   CO2 26 26 26   BUN 17 11 16   CR 0.79 0.93 0.95   ANIONGAP 5 5 7   HUY 8.6 8.2* 8.8   * 159* 301*     Most Recent 2 LFT's:  Recent Labs   Lab Test 06/22/21  0506 06/21/21  1028   AST 47* 90*   * 424*   ALKPHOS 159* 173*   BILITOTAL 0.5 1.3     Most Recent INR's and Anticoagulation Dosing History:  Anticoagulation Dose History     Recent Dosing and Labs Latest Ref Rng & Units 4/12/2018    INR 0.80 - 1.20 1.14        Most Recent 3 Troponin's:  Recent Labs   Lab Test 06/20/21  1940 06/10/21  0539 06/10/21  0016   TROPI <0.015 <0.015 <0.015     Most Recent Cholesterol Panel:  Recent Labs   Lab Test 06/16/21  1112   CHOL 87   LDL 32   HDL 32*   TRIG 113     Most Recent 6 Bacteria Isolates From Any Culture (See EPIC Reports for Culture Details):  Recent Labs   Lab Test 06/21/21  1027 06/20/21  2010 06/20/21  1946 06/20/21  1941 10/16/15  1344   CULT No growth after 2 days >100,000  colonies/mL  mixed urogenital kenn  No further identification or sensitivity done   No growth after 3 days 2 of 2 bottles  Escherichia coli  *  Critical Value:  Gram stain result of bottle 1 called to and read back by Tia Dixon at 0802 on 6/21/21 by   Cynthia Choudhury.  Gram stain result of bottle 2 called to and read back by Mary Trevino at 1542 on 6/21/21   by Victorina Cisse    Gram stain review consistent with reported results.  Reviewed by [facility] 06/22/21 0630.   No beta hemolytic Streptococcus Group A isolated     Most Recent TSH, T4 and A1c Labs:  Recent Labs   Lab Test 06/16/21  1112 04/24/18  1015 04/24/18  1015   TSH  --   --  1.44   A1C 10.9*   < >  --     < > = values in this interval not displayed.     Results for orders placed or performed during the hospital encounter of 06/20/21   Chest XR,  PA & LAT    Narrative    PROCEDURE:  XR CHEST 2 VW    HISTORY:  fever unknown origin. crackles RLL.     COMPARISON:  2018    FINDINGS:   The cardiac silhouette is normal in size. The pulmonary vasculature is  normal.  The lungs are clear. No pleural effusion or pneumothorax.  There is mild elevation of the right hemidiaphragm      Impression    IMPRESSION:  No acute cardiopulmonary disease.      MÓNICA GARCIA MD   Abdomen US, limited (RUQ only)    Narrative    PROCEDURES: US ABDOMEN LIMITED    HISTORY: Male, age 76 years, elevated LFTs. Normal four days ago.,  elevated LFTs. Normal four days ago.    TECHNIQUE: Ultrasound of the upper abdomen was performed.    COMPARISON: CT scan abdomen and pelvis 10/30/2020     FINDINGS:    LIVER: Linear echogenic foci within the parenchyma likely representing  gas in the biliary tree as documented on the 10/30/2020 CT scan.    GALLBLADDER: Surgically absent    Common bile duct diameter: 1.8 mm.    ABDOMINAL AORTA AND IVC: The visualized portions of the abdominal  aorta are unremarkable.    PANCREAS:The visualized portions of the pancreas are normal.    RIGHT KIDNEY: The right  kidney is normal. The right kidney measures  10.0 centimeters in length.    OTHER: There is no free fluid in the upper abdomen.      Impression    IMPRESSION: No acute abnormality. Echogenic areas within liver likely  represent gas in the biliary tree from previous sphincterotomy.    This report is in agreement with the preliminary report.    MOISÉS RENO MD   CT Chest/Abdomen/Pelvis w Contrast    Narrative    PROCEDURE: CT CHEST/ABDOMEN/PELVIS W CONTRAST 6/21/2021 1:12 AM    HISTORY: Right flank pain    COMPARISONS: March 2020    Meds/Dose Given: ISOVUE 300 100ML    TECHNIQUE: CT scan of the chest abdomen and pelvis with IV contrast  sagittal coronal reconstructions were obtained    FINDINGS: There is a 2 mm diameter right upper lobe nodule seen on  series 9 and axial image 33.  There is some interstitial thickening  seen in the right middle lobe. The hilar and mediastinal lymph nodes  are normal in caliber. No pleural abnormalities are noted. The heart  is normal in size. Axillary and supraclavicular lymph nodes appear  normal. Degenerative changes are present in the lower cervical and  thoracic spine.    CT scan of the abdomen and pelvis: The liver is free of masses or  biliary ductal enlargement. Gas is seen within the biliary tree. The  spleen appears normal. The pancreatic duct is dilated and gas is seen  within the pancreatic duct. Postoperative changes are seen at the site  of duodenal resection    The adrenal glands are normal. There is a benign-appearing cyst seen  in the left kidney. There is no hydronephrosis.    The periaortic lymph nodes are normal in caliber. No intraperitoneal  masses or inflammatory changes are noted. The bladder and rectum are  normal. degenerative changes are present in the thoracic and lumbar  spine         Impression    IMPRESSION: Postop changes of a duodenal resection. There is some  increased density in the peripancreatic fat, uncertain if this  represents postoperative  scarring or if this represents acute  inflammation. The pancreatic duct is dilated with gas in the  pancreatic duct.    MÓNICA GARCIA MD

## 2021-06-23 NOTE — PROGRESS NOTES
Name: Rafa Bhatti    MRN#: [id number]    Reason for Hospitalization: Thrombocytopenia (H) [D69.6]  Leukopenia [D72.819]  Elevated transaminase level [R74.01]  Bacteremia due to Gram-negative bacteria [R78.81]    PINEDA: Low    Discharge Date: June 23, 2021      Patient / Family response to discharge plan: Patient was involved in discharge planning.    Follow-Up Appt:   Future Appointments   Date Time Provider Department Center   6/30/2021  2:45 PM Genna Rahman NP [facility]   8/10/2021  9:45 AM Evelyn Neal APRN CNP [facility]   8/18/2021  8:30 AM Ava Templeton RD [facility]   8/25/2021  8:45 AM Harry Olson MD [facility]       Other Providers (Care Coordinator, County Services, PCA services etc): No    Discharge Disposition: home via spouse/Ashley    Pt or health care agent verbalized understanding.       Chaparrita Guadarrama CM RN

## 2021-06-23 NOTE — PLAN OF CARE
No falls or injuries this shift. Has remained afebrile this shift. Up in room indep. Steady on feet. Denies pain.     Face to face report given with opportunity to observe patient.    Report given to Darell Hillman RN   6/22/2021  11:04 PM

## 2021-06-23 NOTE — PLAN OF CARE
"Most recent vitals: /69   Pulse 71   Temp 97.7  F (36.5  C) (Tympanic)   Resp 16   Ht 1.778 m (5' 10\")   Wt 74 kg (163 lb 2.3 oz)   SpO2 97%   BMI 23.41 kg/m      Pt A&O, able to make needs known. Afebrile. Up independently to bathroom. Voiding without any issues. Bowels active and audible, per pt he has not had a BM since prior to admission. MD notified orders for PRN Senokot and milk of magnesia placed, Senokot was given. Without any results yet this shift. Lungs clear throughout, maintaining on RA. HR regular. Call light within reach, frequent rounding completed.       Face to face report given with opportunity to observe patient.    Report given to Maliha Cervantes RN   6/23/2021  7:34 AM      "

## 2021-06-24 LAB
A PHAGOCYTOPH DNA BLD QL NAA+PROBE: NOT DETECTED
E CHAFFEENSIS DNA BLD QL NAA+PROBE: NOT DETECTED
E EWINGII DNA SPEC QL NAA+PROBE: NOT DETECTED
EHRLICHIA DNA SPEC QL NAA+PROBE: NOT DETECTED

## 2021-06-24 NOTE — PROGRESS NOTES
Assessment & Plan     (R78.81) Bacteremia due to Gram-negative bacteria  (primary encounter diagnosis)  Comment: recheck labs today per hospital discharge recommendation  Plan: CBC with platelets and differential,         Comprehensive metabolic panel (BMP + Alb, Alk         Phos, ALT, AST, Total. Bili, TP)            (Z09) Hospital discharge follow-up  Comment: improved. Updated on final lab tests that were pending when he left the hospital   Plan: Follow up as needed       See Patient Instructions    Return if symptoms worsen or fail to improve.    Genna Rahman NP  [facility]   Rafa is a 76 year old who presents for the following health issues  accompanied by his spouse:    hospitals       Hospital Follow-up Visit:    Hospital/Nursing Home/IP Rehab Facility: [facility]  Date of Admission: 6/21  Date of Discharge: 6/23  Reason(s) for Admission: bacteremia due to gram negative bacteria       Was your hospitalization related to COVID-19? No   Problems taking medications regularly:  None  Medication changes since discharge: antibiotics ,one more dose left today.   Problems adhering to non-medication therapy:  None    Summary of hospitalization:  [facility] discharge summary reviewed  Diagnostic Tests/Treatments reviewed.  Follow up needed: CBC and CMP  Other Healthcare Providers Involved in Patient s Care:         None  Update since discharge: improved. Post Discharge Medication Reconciliation: discharge medications reconciled, continue medications without change.  Plan of care communicated with patient          Review of Systems   Constitutional, HEENT, cardiovascular, pulmonary, GI, , musculoskeletal, neuro, skin, endocrine and psych systems are negative, except as otherwise noted.      Objective    /68 (BP Location: Right arm, Patient Position: Sitting, Cuff Size: Adult Regular)   Pulse 78   Temp 97  F (36.1  C) (Tympanic)   Resp 16   Wt  75.3 kg (166 lb)   SpO2 97%   BMI 23.82 kg/m    Body mass index is 23.82 kg/m .  Physical Exam   GENERAL: healthy, alert and no distress  NECK: no adenopathy, no asymmetry, masses, or scars and thyroid normal to palpation  RESP: lungs clear to auscultation - no rales, rhonchi or wheezes  CV: regular rate and rhythm, normal S1 S2, no S3 or S4, no murmur, click or rub, no peripheral edema and peripheral pulses strong  ABDOMEN: soft, nontender, no hepatosplenomegaly, no masses and bowel sounds normal  MS: no gross musculoskeletal defects noted, no edema  SKIN: no suspicious lesions or rashes  NEURO: Normal strength and tone, mentation intact and speech normal  PSYCH: mentation appears normal, affect normal/bright  LYMPH: no cervical, supraclavicular, axillary, or inguinal adenopathy    Results for orders placed or performed in visit on 06/30/21   CBC with platelets and differential     Status: Abnormal   Result Value Ref Range    WBC 5.4 4.0 - 11.0 10e9/L    RBC Count 3.89 (L) 4.4 - 5.9 10e12/L    Hemoglobin 12.2 (L) 13.3 - 17.7 g/dL    Hematocrit 35.9 (L) 40.0 - 53.0 %    MCV 92 78 - 100 fl    MCH 31.4 26.5 - 33.0 pg    MCHC 34.0 31.5 - 36.5 g/dL    RDW 13.7 10.0 - 15.0 %    Platelet Count 248 150 - 450 10e9/L    % Neutrophils 62.2 %    % Lymphocytes 28.9 %    % Monocytes 7.2 %    % Eosinophils 1.5 %    % Basophils 0.2 %    Absolute Neutrophil 3.4 1.6 - 8.3 10e9/L    Absolute Lymphocytes 1.6 0.8 - 5.3 10e9/L    Absolute Monocytes 0.4 0.0 - 1.3 10e9/L    Absolute Eosinophils 0.1 0.0 - 0.7 10e9/L    Absolute Basophils 0.0 0.0 - 0.2 10e9/L    Diff Method Automated Method    Comprehensive metabolic panel (BMP + Alb, Alk Phos, ALT, AST, Total. Bili, TP)     Status: Abnormal   Result Value Ref Range    Sodium 139 133 - 144 mmol/L    Potassium 3.8 3.4 - 5.3 mmol/L    Chloride 104 94 - 109 mmol/L    Carbon Dioxide 26 20 - 32 mmol/L    Anion Gap 9 3 - 14 mmol/L    Glucose 158 (H) 70 - 99 mg/dL    Urea Nitrogen 16 7 - 30  mg/dL    Creatinine 0.76 0.66 - 1.25 mg/dL    GFR Estimate 90 >60 mL/min/[1.73_m2]    GFR Estimate If Black >90 >60 mL/min/[1.73_m2]    Calcium 8.8 8.5 - 10.1 mg/dL    Bilirubin Total 0.3 0.2 - 1.3 mg/dL    Albumin 3.6 3.4 - 5.0 g/dL    Protein Total 7.3 6.8 - 8.8 g/dL    Alkaline Phosphatase 103 40 - 150 U/L    ALT 60 0 - 70 U/L    AST 22 0 - 45 U/L

## 2021-06-26 LAB
BACTERIA SPEC CULT: NORMAL
SPECIMEN SOURCE: NORMAL

## 2021-06-27 LAB
BACTERIA SPEC CULT: NORMAL
SPECIMEN SOURCE: NORMAL

## 2021-06-30 ENCOUNTER — OFFICE VISIT (OUTPATIENT)
Dept: FAMILY MEDICINE | Facility: OTHER | Age: 76
End: 2021-06-30
Attending: NURSE PRACTITIONER
Payer: COMMERCIAL

## 2021-06-30 VITALS
HEART RATE: 78 BPM | DIASTOLIC BLOOD PRESSURE: 68 MMHG | RESPIRATION RATE: 16 BRPM | OXYGEN SATURATION: 97 % | BODY MASS INDEX: 23.82 KG/M2 | SYSTOLIC BLOOD PRESSURE: 118 MMHG | WEIGHT: 166 LBS | TEMPERATURE: 97 F

## 2021-06-30 DIAGNOSIS — R78.81 BACTEREMIA DUE TO GRAM-NEGATIVE BACTERIA: Primary | ICD-10-CM

## 2021-06-30 DIAGNOSIS — Z09 HOSPITAL DISCHARGE FOLLOW-UP: ICD-10-CM

## 2021-06-30 LAB
BASOPHILS # BLD AUTO: 0 10E9/L (ref 0–0.2)
BASOPHILS NFR BLD AUTO: 0.2 %
DIFFERENTIAL METHOD BLD: ABNORMAL
EOSINOPHIL # BLD AUTO: 0.1 10E9/L (ref 0–0.7)
EOSINOPHIL NFR BLD AUTO: 1.5 %
ERYTHROCYTE [DISTWIDTH] IN BLOOD BY AUTOMATED COUNT: 13.7 % (ref 10–15)
HCT VFR BLD AUTO: 35.9 % (ref 40–53)
HGB BLD-MCNC: 12.2 G/DL (ref 13.3–17.7)
LYMPHOCYTES # BLD AUTO: 1.6 10E9/L (ref 0.8–5.3)
LYMPHOCYTES NFR BLD AUTO: 28.9 %
MCH RBC QN AUTO: 31.4 PG (ref 26.5–33)
MCHC RBC AUTO-ENTMCNC: 34 G/DL (ref 31.5–36.5)
MCV RBC AUTO: 92 FL (ref 78–100)
MONOCYTES # BLD AUTO: 0.4 10E9/L (ref 0–1.3)
MONOCYTES NFR BLD AUTO: 7.2 %
NEUTROPHILS # BLD AUTO: 3.4 10E9/L (ref 1.6–8.3)
NEUTROPHILS NFR BLD AUTO: 62.2 %
PLATELET # BLD AUTO: 248 10E9/L (ref 150–450)
RBC # BLD AUTO: 3.89 10E12/L (ref 4.4–5.9)
WBC # BLD AUTO: 5.4 10E9/L (ref 4–11)

## 2021-06-30 PROCEDURE — 85025 COMPLETE CBC W/AUTO DIFF WBC: CPT | Mod: ZL | Performed by: NURSE PRACTITIONER

## 2021-06-30 PROCEDURE — 80053 COMPREHEN METABOLIC PANEL: CPT | Mod: ZL | Performed by: NURSE PRACTITIONER

## 2021-06-30 PROCEDURE — 99496 TRANSJ CARE MGMT HIGH F2F 7D: CPT | Performed by: NURSE PRACTITIONER

## 2021-06-30 PROCEDURE — G0463 HOSPITAL OUTPT CLINIC VISIT: HCPCS

## 2021-06-30 PROCEDURE — 36415 COLL VENOUS BLD VENIPUNCTURE: CPT | Mod: ZL | Performed by: NURSE PRACTITIONER

## 2021-06-30 PROCEDURE — 99214 OFFICE O/P EST MOD 30 MIN: CPT | Performed by: NURSE PRACTITIONER

## 2021-06-30 ASSESSMENT — PAIN SCALES - GENERAL: PAINLEVEL: NO PAIN (0)

## 2021-06-30 NOTE — PATIENT INSTRUCTIONS
Patient Education     Lyme Disease  Lyme disease is caused by bacteria. The infection is most often passed during the bite of a deer tick. The tick is very small, so many people with Lyme disease don't know they have been bitten. Tests for Lyme disease are not always accurate early in the disease. If the disease is suspected, treatment may start before testing confirms the infection. A long course of antibiotics is the standard treatment.  If untreated, Lyme disease can cause symptoms in many parts of the body that may worsen.    Early symptoms limited to a small area may appear within a few days to a month after the tick bite. These symptoms may include a round, red rash that sometimes looks like a bull's-eye target with darker outer ring and a darker center. There may fever, chills, fatigue, body aches, and headache. In time, the rash goes away, even without treatment. That doesn't mean the infection has gone away, however. In some cases, early local symptoms never develop.    Early body-wide symptoms may appear weeks to months after the bite. These can include rashes on the skin of various parts of the body, muscle aches, fatigue, fever, headache, stiff neck, weakness on one side of the face, dizziness, palpitations, passing out, and joint pain and swelling.    Late-stage symptoms can include weakness in an arm, or leg, headache, fever, and numbness and tingling in the arms or legs, joint pain and swelling, confusion, and memory loss.    Many people will have left over symptoms even after treatment and cure of the Lyme disease. These are called post-Lyme symptoms and may include fatigue, body aches, joint aches, and headaches, which generally improve with time. Repeated courses of antibiotics don't help these symptoms to resolve faster. And, having the symptoms after a course of treatment does not mean that the Lyme bacteria is still active in the body.  Testing is done to check for the bacteria. When the  infection is treated early, it can be cured. In some cases, a second or third course of antibiotics may be needed. Be sure to follow your healthcare providers directions about treatment.  Home care  If antibiotic pills have been prescribed, take them exactly as directed until they are completely gone. Don't stop taking them until you have taken the full course or your healthcare provider has told you to stop.  Ask your healthcare provider about taking over-the-counter medicines to control symptoms such as aches and fever.  Follow-up care  Follow up with your healthcare provider, or as advised. Be sure to return for follow-up testing as directed to be sure the infection has been treated.  When to seek medical advice  Call your healthcare provider right away if any of the following occur:    Current symptoms get worse    Unexplained fever, neck pain or stiffness, or headache    Arm, leg or facial weakness    Joint pain or swelling    Numbness and tingling in the arms or legs    Confusion or memory loss    Irregular or rapid heartbeat, palpitations, dizziness, or passing out  [employer]i last reviewed this educational content on 3/1/2018    [id number] [employer]. All rights reserved. This information is not intended as a substitute for professional medical care. Always follow your healthcare professional's instructions.

## 2021-06-30 NOTE — NURSING NOTE
"Chief Complaint   Patient presents with     Hospital F/U       Initial /68 (BP Location: Right arm, Patient Position: Sitting, Cuff Size: Adult Regular)   Pulse 78   Temp 97  F (36.1  C) (Tympanic)   Resp 16   Wt 75.3 kg (166 lb)   SpO2 97%   BMI 23.82 kg/m   Estimated body mass index is 23.82 kg/m  as calculated from the following:    Height as of 6/21/21: 1.778 m (5' 10\").    Weight as of this encounter: 75.3 kg (166 lb).  Medication Reconciliation: complete  Abigail Carrion  "

## 2021-07-01 LAB
ALBUMIN SERPL-MCNC: 3.6 G/DL (ref 3.4–5)
ALP SERPL-CCNC: 103 U/L (ref 40–150)
ALT SERPL W P-5'-P-CCNC: 60 U/L (ref 0–70)
ANION GAP SERPL CALCULATED.3IONS-SCNC: 9 MMOL/L (ref 3–14)
AST SERPL W P-5'-P-CCNC: 22 U/L (ref 0–45)
BILIRUB SERPL-MCNC: 0.3 MG/DL (ref 0.2–1.3)
BUN SERPL-MCNC: 16 MG/DL (ref 7–30)
CALCIUM SERPL-MCNC: 8.8 MG/DL (ref 8.5–10.1)
CHLORIDE SERPL-SCNC: 104 MMOL/L (ref 94–109)
CO2 SERPL-SCNC: 26 MMOL/L (ref 20–32)
CREAT SERPL-MCNC: 0.76 MG/DL (ref 0.66–1.25)
GFR SERPL CREATININE-BSD FRML MDRD: 90 ML/MIN/{1.73_M2}
GLUCOSE SERPL-MCNC: 158 MG/DL (ref 70–99)
POTASSIUM SERPL-SCNC: 3.8 MMOL/L (ref 3.4–5.3)
PROT SERPL-MCNC: 7.3 G/DL (ref 6.8–8.8)
SODIUM SERPL-SCNC: 139 MMOL/L (ref 133–144)

## 2021-07-30 DIAGNOSIS — E11.9 TYPE 2 DIABETES MELLITUS WITHOUT COMPLICATION, WITHOUT LONG-TERM CURRENT USE OF INSULIN (H): ICD-10-CM

## 2021-07-30 RX ORDER — BLOOD-GLUCOSE METER
KIT MISCELLANEOUS
Qty: 100 STRIP | Refills: 3 | Status: SHIPPED | OUTPATIENT
Start: 2021-07-30 | End: 2023-02-07

## 2021-07-30 NOTE — TELEPHONE ENCOUNTER
freestyle      Last Written Prescription Date:  4/8/20  Last Fill Quantity: 100,   # refills: 2  Last Office Visit: 6/30/21  Future Office visit:    Next 5 appointments (look out 90 days)    Aug 25, 2021  8:45 AM  (Arrive by 8:30 AM)  [facility] Olson [facility] [address]  [facility] [place] [address]  [phone]

## 2021-08-12 NOTE — PROGRESS NOTES
"    Assessment & Plan     Type 2 diabetes mellitus without complication, without long-term current use of insulin (H)  Doing well.  Sugar around the 125 range overall.  Diarrhea from the metformin presumably.  He would like to change.  He is already on the XR.  I told him we can set up with Ava, his dm educator, who works well with insurance to start one of the newer medicines in light of the fact we can't really use metformin anymore due to side effects.  Thanks to Ava.    - Hemoglobin A1c; Future  - TSH with free T4 reflex; Future  - DIABETES EDUCATION REFERRAL ([place])    Benign essential hypertension  Stable.  He is off the atenolol.  He can stay off.     Chronic idiopathic gout involving toe of left foot without tophus  Stable.  The diarrhea is not from the colchicine as he only  Takes that PRN.      Pancytopenia (H)  Stable.  Update.    - CBC with Platelets & Differential; Future    Elevated transaminase level  History of.  Improved last check.  Recheck.    - Comprehensive metabolic panel (BMP + Alb, Alk Phos, ALT, AST, Total. Bili, TP); Future  - Lipase; Future             BMI:   Estimated body mass index is 25.21 kg/m  as calculated from the following:    Height as of this encounter: 1.734 m (5' 8.25\").    Weight as of this encounter: 75.8 kg (167 lb).           Return in about 53 weeks (around 8/31/2022) for Annual Wellness Visit.    Harry Olson MD  [facility]    Terrance Craig is a 76 year old who presents for the following health issues     HPI     Diabetes Follow-up    How often are you checking your blood sugar? One time daily  What time of day are you checking your blood sugars (select all that apply)?  Before meals  Have you had any blood sugars above 200?  No  Have you had any blood sugars below 70?  No    What symptoms do you notice when your blood sugar is low?  None    What concerns do you have today about your diabetes? None     Do you have any of these " "symptoms? (Select all that apply)  Numbness in feet      BP Readings from Last 2 Encounters:   08/25/21 132/78   08/24/21 (!) 154/70     Hemoglobin A1C (%)   Date Value   06/16/2021 10.9 (H)   08/12/2020 6.4 (H)     LDL Cholesterol Calculated (mg/dL)   Date Value   06/16/2021 32   02/21/2020 38       {Reference  Diabetes Management Resources :[id number]}        Hypertension Follow-up      Do you check your blood pressure regularly outside of the clinic? Yes     Are you following a low salt diet? Yes    Are your blood pressures ever more than 140 on the top number (systolic) OR more   than 90 on the bottom number (diastolic), for example 140/90? No          Review of Systems   Constitutional, HEENT, cardiovascular, pulmonary, gi and gu systems are negative, except as otherwise noted.  Diarrhea as mentioned above.        Objective    /78   Pulse 67   Temp (!) 96.3  F (35.7  C)   Ht 1.734 m (5' 8.25\")   Wt 75.8 kg (167 lb)   SpO2 98%   BMI 25.21 kg/m    Body mass index is 25.21 kg/m .  Physical Exam   GENERAL: healthy, alert and no distress  NECK: no adenopathy, no asymmetry, masses, or scars and thyroid normal to palpation  RESP: lungs clear to auscultation - no rales, rhonchi or wheezes  CV: regular rate and rhythm, normal S1 S2, no S3 or S4, no murmur, click or rub, no peripheral edema and peripheral pulses strong  ABDOMEN: soft, nontender, no hepatosplenomegaly, no masses and bowel sounds normal  MS: no gross musculoskeletal defects noted, no edema    Full labs pending.              "

## 2021-08-12 NOTE — PATIENT INSTRUCTIONS
Patient Education   Personalized Prevention Plan  You are due for the preventive services outlined below.  Your care team is available to assist you in scheduling these services.  If you have already completed any of these items, please share that information with your care team to update in your medical record.  Health Maintenance Due   Topic Date Due     Zoster (Shingles) Vaccine (1 of 2) Never done     Thyroid Function Lab  04/24/2019     Colorectal Cancer Screening  03/01/2021

## 2021-08-18 ENCOUNTER — HOSPITAL ENCOUNTER (OUTPATIENT)
Dept: EDUCATION SERVICES | Facility: HOSPITAL | Age: 76
Discharge: HOME | End: 2021-08-18
Attending: FAMILY MEDICINE | Admitting: FAMILY MEDICINE
Payer: COMMERCIAL

## 2021-08-18 VITALS
SYSTOLIC BLOOD PRESSURE: 128 MMHG | BODY MASS INDEX: 24.56 KG/M2 | HEART RATE: 76 BPM | DIASTOLIC BLOOD PRESSURE: 74 MMHG | WEIGHT: 165.8 LBS | HEIGHT: 69 IN

## 2021-08-18 DIAGNOSIS — E11.9 TYPE 2 DIABETES MELLITUS WITHOUT COMPLICATION, WITHOUT LONG-TERM CURRENT USE OF INSULIN (H): Primary | ICD-10-CM

## 2021-08-18 PROCEDURE — G0108 DIAB MANAGE TRN  PER INDIV: HCPCS | Performed by: DIETITIAN, REGISTERED

## 2021-08-18 ASSESSMENT — PATIENT HEALTH QUESTIONNAIRE - PHQ9: SUM OF ALL RESPONSES TO PHQ QUESTIONS 1-9: 0

## 2021-08-18 ASSESSMENT — ANXIETY QUESTIONNAIRES
IF YOU CHECKED OFF ANY PROBLEMS ON THIS QUESTIONNAIRE, HOW DIFFICULT HAVE THESE PROBLEMS MADE IT FOR YOU TO DO YOUR WORK, TAKE CARE OF THINGS AT HOME, OR GET ALONG WITH OTHER PEOPLE: NOT DIFFICULT AT ALL
5. BEING SO RESTLESS THAT IT IS HARD TO SIT STILL: NOT AT ALL
2. NOT BEING ABLE TO STOP OR CONTROL WORRYING: NOT AT ALL
7. FEELING AFRAID AS IF SOMETHING AWFUL MIGHT HAPPEN: NOT AT ALL
6. BECOMING EASILY ANNOYED OR IRRITABLE: NOT AT ALL
4. TROUBLE RELAXING: NOT AT ALL
3. WORRYING TOO MUCH ABOUT DIFFERENT THINGS: NOT AT ALL
GAD7 TOTAL SCORE: 0
1. FEELING NERVOUS, ANXIOUS, OR ON EDGE: NOT AT ALL

## 2021-08-18 ASSESSMENT — PAIN SCALES - GENERAL: PAINLEVEL: NO PAIN (0)

## 2021-08-18 ASSESSMENT — MIFFLIN-ST. JEOR: SCORE: 1474.5

## 2021-08-18 NOTE — LETTER
"    8/18/2021        RE: Rafa Bhatti  [address] [facility] [address]        Diabetes Self-Management Education & Support    Presents for: Individual review (Annual)    SUBJECTIVE/OBJECTIVE:  Presents for: Individual review (Annual)  Accompanied by: Self  Diabetes education in the past 24mo: Yes  Focus of Visit: Monitoring  Diabetes type: Type 2  Date of diagnosis: 4/2018  Disease course: Stable  How confident are you filling out medical forms by yourself:: Extremely  Diabetes management related comments/concerns: Numbers a bit higher than in the past  Transportation concerns: No  Difficulty affording diabetes medication?: No  Difficulty affording diabetes testing supplies?: No  Other concerns:: None  Cultural Influences/Ethnic Background:  American    Diabetes Symptoms & Complications:  Fatigue: No  Neuropathy: Sometimes  Polydipsia: No  Polyphagia: No  Polyuria: No  Visual change: No  Slow healing wounds: No  Symptom course: Stable  Weight trend: Increasing (Up 10# in the past year but had lost weight prior to that r/t surgery.)  Complications assessed today?: Yes  Autonomic neuropathy: No  CVA: No  Heart disease: No  Nephropathy: No  Peripheral neuropathy: No  Foot ulcerations: No  Peripheral Vascular Disease: No  Retinopathy: No  Sexual dysfunction: Yes    Patient Problem List and Family Medical History reviewed for relevant medical history, current medical status, and diabetes risk factors.    Vitals:  /74   Pulse 76   Ht 1.74 m (5' 8.5\")   Wt 75.2 kg (165 lb 12.8 oz)   BMI 24.84 kg/m    Estimated body mass index is 24.84 kg/m  as calculated from the following:    Height as of this encounter: 1.74 m (5' 8.5\").    Weight as of this encounter: 75.2 kg (165 lb 12.8 oz).   Last 3 BP:   BP Readings from Last 3 Encounters:   08/18/21 128/74   06/30/21 118/68   06/23/21 124/70       History   Smoking Status     Never Smoker   Smokeless Tobacco     Never Used       Labs:  Lab Results   Component Value " Date    A1C 10.9 06/16/2021     Lab Results   Component Value Date     06/30/2021     Lab Results   Component Value Date    LDL 32 06/16/2021     HDL Cholesterol   Date Value Ref Range Status   06/16/2021 32 (L) >39 mg/dL Final   ]  GFR Estimate   Date Value Ref Range Status   06/30/2021 90 >60 mL/min/[1.73_m2] Final     Comment:     Non  GFR Calc  Starting 12/18/2018, serum creatinine based estimated GFR (eGFR) will be   calculated using the Chronic Kidney Disease Epidemiology Collaboration   (CKD-EPI) equation.       GFR Estimate If Black   Date Value Ref Range Status   06/30/2021 >90 >60 mL/min/[1.73_m2] Final     Comment:      GFR Calc  Starting 12/18/2018, serum creatinine based estimated GFR (eGFR) will be   calculated using the Chronic Kidney Disease Epidemiology Collaboration   (CKD-EPI) equation.       Lab Results   Component Value Date    CR 0.76 06/30/2021     No results found for: MICROALBUMIN    Healthy Eating:  Healthy Eating Assessed Today: Yes  Cultural/Gnosticist diet restrictions?: No  Meal planning/habits: None  How many times a week on average do you eat food made away from home (restaurant/take-out)?: 1  Meals include: Breakfast, Lunch, Dinner, Evening Snack  Breakfast: honey nut cheerios with milk or small bagel with margarine - 2 cups coffee with flavored cream  Lunch: salad or sandwich or soup - water  Dinner: pasta/sauce or grilled meat/veggies - water  Snacks: crackers, popcorn, nuts  Beverages: Water, Coffee, Alcohol (occasional beer)  Has patient met with a dietitian in the past?: Yes    Being Active:  Being Active Assessed Today: Yes  Exercise:: Yes (cuts grass, walks, kayaking, bikes)  Days per week of moderate to strenuous exercise (like a brisk walk): 7  On average, minutes per day of exercise at this level: 40  How intense was your typical exercise? : Moderate (like brisk walking)  Exercise Minutes per Week: 280  Barrier to exercise:  None    Monitoring:  Monitoring Assessed Today: Yes  Did patient bring glucose meter to appointment? : Yes  Blood Glucose Meter: FreeStyle  Times checking blood sugar at home (number): 1  Times checking blood sugar at home (per): Day  Blood glucose trend: No change  Fasting-129, 147, 133, 140, 129, 135  Later in day - 149    Taking Medications:  Diabetes Medication(s)     Biguanides       metFORMIN (GLUCOPHAGE-XR) 500 MG 24 hr tablet    TAKE 2 TABLETS (1000MG) BY TWICE DAILY WITH MEALS          Taking Medication Assessed Today: Yes  Current Treatments: Diet, Oral Medication (taken by mouth) (Metformin 1000 bid)  Problems taking diabetes medications regularly?: No  Diabetes medication side effects?: Yes (Has diarrhea most days.)    Problem Solving:  Problem Solving Assessed Today: Yes  Is the patient at risk for hypoglycemia?: No  Hypoglycemia Frequency: Rarely (When pt feels low he is usually in the 90's and eats and feels better.)  Hypoglycemia Treatment: Other food  Patient carries a carbohydrate source: Yes  Is the patient at risk for DKA?: No    Reducing Risks:  Reducing Risks Assessed Today: No  Diabetes Risks: Age over 45 years, Hyperlipidemia, Family History  CAD Risks: Diabetes Mellitus, Male sex, Family history  Has dilated eye exam at least once a year?: Yes  Sees dentist every 6 months?: Yes  Feet checked by healthcare provider in the last year?: Yes    Healthy Coping:  Healthy Coping Assessed Today: Yes  Emotional response to diabetes: Acceptance, Confidence diabetes can be controlled  Informal Support system:: Family  Stage of change: MAINTENANCE (Working to maintain change, with risk of relapse)  Support resources: None  Patient Activation Measure Survey Score:  ALICIA Score (Last Two) 2/20/2020   ALICIA Raw Score 38   Activation Score 83.7   ALICIA Level 4     Diabetes knowledge and skills assessment:   Patient is knowledgeable in diabetes management concepts related to: Healthy Eating, Being Active,  Monitoring and Taking Medication    Patient needs further education on the following diabetes management concepts: Reducing Risks    Based on learning assessment above, most appropriate setting for further diabetes education would be: Individual setting.      INTERVENTIONS:    Education provided today on:  AADE Self-Care Behaviors:  Taking Medication: Pt is having some diarrhea with Metformin ER.  He wants to speak with provider to see if he thinks Metformin is cause vs one of other medications.   Reducing Risks: A1C - goals, relating to blood glucose levels, how often to check    Opportunities for ongoing education and support in diabetes-self management were discussed.    Pt verbalized understanding of concepts discussed and recommendations provided today.       Education Materials Provided:  No new materials today.    ASSESSMENT:  Pt's A1c in June was elevated at 10.9%.  Pt was ill/hospitalized at this time with noted bacterial infection/possible tick borne illness.  Suspect this A1c not accurate of glucose control.  I have seen with other patients that A1c levels skew falsely elevated with tick borne illness.  Current meter download does not reflect elevated A1c.  Unfortunately, A1c not due until September for recheck.  Also of note is that in May patient discovered by talking with pharmacist that he was only taking 500 mg Metformin ER bid vs 1000 mg bid as prescribed.  He has been taking 1000 mg bid since then.  Pt is having some diarrhea but wants to speak with provider before making any medication change.     Patient's most recent   Lab Results   Component Value Date    A1C 10.9 06/16/2021    is not meeting goal of <7.0    PLAN  Continue efforts to follow healthy, low carbohydrate meal plan.   Continue to get some exercise most days of the week.    Test glucose 1x/day - alternate times.  Keep taking current dose of Metformin.  Have A1c checked when able (after 9/16/21).   See Patient Instructions for  co-developed, patient-stated behavior change goals.  AVS printed and provided to patient today.   Follow up annually or sooner if A1c still elevated or med change is indicated.     Time Spent: 45 minutes  Encounter Type: Individual    Any diabetes medication dose changes were made via the CDE Protocol and Collaborative Practice Agreement with the patient's referring provider. A copy of this encounter was shared with the provider.          Sincerely,        Ava Templeton RD

## 2021-08-18 NOTE — PROGRESS NOTES
"Diabetes Self-Management Education & Support    Presents for: Individual review (Annual)    SUBJECTIVE/OBJECTIVE:  Presents for: Individual review (Annual)  Accompanied by: Self  Diabetes education in the past 24mo: Yes  Focus of Visit: Monitoring  Diabetes type: Type 2  Date of diagnosis: 4/2018  Disease course: Stable  How confident are you filling out medical forms by yourself:: Extremely  Diabetes management related comments/concerns: Numbers a bit higher than in the past  Transportation concerns: No  Difficulty affording diabetes medication?: No  Difficulty affording diabetes testing supplies?: No  Other concerns:: None  Cultural Influences/Ethnic Background:  American    Diabetes Symptoms & Complications:  Fatigue: No  Neuropathy: Sometimes  Polydipsia: No  Polyphagia: No  Polyuria: No  Visual change: No  Slow healing wounds: No  Symptom course: Stable  Weight trend: Increasing (Up 10# in the past year but had lost weight prior to that r/t surgery.)  Complications assessed today?: Yes  Autonomic neuropathy: No  CVA: No  Heart disease: No  Nephropathy: No  Peripheral neuropathy: No  Foot ulcerations: No  Peripheral Vascular Disease: No  Retinopathy: No  Sexual dysfunction: Yes    Patient Problem List and Family Medical History reviewed for relevant medical history, current medical status, and diabetes risk factors.    Vitals:  /74   Pulse 76   Ht 1.74 m (5' 8.5\")   Wt 75.2 kg (165 lb 12.8 oz)   BMI 24.84 kg/m    Estimated body mass index is 24.84 kg/m  as calculated from the following:    Height as of this encounter: 1.74 m (5' 8.5\").    Weight as of this encounter: 75.2 kg (165 lb 12.8 oz).   Last 3 BP:   BP Readings from Last 3 Encounters:   08/18/21 128/74   06/30/21 118/68   06/23/21 124/70       History   Smoking Status     Never Smoker   Smokeless Tobacco     Never Used       Labs:  Lab Results   Component Value Date    A1C 10.9 06/16/2021     Lab Results   Component Value Date     " 06/30/2021     Lab Results   Component Value Date    LDL 32 06/16/2021     HDL Cholesterol   Date Value Ref Range Status   06/16/2021 32 (L) >39 mg/dL Final   ]  GFR Estimate   Date Value Ref Range Status   06/30/2021 90 >60 mL/min/[1.73_m2] Final     Comment:     Non  GFR Calc  Starting 12/18/2018, serum creatinine based estimated GFR (eGFR) will be   calculated using the Chronic Kidney Disease Epidemiology Collaboration   (CKD-EPI) equation.       GFR Estimate If Black   Date Value Ref Range Status   06/30/2021 >90 >60 mL/min/[1.73_m2] Final     Comment:      GFR Calc  Starting 12/18/2018, serum creatinine based estimated GFR (eGFR) will be   calculated using the Chronic Kidney Disease Epidemiology Collaboration   (CKD-EPI) equation.       Lab Results   Component Value Date    CR 0.76 06/30/2021     No results found for: MICROALBUMIN    Healthy Eating:  Healthy Eating Assessed Today: Yes  Cultural/Mosque diet restrictions?: No  Meal planning/habits: None  How many times a week on average do you eat food made away from home (restaurant/take-out)?: 1  Meals include: Breakfast, Lunch, Dinner, Evening Snack  Breakfast: honey nut cheerios with milk or small bagel with margarine - 2 cups coffee with flavored cream  Lunch: salad or sandwich or soup - water  Dinner: pasta/sauce or grilled meat/veggies - water  Snacks: crackers, popcorn, nuts  Beverages: Water, Coffee, Alcohol (occasional beer)  Has patient met with a dietitian in the past?: Yes    Being Active:  Being Active Assessed Today: Yes  Exercise:: Yes (cuts grass, walks, kayaking, bikes)  Days per week of moderate to strenuous exercise (like a brisk walk): 7  On average, minutes per day of exercise at this level: 40  How intense was your typical exercise? : Moderate (like brisk walking)  Exercise Minutes per Week: 280  Barrier to exercise: None    Monitoring:  Monitoring Assessed Today: Yes  Did patient bring glucose meter to  appointment? : Yes  Blood Glucose Meter: FreeStyle  Times checking blood sugar at home (number): 1  Times checking blood sugar at home (per): Day  Blood glucose trend: No change  Fasting-129, 147, 133, 140, 129, 135  Later in day - 149    Taking Medications:  Diabetes Medication(s)     Biguanides       metFORMIN (GLUCOPHAGE-XR) 500 MG 24 hr tablet    TAKE 2 TABLETS (1000MG) BY TWICE DAILY WITH MEALS          Taking Medication Assessed Today: Yes  Current Treatments: Diet, Oral Medication (taken by mouth) (Metformin 1000 bid)  Problems taking diabetes medications regularly?: No  Diabetes medication side effects?: Yes (Has diarrhea most days.)    Problem Solving:  Problem Solving Assessed Today: Yes  Is the patient at risk for hypoglycemia?: No  Hypoglycemia Frequency: Rarely (When pt feels low he is usually in the 90's and eats and feels better.)  Hypoglycemia Treatment: Other food  Patient carries a carbohydrate source: Yes  Is the patient at risk for DKA?: No    Reducing Risks:  Reducing Risks Assessed Today: No  Diabetes Risks: Age over 45 years, Hyperlipidemia, Family History  CAD Risks: Diabetes Mellitus, Male sex, Family history  Has dilated eye exam at least once a year?: Yes  Sees dentist every 6 months?: Yes  Feet checked by healthcare provider in the last year?: Yes    Healthy Coping:  Healthy Coping Assessed Today: Yes  Emotional response to diabetes: Acceptance, Confidence diabetes can be controlled  Informal Support system:: Family  Stage of change: MAINTENANCE (Working to maintain change, with risk of relapse)  Support resources: None  Patient Activation Measure Survey Score:  ALICIA Score (Last Two) 2/20/2020   ALICIA Raw Score 38   Activation Score 83.7   ALICIA Level 4     Diabetes knowledge and skills assessment:   Patient is knowledgeable in diabetes management concepts related to: Healthy Eating, Being Active, Monitoring and Taking Medication    Patient needs further education on the following diabetes  management concepts: Reducing Risks    Based on learning assessment above, most appropriate setting for further diabetes education would be: Individual setting.      INTERVENTIONS:    Education provided today on:  AADE Self-Care Behaviors:  Taking Medication: Pt is having some diarrhea with Metformin ER.  He wants to speak with provider to see if he thinks Metformin is cause vs one of other medications.   Reducing Risks: A1C - goals, relating to blood glucose levels, how often to check    Opportunities for ongoing education and support in diabetes-self management were discussed.    Pt verbalized understanding of concepts discussed and recommendations provided today.       Education Materials Provided:  No new materials today.    ASSESSMENT:  Pt's A1c in June was elevated at 10.9%.  Pt was ill/hospitalized at this time with noted bacterial infection/possible tick borne illness.  Suspect this A1c not accurate of glucose control.  I have seen with other patients that A1c levels skew falsely elevated with tick borne illness.  Current meter download does not reflect elevated A1c.  Unfortunately, A1c not due until September for recheck.  Also of note is that in May patient discovered by talking with pharmacist that he was only taking 500 mg Metformin ER bid vs 1000 mg bid as prescribed.  He has been taking 1000 mg bid since then.  Pt is having some diarrhea but wants to speak with provider before making any medication change.     Patient's most recent   Lab Results   Component Value Date    A1C 10.9 06/16/2021    is not meeting goal of <7.0    PLAN  Continue efforts to follow healthy, low carbohydrate meal plan.   Continue to get some exercise most days of the week.    Test glucose 1x/day - alternate times.  Keep taking current dose of Metformin.  Have A1c checked when able (after 9/16/21).   See Patient Instructions for co-developed, patient-stated behavior change goals.  AVS printed and provided to patient today.    Follow up annually or sooner if A1c still elevated or med change is indicated.     Time Spent: 45 minutes  Encounter Type: Individual    Any diabetes medication dose changes were made via the CDE Protocol and Collaborative Practice Agreement with the patient's referring provider. A copy of this encounter was shared with the provider.

## 2021-08-18 NOTE — PATIENT INSTRUCTIONS
-Keep trying to limit foods high in carbohydrates - bread products, potatoes, peas, corn, pasta, rice, crackers, chips, sweets, drinks with sugar.   -Continue to get some daily exercise.   -Test glucose 1x/day.  Good times are fasting or 2 hours after a meal.   -Target levels are fasting , 2 hours after a meal <180.  -Keep taking your current dose of Metformin.  -Make sure you have your A1c checked in the next couple of months.  90 days will be after 9/16/21.   -Follow up annually or sooner if needed.    -Call with any concerns- MICHAEL Seals, -[phone].

## 2021-08-19 DIAGNOSIS — R55 NEAR SYNCOPE: Primary | ICD-10-CM

## 2021-08-19 ASSESSMENT — ANXIETY QUESTIONNAIRES: GAD7 TOTAL SCORE: 0

## 2021-08-24 ENCOUNTER — OFFICE VISIT (OUTPATIENT)
Dept: CARDIOLOGY | Facility: OTHER | Age: 76
End: 2021-08-24
Attending: NURSE PRACTITIONER
Payer: COMMERCIAL

## 2021-08-24 VITALS
RESPIRATION RATE: 17 BRPM | TEMPERATURE: 97.1 F | WEIGHT: 166 LBS | OXYGEN SATURATION: 98 % | DIASTOLIC BLOOD PRESSURE: 70 MMHG | HEART RATE: 70 BPM | HEIGHT: 69 IN | BODY MASS INDEX: 24.59 KG/M2 | SYSTOLIC BLOOD PRESSURE: 154 MMHG

## 2021-08-24 DIAGNOSIS — R55 NEAR SYNCOPE: ICD-10-CM

## 2021-08-24 DIAGNOSIS — D61.818 PANCYTOPENIA (H): ICD-10-CM

## 2021-08-24 DIAGNOSIS — E11.42 TYPE 2 DIABETES MELLITUS WITH DIABETIC POLYNEUROPATHY, WITHOUT LONG-TERM CURRENT USE OF INSULIN (H): ICD-10-CM

## 2021-08-24 DIAGNOSIS — I10 BENIGN ESSENTIAL HYPERTENSION: ICD-10-CM

## 2021-08-24 DIAGNOSIS — R00.1 SINUS BRADYCARDIA: ICD-10-CM

## 2021-08-24 DIAGNOSIS — R79.89 ELEVATED LFTS: ICD-10-CM

## 2021-08-24 DIAGNOSIS — B88.2 TICK-BORNE DISEASE: ICD-10-CM

## 2021-08-24 DIAGNOSIS — R42 EPISODIC LIGHTHEADEDNESS: Primary | ICD-10-CM

## 2021-08-24 PROCEDURE — G0463 HOSPITAL OUTPT CLINIC VISIT: HCPCS

## 2021-08-24 PROCEDURE — 99215 OFFICE O/P EST HI 40 MIN: CPT | Performed by: NURSE PRACTITIONER

## 2021-08-24 RX ORDER — LOSARTAN POTASSIUM 25 MG/1
25 TABLET ORAL DAILY
Qty: 90 TABLET | Refills: 3 | Status: SHIPPED | OUTPATIENT
Start: 2021-08-24 | End: 2022-06-30

## 2021-08-24 ASSESSMENT — MIFFLIN-ST. JEOR: SCORE: 1475.41

## 2021-08-24 ASSESSMENT — PAIN SCALES - GENERAL: PAINLEVEL: NO PAIN (0)

## 2021-08-24 NOTE — PROGRESS NOTES
[facility] HEART CARE   CARDIOLOGY CONSULT     Rafa Bhatti   [address] [facility] [address]    Harry Olson     Chief Complaint   Patient presents with     Follow Up     near syncope        HPI:   Mr. Bhatti is a 76 year old male who presents for cardiology evaluation with recent near syncope event.  Patient has a history of hypertension, bradycardia, hyperlipidemia, elevated LFTs, pancytopenia, DM 2 with polyneuropathy, hx of pancreatic surgery in May 2020 (pancreas preserving duodenectomy, choledochojejunostomy, duodenojejunostomy resection of remnant cystic duct), gout and GERD.     Patient was hospitalized from 6/9/2021 to 6/10/2021 as a result of near syncope with underlying sinus bradycardia.  Patient has been working outside in the heat when he presented to the ED with lightheadedness, near syncope episode and bradycardia with rates identified in the 30s to 40s on ECG.  No acute ST-T wave changes identified.  He has been on beta-blocker atenolol prior to this with continued daily use.  He received IV fluids during this hospitalization serial troponins negative.  He was advised to hold his atenolol with his heart rate improved to 81 bpm by discharge.  He was advised to remain off the beta-blocker following hospital discharge.  He has remained on amlodipine and losartan for hypertension.    He returned to the ED on 6/20/2021 with reports of back pain, diaphoresis, fever, chills, fatigue, body aches, decreased appetite and loose stools.  He does describe increased lightheadedness again.  He was identified to have elevated LFTs, blood cultures positive for gram-negative rods.  Therefore, he was admitted to the hospital receiving IV antibiotics and IV fluids.  He had received 2 g of Rocephin in the ED.  He was diagnosed with an E. coli bacteremia receiving IV Rocephin.  Elevated LFTs suspected secondary to tickborne illness with pancytopenia.  He was started on doxycycline empirically.  He was  discharged on 6/23/2021.    Today patient reports continued episodes of brief lightheadedness noted only when working in the heat with position changes from bent forward to standing quickly. No resulting syncope or falls. No palpitations. No edema. Symptom onset in June was the first time he experienced lightheadedness, resulting in hospitalization with bradycardia at that time. No recurrence of bradycardia since off of his beta blocker, he has been monitoring his HR and BP regularly. He denies any chest pain or pressure. No increased edema. No increased dyspnea.       PAST MEDICAL HISTORY:   Past Medical History:   Diagnosis Date     Benign neoplasm of unspecified site 8/1/2012    Dermoid cyst     Calculus of kidney 5/20/2002     Chest pain, unspecified 3/2/2000     Diabetic eye exam (H) 06/06/2018    normal     Gout, unspecified 8/3/2011     Hyperlipidemia      Unspecified essential hypertension 7/18/2000          FAMILY HISTORY:   Family History   Problem Relation Age of Onset     C.A.D. Father 80     C.A.D. Mother      Hypertension Mother      C.A.D. Brother      C.A.D. Other         family hx          PAST SURGICAL HISTORY:   Past Surgical History:   Procedure Laterality Date     cardiolyte stress test  2000    chest pain     CHOLECYSTECTOMY, COMMON BILE DUCT EXPLORATION, COMBINED  04/15/2018     COLONOSCOPY  1999     fistula in ANO       HEMORRHOIDECTOMY       removal of dermoid cyst of mediastinum       VASECTOMY            SOCIAL HISTORY:   Social History     Socioeconomic History     Marital status:      Spouse name: Not on file     Number of children: Not on file     Years of education: Not on file     Highest education level: Not on file   Occupational History     Occupation: retired   Tobacco Use     Smoking status: Never Smoker     Smokeless tobacco: Never Used   Substance and Sexual Activity     Alcohol use: Yes     Comment: socially     Drug use: No     Sexual activity: Not Currently   Other  Topics Concern      Service Yes     Blood Transfusions Yes     Caffeine Concern Yes     Comment: 3 cups daily     Occupational Exposure No     Hobby Hazards No     Sleep Concern Yes     Stress Concern No     Weight Concern No     Special Diet No     Back Care Yes     Exercise Yes     Bike Helmet No     Seat Belt Yes     Self-Exams Not Asked     Parent/sibling w/ CABG, MI or angioplasty before 65F 55M? No   Social History Narrative     Not on file     Social Determinants of Health     Financial Resource Strain:      Difficulty of Paying Living Expenses:    Food Insecurity:      Worried About Running Out of Food in the Last Year:      Ran Out of Food in the Last Year:    Transportation Needs:      Lack of Transportation (Medical):      Lack of Transportation (Non-Medical):    Physical Activity:      Days of Exercise per Week:      Minutes of Exercise per Session:    Stress:      Feeling of Stress :    Social Connections:      Frequency of Communication with Friends and Family:      Frequency of Social Gatherings with Friends and Family:      Attends [employer] Services:      Active Member of Clubs or Organizations:      Attends Club or Organization Meetings:      Marital Status:    Intimate Partner Violence:      Fear of Current or Ex-Partner:      Emotionally Abused:      Physically Abused:      Sexually Abused:           CURRENT MEDICATIONS:   Prior to Admission medications    Medication Sig Start Date End Date Taking? Authorizing Provider   allopurinol (ZYLOPRIM) 100 MG tablet TAKE 1 TABLET BY MOUTH TWICE A DAY 3/8/21   Harry Olson MD   amLODIPine (NORVASC) 5 MG tablet TAKE 1 TABLET BY MOUTH EVERY DAY 3/8/21   Harry Olson MD   blood glucose monitoring (FREESTYLE) lancets Use to test blood sugar 1 times daily. 5/31/18   Santy Agustin,    calcium carbonate (TUMS) 500 MG chewable tablet Take 1 tablet by mouth daily as needed     Reported, Patient   colchicine (COLCYRS) 0.6 MG tablet  TAKE 2 TABLETS AT THE ONSET OF GOUT PAIN. MAY TAKE ONE TABLET AGAIN IN ONE HOUR. MAX 4 TABS IN 24 HRS.  Patient not taking: Reported on 6/30/2021 12/14/20   Harry Olson MD   FREESTYLE LITE test strip USE TO TEST BLOOD SUGARS ONCE A DAY. 7/30/21   Judith Bran CNP   losartan (COZAAR) 25 MG tablet Take 1 tablet (25 mg) by mouth daily 8/24/21   Harry Olson MD   metFORMIN (GLUCOPHAGE-XR) 500 MG 24 hr tablet TAKE 2 TABLETS (1000MG) BY TWICE DAILY WITH MEALS 12/10/20   Momo Dumont MD   pantoprazole (PROTONIX) 40 MG EC tablet Take 40 mg by mouth daily 6/7/21   Reported, Patient   rosuvastatin (CRESTOR) 10 MG tablet TAKE 1 TABLET BY MOUTH EVERY DAY 6/16/21   Momo Dumont MD   sildenafil (REVATIO) 20 MG tablet Take 1-2 tablets (20-40 mg) by mouth daily as needed (erectile dysfunction) 3/18/19   Santy Agustin DO   tamsulosin (FLOMAX) 0.4 MG capsule TAKE 1 CAPSULE DAILY BY MOUTH  Patient taking differently: Take 0.4 mg by mouth every evening  12/14/20   Harry Olson MD          ALLERGIES:   Allergies   Allergen Reactions     Lactose Unknown     LACTOSE INTOLERANT          ROS:   CONSTITUTIONAL: No reported fever or chills. No changes in weight.  ENT: No visual disturbance, ear ache, epistaxis or sore throat.   CARDIOVASCULAR: No chest pain, chest pressure or chest discomfort. No palpitations or lower extremity edema.   RESPIRATORY: No shortness of breath, dyspnea upon exertion, cough, wheezing or hemoptysis.   GI: No reported abdominal pain, melena or hematochezia.   : No reported hematuria or dysuria.   NEUROLOGICAL: Positive for episodic lightheadedness with position changes. No dizziness, syncope, ataxia, paresthesias or weakness.   HEMATOLOGIC: No history of anemia. No bleeding or excessive bruising. No history of blood clots.   MUSCULOSKELETAL: No new joint pain or swelling, no muscle pain.  ENDOCRINOLOGIC: No temperature intolerance. No hair or skin changes.  SKIN: No  "abnormal rashes or sores, no unusual itching.  PSYCHIATRIC: No history of depression or anxiety. No changes in mood, feeling down or anxious. No changes in sleep.      PHYSICAL EXAM:   /74 (BP Location: Left arm, Cuff Size: Adult Large)   Pulse 82   Temp 97.1  F (36.2  C) (Tympanic)   Resp 17   Ht 1.74 m (5' 8.5\")   Wt 75.3 kg (166 lb)   SpO2 98%   BMI 24.87 kg/m    GENERAL: The patient is a well-developed, well-nourished, in no apparent distress.  HEENT: Head is normocephalic and atraumatic. Eyes are symmetrical with normal visual tracking. No icterus, no xanthelasmas. Nares appeared normal without nasal drainage. Mucous membranes are moist, no cyanosis.  NECK: Supple. No adenopathy, asymmetry or masses. No cervical bruits, JVP not visible.   CHEST/ LUNGS: Lungs clear to auscultation, no rales, rhonchi or wheezes, no use of accessory muscles, no retractions, respirations unlabored and normal respiratory rate.   CARDIO: Regular rate and rhythm normal with S1 and S2, no S3 or S4 and no murmur, click or rub. Precordium quiet with normal PMI.    ABD: Abdomen is nondistended.  EXTREMITIES: No LE edema present.   MUSCULOSKELETAL: No visible joint swelling.   NEUROLOGIC: Alert and oriented X3. Normal speech, gait and affect. No focal neurologic deficits.   SKIN: No jaundice. No rashes or visible skin lesions present. No ecchymosis.     EKG:    Normal sinus rhythm with sinus arrhythmia, rate 66 bpm.  Left axis deviation, no ST-T changes.    LAB RESULTS:   Office Visit on 06/30/2021   Component Date Value Ref Range Status     WBC 06/30/2021 5.4  4.0 - 11.0 10e9/L Final     RBC Count 06/30/2021 3.89* 4.4 - 5.9 10e12/L Final     Hemoglobin 06/30/2021 12.2* 13.3 - 17.7 g/dL Final     Hematocrit 06/30/2021 35.9* 40.0 - 53.0 % Final     MCV 06/30/2021 92  78 - 100 fl Final     MCH 06/30/2021 31.4  26.5 - 33.0 pg Final     MCHC 06/30/2021 34.0  31.5 - 36.5 g/dL Final     RDW 06/30/2021 13.7  10.0 - 15.0 % Final     " Platelet Count 06/30/2021 248  150 - 450 10e9/L Final     % Neutrophils 06/30/2021 62.2  % Final     % Lymphocytes 06/30/2021 28.9  % Final     % Monocytes 06/30/2021 7.2  % Final     % Eosinophils 06/30/2021 1.5  % Final     % Basophils 06/30/2021 0.2  % Final     Absolute Neutrophil 06/30/2021 3.4  1.6 - 8.3 10e9/L Final     Absolute Lymphocytes 06/30/2021 1.6  0.8 - 5.3 10e9/L Final     Absolute Monocytes 06/30/2021 0.4  0.0 - 1.3 10e9/L Final     Absolute Eosinophils 06/30/2021 0.1  0.0 - 0.7 10e9/L Final     Absolute Basophils 06/30/2021 0.0  0.0 - 0.2 10e9/L Final     Diff Method 06/30/2021 Automated Method   Final     Sodium 06/30/2021 139  133 - 144 mmol/L Final     Potassium 06/30/2021 3.8  3.4 - 5.3 mmol/L Final     Chloride 06/30/2021 104  94 - 109 mmol/L Final     Carbon Dioxide 06/30/2021 26  20 - 32 mmol/L Final     Anion Gap 06/30/2021 9  3 - 14 mmol/L Final     Glucose 06/30/2021 158* 70 - 99 mg/dL Final     Urea Nitrogen 06/30/2021 16  7 - 30 mg/dL Final     Creatinine 06/30/2021 0.76  0.66 - 1.25 mg/dL Final     GFR Estimate 06/30/2021 90  >60 mL/min/[1.73_m2] Final     GFR Estimate If Black 06/30/2021 >90  >60 mL/min/[1.73_m2] Final     Calcium 06/30/2021 8.8  8.5 - 10.1 mg/dL Final     Bilirubin Total 06/30/2021 0.3  0.2 - 1.3 mg/dL Final     Albumin 06/30/2021 3.6  3.4 - 5.0 g/dL Final     Protein Total 06/30/2021 7.3  6.8 - 8.8 g/dL Final     Alkaline Phosphatase 06/30/2021 103  40 - 150 U/L Final     ALT 06/30/2021 60  0 - 70 U/L Final     AST 06/30/2021 22  0 - 45 U/L Final   Office Visit on 06/16/2021   Component Date Value Ref Range Status     Hemoglobin A1C 06/16/2021 10.9* 0 - 5.6 % Final     Cholesterol 06/16/2021 87  <200 mg/dL Final     Triglycerides 06/16/2021 113  <150 mg/dL Final     HDL Cholesterol 06/16/2021 32* >39 mg/dL Final     LDL Cholesterol Calculated 06/16/2021 32  <100 mg/dL Final     Non HDL Cholesterol 06/16/2021 55  <130 mg/dL Final     Sodium 06/16/2021 132* 133 - 144  mmol/L Final     Potassium 06/16/2021 4.6  3.4 - 5.3 mmol/L Final     Chloride 06/16/2021 100  94 - 109 mmol/L Final     Carbon Dioxide 06/16/2021 26  20 - 32 mmol/L Final     Anion Gap 06/16/2021 6  3 - 14 mmol/L Final     Glucose 06/16/2021 344* 70 - 99 mg/dL Final     Urea Nitrogen 06/16/2021 13  7 - 30 mg/dL Final     Creatinine 06/16/2021 0.75  0.66 - 1.25 mg/dL Final     GFR Estimate 06/16/2021 >90  >60 mL/min/[1.73_m2] Final     GFR Estimate If Black 06/16/2021 >90  >60 mL/min/[1.73_m2] Final     Calcium 06/16/2021 9.2  8.5 - 10.1 mg/dL Final     Bilirubin Total 06/16/2021 0.4  0.2 - 1.3 mg/dL Final     Albumin 06/16/2021 4.0  3.4 - 5.0 g/dL Final     Protein Total 06/16/2021 7.9  6.8 - 8.8 g/dL Final     Alkaline Phosphatase 06/16/2021 80  40 - 150 U/L Final     ALT 06/16/2021 30  0 - 70 U/L Final     AST 06/16/2021 21  0 - 45 U/L Final     Creatinine Urine 06/16/2021 38  mg/dL Final     Albumin Urine mg/L 06/16/2021 9  mg/L Final     Albumin Urine mg/g Cr 06/16/2021 24.59* 0 - 17 mg/g Cr Final     WBC 06/16/2021 4.0  4.0 - 11.0 10e9/L Final     RBC Count 06/16/2021 4.30* 4.4 - 5.9 10e12/L Final     Hemoglobin 06/16/2021 13.5  13.3 - 17.7 g/dL Final     Hematocrit 06/16/2021 39.6* 40.0 - 53.0 % Final     MCV 06/16/2021 92  78 - 100 fl Final     MCH 06/16/2021 31.4  26.5 - 33.0 pg Final     MCHC 06/16/2021 34.1  31.5 - 36.5 g/dL Final     RDW 06/16/2021 13.9  10.0 - 15.0 % Final     Platelet Count 06/16/2021 137* 150 - 450 10e9/L Final     Estimated Average Glucose 06/16/2021 266  mg/dL Final          ASSESSMENT:   Rafa Bhatti presents for cardiology evaluation with recent near syncope event.  Patient has a history of hypertension, bradycardia, hyperlipidemia, elevated LFTs, pancytopenia, DM 2 with polyneuropathy, hx of pancreatic surgery in May 2020 (pancreas preserving duodenectomy, choledochojejunostomy, duodenojejunostomy resection of remnant cystic duct), gout and GERD.   Today patient reports  continued episodes of brief lightheadedness noted only when working in the heat with position changes from bent forward to standing quickly. No resulting syncope or falls. No palpitations. No edema. Symptom onset in June was the first time he experienced lightheadedness, resulting in hospitalization with bradycardia at that time. No recurrence of bradycardia since off of his beta blocker, he has been monitoring his HR and BP regularly. He denies any chest pain or pressure. No increased edema. No increased dyspnea.     1. Episodic lightheadedness  2. Near syncope  3. Sinus bradycardia  4. Pancytopenia (H)  5. Elevated LFTs  6. Tick-borne disease  7. Type 2 diabetes mellitus with diabetic polyneuropathy, without long-term current use of insulin (H)    PLAN:   1. Patient with history of near syncope in June and positional lightheadedness with bending over and standing quickly, no resulting syncope. Fitting with orthostatic symptoms, has related symptoms occurring when working in the heat and possible associated dehydration/ heat exhaustion.   2. Orthostatic vitals assessment negative today in clinic, asymptomatic.   3. Recommended TTE for structural exam.   4. EKG stable with no ischemic changes, sinus rhythm with sinus arrhythmia.   5. Bradycardia resolved with eliminating beta blocker, he will remain off his beta blocker. BP currently controlled on ARB and CCB.  6. He will proceed with two week ZIO to assess for any symptomatic bradycardia, pauses or AV block.   7. Suspected tick borne illness with pancytopenia and elevated liver function studies which have resolved after treatment with doxycycline. His Lyme, Anaplasma and Ehrlichia testing was negative, although serology changes concerning for tickborne illness despite negative testing. Lyme endocarditis may increase risk for developing AV block. He will proceed with Zio and TTE.   8. Carotid US with episodic lightheadedness.   9. Labs today, see orders.   Orders  Placed This Encounter   Procedures     US Carotid Bilateral     Comprehensive metabolic panel     CBC with platelets     TSH with free T4 reflex     Leadless EKG Monitor 8 to 14 Days     Echocardiogram Complete   10. Patient will follow-up with cardiology to review results of TTE, ZIO and carotid US. No indication for stress testing at this point as no anginal symptoms, stable ECG and negative serial troponins during hospitalization this past June.      Thank you for allowing me to participate in the care of your patient. Please do not hesitate to contact me if you have any questions.     Total time 65 min on date of encounter spent reviewing records, face-to-face time obtaining HPI, physical exam and counseling on the above diagnoses and recommended plan of care.    Evelyn Neal, APRN CNP [facility]

## 2021-08-24 NOTE — PATIENT INSTRUCTIONS
Thank you for allowing Evelyn Neal and our team to participate in your care. Please call our office at [phone] with scheduling questions or if you need to cancel or change your appointment. With any other questions or concerns you may call Sherry cardiology nurse at [phone].       If you experience chest pain, chest pressure, chest tightness, shortness of breath, fainting, lightheadedness, nausea, vomiting, or other concerning symptoms, please report to the Emergency Department or call 911. These symptoms may be emergent, and best treated in the Emergency Department.    Follow up in       You will be scheduled for an appointment to have a Zio Patch placed on the skin.  This monitor will be worn for 14 days.  This is a device that will monitor your heart rate, and rhythm. The hospital scheduling department will contact you to schedule this appointment, and educate you on the use of this patch.    You will have an echocardiogram performed.  This is an ultrasound of the heart, that evaluates heart function.  The hospital scheduling department will call to schedule you for this test.     You will have an ultrasound of the carotid arteries. The hospital will call you to schedule this.     Labs today. We will call you with the results.

## 2021-08-24 NOTE — NURSING NOTE
"Chief Complaint   Patient presents with     Follow Up     near syncope       Initial /74 (BP Location: Left arm, Cuff Size: Adult Large)   Pulse 82   Temp 97.1  F (36.2  C) (Tympanic)   Resp 17   Ht 1.74 m (5' 8.5\")   Wt 75.3 kg (166 lb)   SpO2 98%   BMI 24.87 kg/m   Estimated body mass index is 24.87 kg/m  as calculated from the following:    Height as of this encounter: 1.74 m (5' 8.5\").    Weight as of this encounter: 75.3 kg (166 lb).  Medication Reconciliation: complete  Destiny Pablo LPN  "

## 2021-08-25 ENCOUNTER — OFFICE VISIT (OUTPATIENT)
Dept: FAMILY MEDICINE | Facility: OTHER | Age: 76
End: 2021-08-25
Attending: FAMILY MEDICINE
Payer: COMMERCIAL

## 2021-08-25 VITALS
WEIGHT: 167 LBS | HEIGHT: 68 IN | OXYGEN SATURATION: 98 % | DIASTOLIC BLOOD PRESSURE: 78 MMHG | SYSTOLIC BLOOD PRESSURE: 132 MMHG | TEMPERATURE: 96.3 F | HEART RATE: 67 BPM | BODY MASS INDEX: 25.31 KG/M2

## 2021-08-25 DIAGNOSIS — M1A.0720 CHRONIC IDIOPATHIC GOUT INVOLVING TOE OF LEFT FOOT WITHOUT TOPHUS: ICD-10-CM

## 2021-08-25 DIAGNOSIS — I10 BENIGN ESSENTIAL HYPERTENSION: ICD-10-CM

## 2021-08-25 DIAGNOSIS — R74.01 ELEVATED TRANSAMINASE LEVEL: ICD-10-CM

## 2021-08-25 DIAGNOSIS — E11.9 TYPE 2 DIABETES MELLITUS WITHOUT COMPLICATION, WITHOUT LONG-TERM CURRENT USE OF INSULIN (H): Primary | ICD-10-CM

## 2021-08-25 DIAGNOSIS — D61.818 PANCYTOPENIA (H): ICD-10-CM

## 2021-08-25 DIAGNOSIS — R00.1 SINUS BRADYCARDIA: ICD-10-CM

## 2021-08-25 DIAGNOSIS — R42 EPISODIC LIGHTHEADEDNESS: ICD-10-CM

## 2021-08-25 LAB
ALBUMIN SERPL-MCNC: 3.9 G/DL (ref 3.4–5)
ALP SERPL-CCNC: 54 U/L (ref 40–150)
ALT SERPL W P-5'-P-CCNC: 29 U/L (ref 0–70)
ANION GAP SERPL CALCULATED.3IONS-SCNC: 6 MMOL/L (ref 3–14)
AST SERPL W P-5'-P-CCNC: 18 U/L (ref 0–45)
BASOPHILS # BLD AUTO: 0 10E3/UL (ref 0–0.2)
BASOPHILS NFR BLD AUTO: 0 %
BILIRUB SERPL-MCNC: 0.5 MG/DL (ref 0.2–1.3)
BUN SERPL-MCNC: 16 MG/DL (ref 7–30)
CALCIUM SERPL-MCNC: 9.2 MG/DL (ref 8.5–10.1)
CHLORIDE BLD-SCNC: 106 MMOL/L (ref 94–109)
CO2 SERPL-SCNC: 27 MMOL/L (ref 20–32)
CREAT SERPL-MCNC: 0.8 MG/DL (ref 0.66–1.25)
EOSINOPHIL # BLD AUTO: 0.1 10E3/UL (ref 0–0.7)
EOSINOPHIL NFR BLD AUTO: 2 %
ERYTHROCYTE [DISTWIDTH] IN BLOOD BY AUTOMATED COUNT: 13.3 % (ref 10–15)
EST. AVERAGE GLUCOSE BLD GHB EST-MCNC: 157 MG/DL
GFR SERPL CREATININE-BSD FRML MDRD: 88 ML/MIN/1.73M2
GLUCOSE BLD-MCNC: 107 MG/DL (ref 70–99)
HBA1C MFR BLD: 7.1 % (ref 0–5.6)
HCT VFR BLD AUTO: 38.5 % (ref 40–53)
HGB BLD-MCNC: 12.7 G/DL (ref 13.3–17.7)
LIPASE SERPL-CCNC: 23 U/L (ref 73–393)
LYMPHOCYTES # BLD AUTO: 1.4 10E3/UL (ref 0.8–5.3)
LYMPHOCYTES NFR BLD AUTO: 27 %
MCH RBC QN AUTO: 30.8 PG (ref 26.5–33)
MCHC RBC AUTO-ENTMCNC: 33 G/DL (ref 31.5–36.5)
MCV RBC AUTO: 93 FL (ref 78–100)
MONOCYTES # BLD AUTO: 0.4 10E3/UL (ref 0–1.3)
MONOCYTES NFR BLD AUTO: 8 %
NEUTROPHILS # BLD AUTO: 3.2 10E3/UL (ref 1.6–8.3)
NEUTROPHILS NFR BLD AUTO: 63 %
PLATELET # BLD AUTO: 147 10E3/UL (ref 150–450)
POTASSIUM BLD-SCNC: 3.8 MMOL/L (ref 3.4–5.3)
PROT SERPL-MCNC: 7.5 G/DL (ref 6.8–8.8)
RBC # BLD AUTO: 4.12 10E6/UL (ref 4.4–5.9)
SODIUM SERPL-SCNC: 139 MMOL/L (ref 133–144)
TSH SERPL DL<=0.005 MIU/L-ACNC: 1.62 MU/L (ref 0.4–4)
WBC # BLD AUTO: 5.2 10E3/UL (ref 4–11)

## 2021-08-25 PROCEDURE — 84443 ASSAY THYROID STIM HORMONE: CPT | Mod: ZL | Performed by: FAMILY MEDICINE

## 2021-08-25 PROCEDURE — 36415 COLL VENOUS BLD VENIPUNCTURE: CPT | Mod: ZL | Performed by: FAMILY MEDICINE

## 2021-08-25 PROCEDURE — 83036 HEMOGLOBIN GLYCOSYLATED A1C: CPT | Mod: ZL | Performed by: FAMILY MEDICINE

## 2021-08-25 PROCEDURE — 83690 ASSAY OF LIPASE: CPT | Mod: ZL | Performed by: FAMILY MEDICINE

## 2021-08-25 PROCEDURE — 99214 OFFICE O/P EST MOD 30 MIN: CPT | Performed by: FAMILY MEDICINE

## 2021-08-25 PROCEDURE — G0463 HOSPITAL OUTPT CLINIC VISIT: HCPCS

## 2021-08-25 PROCEDURE — 82040 ASSAY OF SERUM ALBUMIN: CPT | Mod: ZL | Performed by: FAMILY MEDICINE

## 2021-08-25 PROCEDURE — 85025 COMPLETE CBC W/AUTO DIFF WBC: CPT | Mod: ZL | Performed by: FAMILY MEDICINE

## 2021-08-25 ASSESSMENT — ANXIETY QUESTIONNAIRES
6. BECOMING EASILY ANNOYED OR IRRITABLE: NOT AT ALL
2. NOT BEING ABLE TO STOP OR CONTROL WORRYING: NOT AT ALL
5. BEING SO RESTLESS THAT IT IS HARD TO SIT STILL: NOT AT ALL
1. FEELING NERVOUS, ANXIOUS, OR ON EDGE: NOT AT ALL
IF YOU CHECKED OFF ANY PROBLEMS ON THIS QUESTIONNAIRE, HOW DIFFICULT HAVE THESE PROBLEMS MADE IT FOR YOU TO DO YOUR WORK, TAKE CARE OF THINGS AT HOME, OR GET ALONG WITH OTHER PEOPLE: NOT DIFFICULT AT ALL
GAD7 TOTAL SCORE: 0
3. WORRYING TOO MUCH ABOUT DIFFERENT THINGS: NOT AT ALL
7. FEELING AFRAID AS IF SOMETHING AWFUL MIGHT HAPPEN: NOT AT ALL

## 2021-08-25 ASSESSMENT — MIFFLIN-ST. JEOR: SCORE: 1475.98

## 2021-08-25 ASSESSMENT — PATIENT HEALTH QUESTIONNAIRE - PHQ9
5. POOR APPETITE OR OVEREATING: NOT AT ALL
SUM OF ALL RESPONSES TO PHQ QUESTIONS 1-9: 0

## 2021-08-25 ASSESSMENT — PAIN SCALES - GENERAL: PAINLEVEL: NO PAIN (0)

## 2021-08-25 NOTE — LETTER
August 26, 2021      Rafa Bhatti  [address] [facility] [address]        Dear ,    We are writing to inform you of your test results.    Your a1c has improved, labs look great.  Follow up in 3 months.    Resulted Orders   Hemoglobin A1c   Result Value Ref Range    Estimated Average Glucose 157 mg/dL    Hemoglobin A1C 7.1 (H) 0.0 - 5.6 %      Comment:      Normal <5.7%   Prediabetes 5.7-6.4%    Diabetes 6.5% or higher     Note: Adopted from ADA consensus guidelines.   TSH with free T4 reflex   Result Value Ref Range    TSH 1.62 0.40 - 4.00 mU/L   Comprehensive metabolic panel   Result Value Ref Range    Sodium 139 133 - 144 mmol/L    Potassium 3.8 3.4 - 5.3 mmol/L    Chloride 106 94 - 109 mmol/L    Carbon Dioxide (CO2) 27 20 - 32 mmol/L    Anion Gap 6 3 - 14 mmol/L    Urea Nitrogen 16 7 - 30 mg/dL    Creatinine 0.80 0.66 - 1.25 mg/dL    Calcium 9.2 8.5 - 10.1 mg/dL    Glucose 107 (H) 70 - 99 mg/dL    Alkaline Phosphatase 54 40 - 150 U/L    AST 18 0 - 45 U/L    ALT 29 0 - 70 U/L    Protein Total 7.5 6.8 - 8.8 g/dL    Albumin 3.9 3.4 - 5.0 g/dL    Bilirubin Total 0.5 0.2 - 1.3 mg/dL    GFR Estimate 88 >60 mL/min/1.73m2      Comment:      As of July 11, 2021, eGFR is calculated by the CKD-EPI creatinine equation, without race adjustment. eGFR can be influenced by muscle mass, exercise, and diet. The reported eGFR is an estimation only and is only applicable if the renal function is stable.   Lipase   Result Value Ref Range    Lipase 23 (L) 73 - 393 U/L   CBC with platelets and differential   Result Value Ref Range    WBC Count 5.2 4.0 - 11.0 10e3/uL    RBC Count 4.12 (L) 4.40 - 5.90 10e6/uL    Hemoglobin 12.7 (L) 13.3 - 17.7 g/dL    Hematocrit 38.5 (L) 40.0 - 53.0 %    MCV 93 78 - 100 fL    MCH 30.8 26.5 - 33.0 pg    MCHC 33.0 31.5 - 36.5 g/dL    RDW 13.3 10.0 - 15.0 %    Platelet Count 147 (L) 150 - 450 10e3/uL    % Neutrophils 63 %    % Lymphocytes 27 %    % Monocytes 8 %    % Eosinophils 2 %    %  Basophils 0 %    Absolute Neutrophils 3.2 1.6 - 8.3 10e3/uL    Absolute Lymphocytes 1.4 0.8 - 5.3 10e3/uL    Absolute Monocytes 0.4 0.0 - 1.3 10e3/uL    Absolute Eosinophils 0.1 0.0 - 0.7 10e3/uL    Absolute Basophils 0.0 0.0 - 0.2 10e3/uL       If you have any questions or concerns, please call the clinic at the number listed above.       Sincerely,      Harry Olson MD

## 2021-08-25 NOTE — LETTER
August 26, 2021      Rafa Bhatti  [address] [facility] [address]        Dear ,    We are writing to inform you of your test results.    Your a1c, hemoglobin, and platelet count have improved.  Recheck all in 3 months at your next diabetic follow up.  All labs look great.    Resulted Orders   Hemoglobin A1c   Result Value Ref Range    Estimated Average Glucose 157 mg/dL    Hemoglobin A1C 7.1 (H) 0.0 - 5.6 %      Comment:      Normal <5.7%   Prediabetes 5.7-6.4%    Diabetes 6.5% or higher     Note: Adopted from ADA consensus guidelines.   TSH with free T4 reflex   Result Value Ref Range    TSH 1.62 0.40 - 4.00 mU/L   Comprehensive metabolic panel   Result Value Ref Range    Sodium 139 133 - 144 mmol/L    Potassium 3.8 3.4 - 5.3 mmol/L    Chloride 106 94 - 109 mmol/L    Carbon Dioxide (CO2) 27 20 - 32 mmol/L    Anion Gap 6 3 - 14 mmol/L    Urea Nitrogen 16 7 - 30 mg/dL    Creatinine 0.80 0.66 - 1.25 mg/dL    Calcium 9.2 8.5 - 10.1 mg/dL    Glucose 107 (H) 70 - 99 mg/dL    Alkaline Phosphatase 54 40 - 150 U/L    AST 18 0 - 45 U/L    ALT 29 0 - 70 U/L    Protein Total 7.5 6.8 - 8.8 g/dL    Albumin 3.9 3.4 - 5.0 g/dL    Bilirubin Total 0.5 0.2 - 1.3 mg/dL    GFR Estimate 88 >60 mL/min/1.73m2      Comment:      As of July 11, 2021, eGFR is calculated by the CKD-EPI creatinine equation, without race adjustment. eGFR can be influenced by muscle mass, exercise, and diet. The reported eGFR is an estimation only and is only applicable if the renal function is stable.   Lipase   Result Value Ref Range    Lipase 23 (L) 73 - 393 U/L   CBC with platelets and differential   Result Value Ref Range    WBC Count 5.2 4.0 - 11.0 10e3/uL    RBC Count 4.12 (L) 4.40 - 5.90 10e6/uL    Hemoglobin 12.7 (L) 13.3 - 17.7 g/dL    Hematocrit 38.5 (L) 40.0 - 53.0 %    MCV 93 78 - 100 fL    MCH 30.8 26.5 - 33.0 pg    MCHC 33.0 31.5 - 36.5 g/dL    RDW 13.3 10.0 - 15.0 %    Platelet Count 147 (L) 150 - 450 10e3/uL    % Neutrophils 63  %    % Lymphocytes 27 %    % Monocytes 8 %    % Eosinophils 2 %    % Basophils 0 %    Absolute Neutrophils 3.2 1.6 - 8.3 10e3/uL    Absolute Lymphocytes 1.4 0.8 - 5.3 10e3/uL    Absolute Monocytes 0.4 0.0 - 1.3 10e3/uL    Absolute Eosinophils 0.1 0.0 - 0.7 10e3/uL    Absolute Basophils 0.0 0.0 - 0.2 10e3/uL       If you have any questions or concerns, please call the clinic at the number listed above.       Sincerely,      Harry Olson MD

## 2021-08-26 ASSESSMENT — ANXIETY QUESTIONNAIRES: GAD7 TOTAL SCORE: 0

## 2021-09-14 DIAGNOSIS — E78.2 MIXED HYPERLIPIDEMIA: ICD-10-CM

## 2021-09-14 DIAGNOSIS — E11.9 TYPE 2 DIABETES MELLITUS WITHOUT COMPLICATION, WITHOUT LONG-TERM CURRENT USE OF INSULIN (H): ICD-10-CM

## 2021-09-15 DIAGNOSIS — E11.9 TYPE 2 DIABETES MELLITUS WITHOUT COMPLICATION, WITHOUT LONG-TERM CURRENT USE OF INSULIN (H): ICD-10-CM

## 2021-09-15 RX ORDER — METFORMIN HCL 500 MG
TABLET, EXTENDED RELEASE 24 HR ORAL
Qty: 120 TABLET | Refills: 2 | Status: SHIPPED | OUTPATIENT
Start: 2021-09-15 | End: 2021-11-29

## 2021-09-15 RX ORDER — ROSUVASTATIN CALCIUM 10 MG/1
TABLET, COATED ORAL
Qty: 90 TABLET | Refills: 0 | Status: SHIPPED | OUTPATIENT
Start: 2021-09-15 | End: 2021-12-15

## 2021-09-15 NOTE — TELEPHONE ENCOUNTER
[facility]      Last Written Prescription Date:  6/16/21  Last Fill Quantity: 90,   # refills: 0  Last Office Visit: 8/25/21  Future Office visit:    Next 5 appointments (look out 90 days)    Oct 19, 2021  1:45 PM  (Arrive by 1:30 PM)  Return Visit with EMI Moreno CNP  [facility] [place] ([facility] [place] ) [address]  [place] [address]  [phone]

## 2021-09-24 ENCOUNTER — HOSPITAL ENCOUNTER (OUTPATIENT)
Dept: ULTRASOUND IMAGING | Facility: HOSPITAL | Age: 76
End: 2021-09-24
Attending: NURSE PRACTITIONER
Payer: COMMERCIAL

## 2021-09-24 ENCOUNTER — HOSPITAL ENCOUNTER (OUTPATIENT)
Dept: CARDIOLOGY | Facility: HOSPITAL | Age: 76
End: 2021-09-24
Attending: NURSE PRACTITIONER
Payer: COMMERCIAL

## 2021-09-24 DIAGNOSIS — R00.1 SINUS BRADYCARDIA: ICD-10-CM

## 2021-09-24 DIAGNOSIS — R55 NEAR SYNCOPE: ICD-10-CM

## 2021-09-24 DIAGNOSIS — R42 EPISODIC LIGHTHEADEDNESS: ICD-10-CM

## 2021-09-24 LAB — LVEF ECHO: NORMAL

## 2021-09-24 PROCEDURE — 93306 TTE W/DOPPLER COMPLETE: CPT

## 2021-09-24 PROCEDURE — 93880 EXTRACRANIAL BILAT STUDY: CPT

## 2021-09-24 PROCEDURE — 93248 EXT ECG>7D<15D REV&INTERPJ: CPT | Performed by: INTERNAL MEDICINE

## 2021-09-24 PROCEDURE — 93246 EXT ECG>7D<15D RECORDING: CPT

## 2021-10-19 ENCOUNTER — OFFICE VISIT (OUTPATIENT)
Dept: CARDIOLOGY | Facility: OTHER | Age: 76
End: 2021-10-19
Attending: NURSE PRACTITIONER
Payer: COMMERCIAL

## 2021-10-19 VITALS
DIASTOLIC BLOOD PRESSURE: 78 MMHG | OXYGEN SATURATION: 97 % | RESPIRATION RATE: 16 BRPM | WEIGHT: 172.5 LBS | SYSTOLIC BLOOD PRESSURE: 132 MMHG | BODY MASS INDEX: 26.14 KG/M2 | HEIGHT: 68 IN | HEART RATE: 87 BPM | TEMPERATURE: 98 F

## 2021-10-19 DIAGNOSIS — E11.42 TYPE 2 DIABETES MELLITUS WITH DIABETIC POLYNEUROPATHY, WITHOUT LONG-TERM CURRENT USE OF INSULIN (H): ICD-10-CM

## 2021-10-19 DIAGNOSIS — R42 EPISODIC LIGHTHEADEDNESS: Primary | ICD-10-CM

## 2021-10-19 DIAGNOSIS — R00.1 SINUS BRADYCARDIA: ICD-10-CM

## 2021-10-19 DIAGNOSIS — I35.8 MILD AORTIC VALVE SCLEROSIS: ICD-10-CM

## 2021-10-19 PROCEDURE — G0463 HOSPITAL OUTPT CLINIC VISIT: HCPCS | Mod: 25

## 2021-10-19 PROCEDURE — 99215 OFFICE O/P EST HI 40 MIN: CPT | Performed by: NURSE PRACTITIONER

## 2021-10-19 PROCEDURE — G0463 HOSPITAL OUTPT CLINIC VISIT: HCPCS

## 2021-10-19 PROCEDURE — 90662 IIV NO PRSV INCREASED AG IM: CPT

## 2021-10-19 PROCEDURE — G0008 ADMIN INFLUENZA VIRUS VAC: HCPCS

## 2021-10-19 ASSESSMENT — MIFFLIN-ST. JEOR: SCORE: 1500.92

## 2021-10-19 ASSESSMENT — PAIN SCALES - GENERAL: PAINLEVEL: NO PAIN (0)

## 2021-10-19 NOTE — PATIENT INSTRUCTIONS
Thank you for allowing Evelyn Neal and our team to participate in your care. Please call our office at [phone] with scheduling questions or if you need to cancel or change your appointment. With any other questions or concerns you may call Sherry cardiology nurse at [phone].       If you experience chest pain, chest pressure, chest tightness, shortness of breath, fainting, lightheadedness, nausea, vomiting, or other concerning symptoms, please report to the Emergency Department or call 911. These symptoms may be emergent, and best treated in the Emergency Department.    Follow up as needed     Flu shot given at visit.

## 2021-10-19 NOTE — PROGRESS NOTES
[facility], [place] HEART CARE   CARDIOLOGY PROGRESS NOTE    Rafa Bhatti   [address] [facility] [id number]    Harry Olson     Chief Complaint   Patient presents with     Heart Problem        HPI:   Mr. Bhatti is a 76 year old male who presents for cardiology follow-up visit on 8/24/2021 with recent near syncope event.  Patient has a history of hypertension, bradycardia, hyperlipidemia, elevated LFTs, pancytopenia, DM 2 with polyneuropathy, hx of pancreatic surgery in May 2020 (pancreas preserving duodenectomy, choledochojejunostomy, duodenojejunostomy resection of remnant cystic duct), gout and GERD.     Patient was hospitalized from 6/9/2021 to 6/10/2021 as a result of near syncope with underlying sinus bradycardia.  Patient had been working outside in the heat when he presented to the ED with lightheadedness, near syncope episode and bradycardia with rates identified in the 30s to 40s bpm on ECG.  No acute ST-T wave changes identified.  He has been on beta-blocker atenolol prior to this with continued daily use.  He received IV fluids during this hospitalization serial troponins negative.  He was advised to hold his atenolol with his heart rate improved to 81 bpm by discharge.  He was advised to remain off the beta-blocker following hospital discharge.  He has remained on amlodipine and losartan for hypertension.    He returned to the ED on 6/20/2021 with reports of back pain, diaphoresis, fever, chills, fatigue, body aches, decreased appetite and loose stools.  He does describe increased lightheadedness again.  He was identified to have elevated LFTs, blood cultures positive for gram-negative rods.  Therefore, he was admitted to the hospital receiving IV antibiotics and IV fluids.  He had received 2 g of Rocephin in the ED.  He was diagnosed with an E. coli bacteremia receiving IV Rocephin.  Elevated LFTs suspected secondary to tickborne illness with pancytopenia.  He was started on doxycycline empirically.  He  was discharged on 6/23/2021.    At his last visit patient reported continued episodes of brief lightheadedness noted only when working in the heat with position changes from bent forward to standing quickly. No resulting syncope or falls. No palpitations. No edema. Symptom onset in June was the first time he experienced lightheadedness, resulting in hospitalization with bradycardia at that time. No recurrence of bradycardia since off of his beta blocker, he has been monitoring his HR and BP regularly. He denied any chest pain or pressure. No increased edema. No increased dyspnea.     TTE on 9/24/21 revealing normal biventricular function, LVEF 55 to 60%.  LV diastolic function was indeterminate.  No regional wall motion abnormalities.  Trileaflet aortic sclerosis without stenosis, trace aortic insufficiency.  No significant valvular abnormalities.  No pericardial effusion.    Carotid US on 9/24/2021 was negative, no evidence of internal carotid artery stenosis.    ZIO revealing average heart rate 74 bpm.  His heart rate ranged from 47 to 190 bpm.  No symptomatic or significant bradycardia, no pauses, no Mobitz type II or third-degree AV block.  No atrial fibrillation.  He had 2 runs of ventricular tachycardia which were nonsustained, lasting up to 4 beats with a maximum heart rate of 190 bpm, asymptomatic.  3 runs of SVT with the longest lasting 20 beats and a maximum heart rate of 160 bpm, asymptomatic.  Rare SVE and VE, less than 1%.  No ventricular bigeminy or ventricular trigeminy.  He had 3 triggered events and 0 diary entries.  The patient triggered events corresponded to sinus rhythm.    Today patient reports feeling good. No recurrence of lightheadedness or syncope, no recent orthostatic symptoms now since he has remained off the beta blocker. No palpitations. No chest pain or pressure. No changes in breathing. No edema, no weight gain. No concerns today. LFT's have normalized on recent follow-up lab  evaluation, white count back to baseline and platelet count improved.     PAST MEDICAL HISTORY:   Past Medical History:   Diagnosis Date     Benign neoplasm of unspecified site 8/1/2012    Dermoid cyst     Calculus of kidney 5/20/2002     Chest pain, unspecified 3/2/2000     Diabetic eye exam (H) 06/06/2018    normal     Gout, unspecified 8/3/2011     Hyperlipidemia      Unspecified essential hypertension 7/18/2000          FAMILY HISTORY:   Family History   Problem Relation Age of Onset     C.A.D. Father 80     C.A.D. Mother      Hypertension Mother      C.A.D. Brother      C.A.D. Other         family hx          PAST SURGICAL HISTORY:   Past Surgical History:   Procedure Laterality Date     cardiolyte stress test  2000    chest pain     CHOLECYSTECTOMY, COMMON BILE DUCT EXPLORATION, COMBINED  04/15/2018     COLONOSCOPY  1999     fistula in ANO       HEMORRHOIDECTOMY       removal of dermoid cyst of mediastinum       VASECTOMY            SOCIAL HISTORY:   Social History     Socioeconomic History     Marital status:      Spouse name: Not on file     Number of children: Not on file     Years of education: Not on file     Highest education level: Not on file   Occupational History     Occupation: retired   Tobacco Use     Smoking status: Never Smoker     Smokeless tobacco: Never Used   Substance and Sexual Activity     Alcohol use: Yes     Comment: socially     Drug use: No     Sexual activity: Not Currently   Other Topics Concern      Service Yes     Blood Transfusions Yes     Caffeine Concern Yes     Comment: 3 cups daily     Occupational Exposure No     Hobby Hazards No     Sleep Concern Yes     Stress Concern No     Weight Concern No     Special Diet No     Back Care Yes     Exercise Yes     Bike Helmet No     Seat Belt Yes     Self-Exams Not Asked     Parent/sibling w/ CABG, MI or angioplasty before 65F 55M? No   Social History Narrative     Not on file     Social Determinants of Health      Financial Resource Strain:      Difficulty of Paying Living Expenses:    Food Insecurity:      Worried About Running Out of Food in the Last Year:      Ran Out of Food in the Last Year:    Transportation Needs:      Lack of Transportation (Medical):      Lack of Transportation (Non-Medical):    Physical Activity:      Days of Exercise per Week:      Minutes of Exercise per Session:    Stress:      Feeling of Stress :    Social Connections:      Frequency of Communication with Friends and Family:      Frequency of Social Gatherings with Friends and Family:      Attends Sikhism Services:      Active Member of Clubs or Organizations:      Attends Club or Organization Meetings:      Marital Status:    Intimate Partner Violence:      Fear of Current or Ex-Partner:      Emotionally Abused:      Physically Abused:      Sexually Abused:           CURRENT MEDICATIONS:   Prior to Admission medications    Medication Sig Start Date End Date Taking? Authorizing Provider   allopurinol (ZYLOPRIM) 100 MG tablet TAKE 1 TABLET BY MOUTH TWICE A DAY 3/8/21   Harry Olson MD   amLODIPine (NORVASC) 5 MG tablet TAKE 1 TABLET BY MOUTH EVERY DAY 3/8/21   Harry Olson MD   blood glucose monitoring (FREESTYLE) lancets Use to test blood sugar 1 times daily. 5/31/18   Santy Agustin DO   calcium carbonate (TUMS) 500 MG chewable tablet Take 1 tablet by mouth daily as needed     Reported, Patient   colchicine (COLCYRS) 0.6 MG tablet TAKE 2 TABLETS AT THE ONSET OF GOUT PAIN. MAY TAKE ONE TABLET AGAIN IN ONE HOUR. MAX 4 TABS IN 24 HRS.  Patient not taking: Reported on 6/30/2021 12/14/20   Harry Olson MD   FREESTYLE LITE test strip USE TO TEST BLOOD SUGARS ONCE A DAY. 7/30/21   Judith Bran CNP   losartan (COZAAR) 25 MG tablet Take 1 tablet (25 mg) by mouth daily 8/24/21   Harry Olson MD   metFORMIN (GLUCOPHAGE-XR) 500 MG 24 hr tablet TAKE 2 TABLETS (1000MG) BY TWICE DAILY WITH MEALS 12/10/20   Julia  "Momo LIRIANO MD   pantoprazole (PROTONIX) 40 MG EC tablet Take 40 mg by mouth daily 6/7/21   Reported, Patient   rosuvastatin (CRESTOR) 10 MG tablet TAKE 1 TABLET BY MOUTH EVERY DAY 6/16/21   Momo Dumont MD   sildenafil (REVATIO) 20 MG tablet Take 1-2 tablets (20-40 mg) by mouth daily as needed (erectile dysfunction) 3/18/19   Santy gAustin DO   tamsulosin (FLOMAX) 0.4 MG capsule TAKE 1 CAPSULE DAILY BY MOUTH  Patient taking differently: Take 0.4 mg by mouth every evening  12/14/20   Harry Olson MD          ALLERGIES:   Allergies   Allergen Reactions     Lactose Unknown     LACTOSE INTOLERANT          ROS:   CONSTITUTIONAL: No reported fever or chills. No changes in weight.  ENT: No visual disturbance, ear ache, epistaxis or sore throat.   CARDIOVASCULAR: No chest pain, chest pressure or chest discomfort. No palpitations or lower extremity edema.   RESPIRATORY: No shortness of breath, dyspnea upon exertion, cough, wheezing or hemoptysis.   GI: No reported abdominal pain, melena or hematochezia.   : No reported hematuria or dysuria.   NEUROLOGICAL: No recurrence of  Lightheadedness or orthostatic symptoms. No dizziness, syncope, ataxia, paresthesias or weakness.   HEMATOLOGIC: No history of anemia. No bleeding or excessive bruising. No history of blood clots.   MUSCULOSKELETAL: No new joint pain or swelling, no muscle pain.  ENDOCRINOLOGIC: No temperature intolerance. No hair or skin changes.  SKIN: No abnormal rashes or sores, no unusual itching.  PSYCHIATRIC: No history of depression or anxiety. No changes in mood, feeling down or anxious. No changes in sleep.    PHYSICAL EXAM:   /78   Pulse 87   Temp 98  F (36.7  C) (Tympanic)   Resp 16   Ht 1.734 m (5' 8.25\")   Wt 78.2 kg (172 lb 8 oz)   SpO2 97%   BMI 26.04 kg/m    GENERAL: The patient is a well-developed, well-nourished, in no apparent distress.  HEENT: Head is normocephalic and atraumatic. Eyes are symmetrical with normal " visual tracking. No icterus, no xanthelasmas. Nares appeared normal without nasal drainage. Mucous membranes are moist, no cyanosis.  NECK: Supple. No cervical bruits, JVP not visible.   CHEST/ LUNGS: Lungs clear to auscultation, no rales, rhonchi or wheezes, no use of accessory muscles, no retractions, respirations unlabored and normal respiratory rate.   CARDIO: Regular rate and rhythm normal with S1 and S2, no S3 or S4 and no murmur, click or rub.   ABD: Abdomen is nondistended.  EXTREMITIES: No LE edema present.   MUSCULOSKELETAL: No visible joint swelling.   NEUROLOGIC: Alert and oriented X3. Normal speech, gait and affect. No focal neurologic deficits.   SKIN: No jaundice. No rashes or visible skin lesions present. No ecchymosis.     LAB RESULTS:   Office Visit on 06/30/2021   Component Date Value Ref Range Status     WBC 06/30/2021 5.4  4.0 - 11.0 10e9/L Final     RBC Count 06/30/2021 3.89* 4.4 - 5.9 10e12/L Final     Hemoglobin 06/30/2021 12.2* 13.3 - 17.7 g/dL Final     Hematocrit 06/30/2021 35.9* 40.0 - 53.0 % Final     MCV 06/30/2021 92  78 - 100 fl Final     MCH 06/30/2021 31.4  26.5 - 33.0 pg Final     MCHC 06/30/2021 34.0  31.5 - 36.5 g/dL Final     RDW 06/30/2021 13.7  10.0 - 15.0 % Final     Platelet Count 06/30/2021 248  150 - 450 10e9/L Final     % Neutrophils 06/30/2021 62.2  % Final     % Lymphocytes 06/30/2021 28.9  % Final     % Monocytes 06/30/2021 7.2  % Final     % Eosinophils 06/30/2021 1.5  % Final     % Basophils 06/30/2021 0.2  % Final     Absolute Neutrophil 06/30/2021 3.4  1.6 - 8.3 10e9/L Final     Absolute Lymphocytes 06/30/2021 1.6  0.8 - 5.3 10e9/L Final     Absolute Monocytes 06/30/2021 0.4  0.0 - 1.3 10e9/L Final     Absolute Eosinophils 06/30/2021 0.1  0.0 - 0.7 10e9/L Final     Absolute Basophils 06/30/2021 0.0  0.0 - 0.2 10e9/L Final     Diff Method 06/30/2021 Automated Method   Final     Sodium 06/30/2021 139  133 - 144 mmol/L Final     Potassium 06/30/2021 3.8  3.4 - 5.3  mmol/L Final     Chloride 06/30/2021 104  94 - 109 mmol/L Final     Carbon Dioxide 06/30/2021 26  20 - 32 mmol/L Final     Anion Gap 06/30/2021 9  3 - 14 mmol/L Final     Glucose 06/30/2021 158* 70 - 99 mg/dL Final     Urea Nitrogen 06/30/2021 16  7 - 30 mg/dL Final     Creatinine 06/30/2021 0.76  0.66 - 1.25 mg/dL Final     GFR Estimate 06/30/2021 90  >60 mL/min/[1.73_m2] Final     GFR Estimate If Black 06/30/2021 >90  >60 mL/min/[1.73_m2] Final     Calcium 06/30/2021 8.8  8.5 - 10.1 mg/dL Final     Bilirubin Total 06/30/2021 0.3  0.2 - 1.3 mg/dL Final     Albumin 06/30/2021 3.6  3.4 - 5.0 g/dL Final     Protein Total 06/30/2021 7.3  6.8 - 8.8 g/dL Final     Alkaline Phosphatase 06/30/2021 103  40 - 150 U/L Final     ALT 06/30/2021 60  0 - 70 U/L Final     AST 06/30/2021 22  0 - 45 U/L Final   Office Visit on 06/16/2021   Component Date Value Ref Range Status     Hemoglobin A1C 06/16/2021 10.9* 0 - 5.6 % Final     Cholesterol 06/16/2021 87  <200 mg/dL Final     Triglycerides 06/16/2021 113  <150 mg/dL Final     HDL Cholesterol 06/16/2021 32* >39 mg/dL Final     LDL Cholesterol Calculated 06/16/2021 32  <100 mg/dL Final     Non HDL Cholesterol 06/16/2021 55  <130 mg/dL Final     Sodium 06/16/2021 132* 133 - 144 mmol/L Final     Potassium 06/16/2021 4.6  3.4 - 5.3 mmol/L Final     Chloride 06/16/2021 100  94 - 109 mmol/L Final     Carbon Dioxide 06/16/2021 26  20 - 32 mmol/L Final     Anion Gap 06/16/2021 6  3 - 14 mmol/L Final     Glucose 06/16/2021 344* 70 - 99 mg/dL Final     Urea Nitrogen 06/16/2021 13  7 - 30 mg/dL Final     Creatinine 06/16/2021 0.75  0.66 - 1.25 mg/dL Final     GFR Estimate 06/16/2021 >90  >60 mL/min/[1.73_m2] Final     GFR Estimate If Black 06/16/2021 >90  >60 mL/min/[1.73_m2] Final     Calcium 06/16/2021 9.2  8.5 - 10.1 mg/dL Final     Bilirubin Total 06/16/2021 0.4  0.2 - 1.3 mg/dL Final     Albumin 06/16/2021 4.0  3.4 - 5.0 g/dL Final     Protein Total 06/16/2021 7.9  6.8 - 8.8 g/dL Final      Alkaline Phosphatase 06/16/2021 80  40 - 150 U/L Final     ALT 06/16/2021 30  0 - 70 U/L Final     AST 06/16/2021 21  0 - 45 U/L Final     Creatinine Urine 06/16/2021 38  mg/dL Final     Albumin Urine mg/L 06/16/2021 9  mg/L Final     Albumin Urine mg/g Cr 06/16/2021 24.59* 0 - 17 mg/g Cr Final     WBC 06/16/2021 4.0  4.0 - 11.0 10e9/L Final     RBC Count 06/16/2021 4.30* 4.4 - 5.9 10e12/L Final     Hemoglobin 06/16/2021 13.5  13.3 - 17.7 g/dL Final     Hematocrit 06/16/2021 39.6* 40.0 - 53.0 % Final     MCV 06/16/2021 92  78 - 100 fl Final     MCH 06/16/2021 31.4  26.5 - 33.0 pg Final     MCHC 06/16/2021 34.1  31.5 - 36.5 g/dL Final     RDW 06/16/2021 13.9  10.0 - 15.0 % Final     Platelet Count 06/16/2021 137* 150 - 450 10e9/L Final     Estimated Average Glucose 06/16/2021 266  mg/dL Final          ASSESSMENT:   Rafa Bhatti presents for cardiology follow-up visit on 8/24/2021 with recent near syncope event.  Patient has a history of hypertension, bradycardia, hyperlipidemia, elevated LFTs, pancytopenia, DM 2 with polyneuropathy, hx of pancreatic surgery in May 2020 (pancreas preserving duodenectomy, choledochojejunostomy, duodenojejunostomy resection of remnant cystic duct), gout and GERD.   Today patient reports feeling good. No recurrence of lightheadedness or syncope, no recent orthostatic symptoms now since he has remained off the beta blocker. No palpitations. No chest pain or pressure. No changes in breathing. No edema, no weight gain. No concerns today. LFT's have normalized on recent follow-up lab evaluation, white count back to baseline and platelet count improved.     1. Episodic lightheadedness  2. Sinus bradycardia  3. Type 2 diabetes mellitus with diabetic polyneuropathy, without long-term current use of insulin (H)  4. Mild aortic valve sclerosis- no stenosis    PLAN:   1. Patient with history of near syncope in June and positional lightheadedness with bending over and standing quickly, no  resulting syncope. No recurrence of symptoms now that he has remained off beta blocker.   2. BP controlled on Amlodipine and Losartan. Bradycardia resolved.   3. TTE on 9/24/21 revealing normal biventricular function, LVEF 55 to 60%.  LV diastolic function was indeterminate.  No regional wall motion abnormalities.  Trileaflet aortic sclerosis without stenosis, trace aortic insufficiency.  No significant valvular abnormalities.  No pericardial effusion.  4. Continue on statin with aortic valve sclerosis, no stenosis.   5. Carotid US on 9/24/2021 was negative, no evidence of internal carotid artery stenosis.  6. ZIO revealing average heart rate 74 bpm. No symptomatic or significant bradycardia, no pauses, no Mobitz type II or third-degree AV block.  No atrial fibrillation.  He had 2 runs of ventricular tachycardia which were nonsustained, lasting up to 4 beats with a maximum heart rate of 190 bpm, asymptomatic.  3 runs of SVT with the longest lasting 20 beats and a maximum heart rate of 160 bpm, asymptomatic.  Rare SVE and VE, less than 1%.  No ventricular bigeminy or ventricular trigeminy.  7. NSVT without increased VE or anginal symptoms, stable. No indication for stress testing at this point. TTE with normal LV systolic function and no RWMA's at baseline.   8. Suspected tick borne illness with pancytopenia and elevated liver function studies which have resolved after treatment with doxycycline. His Lyme, Anaplasma and Ehrlichia testing was negative, although serology changes concerning for tickborne illness despite negative testing. Above cardiac testing does not suggest lyme endocarditis and no tick borne AV block.   9. Influenza vaccination today.   10. Follow-up with cardiology as needed.       Thank you for allowing me to participate in the care of your patient. Please do not hesitate to contact me if you have any questions.     Total time 65 min on date of encounter spent reviewing records, face-to-face time  obtaining HPI, physical exam and counseling on the above diagnoses and recommended plan of care.    Evelyn Neal, APRN CNP [facility]

## 2021-10-25 ENCOUNTER — IMMUNIZATION (OUTPATIENT)
Dept: FAMILY MEDICINE | Facility: OTHER | Age: 76
End: 2021-10-25
Attending: FAMILY MEDICINE
Payer: COMMERCIAL

## 2021-10-25 PROCEDURE — 91300 PR COVID VAC PFIZER DIL RECON 30 MCG/0.3 ML IM: CPT

## 2021-11-12 NOTE — PROGRESS NOTES
"  Assessment & Plan     Adenomatous duodenal polyp  S/p removal with issues with digestion since.  He hasn't been able to get in in [place].  We talked options.  We are going to try creon for at least a week.  If that doesn't work we will try florastor.  Finally, I did get stool studies to ensure no infectious concern, but I suspect pancreatic insufficiency more.    - amylase-lipase-protease (CREON) [id number] units CPEP; Take 1 capsule by mouth 3 times daily (with meals)    Pancreatic fluid leak  As above.  Reviewed [facility] chart.    - amylase-lipase-protease (CREON) [id number] units CPEP; Take 1 capsule by mouth 3 times daily (with meals)    Diarrhea, unspecified type  As above.    - amylase-lipase-protease (CREON) [id number] units CPEP; Take 1 capsule by mouth 3 times daily (with meals)  - saccharomyces boulardii (FLORASTOR) 250 MG capsule; Take 1 capsule (250 mg) by mouth 2 times daily      30 minutes spent on the date of the encounter doing chart review, patient visit and documentation        BMI:   Estimated body mass index is 26.41 kg/m  as calculated from the following:    Height as of 10/19/21: 1.734 m (5' 8.25\").    Weight as of this encounter: 79.4 kg (175 lb).           No follow-ups on file.    Harry Olson MD  [facility]   Rafa is a 76 year old who presents for the following health issues  accompanied by his spouse.    HPI     Diarrhea       Duration: over 1 year    Description (location/character/radiation): diarrhea    Intensity:  moderate    Accompanying signs and symptoms: loose stools all the time    History (similar episodes/previous evaluation): None    Precipitating or alleviating factors: None    Therapies tried and outcome: None            Review of Systems   Constitutional, HEENT, cardiovascular, pulmonary, gi and gu systems are negative, except as otherwise noted.      Objective    BP (!) 148/80   Pulse 71   Temp 97.1 "  F (36.2  C)   Wt 79.4 kg (175 lb)   SpO2 97%   BMI 26.41 kg/m    Body mass index is 26.41 kg/m .  Physical Exam   GENERAL: healthy, alert and no distress  NECK: no adenopathy, no asymmetry, masses, or scars and thyroid normal to palpation  RESP: lungs clear to auscultation - no rales, rhonchi or wheezes  CV: regular rate and rhythm, normal S1 S2, no S3 or S4, no murmur, click or rub, no peripheral edema and peripheral pulses strong  ABDOMEN: soft, nontender, no hepatosplenomegaly, no masses and bowel sounds normal  MS: no gross musculoskeletal defects noted, no edema    bp recheck 136/78

## 2021-11-17 ENCOUNTER — OFFICE VISIT (OUTPATIENT)
Dept: FAMILY MEDICINE | Facility: OTHER | Age: 76
End: 2021-11-17
Attending: FAMILY MEDICINE
Payer: COMMERCIAL

## 2021-11-17 VITALS
OXYGEN SATURATION: 97 % | TEMPERATURE: 97.1 F | HEART RATE: 71 BPM | BODY MASS INDEX: 26.41 KG/M2 | WEIGHT: 175 LBS | SYSTOLIC BLOOD PRESSURE: 136 MMHG | DIASTOLIC BLOOD PRESSURE: 78 MMHG

## 2021-11-17 DIAGNOSIS — K86.89 PANCREATIC FLUID LEAK: ICD-10-CM

## 2021-11-17 DIAGNOSIS — D13.2 ADENOMATOUS DUODENAL POLYP: Primary | ICD-10-CM

## 2021-11-17 DIAGNOSIS — R19.7 DIARRHEA, UNSPECIFIED TYPE: ICD-10-CM

## 2021-11-17 PROCEDURE — 99214 OFFICE O/P EST MOD 30 MIN: CPT | Performed by: FAMILY MEDICINE

## 2021-11-17 PROCEDURE — G0463 HOSPITAL OUTPT CLINIC VISIT: HCPCS

## 2021-11-17 RX ORDER — SACCHAROMYCES BOULARDII 250 MG
250 CAPSULE ORAL 2 TIMES DAILY
Qty: 60 CAPSULE | Refills: 4 | Status: SHIPPED | OUTPATIENT
Start: 2021-11-17 | End: 2022-10-06

## 2021-11-17 ASSESSMENT — PAIN SCALES - GENERAL: PAINLEVEL: NO PAIN (0)

## 2021-11-17 NOTE — PATIENT INSTRUCTIONS
Patient Education     Treating Diarrhea  Diarrhea happens when you have loose, watery, or frequent bowel movements. It is a common problem with many causes. Most cases of diarrhea clear up on their own. But certain cases may need treatment. Be sure to see your healthcare provider if your symptoms don't get better in a few days.   Getting relief  Treatment of diarrhea depends on its cause. Diarrhea caused by bacterial or parasite infection is often treated with antibiotics. Diarrhea caused by other factors, such as a stomach virus, often improves with simple home treatment. The tips below may also help ease your symptoms.       Drink plenty of fluids. This helps prevent too much fluid loss (dehydration). Water, clear soups, and electrolyte solutions are good choices. Don't take alcohol, coffee, tea, or milk. These can irritate your intestines and make symptoms worse.    Suck on ice chips if drinking makes you queasy.    Return to your normal diet slowly. You may want to eat bland foods at first, such as rice and toast. Also, you may need to stay away from certain foods for a while, such as dairy products. These can make symptoms worse. Ask your healthcare provider if there are any other foods you should stay away from.    If you were prescribed antibiotics, take them as directed.    Don't take anti-diarrhea medicines without asking your provider first.  Call your healthcare provider   Call your healthcare provider if you have any of the following:      A fever of 100.4  F ( 38.0 C) or higher, or as directed by your provider    Chills    Severe pain    Worsening diarrhea or diarrhea for more than 2 days    Bloody vomit or stool    Signs of dehydration (dizziness, dry mouth and tongue, rapid pulse, dark urine)  [employer] last reviewed this educational content on 6/1/2019 2000-2021 [employer]. All rights reserved. This information is not intended as a substitute for professional medical care. Always  follow your healthcare professional's instructions.

## 2021-11-17 NOTE — NURSING NOTE
"Chief Complaint   Patient presents with     Diarrhea       Initial BP (!) 148/80   Pulse 71   Temp 97.1  F (36.2  C)   Wt 79.4 kg (175 lb)   SpO2 97%   BMI 26.41 kg/m   Estimated body mass index is 26.41 kg/m  as calculated from the following:    Height as of 10/19/21: 1.734 m (5' 8.25\").    Weight as of this encounter: 79.4 kg (175 lb).  Medication Reconciliation: complete  Maliha Paz LPN  "

## 2021-11-18 ENCOUNTER — APPOINTMENT (OUTPATIENT)
Dept: LAB | Facility: OTHER | Age: 76
End: 2021-11-18
Payer: COMMERCIAL

## 2021-11-18 LAB — C DIFF TOX B STL QL: NEGATIVE

## 2021-11-18 PROCEDURE — 87506 IADNA-DNA/RNA PROBE TQ 6-11: CPT | Mod: ZL | Performed by: FAMILY MEDICINE

## 2021-11-18 PROCEDURE — 87493 C DIFF AMPLIFIED PROBE: CPT | Mod: ZL | Performed by: FAMILY MEDICINE

## 2021-11-18 PROCEDURE — 87177 OVA AND PARASITES SMEARS: CPT | Mod: ZL | Performed by: FAMILY MEDICINE

## 2021-11-19 ENCOUNTER — TRANSFERRED RECORDS (OUTPATIENT)
Dept: HEALTH INFORMATION MANAGEMENT | Facility: CLINIC | Age: 76
End: 2021-11-19
Payer: COMMERCIAL

## 2021-11-19 LAB
C COLI+JEJUNI+LARI FUSA STL QL NAA+PROBE: NOT DETECTED
EC STX1 GENE STL QL NAA+PROBE: NOT DETECTED
EC STX2 GENE STL QL NAA+PROBE: NOT DETECTED
HEMOCCULT STL QL IA: NEGATIVE
NOROV GI+II ORF1-ORF2 JNC STL QL NAA+PR: NOT DETECTED
O+P STL MICRO: NEGATIVE
RVA NSP5 STL QL NAA+PROBE: NOT DETECTED
SALMONELLA SP RPOD STL QL NAA+PROBE: NOT DETECTED
SHIGELLA SP+EIEC IPAH STL QL NAA+PROBE: NOT DETECTED
V CHOL+PARA RFBL+TRKH+TNAA STL QL NAA+PR: NOT DETECTED
Y ENTERO RECN STL QL NAA+PROBE: NOT DETECTED

## 2021-11-25 DIAGNOSIS — E11.9 TYPE 2 DIABETES MELLITUS WITHOUT COMPLICATION, WITHOUT LONG-TERM CURRENT USE OF INSULIN (H): ICD-10-CM

## 2021-11-29 RX ORDER — METFORMIN HCL 500 MG
TABLET, EXTENDED RELEASE 24 HR ORAL
Qty: 120 TABLET | Refills: 1 | Status: SHIPPED | OUTPATIENT
Start: 2021-11-29 | End: 2022-01-19

## 2021-11-29 NOTE — TELEPHONE ENCOUNTER
metFORMIN (GLUCOPHAGE-XR) 500 MG 24 hr tablet  Last Written Prescription Date:  9/15/21  Last Fill Quantity: 120,  # refills: 2   Last office visit: 11/17/2021   Future Office Visit:      Routing refill request to provider for review/approval because:    Protocol failing due to A1C however this was last done in August 2021, not due for recheck yet. Please advise. Thank you!

## 2021-12-15 DIAGNOSIS — E11.9 TYPE 2 DIABETES MELLITUS WITHOUT COMPLICATION, WITHOUT LONG-TERM CURRENT USE OF INSULIN (H): ICD-10-CM

## 2021-12-15 DIAGNOSIS — E78.2 MIXED HYPERLIPIDEMIA: ICD-10-CM

## 2021-12-15 RX ORDER — ROSUVASTATIN CALCIUM 10 MG/1
TABLET, COATED ORAL
Qty: 90 TABLET | Refills: 0 | Status: SHIPPED | OUTPATIENT
Start: 2021-12-15 | End: 2022-03-14

## 2022-01-18 DIAGNOSIS — E11.9 TYPE 2 DIABETES MELLITUS WITHOUT COMPLICATION, WITHOUT LONG-TERM CURRENT USE OF INSULIN (H): ICD-10-CM

## 2022-01-18 DIAGNOSIS — R33.9 URINARY RETENTION: ICD-10-CM

## 2022-01-19 RX ORDER — TAMSULOSIN HYDROCHLORIDE 0.4 MG/1
CAPSULE ORAL
Qty: 90 CAPSULE | Refills: 2 | Status: SHIPPED | OUTPATIENT
Start: 2022-01-19 | End: 2022-10-11

## 2022-01-19 RX ORDER — METFORMIN HCL 500 MG
TABLET, EXTENDED RELEASE 24 HR ORAL
Qty: 120 TABLET | Refills: 0 | Status: SHIPPED | OUTPATIENT
Start: 2022-01-19 | End: 2022-02-17

## 2022-01-19 NOTE — TELEPHONE ENCOUNTER
tamsulosin (FLOMAX) 0.4 MG capsule  Last Written Prescription Date:  12/14/20  Last Fill Quantity: 90,  # refills: 3     metFORMIN (GLUCOPHAGE-XR) 500 MG 24 hr tablet  Last Written Prescription Date:  11/29/21  Last Fill Quantity: 120,  # refills: 1     Last office visit: 11/17/21  Future Office Visit:

## 2022-02-15 DIAGNOSIS — E11.9 TYPE 2 DIABETES MELLITUS WITHOUT COMPLICATION, WITHOUT LONG-TERM CURRENT USE OF INSULIN (H): ICD-10-CM

## 2022-02-17 RX ORDER — METFORMIN HCL 500 MG
TABLET, EXTENDED RELEASE 24 HR ORAL
Qty: 120 TABLET | Refills: 0 | Status: SHIPPED | OUTPATIENT
Start: 2022-02-17 | End: 2022-03-16

## 2022-03-14 DIAGNOSIS — E78.2 MIXED HYPERLIPIDEMIA: ICD-10-CM

## 2022-03-14 DIAGNOSIS — E11.9 TYPE 2 DIABETES MELLITUS WITHOUT COMPLICATION, WITHOUT LONG-TERM CURRENT USE OF INSULIN (H): ICD-10-CM

## 2022-03-14 RX ORDER — ROSUVASTATIN CALCIUM 10 MG/1
TABLET, COATED ORAL
Qty: 90 TABLET | Refills: 1 | Status: SHIPPED | OUTPATIENT
Start: 2022-03-14 | End: 2022-09-06

## 2022-03-15 DIAGNOSIS — E11.9 TYPE 2 DIABETES MELLITUS WITHOUT COMPLICATION, WITHOUT LONG-TERM CURRENT USE OF INSULIN (H): ICD-10-CM

## 2022-03-15 NOTE — PROGRESS NOTES
"  Assessment & Plan     Type 2 diabetes mellitus without complication, without long-term current use of insulin (H)  Update and follow.  Doing well.  Last a1c 7.1.    - Basic metabolic panel; Future  - Hemoglobin A1c; Future  - Hemoglobin A1c  - Basic metabolic panel    Chronic pain of right knee  Reviewed options.  He basically has an instability and does ok with normal walking but exquisite pain with any movement of the knee outside of normal straight gait.  Suspect an internal derangement.  Xray showing mild oa changes.  MRI ordered.    - XR Knee Right 3 Views (Clinic Performed); Future  - MR Knee Right w/o Contrast; Future             BMI:   Estimated body mass index is 27.32 kg/m  as calculated from the following:    Height as of 10/19/21: 1.734 m (5' 8.25\").    Weight as of this encounter: 82.1 kg (181 lb).           No follow-ups on file.    Harry Olson MD  [facility]   Rafa is a 76 year old who presents for the following health issues     HPI     Diabetes Follow-up    How often are you checking your blood sugar? A few times a month  What time of day are you checking your blood sugars (select all that apply)?  Before and after meals  Have you had any blood sugars above 200?  No  Have you had any blood sugars below 70?  No    What symptoms do you notice when your blood sugar is low?  None    What concerns do you have today about your diabetes? None     Do you have any of these symptoms? (Select all that apply)  Numbness in feet    Have you had a diabetic eye exam in the last 12 months? Yes- Date of last eye exam: 3/21/22,  Location: [place]        BP Readings from Last 2 Encounters:   03/22/22 130/72   11/17/21 136/78     Hemoglobin A1C POCT (%)   Date Value   06/16/2021 10.9 (H)   08/12/2020 6.4 (H)     Hemoglobin A1C (%)   Date Value   08/25/2021 7.1 (H)     LDL Cholesterol Calculated (mg/dL)   Date Value   06/16/2021 32   02/21/2020 38         Hypertension " Follow-up      Do you check your blood pressure regularly outside of the clinic? Yes     Are you following a low salt diet? Yes    Are your blood pressures ever more than 140 on the top number (systolic) OR more   than 90 on the bottom number (diastolic), for example 140/90? No    Musculoskeletal problem/pain      Duration: 2 months     Description  Location: right knee    Intensity:  moderate    Accompanying signs and symptoms: none    History  Previous similar problem: no   Previous evaluation:  none    Precipitating or alleviating factors:  Trauma or overuse: no   Aggravating factors include: walking and twisting     Therapies tried and outcome: support wrap and acetaminophen     Medication Followup of creon    Taking Medication as prescribed: yes    Side Effects:  None    Medication Helping Symptoms:  NO            Review of Systems   Constitutional, HEENT, cardiovascular, pulmonary, gi and gu systems are negative, except as otherwise noted.      Objective    /72   Pulse 72   Temp 97  F (36.1  C)   Wt 82.1 kg (181 lb)   SpO2 97%   BMI 27.32 kg/m    Body mass index is 27.32 kg/m .  Physical Exam   GENERAL: healthy, alert and no distress  NECK: no adenopathy, no asymmetry, masses, or scars and thyroid normal to palpation  RESP: lungs clear to auscultation - no rales, rhonchi or wheezes  CV: regular rate and rhythm, normal S1 S2, no S3 or S4, no murmur, click or rub, no peripheral edema and peripheral pulses strong  ABDOMEN: soft, nontender, no hepatosplenomegaly, no masses and bowel sounds normal  MS: probably small effusion right knee.  Walks with limp.      Xray showing mild oa changes.            Dm labs pending.

## 2022-03-16 RX ORDER — METFORMIN HCL 500 MG
1000 TABLET, EXTENDED RELEASE 24 HR ORAL 2 TIMES DAILY WITH MEALS
Qty: 360 TABLET | Refills: 3 | Status: SHIPPED | OUTPATIENT
Start: 2022-03-16 | End: 2022-08-23

## 2022-03-21 ENCOUNTER — TRANSFERRED RECORDS (OUTPATIENT)
Dept: HEALTH INFORMATION MANAGEMENT | Facility: CLINIC | Age: 77
End: 2022-03-21

## 2022-03-21 LAB
RETINOPATHY: NEGATIVE
RETINOPATHY: NORMAL

## 2022-03-22 ENCOUNTER — ANCILLARY PROCEDURE (OUTPATIENT)
Dept: GENERAL RADIOLOGY | Facility: OTHER | Age: 77
End: 2022-03-22
Attending: FAMILY MEDICINE
Payer: COMMERCIAL

## 2022-03-22 ENCOUNTER — OFFICE VISIT (OUTPATIENT)
Dept: FAMILY MEDICINE | Facility: OTHER | Age: 77
End: 2022-03-22
Attending: FAMILY MEDICINE
Payer: COMMERCIAL

## 2022-03-22 VITALS
BODY MASS INDEX: 27.32 KG/M2 | DIASTOLIC BLOOD PRESSURE: 72 MMHG | HEART RATE: 72 BPM | TEMPERATURE: 97 F | OXYGEN SATURATION: 97 % | WEIGHT: 181 LBS | SYSTOLIC BLOOD PRESSURE: 130 MMHG

## 2022-03-22 DIAGNOSIS — M25.561 CHRONIC PAIN OF RIGHT KNEE: ICD-10-CM

## 2022-03-22 DIAGNOSIS — E11.9 TYPE 2 DIABETES MELLITUS WITHOUT COMPLICATION, WITHOUT LONG-TERM CURRENT USE OF INSULIN (H): Primary | ICD-10-CM

## 2022-03-22 DIAGNOSIS — G89.29 CHRONIC PAIN OF RIGHT KNEE: ICD-10-CM

## 2022-03-22 PROCEDURE — G0463 HOSPITAL OUTPT CLINIC VISIT: HCPCS

## 2022-03-22 PROCEDURE — 73562 X-RAY EXAM OF KNEE 3: CPT | Mod: TC,RT,FY

## 2022-03-22 PROCEDURE — 99214 OFFICE O/P EST MOD 30 MIN: CPT | Performed by: FAMILY MEDICINE

## 2022-03-22 PROCEDURE — 36415 COLL VENOUS BLD VENIPUNCTURE: CPT | Mod: ZL | Performed by: FAMILY MEDICINE

## 2022-03-22 PROCEDURE — 83036 HEMOGLOBIN GLYCOSYLATED A1C: CPT | Mod: ZL | Performed by: FAMILY MEDICINE

## 2022-03-22 PROCEDURE — 82310 ASSAY OF CALCIUM: CPT | Mod: ZL | Performed by: FAMILY MEDICINE

## 2022-03-22 ASSESSMENT — PAIN SCALES - GENERAL: PAINLEVEL: MILD PAIN (2)

## 2022-03-22 NOTE — NURSING NOTE
"Chief Complaint   Patient presents with     Diabetes     Musculoskeletal Problem       Initial /72   Pulse 72   Temp 97  F (36.1  C)   Wt 82.1 kg (181 lb)   SpO2 97%   BMI 27.32 kg/m   Estimated body mass index is 27.32 kg/m  as calculated from the following:    Height as of 10/19/21: 1.734 m (5' 8.25\").    Weight as of this encounter: 82.1 kg (181 lb).  Medication Reconciliation: complete  Maliha Paz LPN  "

## 2022-03-23 LAB
ANION GAP SERPL CALCULATED.3IONS-SCNC: 6 MMOL/L (ref 3–14)
BUN SERPL-MCNC: 16 MG/DL (ref 7–30)
CALCIUM SERPL-MCNC: 9.5 MG/DL (ref 8.5–10.1)
CHLORIDE BLD-SCNC: 103 MMOL/L (ref 94–109)
CO2 SERPL-SCNC: 27 MMOL/L (ref 20–32)
CREAT SERPL-MCNC: 0.88 MG/DL (ref 0.66–1.25)
EST. AVERAGE GLUCOSE BLD GHB EST-MCNC: 171 MG/DL
GFR SERPL CREATININE-BSD FRML MDRD: 90 ML/MIN/1.73M2
GLUCOSE BLD-MCNC: 181 MG/DL (ref 70–99)
HBA1C MFR BLD: 7.6 % (ref 0–5.6)
POTASSIUM BLD-SCNC: 4 MMOL/L (ref 3.4–5.3)
SODIUM SERPL-SCNC: 136 MMOL/L (ref 133–144)

## 2022-03-24 ENCOUNTER — TELEPHONE (OUTPATIENT)
Dept: FAMILY MEDICINE | Facility: OTHER | Age: 77
End: 2022-03-24
Payer: COMMERCIAL

## 2022-03-24 DIAGNOSIS — E11.9 TYPE 2 DIABETES MELLITUS WITHOUT COMPLICATION, WITHOUT LONG-TERM CURRENT USE OF INSULIN (H): Primary | ICD-10-CM

## 2022-03-27 DIAGNOSIS — E11.9 TYPE 2 DIABETES MELLITUS WITHOUT COMPLICATION, WITHOUT LONG-TERM CURRENT USE OF INSULIN (H): ICD-10-CM

## 2022-03-28 RX ORDER — SITAGLIPTIN 100 MG/1
TABLET, FILM COATED ORAL
Qty: 90 TABLET | Refills: 3 | OUTPATIENT
Start: 2022-03-28

## 2022-03-29 ENCOUNTER — HOSPITAL ENCOUNTER (OUTPATIENT)
Dept: MRI IMAGING | Facility: HOSPITAL | Age: 77
Discharge: HOME | End: 2022-03-29
Attending: FAMILY MEDICINE | Admitting: FAMILY MEDICINE
Payer: COMMERCIAL

## 2022-03-29 DIAGNOSIS — G89.29 CHRONIC PAIN OF RIGHT KNEE: ICD-10-CM

## 2022-03-29 DIAGNOSIS — M25.561 CHRONIC PAIN OF RIGHT KNEE: ICD-10-CM

## 2022-03-29 PROCEDURE — 73721 MRI JNT OF LWR EXTRE W/O DYE: CPT | Mod: RT

## 2022-03-30 ENCOUNTER — TELEPHONE (OUTPATIENT)
Dept: FAMILY MEDICINE | Facility: OTHER | Age: 77
End: 2022-03-30
Payer: COMMERCIAL

## 2022-03-30 DIAGNOSIS — M25.561 CHRONIC PAIN OF RIGHT KNEE: Primary | ICD-10-CM

## 2022-03-30 DIAGNOSIS — G89.29 CHRONIC PAIN OF RIGHT KNEE: Primary | ICD-10-CM

## 2022-03-30 DIAGNOSIS — S83.241D TEAR OF MEDIAL MENISCUS OF RIGHT KNEE, UNSPECIFIED TEAR TYPE, UNSPECIFIED WHETHER OLD OR CURRENT TEAR, SUBSEQUENT ENCOUNTER: ICD-10-CM

## 2022-04-04 ENCOUNTER — TELEPHONE (OUTPATIENT)
Dept: FAMILY MEDICINE | Facility: OTHER | Age: 77
End: 2022-04-04
Payer: COMMERCIAL

## 2022-04-04 DIAGNOSIS — E11.9 TYPE 2 DIABETES MELLITUS WITHOUT COMPLICATION, WITHOUT LONG-TERM CURRENT USE OF INSULIN (H): ICD-10-CM

## 2022-04-04 NOTE — TELEPHONE ENCOUNTER
9:08 AM    Reason for Call: Phone Call    Description: Pt called and wants to talk to care team regarding a medication that was prescribed. He needs it to go through the mail order service in order to cost less. Please call pt for further discussion.    Was an appointment offered for this call? No  If yes : Appointment type              Date    Preferred method for responding to this message: Telephone Call  What is your phone number ? [phone]    If we cannot reach you directly, may we leave a detailed response at the number you provided? Yes    Can this message wait until your PCP/provider returns, if available today? Not applicable, Provider is in today.    Sisi Lopes

## 2022-04-04 NOTE — TELEPHONE ENCOUNTER
Cole called regarding pended med below.   Pharmacy is waiting for PCP to sign.    Fax: [phone]  Veronica,  stated she had no direct call back number:

## 2022-05-12 ENCOUNTER — TELEPHONE (OUTPATIENT)
Dept: FAMILY MEDICINE | Facility: OTHER | Age: 77
End: 2022-05-12
Payer: COMMERCIAL

## 2022-05-12 NOTE — TELEPHONE ENCOUNTER
9:14 AM    Reason for Call: Phone Call    Description: Rafa Sheikh and would like a call from Dr. Olson to know how to proceed    Was an appointment offered for this call? No  If yes : Appointment type              Date    Preferred method for responding to this message: Telephone Call  What is your phone number ?  [phone]    If we cannot reach you directly, may we leave a detailed response at the number you provided? Yes    Can this message wait until your PCP/provider returns, if available today?  Not applicable    Guera Stephens

## 2022-05-24 ENCOUNTER — TRANSFERRED RECORDS (OUTPATIENT)
Dept: HEALTH INFORMATION MANAGEMENT | Facility: CLINIC | Age: 77
End: 2022-05-24

## 2022-06-23 NOTE — PROGRESS NOTES
"  Assessment & Plan     Type 2 diabetes mellitus without complication, without long-term current use of insulin (H)  Doing well overall.  Feet doing well in new shoes.  Stable sx.  Update full labs.    - Albumin Random Urine Quantitative with Creat Ratio; Future  - Comprehensive metabolic panel; Future  - Lipid Profile; Future  - IN FOOT EXAM NO CHARGE  - Hemoglobin A1c; Future    Type 2 diabetes mellitus with diabetic polyneuropathy, without long-term current use of insulin (H)  As above.  Stable.   - losartan (COZAAR) 25 MG tablet; Take 1 tablet (25 mg) by mouth daily    Benign essential hypertension  Refill sent.    - losartan (COZAAR) 25 MG tablet; Take 1 tablet (25 mg) by mouth daily    Chronic idiopathic gout involving toe of left foot without tophus  Stable.  No new sx.      Loose stools  Improved markedly.  Monitor.                 BMI:   Estimated body mass index is 26.87 kg/m  as calculated from the following:    Height as of 10/19/21: 1.734 m (5' 8.25\").    Weight as of this encounter: 80.7 kg (178 lb).           No follow-ups on file.    Haryr Olson MD  [facility]   Rafa is a 77 year old, presenting for the following health issues:  Diabetes      HPI     Diabetes Follow-up    How often are you checking your blood sugar? One time daily  What time of day are you checking your blood sugars (select all that apply)?  morning  Have you had any blood sugars above 200?  No  Have you had any blood sugars below 70?  No    What symptoms do you notice when your blood sugar is low?  None    What concerns do you have today about your diabetes? None     Do you have any of these symptoms? (Select all that apply)  Numbness in feet        Hyperlipidemia Follow-Up      Are you regularly taking any medication or supplement to lower your cholesterol?       Are you having muscle aches or other side effects that you think could be caused by your cholesterol lowering medication?  " No    Hypertension Follow-up      Do you check your blood pressure regularly outside of the clinic? Yes     Are you following a low salt diet? No    Are your blood pressures ever more than 140 on the top number (systolic) OR more   than 90 on the bottom number (diastolic), for example 140/90? No    BP Readings from Last 2 Encounters:   03/22/22 130/72   11/17/21 136/78     Hemoglobin A1C POCT (%)   Date Value   06/16/2021 10.9 (H)   08/12/2020 6.4 (H)     Hemoglobin A1C (%)   Date Value   03/22/2022 7.6 (H)   08/25/2021 7.1 (H)     LDL Cholesterol Calculated (mg/dL)   Date Value   06/16/2021 32   02/21/2020 38         Diabetic foot exam   1. foot deformity   No  2. Current or previous foot ulceration     No  3. Current or previous pre-ulcerative calluses     Yes  4. previous partial amputation of one or both feet or complete amputation of one foot     No  5. peripheral neuropathy with evidence of callus formation     Yes  6. poor circulation     Yes  Approval given for 3 pairs of diabetic shoes and inserts if patient desires.        Review of Systems   Constitutional, HEENT, cardiovascular, pulmonary, gi and gu systems are negative, except as otherwise noted.      Objective    There were no vitals taken for this visit.  There is no height or weight on file to calculate BMI.  Physical Exam   GENERAL: healthy, alert and no distress  NECK: no adenopathy, no asymmetry, masses, or scars and thyroid normal to palpation  RESP: lungs clear to auscultation - no rales, rhonchi or wheezes  CV: regular rate and rhythm, normal S1 S2, no S3 or S4, no murmur, click or rub, no peripheral edema and peripheral pulses strong  ABDOMEN: soft, nontender, no hepatosplenomegaly, no masses and bowel sounds normal  MS: no gross musculoskeletal defects noted, no edema    Full labs pending.                  .  ..

## 2022-06-30 ENCOUNTER — OFFICE VISIT (OUTPATIENT)
Dept: FAMILY MEDICINE | Facility: OTHER | Age: 77
End: 2022-06-30
Attending: FAMILY MEDICINE
Payer: COMMERCIAL

## 2022-06-30 VITALS
OXYGEN SATURATION: 98 % | TEMPERATURE: 97 F | SYSTOLIC BLOOD PRESSURE: 136 MMHG | HEART RATE: 66 BPM | WEIGHT: 178 LBS | BODY MASS INDEX: 26.87 KG/M2 | DIASTOLIC BLOOD PRESSURE: 72 MMHG

## 2022-06-30 DIAGNOSIS — M1A.0720 CHRONIC IDIOPATHIC GOUT INVOLVING TOE OF LEFT FOOT WITHOUT TOPHUS: ICD-10-CM

## 2022-06-30 DIAGNOSIS — I10 BENIGN ESSENTIAL HYPERTENSION: ICD-10-CM

## 2022-06-30 DIAGNOSIS — Z86.16 HISTORY OF COVID-19: ICD-10-CM

## 2022-06-30 DIAGNOSIS — E11.42 TYPE 2 DIABETES MELLITUS WITH DIABETIC POLYNEUROPATHY, WITHOUT LONG-TERM CURRENT USE OF INSULIN (H): ICD-10-CM

## 2022-06-30 DIAGNOSIS — R19.5 LOOSE STOOLS: ICD-10-CM

## 2022-06-30 DIAGNOSIS — E11.9 TYPE 2 DIABETES MELLITUS WITHOUT COMPLICATION, WITHOUT LONG-TERM CURRENT USE OF INSULIN (H): Primary | ICD-10-CM

## 2022-06-30 PROBLEM — L72.0 EPIDERMAL INCLUSION CYST: Status: ACTIVE | Noted: 2022-04-26

## 2022-06-30 LAB
ALBUMIN SERPL-MCNC: 4 G/DL (ref 3.4–5)
ALP SERPL-CCNC: 74 U/L (ref 40–150)
ALT SERPL W P-5'-P-CCNC: 32 U/L (ref 0–70)
ANION GAP SERPL CALCULATED.3IONS-SCNC: 5 MMOL/L (ref 3–14)
AST SERPL W P-5'-P-CCNC: 20 U/L (ref 0–45)
BILIRUB SERPL-MCNC: 0.4 MG/DL (ref 0.2–1.3)
BUN SERPL-MCNC: 23 MG/DL (ref 7–30)
CALCIUM SERPL-MCNC: 9.3 MG/DL (ref 8.5–10.1)
CHLORIDE BLD-SCNC: 106 MMOL/L (ref 94–109)
CHOLEST SERPL-MCNC: 100 MG/DL
CO2 SERPL-SCNC: 26 MMOL/L (ref 20–32)
CREAT SERPL-MCNC: 0.97 MG/DL (ref 0.66–1.25)
CREAT UR-MCNC: 136 MG/DL
EST. AVERAGE GLUCOSE BLD GHB EST-MCNC: 180 MG/DL
FASTING STATUS PATIENT QL REPORTED: YES
GFR SERPL CREATININE-BSD FRML MDRD: 81 ML/MIN/1.73M2
GLUCOSE BLD-MCNC: 154 MG/DL (ref 70–99)
HBA1C MFR BLD: 7.9 % (ref 0–5.6)
HDLC SERPL-MCNC: 37 MG/DL
LDLC SERPL CALC-MCNC: 46 MG/DL
MICROALBUMIN UR-MCNC: 15 MG/L
MICROALBUMIN/CREAT UR: 11.03 MG/G CR (ref 0–17)
NONHDLC SERPL-MCNC: 63 MG/DL
POTASSIUM BLD-SCNC: 4.3 MMOL/L (ref 3.4–5.3)
PROT SERPL-MCNC: 8.1 G/DL (ref 6.8–8.8)
SODIUM SERPL-SCNC: 137 MMOL/L (ref 133–144)
TRIGL SERPL-MCNC: 85 MG/DL

## 2022-06-30 PROCEDURE — 83036 HEMOGLOBIN GLYCOSYLATED A1C: CPT | Mod: ZL | Performed by: FAMILY MEDICINE

## 2022-06-30 PROCEDURE — 36415 COLL VENOUS BLD VENIPUNCTURE: CPT | Mod: ZL | Performed by: FAMILY MEDICINE

## 2022-06-30 PROCEDURE — 99214 OFFICE O/P EST MOD 30 MIN: CPT | Performed by: FAMILY MEDICINE

## 2022-06-30 PROCEDURE — 82043 UR ALBUMIN QUANTITATIVE: CPT | Mod: ZL | Performed by: FAMILY MEDICINE

## 2022-06-30 PROCEDURE — 80061 LIPID PANEL: CPT | Mod: ZL | Performed by: FAMILY MEDICINE

## 2022-06-30 PROCEDURE — G0463 HOSPITAL OUTPT CLINIC VISIT: HCPCS | Mod: 25

## 2022-06-30 PROCEDURE — 80053 COMPREHEN METABOLIC PANEL: CPT | Mod: ZL | Performed by: FAMILY MEDICINE

## 2022-06-30 RX ORDER — LOSARTAN POTASSIUM 25 MG/1
25 TABLET ORAL DAILY
Qty: 90 TABLET | Refills: 3 | Status: SHIPPED | OUTPATIENT
Start: 2022-06-30 | End: 2023-06-05

## 2022-06-30 ASSESSMENT — PAIN SCALES - GENERAL: PAINLEVEL: MODERATE PAIN (5)

## 2022-07-01 ENCOUNTER — TELEPHONE (OUTPATIENT)
Dept: FAMILY MEDICINE | Facility: OTHER | Age: 77
End: 2022-07-01

## 2022-07-01 DIAGNOSIS — E11.9 TYPE 2 DIABETES, HBA1C GOAL < 7% (H): ICD-10-CM

## 2022-07-01 DIAGNOSIS — E11.42 TYPE 2 DIABETES MELLITUS WITH DIABETIC POLYNEUROPATHY, WITHOUT LONG-TERM CURRENT USE OF INSULIN (H): Primary | ICD-10-CM

## 2022-08-04 ENCOUNTER — IMMUNIZATION (OUTPATIENT)
Dept: FAMILY MEDICINE | Facility: OTHER | Age: 77
End: 2022-08-04
Attending: FAMILY MEDICINE
Payer: COMMERCIAL

## 2022-08-04 PROCEDURE — 0054A COVID-19,PF,PFIZER (12+ YRS): CPT

## 2022-08-23 ENCOUNTER — HOSPITAL ENCOUNTER (OUTPATIENT)
Dept: EDUCATION SERVICES | Facility: HOSPITAL | Age: 77
Discharge: HOME | End: 2022-08-23
Attending: NURSE PRACTITIONER | Admitting: NURSE PRACTITIONER
Payer: COMMERCIAL

## 2022-08-23 VITALS
BODY MASS INDEX: 26.61 KG/M2 | WEIGHT: 175.6 LBS | HEART RATE: 60 BPM | DIASTOLIC BLOOD PRESSURE: 74 MMHG | SYSTOLIC BLOOD PRESSURE: 134 MMHG | OXYGEN SATURATION: 97 % | RESPIRATION RATE: 16 BRPM | HEIGHT: 68 IN

## 2022-08-23 DIAGNOSIS — E11.9 TYPE 2 DIABETES MELLITUS WITHOUT COMPLICATION, WITHOUT LONG-TERM CURRENT USE OF INSULIN (H): Primary | ICD-10-CM

## 2022-08-23 PROCEDURE — G0108 DIAB MANAGE TRN  PER INDIV: HCPCS | Performed by: DIETITIAN, REGISTERED

## 2022-08-23 ASSESSMENT — PAIN SCALES - GENERAL: PAINLEVEL: MILD PAIN (3)

## 2022-08-23 ASSESSMENT — PATIENT HEALTH QUESTIONNAIRE - PHQ9: SUM OF ALL RESPONSES TO PHQ QUESTIONS 1-9: 1

## 2022-08-23 ASSESSMENT — ANXIETY QUESTIONNAIRES
6. BECOMING EASILY ANNOYED OR IRRITABLE: SEVERAL DAYS
4. TROUBLE RELAXING: NOT AT ALL
IF YOU CHECKED OFF ANY PROBLEMS ON THIS QUESTIONNAIRE, HOW DIFFICULT HAVE THESE PROBLEMS MADE IT FOR YOU TO DO YOUR WORK, TAKE CARE OF THINGS AT HOME, OR GET ALONG WITH OTHER PEOPLE: NOT DIFFICULT AT ALL
2. NOT BEING ABLE TO STOP OR CONTROL WORRYING: NOT AT ALL
3. WORRYING TOO MUCH ABOUT DIFFERENT THINGS: NOT AT ALL
GAD7 TOTAL SCORE: 2
GAD7 TOTAL SCORE: 2
7. FEELING AFRAID AS IF SOMETHING AWFUL MIGHT HAPPEN: NOT AT ALL
1. FEELING NERVOUS, ANXIOUS, OR ON EDGE: SEVERAL DAYS
5. BEING SO RESTLESS THAT IT IS HARD TO SIT STILL: NOT AT ALL

## 2022-08-23 NOTE — PROGRESS NOTES
Diabetes Self-Management Education & Support    Presents for: Individual review (Annual)    CDE VISIT MODE: In Person    Assessment Type:  Annual Visit - Type 2 Diabetes    ASSESSMENT:  Pt's last A1c was 7.9% at the end of June.  He was changed from Metformin XR to Januvia in April r/t side effects from Metformin XR and he feels glucose levels are not as well controlled with the Januvia.  He only has fasting levels on meter download to assess today with many in 140-150's but some at 170-180's.  Weight is up 10# from visit to [facility] one [facility] ago.  Eye exam and foot exam are up to date.  Pt may need alternate or additional medication but will hold off until next A1c check.     Patient's most recent   Lab Results   Component Value Date    A1C 7.9 06/30/2022    A1C 10.9 06/16/2021     is not meeting goal of <7.5% for age    Diabetes knowledge and skills assessment:   Patient is knowledgeable in diabetes management concepts related to: Healthy Eating and Being Active    Continue education with the following diabetes management concepts: Monitoring, Taking Medication and Reducing Risks    Based on learning assessment above, most appropriate setting for further diabetes education would be: Individual setting.      PLAN  Pt will continue to work on healthy food choices.    Continue current walking/biking for exercise.  Test glucose 1x/day but alternate times more.   Keep taking Januvia as prescribed.     Topics to cover at upcoming visits: Taking Medication and Reducing Risks    Follow-up: October 2022 for A1c and med changes if needed.     See Care Plan for co-developed, patient-state behavior change goals.  AVS provided for patient today.    Education Materials Provided:  No new materials today.    SUBJECTIVE/OBJECTIVE:  Presents for: Individual review (Annual)  Accompanied by: Self  Diabetes education in the past 24mo: Yes  Focus of Visit: Monitoring  Diabetes type: Type 2  Date of diagnosis: 4/2018  Disease course: Getting  "harder to manage  How confident are you filling out medical forms by yourself:: Extremely  Diabetes management related comments/concerns: Glucose levels higher since Metformin stopped and Januvia started in April.  Transportation concerns: No  Difficulty affording diabetes medication?: No  Difficulty affording diabetes testing supplies?: No  Other concerns:: None  Cultural Influences/Ethnic Background:  Not  or     Diabetes Symptoms & Complications:  Fatigue: Sometimes  Neuropathy: Yes  Polydipsia: No  Polyphagia: No  Polyuria: No  Visual change: No  Slow healing wounds: No  Symptom course: Stable  Weight trend: Increasing (Up 10# x 1 year)  Complications assessed today?: Yes  CVA: No  Heart disease: No  Nephropathy: No  Retinopathy: No    Patient Problem List and Family Medical History reviewed for relevant medical history, current medical status, and diabetes risk factors.    Vitals:  /74   Pulse 60   Resp 16   Ht 1.715 m (5' 7.5\")   Wt 79.7 kg (175 lb 9.6 oz)   SpO2 97%   BMI 27.10 kg/m    Estimated body mass index is 27.1 kg/m  as calculated from the following:    Height as of this encounter: 1.715 m (5' 7.5\").    Weight as of this encounter: 79.7 kg (175 lb 9.6 oz).   Last 3 BP:   BP Readings from Last 3 Encounters:   08/23/22 134/74   06/30/22 136/72   03/22/22 130/72       History   Smoking Status     Never Smoker   Smokeless Tobacco     Never Used       Labs:  Lab Results   Component Value Date    A1C 7.9 06/30/2022    A1C 10.9 06/16/2021     Lab Results   Component Value Date     06/30/2022     06/30/2021     Lab Results   Component Value Date    LDL 46 06/30/2022    LDL 32 06/16/2021     HDL Cholesterol   Date Value Ref Range Status   06/16/2021 32 (L) >39 mg/dL Final     Direct Measure HDL   Date Value Ref Range Status   06/30/2022 37 (L) >=40 mg/dL Final   ]  GFR Estimate   Date Value Ref Range Status   06/30/2022 81 >60 mL/min/1.73m2 Final     Comment:     " "Effective December 21, 2021 eGFRcr in adults is calculated using the 2021 CKD-EPI creatinine equation which includes age and gender (Zita boucher al., NEJM, DOI: 10.1056/ZFDGjb5629290)   06/30/2021 90 >60 mL/min/[1.73_m2] Final     Comment:     Non  GFR Calc  Starting 12/18/2018, serum creatinine based estimated GFR (eGFR) will be   calculated using the Chronic Kidney Disease Epidemiology Collaboration   (CKD-EPI) equation.       GFR Estimate If Black   Date Value Ref Range Status   06/30/2021 >90 >60 mL/min/[1.73_m2] Final     Comment:      GFR Calc  Starting 12/18/2018, serum creatinine based estimated GFR (eGFR) will be   calculated using the Chronic Kidney Disease Epidemiology Collaboration   (CKD-EPI) equation.       Lab Results   Component Value Date    CR 0.97 06/30/2022    CR 0.76 06/30/2021     No results found for: MICROALBUMIN    Healthy Eating:  Healthy Eating Assessed Today: Yes  Cultural/Yazidism diet restrictions?: No  Meal planning/habits: None  How many times a week on average do you eat food made away from home (restaurant/take-out)?: 1  Meals include: Breakfast, Lunch, Dinner  Breakfast: honey nut cheerios/skim-lactose free milk or oatmeal with fresh fruit, coffee with flavored cream (2 cups)  Lunch: sandwich, few chips or leftovers  Dinner: meat, starch, veggies  Snacks: \"the bad stuff\" - few pieces of licorice, ice cream (low sugar) 1x/week  Beverages: Water, Coffee, Diet soda  Has patient met with a dietitian in the past?: Yes    Being Active:  Being Active Assessed Today: Yes  Exercise:: Yes (walks or bikes)  Days per week of moderate to strenuous exercise (like a brisk walk): 5  On average, minutes per day of exercise at this level: 40  How intense was your typical exercise? : Light (like stretching or slow walking)  Exercise Minutes per Week: 200  Barrier to exercise: None    Monitoring:  Monitoring Assessed Today: Yes  Did patient bring glucose meter to " appointment? : Yes  Blood Glucose Meter: FreeStyle  Times checking blood sugar at home (number): 1  Times checking blood sugar at home (per): Day  Blood glucose trend: Fluctuating  Fasting-181, 144, 140, 144, 148, 155, 155, 158, 172, 158    Taking Medications:  Diabetes Medication(s)     Dipeptidyl Peptidase-4 (DPP-4) Inhibitors       sitagliptin (JANUVIA) 100 MG tablet    Take 1 tablet (100 mg) by mouth daily        Taking Medication Assessed Today: Yes  Current Treatments: Oral Medication (taken by mouth) (Januvia 100 mg daily)  Problems taking diabetes medications regularly?: No  Diabetes medication side effects?: Yes (Had diarrhea from Metformin XR - stopped April 2022)    Problem Solving:  Problem Solving Assessed Today: Yes  Is the patient at risk for hypoglycemia?: No  Is the patient at risk for DKA?: No    Reducing Risks:  Has dilated eye exam at least once a year?: Yes  Sees dentist every 6 months?: Yes  Feet checked by healthcare provider in the last year?: Yes    Healthy Coping:  Healthy Coping Assessed Today: Yes  Emotional response to diabetes: Acceptance, Concern for health and well-being  Informal Support system:: Spouse  Stage of change: MAINTENANCE (Working to maintain change, with risk of relapse)  Patient Activation Measure Survey Score:  ALICIA Score (Last Two) 2/20/2020   ALICIA Raw Score 38   Activation Score 83.7   ALICIA Level 4     Care Plan and Education Provided:  Care Plan: Diabetes   Updates made by Ava Templeton RD since 8/23/2022 12:00 AM      Problem: HbA1C Not In Goal       Goal: Establish Regular Follow-Ups with PCP       Task: Discuss with PCP the recommended timing for patient's next follow up visit(s)    Responsible User: Ava Templeton RD      Task: Discuss schedule for PCP visits with patient    Responsible User: Ava Templeton RD      Goal: Get HbA1C Level in Goal       Task: Educate patient on diabetes education self-management topics    Responsible User: Ava Templeton RD      Task:  Educate patient on benefits of regular glucose monitoring    Responsible User: Ava Templeton RD      Task: Refer patient to appropriate extended care team member, as needed (Medication Therapy Management, Behavioral Health, Physical Therapy, etc.)    Responsible User: Ava Templeton RD      Task: Discuss diabetes treatment plan with patient    Responsible User: Ava Templeton RD      Problem: Diabetes Self-Management Education Needed to Optimize Self-Care Behaviors       Goal: Understand diabetes pathophysiology and disease progression       Task: Provide education on diabetes pathophysiology and disease progression specfic to patient's diabetes type Completed 8/23/2022   Responsible User: Ava Templeton RD      Goal: Healthy Eating - follow a healthy eating pattern for diabetes       Task: Provide education on portion control and consistency in amount, composition and timing of food intake    Responsible User: Ava Templeton RD      Task: Provide education on managing carbohydrate intake (carbohydrate counting, plate planning method, etc.) Completed 8/23/2022   Responsible User: Ava Templeton RD      Task: Provide education on weight management    Responsible User: Ava Templeton RD      Task: Provide education on heart healthy eating    Responsible User: Ava Templeton RD      Task: Provide education on eating out    Responsible User: Ava Templeton RD      Task: Develop individualized healthy eating plan with patient    Responsible User: Ava Templeton RD      Goal: Being Active - get regular physical activity, working up to at least 150 minutes per week Completed 8/23/2022   Start Date: 8/23/2022      Task: Provide education on relationship of activity to glucose and precautions to take if at risk for low glucose Completed 8/23/2022   Responsible User: Ava Templeton RD      Task: Discuss barriers to physical activity with patient Completed 8/23/2022   Responsible User: Ava Templeton RD      Task: Develop  physical activity plan with patient Completed 8/23/2022   Responsible User: Ava Templeton RD      Task: Explore community resources including walking groups, assistance programs, and home videos Completed 8/23/2022   Responsible User: Ava Templeton RD      Goal: Monitoring - monitor glucose and ketones as directed       Task: Provide education on blood glucose monitoring (purpose, proper technique, frequency, glucose targets, interpreting results, when to use glucose control solution, sharps disposal) Completed 8/23/2022   Responsible User: Ava Templeton RD      Task: Provide education on continuous glucose monitoring (sensor placement, use of christ or /reader, understanding glucose trends, alerts and alarms, differences between sensor glucose and blood glucose)    Responsible User: Ava Templeton RD      Task: Provide education on ketone monitoring (when to monitor, frequency, etc.)    Responsible User: Ava Templeton RD      Goal: Taking Medication - patient is consistently taking medications as directed       Task: Provide education on action of prescribed medication, including when to take and possible side effects    Responsible User: Ava Templeton RD      Task: Provide education on insulin and injectable diabetes medications, including administration, storage, site selection and rotation for injection sites    Responsible User: Ava Templeton RD      Task: Discuss barriers to medication adherence with patient and provide management technique ideas as appropriate    Responsible User: Ava Templeton RD      Task: Provide education on frequency and refill details of medications    Responsible User: Ava Templeton RD      Goal: Problem Solving - know how to prevent and manage short-term diabetes complications       Task: Provide education on high blood glucose - causes, signs/symptoms, prevention and treatment    Responsible User: Ava Templeton RD      Task: Provide education on low blood glucose -  causes, signs/symptoms, prevention, treatment, carrying a carbohydrate source at all times, and medical identification    Responsible User: Ava Templeton RD      Task: Provide education on safe travel with diabetes    Responsible User: Ava Templeton RD      Task: Provide education on how to care for diabetes on sick days    Responsible User: Ava Templeton RD      Task: Provide education on when to call a health care provider    Responsible User: Ava Templeton RD      Goal: Reducing Risks - know how to prevent and treat long-term diabetes complications       Task: Provide education on major complications of diabetes, prevention, early diagnostic measures and treatment of complications    Responsible User: Ava Templeton RD      Task: Provide education on recommended care for dental, eye and foot health Completed 8/23/2022   Responsible User: Ava Templeton RD      Task: Provide education on Hemoglobin A1c - goals and relationship to blood glucose levels Completed 8/23/2022   Responsible User: Ava Templeton RD      Task: Provide education on recommendations for heart health - lipid levels and goals, blood pressure and goals, and aspirin therapy, if indicated    Responsible User: Ava Templeton RD      Task: Provide education on tobacco cessation    Responsible User: Ava Templeton RD      Goal: Healthy Coping - use available resources to cope with the challenges of managing diabetes       Task: Discuss recognizing feelings about having diabetes    Responsible User: Ava Templeton RD      Task: Provide education on the benefits of making appropriate lifestyle changes       Task: Provide education on benefits of utilizing support systems    Responsible User: Ava Templeton RD      Task: Discuss methods for coping with stress    Responsible User: Ava Templeton RD      Task: Provide education on when to seek professional counseling    Responsible User: Ava Templeton RD          Time Spent: 50 minutes  Encounter  Type: Individual    Any diabetes medication dose changes were made via the CDE Protocol per the patient's referring provider. A copy of this encounter was shared with the provider.

## 2022-08-23 NOTE — PATIENT INSTRUCTIONS
-Continue to limit foods high in carbohydrates - bread products, pasta, rice, potatoes, peas, corn, dried beans, sweets, drinks with sugar.   -Your exercise is great!  Keep it up.  -Test glucose 1x/day.  Good times are fasting, before a meal or 2 hours after a meal.  -Target levels are fasting and before meals , 2 hours after a meal < 180.  -Keep taking your current dose of Januvia for now.  -Last A1c was 7.9% in June.  Goal is < 7.5%.  -Follow up early October.  We will check your A1c then.  You do not have to be fasting.  -Call with any concerns - MICHAEL Seals, [facility] [phone].

## 2022-09-06 DIAGNOSIS — E78.2 MIXED HYPERLIPIDEMIA: ICD-10-CM

## 2022-09-06 DIAGNOSIS — E11.9 TYPE 2 DIABETES MELLITUS WITHOUT COMPLICATION, WITHOUT LONG-TERM CURRENT USE OF INSULIN (H): ICD-10-CM

## 2022-09-06 RX ORDER — ROSUVASTATIN CALCIUM 10 MG/1
TABLET, COATED ORAL
Qty: 90 TABLET | Refills: 3 | Status: SHIPPED | OUTPATIENT
Start: 2022-09-06 | End: 2023-03-22

## 2022-10-06 ENCOUNTER — HOSPITAL ENCOUNTER (OUTPATIENT)
Dept: EDUCATION SERVICES | Facility: HOSPITAL | Age: 77
Discharge: HOME | End: 2022-10-06
Attending: DIETITIAN, REGISTERED | Admitting: NURSE PRACTITIONER
Payer: COMMERCIAL

## 2022-10-06 VITALS
SYSTOLIC BLOOD PRESSURE: 152 MMHG | DIASTOLIC BLOOD PRESSURE: 76 MMHG | WEIGHT: 175.8 LBS | HEIGHT: 68 IN | RESPIRATION RATE: 16 BRPM | HEART RATE: 59 BPM | BODY MASS INDEX: 26.64 KG/M2 | OXYGEN SATURATION: 95 %

## 2022-10-06 DIAGNOSIS — E11.9 TYPE 2 DIABETES MELLITUS WITHOUT COMPLICATION, WITHOUT LONG-TERM CURRENT USE OF INSULIN (H): Primary | ICD-10-CM

## 2022-10-06 LAB — HBA1C MFR BLD: 7.2 % (ref 4.3–?)

## 2022-10-06 PROCEDURE — 90662 IIV NO PRSV INCREASED AG IM: CPT

## 2022-10-06 PROCEDURE — 83036 HEMOGLOBIN GLYCOSYLATED A1C: CPT | Performed by: FAMILY MEDICINE

## 2022-10-06 PROCEDURE — G0008 ADMIN INFLUENZA VIRUS VAC: HCPCS

## 2022-10-06 PROCEDURE — G0108 DIAB MANAGE TRN  PER INDIV: HCPCS | Performed by: DIETITIAN, REGISTERED

## 2022-10-06 ASSESSMENT — PAIN SCALES - GENERAL: PAINLEVEL: SEVERE PAIN (6)

## 2022-10-06 NOTE — PATIENT INSTRUCTIONS
-Continue to work on healthy food choices.  -Your exercise is great!  Keep it up.  -Test glucose 1x/day.  Good times are fasting or 2 hours after a meal.  -Target levels are fasting , 2 hours after a meal < 180.  -A1c today was 7.2% - down from 7.9% in June - great job!  Goal is < 7.5%, < 7.0% is better.   -Follow up in six months.    -MICHAEL Seals, [facility] [phone].

## 2022-10-06 NOTE — PROGRESS NOTES
Diabetes Self-Management Education & Support    Presents for: Individual review    Type of Service: In Person Visit    ASSESSMENT:  A1c today in target at 7.2% - down from last A1c of 7.9% in June.  Pt exercises most days which I suspect helps him much with glucose control.  He is tolerating Januvia much better than Metformin in past.  Eye exam and foot exam are up to date.      Patient's most recent   Lab Results   Component Value Date    A1C 7.9 06/30/2022    A1C 10.9 06/16/2021    HEMOGLOBINA1 7.2 10/06/2022     is meeting goal of <7.5% for age.     Diabetes knowledge and skills assessment:   Patient is knowledgeable in diabetes management concepts related to: Healthy Eating, Being Active, Monitoring and Taking Medication    Based on learning assessment above, most appropriate setting for further diabetes education would be: Individual setting.      PLAN  Continue efforts to follow healthy, low carbohydrate meal plan.  Continue current exercise regimen.   Test glucose 1x/day - alternate times.    Follow-up: March 2023 - glucose review/A1c check.     See Care Plan for co-developed, patient-state behavior change goals.  AVS provided for patient today.    Education Materials Provided:  No new materials today.     SUBJECTIVE/OBJECTIVE:  Presents for: Individual review  Accompanied by: Self  Diabetes education in the past 24mo: Yes  Focus of Visit: Monitoring, Taking Medication  Diabetes type: Type 2  Date of diagnosis: 4/2018  Disease course: Improving  How confident are you filling out medical forms by yourself:: Extremely  Diabetes management related comments/concerns: None  Transportation concerns: No  Difficulty affording diabetes medication?: No  Difficulty affording diabetes testing supplies?: No  Other concerns:: None  Cultural Influences/Ethnic Background:  Not  or     Diabetes Symptoms & Complications:  Fatigue: No  Neuropathy: Yes  Polydipsia: No  Polyphagia: No  Polyuria: No  Visual change:  "No  Slow healing wounds: No  Symptom course: Stable  Weight trend: Stable  Complications assessed today?: Yes  CVA: No  Heart disease: No  Nephropathy: No  Retinopathy: No    Patient Problem List and Family Medical History reviewed for relevant medical history, current medical status, and diabetes risk factors.    Vitals:  /76   Pulse 59   Resp 16   Ht 1.715 m (5' 7.5\")   Wt 79.7 kg (175 lb 12.8 oz)   SpO2 95%   BMI 27.13 kg/m    Estimated body mass index is 27.13 kg/m  as calculated from the following:    Height as of this encounter: 1.715 m (5' 7.5\").    Weight as of this encounter: 79.7 kg (175 lb 12.8 oz).   Last 3 BP:   BP Readings from Last 3 Encounters:   10/06/22 152/76   08/23/22 134/74   06/30/22 136/72       History   Smoking Status     Never Smoker   Smokeless Tobacco     Never Used       Labs:  Lab Results   Component Value Date    A1C 7.9 06/30/2022    A1C 10.9 06/16/2021    HEMOGLOBINA1 7.2 10/06/2022     Lab Results   Component Value Date     06/30/2022     06/30/2021     Lab Results   Component Value Date    LDL 46 06/30/2022    LDL 32 06/16/2021     HDL Cholesterol   Date Value Ref Range Status   06/16/2021 32 (L) >39 mg/dL Final     Direct Measure HDL   Date Value Ref Range Status   06/30/2022 37 (L) >=40 mg/dL Final   ]  GFR Estimate   Date Value Ref Range Status   06/30/2022 81 >60 mL/min/1.73m2 Final     Comment:     Effective December 21, 2021 eGFRcr in adults is calculated using the 2021 CKD-EPI creatinine equation which includes age and gender (Zita boucher al., NEJM, DOI: 10.1056/HPWJck2449982)   06/30/2021 90 >60 mL/min/[1.73_m2] Final     Comment:     Non  GFR Calc  Starting 12/18/2018, serum creatinine based estimated GFR (eGFR) will be   calculated using the Chronic Kidney Disease Epidemiology Collaboration   (CKD-EPI) equation.       GFR Estimate If Black   Date Value Ref Range Status   06/30/2021 >90 >60 mL/min/[1.73_m2] Final     Comment:     " " GFR Calc  Starting 12/18/2018, serum creatinine based estimated GFR (eGFR) will be   calculated using the Chronic Kidney Disease Epidemiology Collaboration   (CKD-EPI) equation.       Lab Results   Component Value Date    CR 0.97 06/30/2022    CR 0.76 06/30/2021     No results found for: MICROALBUMIN    Healthy Eating:  Healthy Eating Assessed Today: Yes  Cultural/Presybeterian diet restrictions?: No  Meal planning/habits: None  How many times a week on average do you eat food made away from home (restaurant/take-out)?: 0  Meals include: Breakfast, Lunch, Dinner  Breakfast: oatmeal with fresh fruit, coffee with sugar free flavored cream (2 cups)  Lunch: sandwich or soup  Dinner: meat, starch, veggies  Snacks: \"the bad stuff\" - few pieces of licorice, ice cream (low sugar) 1x/week, apple  Beverages: Water, Coffee, Diet soda  Has patient met with a dietitian in the past?: Yes    Being Active:  Being Active Assessed Today: Yes  Exercise:: Yes (walks or bikes or kayaks)  Days per week of moderate to strenuous exercise (like a brisk walk): 5  On average, minutes per day of exercise at this level: 40  How intense was your typical exercise? : Light (like stretching or slow walking)  Exercise Minutes per Week: 200  Barrier to exercise: None    Monitoring:  Monitoring Assessed Today: Yes  Did patient bring glucose meter to appointment? : Yes  Blood Glucose Meter: FreeStyle  Times checking blood sugar at home (number): Other (Pt has five tests in the past two weeks.)  Blood glucose trend: Decreasing  Fasting-137, 121, 122  Post breakfast-134  Noon-148    Taking Medications:  Diabetes Medication(s)     Dipeptidyl Peptidase-4 (DPP-4) Inhibitors       sitagliptin (JANUVIA) 100 MG tablet    Take 1 tablet (100 mg) by mouth daily          Taking Medication Assessed Today: Yes  Current Treatments: Oral Medication (taken by mouth) (Januvia 100 mg daily)  Problems taking diabetes medications regularly?: No  Diabetes " medication side effects?: Yes (Had diarrhea from Metformin XR - stopped April 2022)    Problem Solving:  Problem Solving Assessed Today: Yes  Is the patient at risk for hypoglycemia?: No  Hypoglycemia Frequency: Never  Is the patient at risk for DKA?: No    Reducing Risks:  Reducing Risks Assessed Today: Yes  Has dilated eye exam at least once a year?: Yes  Sees dentist every 6 months?: Yes  Feet checked by healthcare provider in the last year?: Yes    Healthy Coping:  Healthy Coping Assessed Today: Yes  Emotional response to diabetes: Acceptance, Concern for health and well-being  Informal Support system:: Spouse  Stage of change: MAINTENANCE (Working to maintain change, with risk of relapse)  Patient Activation Measure Survey Score:  ALICIA Score (Last Two) 2/20/2020   ALICIA Raw Score 38   Activation Score 83.7   ALICIA Level 4       Time Spent: 40 minutes  Encounter Type: Individual    Any diabetes medication dose changes were made via the CDE Protocol per the patient's referring provider. A copy of this encounter was shared with the provider.

## 2022-10-11 DIAGNOSIS — R33.9 URINARY RETENTION: ICD-10-CM

## 2022-10-11 RX ORDER — TAMSULOSIN HYDROCHLORIDE 0.4 MG/1
CAPSULE ORAL
Qty: 90 CAPSULE | Refills: 3 | Status: SHIPPED | OUTPATIENT
Start: 2022-10-11 | End: 2023-03-22

## 2023-01-24 ENCOUNTER — APPOINTMENT (OUTPATIENT)
Dept: GENERAL RADIOLOGY | Facility: HOSPITAL | Age: 78
End: 2023-01-24
Attending: STUDENT IN AN ORGANIZED HEALTH CARE EDUCATION/TRAINING PROGRAM
Payer: COMMERCIAL

## 2023-01-24 ENCOUNTER — HOSPITAL ENCOUNTER (EMERGENCY)
Facility: HOSPITAL | Age: 78
Discharge: HOME | End: 2023-01-24
Attending: STUDENT IN AN ORGANIZED HEALTH CARE EDUCATION/TRAINING PROGRAM | Admitting: STUDENT IN AN ORGANIZED HEALTH CARE EDUCATION/TRAINING PROGRAM
Payer: COMMERCIAL

## 2023-01-24 VITALS
RESPIRATION RATE: 18 BRPM | OXYGEN SATURATION: 95 % | TEMPERATURE: 97.6 F | DIASTOLIC BLOOD PRESSURE: 70 MMHG | SYSTOLIC BLOOD PRESSURE: 122 MMHG | HEART RATE: 65 BPM

## 2023-01-24 DIAGNOSIS — R07.9 CHEST PAIN, UNSPECIFIED TYPE: ICD-10-CM

## 2023-01-24 LAB
ALBUMIN SERPL BCG-MCNC: 4.1 G/DL (ref 3.5–5.2)
ALP SERPL-CCNC: 117 U/L (ref 40–129)
ALT SERPL W P-5'-P-CCNC: 96 U/L (ref 10–50)
ANION GAP SERPL CALCULATED.3IONS-SCNC: 9 MMOL/L (ref 7–15)
AST SERPL W P-5'-P-CCNC: 153 U/L (ref 10–50)
BASOPHILS # BLD AUTO: 0 10E3/UL (ref 0–0.2)
BASOPHILS NFR BLD AUTO: 0 %
BILIRUB SERPL-MCNC: 0.7 MG/DL
BUN SERPL-MCNC: 20.3 MG/DL (ref 8–23)
CALCIUM SERPL-MCNC: 9.2 MG/DL (ref 8.8–10.2)
CHLORIDE SERPL-SCNC: 99 MMOL/L (ref 98–107)
CREAT SERPL-MCNC: 0.96 MG/DL (ref 0.67–1.17)
DEPRECATED HCO3 PLAS-SCNC: 26 MMOL/L (ref 22–29)
EOSINOPHIL # BLD AUTO: 0 10E3/UL (ref 0–0.7)
EOSINOPHIL NFR BLD AUTO: 0 %
ERYTHROCYTE [DISTWIDTH] IN BLOOD BY AUTOMATED COUNT: 13 % (ref 10–15)
GFR SERPL CREATININE-BSD FRML MDRD: 82 ML/MIN/1.73M2
GLUCOSE SERPL-MCNC: 270 MG/DL (ref 70–99)
HCT VFR BLD AUTO: 40.2 % (ref 40–53)
HGB BLD-MCNC: 13.8 G/DL (ref 13.3–17.7)
HOLD SPECIMEN: NORMAL
IMM GRANULOCYTES # BLD: 0 10E3/UL
IMM GRANULOCYTES NFR BLD: 0 %
LIPASE SERPL-CCNC: 25 U/L (ref 13–60)
LYMPHOCYTES # BLD AUTO: 1 10E3/UL (ref 0.8–5.3)
LYMPHOCYTES NFR BLD AUTO: 20 %
MCH RBC QN AUTO: 30.4 PG (ref 26.5–33)
MCHC RBC AUTO-ENTMCNC: 34.3 G/DL (ref 31.5–36.5)
MCV RBC AUTO: 89 FL (ref 78–100)
MONOCYTES # BLD AUTO: 0.4 10E3/UL (ref 0–1.3)
MONOCYTES NFR BLD AUTO: 7 %
NEUTROPHILS # BLD AUTO: 3.6 10E3/UL (ref 1.6–8.3)
NEUTROPHILS NFR BLD AUTO: 73 %
NRBC # BLD AUTO: 0 10E3/UL
NRBC BLD AUTO-RTO: 0 /100
PLATELET # BLD AUTO: 135 10E3/UL (ref 150–450)
POTASSIUM SERPL-SCNC: 4.4 MMOL/L (ref 3.4–5.3)
PROT SERPL-MCNC: 7.1 G/DL (ref 6.4–8.3)
RBC # BLD AUTO: 4.54 10E6/UL (ref 4.4–5.9)
SODIUM SERPL-SCNC: 134 MMOL/L (ref 136–145)
TROPONIN T SERPL HS-MCNC: 10 NG/L
TROPONIN T SERPL HS-MCNC: 9 NG/L
WBC # BLD AUTO: 5 10E3/UL (ref 4–11)

## 2023-01-24 PROCEDURE — 250N000013 HC RX MED GY IP 250 OP 250 PS 637: Performed by: STUDENT IN AN ORGANIZED HEALTH CARE EDUCATION/TRAINING PROGRAM

## 2023-01-24 PROCEDURE — 250N000009 HC RX 250: Performed by: STUDENT IN AN ORGANIZED HEALTH CARE EDUCATION/TRAINING PROGRAM

## 2023-01-24 PROCEDURE — 83690 ASSAY OF LIPASE: CPT | Performed by: STUDENT IN AN ORGANIZED HEALTH CARE EDUCATION/TRAINING PROGRAM

## 2023-01-24 PROCEDURE — 71046 X-RAY EXAM CHEST 2 VIEWS: CPT

## 2023-01-24 PROCEDURE — 36415 COLL VENOUS BLD VENIPUNCTURE: CPT | Performed by: STUDENT IN AN ORGANIZED HEALTH CARE EDUCATION/TRAINING PROGRAM

## 2023-01-24 PROCEDURE — 85004 AUTOMATED DIFF WBC COUNT: CPT | Performed by: STUDENT IN AN ORGANIZED HEALTH CARE EDUCATION/TRAINING PROGRAM

## 2023-01-24 PROCEDURE — 93005 ELECTROCARDIOGRAM TRACING: CPT

## 2023-01-24 PROCEDURE — 93010 ELECTROCARDIOGRAM REPORT: CPT | Performed by: INTERNAL MEDICINE

## 2023-01-24 PROCEDURE — 80053 COMPREHEN METABOLIC PANEL: CPT | Performed by: STUDENT IN AN ORGANIZED HEALTH CARE EDUCATION/TRAINING PROGRAM

## 2023-01-24 PROCEDURE — 99284 EMERGENCY DEPT VISIT MOD MDM: CPT | Performed by: STUDENT IN AN ORGANIZED HEALTH CARE EDUCATION/TRAINING PROGRAM

## 2023-01-24 PROCEDURE — 84484 ASSAY OF TROPONIN QUANT: CPT | Performed by: STUDENT IN AN ORGANIZED HEALTH CARE EDUCATION/TRAINING PROGRAM

## 2023-01-24 PROCEDURE — 99285 EMERGENCY DEPT VISIT HI MDM: CPT | Mod: 25

## 2023-01-24 RX ORDER — KETOROLAC TROMETHAMINE 15 MG/ML
10 INJECTION, SOLUTION INTRAMUSCULAR; INTRAVENOUS
Status: DISCONTINUED | OUTPATIENT
Start: 2023-01-24 | End: 2023-01-24 | Stop reason: HOSPADM

## 2023-01-24 RX ORDER — MAGNESIUM HYDROXIDE/ALUMINUM HYDROXICE/SIMETHICONE 120; 1200; 1200 MG/30ML; MG/30ML; MG/30ML
15 SUSPENSION ORAL ONCE
Status: COMPLETED | OUTPATIENT
Start: 2023-01-24 | End: 2023-01-24

## 2023-01-24 RX ORDER — ASPIRIN 81 MG/1
324 TABLET, CHEWABLE ORAL
Status: COMPLETED | OUTPATIENT
Start: 2023-01-24 | End: 2023-01-24

## 2023-01-24 RX ORDER — ONDANSETRON 2 MG/ML
4 INJECTION INTRAMUSCULAR; INTRAVENOUS EVERY 30 MIN PRN
Status: DISCONTINUED | OUTPATIENT
Start: 2023-01-24 | End: 2023-01-24 | Stop reason: HOSPADM

## 2023-01-24 RX ORDER — NITROGLYCERIN 0.4 MG/1
0.4 TABLET SUBLINGUAL EVERY 5 MIN PRN
Status: DISCONTINUED | OUTPATIENT
Start: 2023-01-24 | End: 2023-01-24 | Stop reason: HOSPADM

## 2023-01-24 RX ORDER — LIDOCAINE HYDROCHLORIDE 20 MG/ML
15 SOLUTION OROPHARYNGEAL ONCE
Status: COMPLETED | OUTPATIENT
Start: 2023-01-24 | End: 2023-01-24

## 2023-01-24 RX ORDER — OXYCODONE HYDROCHLORIDE 5 MG/1
5 TABLET ORAL
Status: DISCONTINUED | OUTPATIENT
Start: 2023-01-24 | End: 2023-01-24 | Stop reason: HOSPADM

## 2023-01-24 RX ADMIN — ALUMINUM HYDROXIDE, MAGNESIUM HYDROXIDE, AND SIMETHICONE 15 ML: 200; 200; 20 SUSPENSION ORAL at 09:58

## 2023-01-24 RX ADMIN — LIDOCAINE HYDROCHLORIDE 15 ML: 20 SOLUTION ORAL; TOPICAL at 09:59

## 2023-01-24 ASSESSMENT — ACTIVITIES OF DAILY LIVING (ADL): ADLS_ACUITY_SCORE: 35

## 2023-01-24 NOTE — DISCHARGE INSTRUCTIONS
Return to the emergency department for worsening symptoms or new concerning symptoms.  Take omeprazole for the next 2 weeks.  Follow-up with primary care provider within the next 1 week.  Call schedule appointment.  Get your Lexiscan scheduled to soon as possible.

## 2023-01-24 NOTE — ED TRIAGE NOTES
Patient presents with complaints of lower rib pain/upper abdominal pain that he states started at 8:30 this morning quite abruptly. Patient states the pain goes into his back. Patient does endorse a hx of hypertension denies heart hx.

## 2023-01-24 NOTE — ED PROVIDER NOTES
History     Chief Complaint   Patient presents with     Chest Pain     HPI  Cleo Bhatti is a 77 year old male with a history of hypertension hemangioma of the spine, type 2 diabetes, mild aortic valve sclerosis without stenosis presents to the emergency department today complaining of chest pain 8/10, pressure-like chest pain that does radiate towards the back but is not a sharp ripping/tearing sensation, that started approximately 0830 after breakfast prior to arrival while he was at rest. He does have a hx of GERD that improved as a cholecystectomy. The pain is not associated with shortness of breath, nausea, vomiting, diaphoresis, change in position or eating (he hasn't eaten since breakfast and the pain started after breakfast) there is some mild change with respiration. No cough or fevers. The patient has not had similar symptoms in the past.    Denies trauma and h/o spontaneous PTX.   Denies paroxysms of coughing or spasmodic retching.   Denies recent travel, recent surgery/hospitalization, clotting d/o, cancer, hemoptysis     Denies recent illness      Allergies:  Allergies   Allergen Reactions     Lactose Unknown     LACTOSE INTOLERANT       Problem List:    Patient Active Problem List    Diagnosis Date Noted     History of COVID-19 06/30/2022     Priority: Medium     Epidermal inclusion cyst 04/26/2022     Priority: Medium     Mild aortic valve sclerosis- no stenosis 10/19/2021     Priority: Medium     Fever and chills 06/21/2021     Priority: Medium     Elevated LFTs 06/21/2021     Priority: Medium     Thrombocytopenia (H) 06/21/2021     Priority: Medium     Pancytopenia (H) 06/21/2021     Priority: Medium     Elevated transaminase level 06/21/2021     Priority: Medium     Bacteremia due to Gram-negative bacteria 06/21/2021     Priority: Medium     Sinus bradycardia 06/09/2021     Priority: Medium     Near syncope 06/09/2021     Priority: Medium     Abnormal LFTs 06/24/2020     Priority: Medium      Hypoxia 06/24/2020     Priority: Medium     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     Priority: Medium     Added automatically from request for surgery [id number]       AK (actinic keratosis) 11/28/2018     Priority: Medium     Encounter for diabetic foot exam (H) 05/23/2018     Priority: Medium     Type 2 diabetes mellitus with diabetic polyneuropathy, without long-term current use of insulin (H) 05/23/2018     Priority: Medium     Type 2 diabetes mellitus without complication, without long-term current use of insulin (H) 04/11/2018     Priority: Medium     Chronic gout 10/18/2017     Priority: Medium     Esophageal reflux 10/18/2017     Priority: Medium     Personal history of malignant neoplasm of skin 11/14/2016     Priority: Medium     Overview:   Rt upper back mild 2013    Overview:   BCC right temple 2012       History of nonmelanoma skin cancer 11/14/2016     Priority: Medium     Overview:   BCC right temple 2012       ACP (advance care planning) 10/26/2016     Priority: Medium     Advance Care Planning 10/26/2016: ACP Review of Chart / Resources Provided:  Reviewed chart for advance care plan.  Rafa Bhatti has no plan or code status on file. Discussed available resources and provided with information. Confirmed code status reflects current choices pending further ACP discussions.  Confirmed/documented legally designated decision makers.  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 Provider to review       Basal cell carcinoma, face 09/05/2012     Priority: Medium        Past Medical History:    Past Medical History:   Diagnosis Date     Benign neoplasm of unspecified site 8/1/2012     Calculus of  kidney 5/20/2002     Chest pain, unspecified 3/2/2000     Diabetic eye exam (H) 06/06/2018     Gout, unspecified 8/3/2011     Hyperlipidemia      Unspecified essential hypertension 7/18/2000       Past Surgical History:    Past Surgical History:   Procedure Laterality Date     cardiolyte stress test  2000    chest pain     CHOLECYSTECTOMY, COMMON BILE DUCT EXPLORATION, COMBINED  04/15/2018     COLONOSCOPY  1999     fistula in ANO       HEMORRHOIDECTOMY       removal of dermoid cyst of mediastinum       VASECTOMY         Family History:    Family History   Problem Relation Age of Onset     C.A.D. Father 80     C.A.D. Mother      Hypertension Mother      C.A.D. Brother      C.A.D. Other         family hx       Social History:  Marital Status:   [2]  Social History     Tobacco Use     Smoking status: Never     Smokeless tobacco: Never   Substance Use Topics     Alcohol use: Yes     Comment: socially     Drug use: No        Medications:    allopurinol (ZYLOPRIM) 100 MG tablet  amLODIPine (NORVASC) 5 MG tablet  blood glucose monitoring (FREESTYLE) lancets  calcium carbonate (TUMS) 500 MG chewable tablet  colchicine (COLCYRS) 0.6 MG tablet  FREESTYLE LITE test strip  losartan (COZAAR) 25 MG tablet  pantoprazole (PROTONIX) 40 MG EC tablet  rosuvastatin (CRESTOR) 10 MG tablet  sildenafil (REVATIO) 20 MG tablet  sitagliptin (JANUVIA) 100 MG tablet  tamsulosin (FLOMAX) 0.4 MG capsule          Review of Systems  A complete review of systems was performed and is otherwise negative.     Physical Exam   BP: 162/92  Pulse: 69  Temp: 97.6  F (36.4  C)  Resp: 18  SpO2: 98 %      Physical Exam  Constitutional: Alert and conversant. NAD   HENT: NCAT   Eyes: Normal pupils   Neck: supple   CV: Normal rate, regular rhythm, no murmur   Pulmonary/Chest: Non-labored respirations, clear to auscultation bilaterally   Abdominal: Soft, non-tender, non-distended   MSK: VARGAS.   Neuro: Alert and appropriate   Skin: Warm and dry. No  diaphoresis. No rashes on exposed skin    Psych: Appropriate mood and affect     ED Course              ED Course as of 01/24/23 1830   Tue Jan 24, 2023   1215 Rafa Bhatti is a 77 year old male presenting with chest pain.    Differential includes but is not limited to acute coronary syndrome, pulmonary embolism, pneumonia, aortic dissection, pneumothorax, GERD, pericarditis, myocarditis, MSK/costochondritis, shingles.    Vitals wnl   Exam reassuring, well-appearing, nontoxic  EKG without signs of acute ischemia or arrhythmia  CXR normal  Labs within normal limits small exception of slightly elevated AST ALT, however, there was some hemolysis difficult to evaluate  HEART Score 3     History and exam suggest a low likelihood of pulmonary embolism, aortic dissection, or pulmonary pathology as the etiology of this patient's pain.      Given the patient's few risk factors and atypical history for acute coronary syndrome with studies results suggesting low suspicion of a coronary event, the patient is stable for outpatient management. The etiology of the chest pain is likely GERD but not definitively diagnosed. Will trial therapy prescription for omeprazole. Given the nature of the patient's symptoms a stress echo with cardiology follow up is necessary and has been ordered.      Procedures           Results for orders placed or performed during the hospital encounter of 01/24/23 (from the past 24 hour(s))   CBC with platelets differential    Narrative    The following orders were created for panel order CBC with platelets differential.  Procedure                               Abnormality         Status                     ---------                               -----------         ------                     CBC with platelets and d...[[id number]]  Abnormal            Final result                 Please view results for these tests on the individual orders.   Comprehensive metabolic panel   Result Value Ref Range     Sodium 134 (L) 136 - 145 mmol/L    Potassium 4.4 3.4 - 5.3 mmol/L    Chloride 99 98 - 107 mmol/L    Carbon Dioxide (CO2) 26 22 - 29 mmol/L    Anion Gap 9 7 - 15 mmol/L    Urea Nitrogen 20.3 8.0 - 23.0 mg/dL    Creatinine 0.96 0.67 - 1.17 mg/dL    Calcium 9.2 8.8 - 10.2 mg/dL    Glucose 270 (H) 70 - 99 mg/dL    Alkaline Phosphatase 117 40 - 129 U/L     (H) 10 - 50 U/L    ALT 96 (H) 10 - 50 U/L    Protein Total 7.1 6.4 - 8.3 g/dL    Albumin 4.1 3.5 - 5.2 g/dL    Bilirubin Total 0.7 <=1.2 mg/dL    GFR Estimate 82 >60 mL/min/1.73m2   Lipase   Result Value Ref Range    Lipase 25 13 - 60 U/L   Troponin T, High Sensitivity   Result Value Ref Range    Troponin T, High Sensitivity 10 <=22 ng/L   CBC with platelets and differential   Result Value Ref Range    WBC Count 5.0 4.0 - 11.0 10e3/uL    RBC Count 4.54 4.40 - 5.90 10e6/uL    Hemoglobin 13.8 13.3 - 17.7 g/dL    Hematocrit 40.2 40.0 - 53.0 %    MCV 89 78 - 100 fL    MCH 30.4 26.5 - 33.0 pg    MCHC 34.3 31.5 - 36.5 g/dL    RDW 13.0 10.0 - 15.0 %    Platelet Count 135 (L) 150 - 450 10e3/uL    % Neutrophils 73 %    % Lymphocytes 20 %    % Monocytes 7 %    % Eosinophils 0 %    % Basophils 0 %    % Immature Granulocytes 0 %    NRBCs per 100 WBC 0 <1 /100    Absolute Neutrophils 3.6 1.6 - 8.3 10e3/uL    Absolute Lymphocytes 1.0 0.8 - 5.3 10e3/uL    Absolute Monocytes 0.4 0.0 - 1.3 10e3/uL    Absolute Eosinophils 0.0 0.0 - 0.7 10e3/uL    Absolute Basophils 0.0 0.0 - 0.2 10e3/uL    Absolute Immature Granulocytes 0.0 <=0.4 10e3/uL    Absolute NRBCs 0.0 10e3/uL   Extra Tube    Narrative    The following orders were created for panel order Extra Tube.  Procedure                               Abnormality         Status                     ---------                               -----------         ------                     Extra Blue Top Tube[[id number]]                              Final result               Extra Red Top Tube[[id number]]                               Final  result               Extra Purple Top Tube[[id number]]                                                       Extra Green Top (Lithium...[[id number]]                      Final result                 Please view results for these tests on the individual orders.   Extra Blue Top Tube   Result Value Ref Range    Hold Specimen JIC    Extra Red Top Tube   Result Value Ref Range    Hold Specimen JIC    Extra Green Top (Lithium Heparin) ON ICE   Result Value Ref Range    Hold Specimen JIC    XR Chest 2 Views    Narrative    PROCEDURE:  XR CHEST 2 VIEWS    HISTORY: chest pain    COMPARISON:  CT chest 6/21/2021; chest radiographs 6/20/2021    FINDINGS: PA and lateral chest radiographs  Cardiomediastinal silhouette is within normal limits. There is  calcific aortic atherosclerosis.  No focal consolidation, effusion or pneumothorax. Stable asymmetric  elevation the right hemidiaphragm.    Osteopenia or osteoporosis. No subdiaphragmatic free air.      Impression    IMPRESSION:    No acute cardiopulmonary process.      MONTSERRAT PEARCE MD         SYSTEM ID:  [id number]   Troponin T, High Sensitivity - Now then in 2 hours x 2   Result Value Ref Range    Troponin T, High Sensitivity 9 <=22 ng/L       Medications   aspirin (ASA) chewable tablet 324 mg (324 mg Oral Not Given 1/24/23 0942)   alum & mag hydroxide-simethicone (MAALOX) suspension 15 mL (15 mLs Oral Given 1/24/23 0958)     And   lidocaine (viscous) (XYLOCAINE) 2 % solution 15 mL (15 mLs Mouth/Throat Given 1/24/23 0959)       Assessments & Plan (with Medical Decision Making)     I have reviewed the nursing notes.    I have reviewed the findings, diagnosis, plan and need for follow up with the patient.  Discharge Medication List as of 1/24/2023 12:21 PM          Final diagnoses:   Chest pain, unspecified type       1/24/2023   [facility]     Mark Rae MD  01/24/23 [id number]

## 2023-01-24 NOTE — ED TRIAGE NOTES
pt reports onset of upper epigastric/chest pain onset about 20 minutes PTA  reports having HTN and BG have been increased.  Pt brought to room placed on monitors.  UA updated for EKG.

## 2023-02-05 DIAGNOSIS — E11.9 TYPE 2 DIABETES MELLITUS WITHOUT COMPLICATION, WITHOUT LONG-TERM CURRENT USE OF INSULIN (H): ICD-10-CM

## 2023-02-07 RX ORDER — BLOOD-GLUCOSE METER
KIT MISCELLANEOUS
Qty: 100 STRIP | Refills: 0 | Status: SHIPPED | OUTPATIENT
Start: 2023-02-07 | End: 2024-05-10

## 2023-02-07 NOTE — TELEPHONE ENCOUNTER
Test Strips      Last Written Prescription Date:  5.31.22  Last Fill Quantity: #100,   # refills: 0  Last Office Visit: 6.30.22  Future Office visit:       Routing refill request to provider for review/approval because:

## 2023-02-14 ENCOUNTER — HOSPITAL ENCOUNTER (OUTPATIENT)
Dept: CARDIOLOGY | Facility: HOSPITAL | Age: 78
Setting detail: NUCLEAR MEDICINE
Discharge: HOME | End: 2023-02-14
Attending: STUDENT IN AN ORGANIZED HEALTH CARE EDUCATION/TRAINING PROGRAM
Payer: COMMERCIAL

## 2023-02-14 ENCOUNTER — HOSPITAL ENCOUNTER (OUTPATIENT)
Dept: NUCLEAR MEDICINE | Facility: HOSPITAL | Age: 78
Setting detail: NUCLEAR MEDICINE
Discharge: HOME | End: 2023-02-14
Attending: STUDENT IN AN ORGANIZED HEALTH CARE EDUCATION/TRAINING PROGRAM
Payer: COMMERCIAL

## 2023-02-14 DIAGNOSIS — R07.9 CHEST PAIN, UNSPECIFIED TYPE: ICD-10-CM

## 2023-02-14 PROCEDURE — 93018 CV STRESS TEST I&R ONLY: CPT | Performed by: INTERNAL MEDICINE

## 2023-02-14 PROCEDURE — 93017 CV STRESS TEST TRACING ONLY: CPT

## 2023-02-14 PROCEDURE — 343N000001 HC RX 343: Performed by: RADIOLOGY

## 2023-02-14 PROCEDURE — 93016 CV STRESS TEST SUPVJ ONLY: CPT | Performed by: INTERNAL MEDICINE

## 2023-02-14 PROCEDURE — 250N000011 HC RX IP 250 OP 636: Performed by: INTERNAL MEDICINE

## 2023-02-14 PROCEDURE — A9500 TC99M SESTAMIBI: HCPCS | Performed by: RADIOLOGY

## 2023-02-14 PROCEDURE — 78452 HT MUSCLE IMAGE SPECT MULT: CPT

## 2023-02-14 RX ORDER — REGADENOSON 0.08 MG/ML
0.4 INJECTION, SOLUTION INTRAVENOUS ONCE
Status: COMPLETED | OUTPATIENT
Start: 2023-02-14 | End: 2023-02-14

## 2023-02-14 RX ORDER — AMINOPHYLLINE 25 MG/ML
INJECTION, SOLUTION INTRAVENOUS
Status: DISCONTINUED
Start: 2023-02-14 | End: 2023-02-14 | Stop reason: WASHOUT

## 2023-02-14 RX ADMIN — REGADENOSON 0.4 MG: 0.08 INJECTION, SOLUTION INTRAVENOUS at 09:06

## 2023-02-14 RX ADMIN — Medication 31.9 MILLICURIE: at 09:10

## 2023-02-14 RX ADMIN — Medication 10.9 MILLICURIE: at 07:02

## 2023-02-15 LAB
CV BLOOD PRESSURE: 64 MMHG
CV STRESS MAX HR HE: 84
NUC STRESS EJECTION FRACTION: 53 %
RATE PRESSURE PRODUCT: 8736
STRESS ECHO BASELINE DIASTOLIC HE: 78
STRESS ECHO BASELINE HR: 54 BPM
STRESS ECHO BASELINE SYSTOLIC BP: 132
STRESS ECHO CALCULATED PERCENT HR: 58 %
STRESS ECHO LAST STRESS DIASTOLIC BP: 52
STRESS ECHO LAST STRESS SYSTOLIC BP: 104
STRESS ECHO TARGET HR: 145

## 2023-02-16 ENCOUNTER — TELEPHONE (OUTPATIENT)
Dept: FAMILY MEDICINE | Facility: OTHER | Age: 78
End: 2023-02-16

## 2023-02-16 DIAGNOSIS — R94.39 ABNORMAL STRESS TEST: Primary | ICD-10-CM

## 2023-02-16 RX ORDER — ASPIRIN 81 MG/1
81 TABLET, CHEWABLE ORAL DAILY
COMMUNITY

## 2023-02-16 NOTE — TELEPHONE ENCOUNTER
9:20 AM    Reason for Call: Phone Call    Description: Rafa called looking for his results from stress test. Please call Rafa to advise.     Was an appointment offered for this call? No  If yes : Appointment type              Date    Preferred method for responding to this message: Telephone Call  What is your phone number ?  [phone]    If we cannot reach you directly, may we leave a detailed response at the number you provided? No    Can this message wait until your PCP/provider returns, if available today?  Not applicable    Guera Stephens

## 2023-03-07 DIAGNOSIS — I10 BENIGN ESSENTIAL HYPERTENSION: ICD-10-CM

## 2023-03-07 DIAGNOSIS — M1A.0720 CHRONIC IDIOPATHIC GOUT INVOLVING TOE OF LEFT FOOT WITHOUT TOPHUS: ICD-10-CM

## 2023-03-08 RX ORDER — AMLODIPINE BESYLATE 5 MG/1
TABLET ORAL
Qty: 90 TABLET | Refills: 1 | Status: SHIPPED | OUTPATIENT
Start: 2023-03-08 | End: 2023-03-22 | Stop reason: ALTCHOICE

## 2023-03-08 RX ORDER — ALLOPURINOL 100 MG/1
TABLET ORAL
Qty: 180 TABLET | Refills: 1 | Status: SHIPPED | OUTPATIENT
Start: 2023-03-08 | End: 2023-09-05

## 2023-03-09 ENCOUNTER — HOSPITAL ENCOUNTER (OUTPATIENT)
Dept: EDUCATION SERVICES | Facility: HOSPITAL | Age: 78
Discharge: HOME | End: 2023-03-09
Attending: DIETITIAN, REGISTERED | Admitting: NURSE PRACTITIONER
Payer: COMMERCIAL

## 2023-03-09 VITALS
OXYGEN SATURATION: 98 % | HEART RATE: 74 BPM | BODY MASS INDEX: 26.83 KG/M2 | RESPIRATION RATE: 16 BRPM | WEIGHT: 177 LBS | SYSTOLIC BLOOD PRESSURE: 146 MMHG | DIASTOLIC BLOOD PRESSURE: 85 MMHG | HEIGHT: 68 IN

## 2023-03-09 DIAGNOSIS — E11.65 TYPE 2 DIABETES MELLITUS WITH HYPERGLYCEMIA, WITHOUT LONG-TERM CURRENT USE OF INSULIN (H): Primary | ICD-10-CM

## 2023-03-09 LAB — HBA1C MFR BLD: 9.3 % (ref 4.3–?)

## 2023-03-09 PROCEDURE — 83036 HEMOGLOBIN GLYCOSYLATED A1C: CPT | Performed by: DIETITIAN, REGISTERED

## 2023-03-09 PROCEDURE — G0108 DIAB MANAGE TRN  PER INDIV: HCPCS | Performed by: DIETITIAN, REGISTERED

## 2023-03-09 ASSESSMENT — PAIN SCALES - GENERAL: PAINLEVEL: EXTREME PAIN (8)

## 2023-03-09 NOTE — LETTER
3/9/2023        RE: Rafa Bhatti  [address] [facility] [id number]        Diabetes Self-Management Education & Support    Presents for: Individual review    Type of Service: In Person Visit    ASSESSMENT:  A1c today was 9.3% which is up from last check at 7.2% in October.  Pt did have COVID per his report since last A1c was checked and also had ER visit in January for unspecified chest pain.  He feels glucose levels have been up since then.  Weight is stable.  Eye exam and foot exam are up to date.   BP also elevated on two checks today.  Pt has cardiology appointment on March 22.      Patient's most recent   Lab Results   Component Value Date    A1C 7.9 06/30/2022    A1C 10.9 06/16/2021    HEMOGLOBINA1 9.3 03/09/2023     is not meeting goal of <7.5% for age.     Diabetes knowledge and skills assessment:   Patient is knowledgeable in diabetes management concepts related to: Healthy Eating, Being Active, Monitoring and Taking Medication    Education today focused on CGM study. Sensor agreement signed.    Sensor attached to right. No redness, drainage or bleeding noted. Patient instructed on testing schedule, how to complete the patient log, restrictions during wear and to remove if having an x-ray, MRI or CT.    Patient return date is March 30th.    Freestyle Mary Sensor:  Lot#:[id number]  Expiration Date: 07/31/2023  Sensor S/N: [id number]    Patient's identity was verified by using patient's name and date of birth prior to insertion.    Sensor started: 11:20  2 minute re-check completed: 11:24    Written instructions and patient log given to patient.    Based on learning assessment above, most appropriate setting for further diabetes education would be: Individual setting.      PLAN  Pt will keep food logs while wearing glucose sensor.   Continue usual food/activity/glucose monitoring habits.     Follow-up: March 30 - Rosa Muhammad NP    See Care Plan for co-developed, patient-state behavior change  "goals.  AVS provided for patient today.    Education Materials Provided:  Food logs    SUBJECTIVE/OBJECTIVE:  Presents for: Individual review  Accompanied by: Self  Diabetes education in the past 24mo: Yes  Focus of Visit: Monitoring  Diabetes type: Type 2  Date of diagnosis: 4/2018  Disease course: Worsening  How confident are you filling out medical forms by yourself:: Extremely  Diabetes management related comments/concerns: None  Transportation concerns: No  Difficulty affording diabetes medication?: No  Difficulty affording diabetes testing supplies?: No  Other concerns:: None  Cultural Influences/Ethnic Background:  Not  or     Diabetes Symptoms & Complications:  Fatigue: No  Neuropathy: Yes  Polydipsia: No  Polyphagia: No  Polyuria: No  Visual change: No  Slow healing wounds: No  Symptom course: Stable  Weight trend: Stable  Complications assessed today?: Yes  CVA: No  Heart disease: No  Nephropathy: No  Retinopathy: No    Patient Problem List and Family Medical History reviewed for relevant medical history, current medical status, and diabetes risk factors.    Vitals:  /85   Pulse 74   Resp 16   Ht 1.715 m (5' 7.5\")   Wt 80.3 kg (177 lb)   SpO2 98%   BMI 27.31 kg/m    Estimated body mass index is 27.31 kg/m  as calculated from the following:    Height as of this encounter: 1.715 m (5' 7.5\").    Weight as of this encounter: 80.3 kg (177 lb).   Last 3 BP:   BP Readings from Last 3 Encounters:   03/09/23 146/85   01/24/23 122/70   10/06/22 152/76       History   Smoking Status     Never   Smokeless Tobacco     Never       Labs:  Lab Results   Component Value Date    A1C 7.9 06/30/2022    A1C 10.9 06/16/2021    HEMOGLOBINA1 9.3 03/09/2023     Lab Results   Component Value Date     01/24/2023     06/30/2022     06/30/2021     Lab Results   Component Value Date    LDL 46 06/30/2022    LDL 32 06/16/2021     HDL Cholesterol   Date Value Ref Range Status   06/16/2021 32 " (L) >39 mg/dL Final     Direct Measure HDL   Date Value Ref Range Status   06/30/2022 37 (L) >=40 mg/dL Final   ]  GFR Estimate   Date Value Ref Range Status   01/24/2023 82 >60 mL/min/1.73m2 Final     Comment:     eGFR calculated using 2021 CKD-EPI equation.   06/30/2021 90 >60 mL/min/[1.73_m2] Final     Comment:     Non  GFR Calc  Starting 12/18/2018, serum creatinine based estimated GFR (eGFR) will be   calculated using the Chronic Kidney Disease Epidemiology Collaboration   (CKD-EPI) equation.       GFR Estimate If Black   Date Value Ref Range Status   06/30/2021 >90 >60 mL/min/[1.73_m2] Final     Comment:      GFR Calc  Starting 12/18/2018, serum creatinine based estimated GFR (eGFR) will be   calculated using the Chronic Kidney Disease Epidemiology Collaboration   (CKD-EPI) equation.       Lab Results   Component Value Date    CR 0.96 01/24/2023    CR 0.76 06/30/2021     No results found for: MICROALBUMIN    Healthy Eating:  Healthy Eating Assessed Today: Yes  Cultural/Adventist diet restrictions?: No  Meal planning/habits: None  How many times a week on average do you eat food made away from home (restaurant/take-out)?: 0  Meals include: Breakfast, Lunch, Dinner  Breakfast: honey nut cheerios with fruit sometimes- milk - coffee with sugar free creamer  Lunch: 1/2 sandwich or salad or soup  Dinner: meat, starch, veggies or chicken salad  Snacks: nuts  Other: If dines out pizza - < 1x/week  Beverages: Water, Coffee, Diet soda, Sports drinks (sugar free sports drink)  Has patient met with a dietitian in the past?: Yes    Being Active:  Being Active Assessed Today: Yes  Exercise:: Yes (Pt just resumed more routine walking - was not as active in winter months.)  Days per week of moderate to strenuous exercise (like a brisk walk): 7  On average, minutes per day of exercise at this level: 60  How intense was your typical exercise? : Light (like stretching or slow walking)  Exercise  Minutes per Week: 420  Barrier to exercise: None    Monitoring:  Monitoring Assessed Today: Yes  Did patient bring glucose meter to appointment? : Yes  Blood Glucose Meter: FreeStyle  Times checking blood sugar at home (number): 1  Times checking blood sugar at home (per): Day  Blood glucose trend: Increasing  Pt tests 2-3.5 hours post breakfast- 182, 162, 204, 196, 181, 176, 182 178, 103, 105, 135, 106  Average-159  Meter time was wrong - corrected today.     Taking Medications:  Diabetes Medication(s)     Dipeptidyl Peptidase-4 (DPP-4) Inhibitors       sitagliptin (JANUVIA) 100 MG tablet    Take 1 tablet (100 mg) by mouth daily          Taking Medication Assessed Today: Yes  Current Treatments: Oral Medication (taken by mouth) (Januvia 100 mg daily)  Problems taking diabetes medications regularly?: No  Diabetes medication side effects?: Yes (Had diarrhea from Metformin XR - stopped April 2022)    Problem Solving:  Problem Solving Assessed Today: Yes  Is the patient at risk for hypoglycemia?: No  Hypoglycemia Frequency: Never  Is the patient at risk for DKA?: No    Reducing Risks:  Reducing Risks Assessed Today: Yes  Has dilated eye exam at least once a year?: Yes  Sees dentist every 6 months?: Yes  Feet checked by healthcare provider in the last year?: Yes    Healthy Coping:  Healthy Coping Assessed Today: Yes  Emotional response to diabetes: Acceptance, Concern for health and well-being  Informal Support system:: Spouse  Stage of change: MAINTENANCE (Working to maintain change, with risk of relapse)  Patient Activation Measure Survey Score:  ALICIA Score (Last Two) 2/20/2020   ALICIA Raw Score 38   Activation Score 83.7   ALICIA Level 4     Care Plan and Education Provided:  Care Plan: Diabetes   Updates made by Ava Templeton RD since 3/11/2023 12:00 AM      Problem: HbA1C Not In Goal       Goal: Get HbA1C Level in Goal       Task: Discuss diabetes treatment plan with patient Completed 3/11/2023   Responsible User: Yokasta  BRADY Escalante      Problem: Diabetes Self-Management Education Needed to Optimize Self-Care Behaviors       Goal: Monitoring - monitor glucose and ketones as directed    Start Date: 3/9/2023   This Visit's Progress: 0%   Note:    Pt will keep food logs while wearing CGM to determine best option for medication change.       Task: Provide education on continuous glucose monitoring (sensor placement, use of christ or /reader, understanding glucose trends, alerts and alarms, differences between sensor glucose and blood glucose) Completed 3/11/2023   Responsible User: Ava Templeton RD      Goal: Reducing Risks - know how to prevent and treat long-term diabetes complications       Task: Provide education on Hemoglobin A1c - goals and relationship to blood glucose levels Completed 3/11/2023   Responsible User: Ava Templeton RD        Time Spent: 60 minutes  Encounter Type: Individual    Any diabetes medication dose changes were made via the CDE Protocol per the patient's referring provider. A copy of this encounter was shared with the provider.        Sincerely,        Ava Templeton RD

## 2023-03-11 NOTE — PROGRESS NOTES
Diabetes Self-Management Education & Support    Presents for: Individual review    Type of Service: In Person Visit    ASSESSMENT:  A1c today was 9.3% which is up from last check at 7.2% in October.  Pt did have COVID per his report since last A1c was checked and also had ER visit in January for unspecified chest pain.  He feels glucose levels have been up since then.  Weight is stable.  Eye exam and foot exam are up to date.   BP also elevated on two checks today.  Pt has cardiology appointment on March 22.      Patient's most recent   Lab Results   Component Value Date    A1C 7.9 06/30/2022    A1C 10.9 06/16/2021    HEMOGLOBINA1 9.3 03/09/2023     is not meeting goal of <7.5% for age.     Diabetes knowledge and skills assessment:   Patient is knowledgeable in diabetes management concepts related to: Healthy Eating, Being Active, Monitoring and Taking Medication    Education today focused on CGM study. Sensor agreement signed.    Sensor attached to right. No redness, drainage or bleeding noted. Patient instructed on testing schedule, how to complete the patient log, restrictions during wear and to remove if having an x-ray, MRI or CT.    Patient return date is March 30th.    Freestyle Mary Sensor:  Lot#:[id number]  Expiration Date: 07/31/2023  Sensor S/N: [id number]    Patient's identity was verified by using patient's name and date of birth prior to insertion.    Sensor started: 11:20  2 minute re-check completed: 11:24    Written instructions and patient log given to patient.    Based on learning assessment above, most appropriate setting for further diabetes education would be: Individual setting.      PLAN  Pt will keep food logs while wearing glucose sensor.   Continue usual food/activity/glucose monitoring habits.     Follow-up: March 30 - Rosa Muhammad NP    See Care Plan for co-developed, patient-state behavior change goals.  AVS provided for patient today.    Education Materials Provided:  Food  "logs    SUBJECTIVE/OBJECTIVE:  Presents for: Individual review  Accompanied by: Self  Diabetes education in the past 24mo: Yes  Focus of Visit: Monitoring  Diabetes type: Type 2  Date of diagnosis: 4/2018  Disease course: Worsening  How confident are you filling out medical forms by yourself:: Extremely  Diabetes management related comments/concerns: None  Transportation concerns: No  Difficulty affording diabetes medication?: No  Difficulty affording diabetes testing supplies?: No  Other concerns:: None  Cultural Influences/Ethnic Background:  Not  or     Diabetes Symptoms & Complications:  Fatigue: No  Neuropathy: Yes  Polydipsia: No  Polyphagia: No  Polyuria: No  Visual change: No  Slow healing wounds: No  Symptom course: Stable  Weight trend: Stable  Complications assessed today?: Yes  CVA: No  Heart disease: No  Nephropathy: No  Retinopathy: No    Patient Problem List and Family Medical History reviewed for relevant medical history, current medical status, and diabetes risk factors.    Vitals:  /85   Pulse 74   Resp 16   Ht 1.715 m (5' 7.5\")   Wt 80.3 kg (177 lb)   SpO2 98%   BMI 27.31 kg/m    Estimated body mass index is 27.31 kg/m  as calculated from the following:    Height as of this encounter: 1.715 m (5' 7.5\").    Weight as of this encounter: 80.3 kg (177 lb).   Last 3 BP:   BP Readings from Last 3 Encounters:   03/09/23 146/85   01/24/23 122/70   10/06/22 152/76       History   Smoking Status     Never   Smokeless Tobacco     Never       Labs:  Lab Results   Component Value Date    A1C 7.9 06/30/2022    A1C 10.9 06/16/2021    HEMOGLOBINA1 9.3 03/09/2023     Lab Results   Component Value Date     01/24/2023     06/30/2022     06/30/2021     Lab Results   Component Value Date    LDL 46 06/30/2022    LDL 32 06/16/2021     HDL Cholesterol   Date Value Ref Range Status   06/16/2021 32 (L) >39 mg/dL Final     Direct Measure HDL   Date Value Ref Range Status "   06/30/2022 37 (L) >=40 mg/dL Final   ]  GFR Estimate   Date Value Ref Range Status   01/24/2023 82 >60 mL/min/1.73m2 Final     Comment:     eGFR calculated using 2021 CKD-EPI equation.   06/30/2021 90 >60 mL/min/[1.73_m2] Final     Comment:     Non  GFR Calc  Starting 12/18/2018, serum creatinine based estimated GFR (eGFR) will be   calculated using the Chronic Kidney Disease Epidemiology Collaboration   (CKD-EPI) equation.       GFR Estimate If Black   Date Value Ref Range Status   06/30/2021 >90 >60 mL/min/[1.73_m2] Final     Comment:      GFR Calc  Starting 12/18/2018, serum creatinine based estimated GFR (eGFR) will be   calculated using the Chronic Kidney Disease Epidemiology Collaboration   (CKD-EPI) equation.       Lab Results   Component Value Date    CR 0.96 01/24/2023    CR 0.76 06/30/2021     No results found for: MICROALBUMIN    Healthy Eating:  Healthy Eating Assessed Today: Yes  Cultural/Shinto diet restrictions?: No  Meal planning/habits: None  How many times a week on average do you eat food made away from home (restaurant/take-out)?: 0  Meals include: Breakfast, Lunch, Dinner  Breakfast: honey nut cheerios with fruit sometimes- milk - coffee with sugar free creamer  Lunch: 1/2 sandwich or salad or soup  Dinner: meat, starch, veggies or chicken salad  Snacks: nuts  Other: If dines out pizza - < 1x/week  Beverages: Water, Coffee, Diet soda, Sports drinks (sugar free sports drink)  Has patient met with a dietitian in the past?: Yes    Being Active:  Being Active Assessed Today: Yes  Exercise:: Yes (Pt just resumed more routine walking - was not as active in winter months.)  Days per week of moderate to strenuous exercise (like a brisk walk): 7  On average, minutes per day of exercise at this level: 60  How intense was your typical exercise? : Light (like stretching or slow walking)  Exercise Minutes per Week: 420  Barrier to exercise: None    Monitoring:  Monitoring  Assessed Today: Yes  Did patient bring glucose meter to appointment? : Yes  Blood Glucose Meter: FreeStyle  Times checking blood sugar at home (number): 1  Times checking blood sugar at home (per): Day  Blood glucose trend: Increasing  Pt tests 2-3.5 hours post breakfast- 182, 162, 204, 196, 181, 176, 182 178, 103, 105, 135, 106  Average-159  Meter time was wrong - corrected today.     Taking Medications:  Diabetes Medication(s)     Dipeptidyl Peptidase-4 (DPP-4) Inhibitors       sitagliptin (JANUVIA) 100 MG tablet    Take 1 tablet (100 mg) by mouth daily          Taking Medication Assessed Today: Yes  Current Treatments: Oral Medication (taken by mouth) (Januvia 100 mg daily)  Problems taking diabetes medications regularly?: No  Diabetes medication side effects?: Yes (Had diarrhea from Metformin XR - stopped April 2022)    Problem Solving:  Problem Solving Assessed Today: Yes  Is the patient at risk for hypoglycemia?: No  Hypoglycemia Frequency: Never  Is the patient at risk for DKA?: No    Reducing Risks:  Reducing Risks Assessed Today: Yes  Has dilated eye exam at least once a year?: Yes  Sees dentist every 6 months?: Yes  Feet checked by healthcare provider in the last year?: Yes    Healthy Coping:  Healthy Coping Assessed Today: Yes  Emotional response to diabetes: Acceptance, Concern for health and well-being  Informal Support system:: Spouse  Stage of change: MAINTENANCE (Working to maintain change, with risk of relapse)  Patient Activation Measure Survey Score:  ALICIA Score (Last Two) 2/20/2020   ALICIA Raw Score 38   Activation Score 83.7   ALICIA Level 4     Care Plan and Education Provided:  Care Plan: Diabetes   Updates made by Ava Templeton RD since 3/11/2023 12:00 AM      Problem: HbA1C Not In Goal       Goal: Get HbA1C Level in Goal       Task: Discuss diabetes treatment plan with patient Completed 3/11/2023   Responsible User: Ava Templeton RD      Problem: Diabetes Self-Management Education Needed to  Optimize Self-Care Behaviors       Goal: Monitoring - monitor glucose and ketones as directed    Start Date: 3/9/2023   This Visit's Progress: 0%   Note:    Pt will keep food logs while wearing CGM to determine best option for medication change.       Task: Provide education on continuous glucose monitoring (sensor placement, use of christ or /reader, understanding glucose trends, alerts and alarms, differences between sensor glucose and blood glucose) Completed 3/11/2023   Responsible User: Ava Templeton RD      Goal: Reducing Risks - know how to prevent and treat long-term diabetes complications       Task: Provide education on Hemoglobin A1c - goals and relationship to blood glucose levels Completed 3/11/2023   Responsible User: Ava Templeton RD        Time Spent: 60 minutes  Encounter Type: Individual    Any diabetes medication dose changes were made via the CDE Protocol per the patient's referring provider. A copy of this encounter was shared with the provider.

## 2023-03-22 ENCOUNTER — OFFICE VISIT (OUTPATIENT)
Dept: CARDIOLOGY | Facility: OTHER | Age: 78
End: 2023-03-22
Attending: INTERNAL MEDICINE
Payer: COMMERCIAL

## 2023-03-22 VITALS
HEIGHT: 70 IN | TEMPERATURE: 97.3 F | HEART RATE: 70 BPM | SYSTOLIC BLOOD PRESSURE: 130 MMHG | WEIGHT: 167 LBS | BODY MASS INDEX: 23.91 KG/M2 | OXYGEN SATURATION: 96 % | DIASTOLIC BLOOD PRESSURE: 72 MMHG

## 2023-03-22 DIAGNOSIS — K21.9 GASTROESOPHAGEAL REFLUX DISEASE, UNSPECIFIED WHETHER ESOPHAGITIS PRESENT: ICD-10-CM

## 2023-03-22 DIAGNOSIS — I10 BENIGN ESSENTIAL HYPERTENSION: ICD-10-CM

## 2023-03-22 DIAGNOSIS — E11.42 TYPE 2 DIABETES MELLITUS WITH DIABETIC POLYNEUROPATHY, WITHOUT LONG-TERM CURRENT USE OF INSULIN (H): ICD-10-CM

## 2023-03-22 DIAGNOSIS — R94.39 ABNORMAL STRESS TEST: Primary | ICD-10-CM

## 2023-03-22 DIAGNOSIS — I35.8 MILD AORTIC VALVE SCLEROSIS: ICD-10-CM

## 2023-03-22 DIAGNOSIS — E11.9 TYPE 2 DIABETES MELLITUS WITHOUT COMPLICATION, WITHOUT LONG-TERM CURRENT USE OF INSULIN (H): ICD-10-CM

## 2023-03-22 DIAGNOSIS — E78.2 MIXED HYPERLIPIDEMIA: ICD-10-CM

## 2023-03-22 DIAGNOSIS — Z86.16 HISTORY OF COVID-19: ICD-10-CM

## 2023-03-22 DIAGNOSIS — R55 NEAR SYNCOPE: ICD-10-CM

## 2023-03-22 PROCEDURE — G0463 HOSPITAL OUTPT CLINIC VISIT: HCPCS

## 2023-03-22 PROCEDURE — 99215 OFFICE O/P EST HI 40 MIN: CPT | Performed by: INTERNAL MEDICINE

## 2023-03-22 RX ORDER — ROSUVASTATIN CALCIUM 20 MG/1
10 TABLET, COATED ORAL DAILY
Qty: 90 TABLET | Refills: 3 | Status: SHIPPED | OUTPATIENT
Start: 2023-03-22 | End: 2023-06-12

## 2023-03-22 RX ORDER — TAMSULOSIN HYDROCHLORIDE 0.4 MG/1
0.4 CAPSULE ORAL DAILY
COMMUNITY
End: 2023-05-08

## 2023-03-22 ASSESSMENT — PAIN SCALES - GENERAL: PAINLEVEL: NO PAIN (0)

## 2023-03-22 NOTE — PROGRESS NOTES
[facility] HEART CARE   CARDIOLOGY PROGRESS NOTE     Chief Complaint   Patient presents with     New Patient          Diagnosis:  1.  Abnormal stress test on 2/14/2023.  2.  Syncope/positional lightheadedness on 6/9/21.  Resolved off of beta-blocker.  3.  Hypertension-uncontrolled.  4.  II-9-[id number].  5.  Hyperlipidemia-controlled.  6.  Hypertriglyceridemia-controlled.    Assessment/Plan:    1.  Abnormal stress test: Stress test from 2/14/2023 a small area of reversible ischemia.  He was seen in the ER on 1//23 for chest discomfort.  His chest discomfort has resolved.  Is been able to do activities outside such as shoveling snow.  He describes himself as being very active.  As result, we will not pursue angiogram.  We will try medical management.  If he ever starts having tightness or pressure, he was told to let us know and this will be addressed promptly.  For now, we will treat him medically.  Will increase rosuvastatin from 10 to 20 mg daily.  Continue on aspirin 81 mg daily, losartan 25 mg daily, and Crestor 20 mg daily.  Not a candidate for submental nitro as he does take Viagra.  2.  Lightheadedness: Happens with bending over and standing up.  Potentially related to dehydration as he does drink 2 cups of coffee as well as only 32 ounces of water.  Told him to double his water intake at minimum.  We will discontinue hydralazine to see if this improves his symptoms as he is also on losartan and Flomax.  3.  Hypertension: Controlled.  May need to make changes in the future with discontinuing of amlodipine.  4.  DM-2: Uncontrolled.  Discussed with patient.  5.  Follow-up in 1 year or sooner with issues.      Interval history:  Rafa was last seen on 10/19/2021 by Evelyn Childs.  He is being seen in follow-up related to an ER visit on 1/24/2023.  He had presented to the ER with chest discomfort.  EKG and troponin unremarkable.  Underwent stress testing.  Stress test does show reversible defect but small.  He  states his chest discomfort has resolved.  Has been active shoveling and doing other activities outside.  He does admit to being dyspneic on exertion but has been going on for years.  Has not had tightness or pressure.  Due to his lack of symptoms, we discussed medical management versus angiogram.  It was decided he would continue with medical management.  If he starts having symptoms in future, we will consider an angiogram at that time.  We will increase Crestor from 10 to 20 mg daily.  He is to take aspirin 81 mg daily.  He cannot take nitro as he does take sildenafil.  He will follow-up 1 year unless he has any cardiac issues, then will be seen sooner.        HPI:    Mr. Bhatti presents for cardiology follow-up visit on 8/24/2021 with recent near syncope event.  Patient has a history of hypertension, bradycardia, hyperlipidemia, elevated LFT's, pancytopenia, DM 2 with polyneuropathy, hx of pancreatic surgery in May 2020 (pancreas preserving duodenectomy, choledochojejunostomy, duodenojejunostomy resection of remnant cystic duct), gout and GERD.      Patient was hospitalized from 6/9/2021 to 6/10/2021 as a result of near syncope with underlying sinus bradycardia.  Patient had been working outside in the heat when he presented to the ED with lightheadedness, near syncope episode and bradycardia with rates identified in the 30's to 40's bpm on ECG.  No acute ST-T wave changes identified.  He has been on beta-blocker atenolol prior to this with continued daily use.  He received IV fluids during this hospitalization serial troponins negative.  He was advised to hold his atenolol with his heart rate improved to 81 bpm by discharge.  He was advised to remain off the beta-blocker following hospital discharge.  He has remained on amlodipine and losartan for hypertension.     He returned to the ED on 6/20/2021 with reports of back pain, diaphoresis, fever, chills, fatigue, body aches, decreased appetite and loose stools.   He does describe increased lightheadedness again.  He was identified to have elevated LFT's, blood cultures positive for gram-negative rods.  Therefore, he was admitted to the hospital receiving IV antibiotics and IV fluids.  He had received 2 g of Rocephin in the ED.  He was diagnosed with an E. coli bacteremia receiving IV Rocephin.  Elevated LFT's suspected secondary to tickborne illness with pancytopenia.  He was started on doxycycline empirically.  He was discharged on 6/23/2021.     At his last visit patient reported continued episodes of brief lightheadedness noted only when working in the heat with position changes from bent forward to standing quickly. No resulting syncope or falls. No palpitations. No edema. Symptom onset in June was the first time he experienced lightheadedness, resulting in hospitalization with bradycardia at that time. No recurrence of bradycardia since off of his beta blocker, he has been monitoring his HR and BP regularly. He denied any chest pain or pressure. No increased edema. No increased dyspnea.      TTE on 9/24/21 revealing normal biventricular function, LVEF 55 to 60%.  LV diastolic function was indeterminate.  No regional wall motion abnormalities.  Trileaflet aortic sclerosis without stenosis, trace aortic insufficiency.  No significant valvular abnormalities.  No pericardial effusion.     Carotid US on 9/24/2021 was negative, no evidence of internal carotid artery stenosis.     ZIO revealing average heart rate 74 bpm.  His heart rate ranged from 47 to 190 bpm.  No symptomatic or significant bradycardia, no pauses, no Mobitz type II or third-degree AV block.  No atrial fibrillation.  He had 2 runs of ventricular tachycardia which were nonsustained, lasting up to 4 beats with a maximum heart rate of 190 bpm, asymptomatic.  3 runs of SVT with the longest lasting 20 beats and a maximum heart rate of 160 bpm, asymptomatic.  Rare SVE and VE, less than 1%.  No ventricular bigeminy  or ventricular trigeminy.  He had 3 triggered events and 0 diary entries.  The patient triggered events corresponded to sinus rhythm.      Relevant testing:  Stress test on 2/14/2023:     The nuclear stress test is equivocal.      There is a small area of infarction in the apical segment(s) of the  left ventricle associated with rose-infarct ischemia.      Left ventricular function is low normal.      The left ventricular ejection fraction at rest is 64%.  The left   ventricular ejection fraction at stress is 53%.      There is no prior study for comparison.     Zio patch 9/24/2021:  Underlying rhythm was sinus.  Hrt rate ranged from 47 bpm, maximum heart rate of 190 bmp, averaging 74 bmp.  No significant bradycardia, pauses, Mobitz type II or 3rd degree heart block.  No atrial fibrillation on this study.  x3 triggered events and x0 diary entries.  These corresponded to sinus rhythm.  x2 runs of VT lasting up to 4 beats with a maximum heart rate of 190 bmp.  x3 runs of SVT lasting up to 20 beats with a maximum heart rate of 160 bmp.  Rare, less than 1% of PAC's, atrial couplets, atrial triplets, PVC's, ventricular couplets, and ventricular triplets.  0 episodes of ventricular bigeminy.  0 episodes of ventricular trigeminy.    Echo on 9/24/2021:  Global and regional left ventricular function is normal with an EF of 55-60%.  Left ventricular diastolic function is indeterminate.  The right ventricle is normal size.Global right ventricular function is  normal.  No significant valvular abnormalities were noted.  The inferior vena cava was normal in size with preserved respiratory  variability.  No pericardial effusion is present.    US carotids on 9/24/2021:  Negative study. No evidence of internal carotid artery stenosis.        ICD-10-CM    1. Abnormal stress test  R94.39 rosuvastatin (CRESTOR) 20 MG tablet      2. Mixed hyperlipidemia  E78.2 rosuvastatin (CRESTOR) 20 MG tablet      3. Type 2 diabetes mellitus without  complication, without long-term current use of insulin (H)  E11.9 rosuvastatin (CRESTOR) 20 MG tablet      4. Mild aortic valve sclerosis- no stenosis  I35.8       5. Benign essential hypertension  I10       6. Type 2 diabetes mellitus with diabetic polyneuropathy, without long-term current use of insulin (H)  E11.42       7. Gastroesophageal reflux disease, unspecified whether esophagitis present  K21.9       8. Near syncope  R55       9. History of COVID-19  Z86.16           Past Medical History:   Diagnosis Date     Benign neoplasm of unspecified site 8/1/2012    Dermoid cyst     Calculus of kidney 5/20/2002     Chest pain, unspecified 3/2/2000     Diabetic eye exam (H) 06/06/2018    normal     Gout, unspecified 8/3/2011     Hyperlipidemia      Unspecified essential hypertension 7/18/2000       Past Surgical History:   Procedure Laterality Date     cardiolyte stress test  2000    chest pain     CHOLECYSTECTOMY, COMMON BILE DUCT EXPLORATION, COMBINED  04/15/2018     COLONOSCOPY  1999     fistula in ANO       HEMORRHOIDECTOMY       removal of dermoid cyst of mediastinum       VASECTOMY         Allergies   Allergen Reactions     Lactose Unknown     LACTOSE INTOLERANT       Current Outpatient Medications   Medication Sig Dispense Refill     allopurinol (ZYLOPRIM) 100 MG tablet TAKE 1 TABLET BY MOUTH TWICE A  tablet 1     aspirin (ASA) 81 MG chewable tablet Take 81 mg by mouth daily       blood glucose monitoring (FREESTYLE) lancets Use to test blood sugar 1 times daily. 100 each 3     calcium carbonate (TUMS) 500 MG chewable tablet Take 1 tablet by mouth daily as needed        colchicine (COLCYRS) 0.6 MG tablet TAKE 2 TABLETS AT THE ONSET OF GOUT PAIN. MAY TAKE ONE TABLET AGAIN IN ONE HOUR. MAX 4 TABS IN 24 HRS. 20 tablet 1     FREESTYLE LITE test strip USE TO TEST BLOOD SUGARS ONCE A DAY. 100 strip 0     losartan (COZAAR) 25 MG tablet Take 1 tablet (25 mg) by mouth daily 90 tablet 3     pantoprazole  (PROTONIX) 40 MG EC tablet Take 40 mg by mouth daily       rosuvastatin (CRESTOR) 20 MG tablet Take 0.5 tablets (10 mg) by mouth daily 90 tablet 3     sildenafil (REVATIO) 20 MG tablet Take 1-2 tablets (20-40 mg) by mouth daily as needed (erectile dysfunction) 60 tablet 1     sitagliptin (JANUVIA) 100 MG tablet Take 1 tablet (100 mg) by mouth daily 90 tablet 3     tamsulosin (FLOMAX) 0.4 MG capsule Take 0.4 mg by mouth daily         Social History     Socioeconomic History     Marital status:      Spouse name: Not on file     Number of children: Not on file     Years of education: Not on file     Highest education level: Not on file   Occupational History     Occupation: retired   Tobacco Use     Smoking status: Never     Smokeless tobacco: Never   Substance and Sexual Activity     Alcohol use: Yes     Comment: socially     Drug use: No     Sexual activity: Not Currently     Partners: Female   Other Topics Concern      Service Yes     Blood Transfusions Yes     Caffeine Concern Yes     Comment: 3 cups daily     Occupational Exposure No     Hobby Hazards No     Sleep Concern Yes     Stress Concern No     Weight Concern No     Special Diet No     Back Care Yes     Exercise Yes     Bike Helmet No     Seat Belt Yes     Self-Exams Not Asked     Parent/sibling w/ CABG, MI or angioplasty before 65F 55M? No   Social History Narrative     Not on file     Social Determinants of Health     Financial Resource Strain: Not on file   Food Insecurity: Not on file   Transportation Needs: Not on file   Physical Activity: Not on file   Stress: Not on file   Social Connections: Not on file   Intimate Partner Violence: Not on file   Housing Stability: Not on file       LAB RESULTS:   No visits with results within 2 Month(s) from this visit.   Latest known visit with results is:   Office Visit on 06/30/2022   Component Date Value Ref Range Status     Creatinine Urine mg/dL 06/30/2022 136  mg/dL Final     Albumin Urine mg/L  "06/30/2022 15  mg/L Final     Albumin Urine mg/g Cr 06/30/2022 11.03  0.00 - 17.00 mg/g Cr Final     Sodium 06/30/2022 137  133 - 144 mmol/L Final     Potassium 06/30/2022 4.3  3.4 - 5.3 mmol/L Final     Chloride 06/30/2022 106  94 - 109 mmol/L Final     Carbon Dioxide (CO2) 06/30/2022 26  20 - 32 mmol/L Final     Anion Gap 06/30/2022 5  3 - 14 mmol/L Final     Urea Nitrogen 06/30/2022 23  7 - 30 mg/dL Final     Creatinine 06/30/2022 0.97  0.66 - 1.25 mg/dL Final     Calcium 06/30/2022 9.3  8.5 - 10.1 mg/dL Final     Glucose 06/30/2022 154 (H)  70 - 99 mg/dL Final     Alkaline Phosphatase 06/30/2022 74  40 - 150 U/L Final     AST 06/30/2022 20  0 - 45 U/L Final     ALT 06/30/2022 32  0 - 70 U/L Final     Protein Total 06/30/2022 8.1  6.8 - 8.8 g/dL Final     Albumin 06/30/2022 4.0  3.4 - 5.0 g/dL Final     Bilirubin Total 06/30/2022 0.4  0.2 - 1.3 mg/dL Final     GFR Estimate 06/30/2022 81  >60 mL/min/1.73m2 Final     Cholesterol 06/30/2022 100  <200 mg/dL Final     Triglycerides 06/30/2022 85  <150 mg/dL Final     Direct Measure HDL 06/30/2022 37 (L)  >=40 mg/dL Final     LDL Cholesterol Calculated 06/30/2022 46  <=100 mg/dL Final     Non HDL Cholesterol 06/30/2022 63  <130 mg/dL Final     Patient Fasting > 8hrs? 06/30/2022 Yes   Final     Estimated Average Glucose 06/30/2022 180  mg/dL Final     Hemoglobin A1C 06/30/2022 7.9 (H)  0.0 - 5.6 % Final        Review of systems: Negative except that which was noted in the HPI.    Physical examination:  /72   Pulse 70   Temp 97.3  F (36.3  C) (Tympanic)   Ht 1.778 m (5' 10\")   Wt 75.8 kg (167 lb)   SpO2 96%   BMI 23.96 kg/m      GENERAL APPEARANCE: healthy, alert and no distress  CHEST: lungs clear to auscultation - no rales, rhonchi or wheezes, no use of accessory muscles, no retractions, respirations are unlabored, normal respiratory rate  CARDIOVASCULAR: regular rhythm, normal S1 with physiologic split S2, no S3 or S4 and no murmur, click or " rub  EXTREMITIES: no clubbing, cyanosis or edema.    Total time spent on day of visit, including review of tests, obtaining/reviewing separately obtained history, ordering medications/tests/procedures, communicating with PCP/consultants, and documenting in electronic medical record: 50 minutes.               Thank you for allowing me to participate in the care of your patient. Please do not hesitate to contact me if you have any questions.     Jimbo Crawford, DO

## 2023-03-22 NOTE — PATIENT INSTRUCTIONS
Thank you for allowing Dr. Crawford and our  team to participate in your care. Please call our office at [phone] with scheduling questions or if you need to cancel or change your appointment. With any other questions or concerns you may call [facility] cardiology nurse at [phone].       If you experience chest pain, chest pressure, chest tightness, shortness of breath, fainting, lightheadedness, nausea, vomiting, or other concerning symptoms, please report to the Emergency Department or call 911. These symptoms may be emergent, and best treated in the Emergency Department.      Maintain hydration by drinking small amounts of clear fluids frequently.

## 2023-03-30 ENCOUNTER — ALLIED HEALTH/NURSE VISIT (OUTPATIENT)
Dept: EDUCATION SERVICES | Facility: OTHER | Age: 78
End: 2023-03-30
Attending: NURSE PRACTITIONER
Payer: COMMERCIAL

## 2023-03-30 VITALS
OXYGEN SATURATION: 98 % | RESPIRATION RATE: 16 BRPM | SYSTOLIC BLOOD PRESSURE: 161 MMHG | HEART RATE: 58 BPM | DIASTOLIC BLOOD PRESSURE: 92 MMHG | BODY MASS INDEX: 26.9 KG/M2 | HEIGHT: 68 IN | WEIGHT: 177.5 LBS

## 2023-03-30 DIAGNOSIS — E11.65 TYPE 2 DIABETES MELLITUS WITH HYPERGLYCEMIA, WITHOUT LONG-TERM CURRENT USE OF INSULIN (H): Primary | ICD-10-CM

## 2023-03-30 DIAGNOSIS — I10 BENIGN ESSENTIAL HYPERTENSION: ICD-10-CM

## 2023-03-30 PROCEDURE — 95251 CONT GLUC MNTR ANALYSIS I&R: CPT | Performed by: NURSE PRACTITIONER

## 2023-03-30 PROCEDURE — 99213 OFFICE O/P EST LOW 20 MIN: CPT | Performed by: NURSE PRACTITIONER

## 2023-03-30 PROCEDURE — G0463 HOSPITAL OUTPT CLINIC VISIT: HCPCS

## 2023-03-30 ASSESSMENT — PAIN SCALES - GENERAL: PAINLEVEL: MILD PAIN (3)

## 2023-03-30 NOTE — PROGRESS NOTES
SUBJECTIVE:  Rafa Bhatti, 77 year old, male presents with the following Chief Complaint(s) with HPI to follow:  Chief Complaint   Patient presents with     Diabetes        Diabetes Follow-up      Patient is checking blood sugars: once daily.  Results:    BG ave: 125  Highest: 170  Lowest: 101      Symptoms of hypoglycemia (low blood sugar): none    Paresthesias (numbness or burning in feet) or sores: Yes--toes and fingertips; no    Diabetic eye exam within the last year: Yes    Breakfast eaten regularly: Yes    Patient counting carbs: Yes       HPI:  Rafa's here today for the follow up regarding his Diabetes mellitus, Type 2.    A1c trend:    3/9/23: 9.3%  10/6/22: 7.2%    Lab Results   Component Value Date    A1C 7.9 06/30/2022    A1C 7.6 03/22/2022    A1C 7.1 08/25/2021    A1C 10.9 06/16/2021    A1C 6.4 08/12/2020    A1C 6.4 05/18/2020    A1C 6.9 02/21/2020    A1C 5.9 08/14/2019     Current Diabetes medication:   1. Januvia 100 mg daily  ASA use: yes  Statin use: yes    Rafa reports the following:  Diagnosed with diabetes, <5 years  Diagnosed during a clinic visit  Doesn't recall any symptoms.   Family hx of diabetes: no    Past medications:  Metformin--didn't tolerate  Amaryl--issues with low BGs.     States he's been exercising more since his last A1c  Eats the same:   Breakfast: cereal + coffee  Lunch: salad, small sandwich, supper leftovers, soup  Supper: typically the bigger meal    Rafa is here for his professional CGM study evaluation    Denies any new health concerns today    Weight trend:  Wt Readings from Last 4 Encounters:   03/30/23 80.5 kg (177 lb 8 oz)   03/22/23 75.8 kg (167 lb)   03/09/23 80.3 kg (177 lb)   10/06/22 79.7 kg (175 lb 12.8 oz)     Noted BP  Denies any symptoms    Patient Active Problem List   Diagnosis     Encounter for monitoring testosterone replacement therapy     Testicular hypofunction     Benign essential hypertension     Hemangioma of spine     ACP (advance care planning)      Chronic gout     Esophageal reflux     Type 2 diabetes mellitus without complication, without long-term current use of insulin (H)     Encounter for diabetic foot exam (H)     Type 2 diabetes mellitus with diabetic polyneuropathy, without long-term current use of insulin (H)     Basal cell carcinoma, face     Personal history of malignant neoplasm of skin     AK (actinic keratosis)     History of nonmelanoma skin cancer     Abnormal LFTs     Adenomatous duodenal polyp     Hypoxia     Sinus bradycardia     Near syncope     Fever and chills     Elevated LFTs     Thrombocytopenia (H)     Pancytopenia (H)     Elevated transaminase level     Bacteremia due to Gram-negative bacteria     Mild aortic valve sclerosis- no stenosis     Epidermal inclusion cyst     History of COVID-19     Abnormal stress test     Mixed hyperlipidemia       Past Medical History:   Diagnosis Date     Benign neoplasm of unspecified site 8/1/2012    Dermoid cyst     Calculus of kidney 5/20/2002     Chest pain, unspecified 3/2/2000     Diabetic eye exam (H) 06/06/2018    normal     Gout, unspecified 8/3/2011     Hyperlipidemia      Unspecified essential hypertension 7/18/2000       Past Surgical History:   Procedure Laterality Date     cardiolyte stress test  2000    chest pain     CHOLECYSTECTOMY, COMMON BILE DUCT EXPLORATION, COMBINED  04/15/2018     COLONOSCOPY  1999     fistula in ANO       HEMORRHOIDECTOMY       removal of dermoid cyst of mediastinum       VASECTOMY         Family History   Problem Relation Age of Onset     C.A.D. Father 80     C.A.D. Mother      Hypertension Mother      C.A.D. Brother      C.A.D. Other         family hx       Social History     Tobacco Use     Smoking status: Never     Smokeless tobacco: Never   Substance Use Topics     Alcohol use: Yes     Comment: socially       Current Outpatient Medications   Medication Sig Dispense Refill     allopurinol (ZYLOPRIM) 100 MG tablet TAKE 1 TABLET BY MOUTH TWICE A   "tablet 1     aspirin (ASA) 81 MG chewable tablet Take 81 mg by mouth daily       blood glucose monitoring (FREESTYLE) lancets Use to test blood sugar 1 times daily. 100 each 3     calcium carbonate (TUMS) 500 MG chewable tablet Take 1 tablet by mouth daily as needed        colchicine (COLCYRS) 0.6 MG tablet TAKE 2 TABLETS AT THE ONSET OF GOUT PAIN. MAY TAKE ONE TABLET AGAIN IN ONE HOUR. MAX 4 TABS IN 24 HRS. 20 tablet 1     FREESTYLE LITE test strip USE TO TEST BLOOD SUGARS ONCE A DAY. 100 strip 0     losartan (COZAAR) 25 MG tablet Take 1 tablet (25 mg) by mouth daily 90 tablet 3     pantoprazole (PROTONIX) 40 MG EC tablet Take 40 mg by mouth daily       rosuvastatin (CRESTOR) 20 MG tablet Take 0.5 tablets (10 mg) by mouth daily 90 tablet 3     sildenafil (REVATIO) 20 MG tablet Take 1-2 tablets (20-40 mg) by mouth daily as needed (erectile dysfunction) 60 tablet 1     sitagliptin (JANUVIA) 100 MG tablet Take 1 tablet (100 mg) by mouth daily 90 tablet 3     tamsulosin (FLOMAX) 0.4 MG capsule Take 0.4 mg by mouth daily         Allergies   Allergen Reactions     Lactose Unknown     LACTOSE INTOLERANT       REVIEW OF SYSTEMS  Skin: negative  Eyes: glasses;  Ears/Nose/Throat: negative; hx of tinnitus   Respiratory: No shortness of breath, dyspnea on exertion, cough, or hemoptysis  Cardiovascular: negative  Gastrointestinal: negative  Genitourinary: negative  Musculoskeletal: positive for negative and back pain  Neurologic: positive for numbness or tingling of feet  Psychiatric: negative  Hematologic/Lymphatic/Immunologic: negative  Endocrine: positive for diabetes    OBJECTIVE:  BP (!) 161/92   Pulse 58   Resp 16   Ht 1.715 m (5' 7.5\")   Wt 80.5 kg (177 lb 8 oz)   SpO2 98%   BMI 27.39 kg/m    Constitutional: alert and no distress  Respiratory:  Good diaphragmatic excursion.  Musculoskeletal: extremities normal- no gross deformities noted and gait normal  Skin: no suspicious lesions or rashes to visible " skin  Psychiatric: mentation appears normal and affect normal/bright      LABS  No results found for any visits on 03/30/23.    ASSESSMENT / PLAN:  (E11.65) Type 2 diabetes mellitus with hyperglycemia, without long-term current use of insulin (H)  (primary encounter diagnosis)  Comment: noted CGM download:    Date: 3/9 to 3/20/23  Glucose management indicator: 7.5%    Average glucose: 176    Glucose range  Very low (<54): 0  low (<70): 0  In target range (): 57%  High (>180): 42%  Very high (>250): 1%    Breakfast:  cherrios with milk, coffee    Lunch:   Grilled chicken sandwich/salad  Chicken breast, celery  Chicken soup/crackers  Chicken breast/potato salad/celery    Snacks:  Celery sticks  Rice cake  Chips  Popcorn  Peanuts  SF vanilla pudding  bagel    Supper:  scrambled eggs/apple  [place] sandwich/pickle/SF chocolate pudding  Chicken breast, potato salad, oranges   Bowl of cheesy bras/carrots/peaches    Plan: GLUCOSE MONITOR, 72 HOUR, FLORIN AMATO    Noted his overnight BG ave is about 150/160 during the study.    BGs do increase with breakfast to >180.    Trends down back into target after noon and hovers in the 180s from 6 pm to midnight.      BGs are better.     No change    Keep working on healthy eating and moving  Try adding protein to breakfast.      (I10) Benign essential hypertension  Comment: noted, no symptoms  Plan:   Keep checking BP at the house    Follow up  With BRADY Hanson in June for A1c    Call with any questions/concerns    Patient Instructions   Continue working on healthy eating and moving (start low and slow, work up to 30 min, 5x/week)    BG goals:  Fasting and before meals <130, >70  2 hour after eating <180    We only need 1/2 of these numbers to be within target then your A1c will be within target    Medication changes   No changes    Keep working on healthy eating and moving  Try adding protein to breakfast    Follow up   BRADY Hanson in 3 months.      Call me sooner  if any problems/concerns and/or questions develop including consistent low BGs <70 or consistent high BGs >200  [phone] (Unit Coordinator)    [phone] (Nurse)        Time: 25 min + 10 min CGM review  Barrier: none  Willingness to learn: accepting    [facility]  Diabetes and Wound Care    Cc: Dr. Olson      With the electronic record, we can now more quickly and easily track our patient diabetic goals. Our diabetes clinical review is in progress and these are the indicators we are monitoring for good diabetes health.     1.) HbA1C less than 7 (measurement of your average blood sugars)  2.) Blood Pressure less than 140/80  3.) LDL less than 100 (bad cholesterol)  4.) HbA1C is checked in the last 6 months and below 7% (more frequently if not at goal or adjusting medications)  5.) LDL is checked in the last 12 months (more frequently if not at goal or adjusting medications)  6.) Taking one baby aspirin daily (unless otherwise instructed)  7.) No tobacco use  8) Statin use     You have achieved 6 out of 8 of these and I am encouraging you to come in and get tuned up to achieve 8 out of 8!  Here is what you have achieved so far in my goals for you:  1.) HbA1C  less than 7:                              NO  Your last  HbA1C :  3/9/23: 9.3%  10/6/22: 7.2%  Lab Results   Component Value Date    A1C 7.9 06/30/2022    A1C 10.9 06/16/2021   .  2.) Blood Pressure less than 140/80:       NO   Your last    BP Readings from Last 1 Encounters:   03/30/23 (!) 161/92     3.) LDL less than 100:                              YES      Your last     Lab Results   Component Value Date    LDL 46 06/30/2022    LDL 32 06/16/2021     4.) Checked HbA1C in the past 6 months: YES      5.) Checked LDL in the past 12 months:    YES      6.) Taking one aspirin daily:                       YES     7.) No tobacco use:                                        YES      8.) Statin use      YES

## 2023-03-30 NOTE — PATIENT INSTRUCTIONS
Continue working on healthy eating and moving (start low and slow, work up to 30 min, 5x/week)    BG goals:  Fasting and before meals <130, >70  2 hour after eating <180    We only need 1/2 of these numbers to be within target then your A1c will be within target    Medication changes   No changes    Keep working on healthy eating and moving  Try adding protein to breakfast    Follow up   Ava GALO RD CDE in 3 months.      Call me sooner if any problems/concerns and/or questions develop including consistent low BGs <70 or consistent high BGs >200  [phone] (Unit Coordinator)    [phone] (Nurse)

## 2023-05-01 ENCOUNTER — TRANSFERRED RECORDS (OUTPATIENT)
Dept: HEALTH INFORMATION MANAGEMENT | Facility: CLINIC | Age: 78
End: 2023-05-01

## 2023-05-01 LAB — RETINOPATHY: NEGATIVE

## 2023-05-08 DIAGNOSIS — R33.9 URINARY RETENTION: Primary | ICD-10-CM

## 2023-05-08 RX ORDER — TAMSULOSIN HYDROCHLORIDE 0.4 MG/1
0.4 CAPSULE ORAL DAILY
Qty: 90 CAPSULE | Refills: 3 | Status: SHIPPED | OUTPATIENT
Start: 2023-05-08 | End: 2024-04-15

## 2023-05-08 NOTE — TELEPHONE ENCOUNTER
Patient calling and requesting refill.  Medication discontinued by another healthcare provider.   Patient reports this medication was not discontinued and is requesting refill.  Please advise, thank you.

## 2023-05-12 ENCOUNTER — TRANSFERRED RECORDS (OUTPATIENT)
Dept: HEALTH INFORMATION MANAGEMENT | Facility: CLINIC | Age: 78
End: 2023-05-12

## 2023-05-15 DIAGNOSIS — E78.2 MIXED HYPERLIPIDEMIA: ICD-10-CM

## 2023-05-15 DIAGNOSIS — E11.9 TYPE 2 DIABETES MELLITUS WITHOUT COMPLICATION, WITHOUT LONG-TERM CURRENT USE OF INSULIN (H): ICD-10-CM

## 2023-05-15 DIAGNOSIS — E78.2 MIXED HYPERLIPIDEMIA: Primary | ICD-10-CM

## 2023-05-15 DIAGNOSIS — R94.39 ABNORMAL STRESS TEST: ICD-10-CM

## 2023-05-15 DIAGNOSIS — E11.42 TYPE 2 DIABETES MELLITUS WITH DIABETIC POLYNEUROPATHY, WITHOUT LONG-TERM CURRENT USE OF INSULIN (H): ICD-10-CM

## 2023-05-15 RX ORDER — ROSUVASTATIN CALCIUM 20 MG/1
20 TABLET, COATED ORAL DAILY
Qty: 90 TABLET | Refills: 3 | Status: SHIPPED | OUTPATIENT
Start: 2023-05-15 | End: 2024-03-04

## 2023-05-15 NOTE — TELEPHONE ENCOUNTER
Patient states Dr Crawford increased from crestor 10 mg to 20 mg at last visit. Unable to find this was changed in chart. He's been taking 20 mg daily. Sig on medication states only 10 mg daily (1/2 tab) daily. So he's too early to fill needs a different RX sent with the sig to take 20 mg a day.

## 2023-05-15 NOTE — TELEPHONE ENCOUNTER
"Patient leaves message stating there is a \"mix up\" with his medication. Requested return call. LM for patient to call back. Number provided.       "

## 2023-05-16 RX ORDER — ROSUVASTATIN CALCIUM 20 MG/1
TABLET, COATED ORAL
Qty: 90 TABLET | Refills: 3 | OUTPATIENT
Start: 2023-05-16

## 2023-05-18 NOTE — H&P (VIEW-ONLY)
[address]  [facility] [place] [address]  Phone: [phone]  Primary Provider: Michael Vasquez  Pre-op Performing Provider: MICHAEL VASQUEZ      PREOPERATIVE EVALUATION:  Today's date: 6/12/2023    Rafa Bhatti is a 77 year old male who presents for a preoperative evaluation.       View : No data to display.              Surgical Information:  Surgery/Procedure: cataract surgery   Surgery Location: [facility]  Surgeon: Dr Go  Surgery Date: 6/27-left eye and 7/11/23 right eye   Time of Surgery: TBD  Where patient plans to recover: At home with family  Fax number for surgical facility: Note does not need to be faxed, will be available electronically in Epic.    Assessment & Plan     The proposed surgical procedure is considered LOW risk.    Preoperative examination  Doing well.  Stable presentation.  No change in cares.    - Basic metabolic panel; Future  - CBC with Platelets & Differential; Future  - Basic metabolic panel  - CBC with Platelets & Differential    Type 2 diabetes mellitus with diabetic polyneuropathy, without long-term current use of insulin (H)  Stable.  Update.    - Hemoglobin A1c; Future  - Hemoglobin A1c    Cataract of both eyes, unspecified cataract type  Upcoming surgeries.  No contraindications to these low risk procedures.                - No identified additional risk factors other than previously addressed        RECOMMENDATION:  APPROVAL GIVEN to proceed with proposed procedure, without further diagnostic evaluation.            Subjective       HPI related to upcoming procedure: bilateral cataracts          6/12/2023     9:51 AM   Preop Questions   1. Have you ever had a heart attack or stroke? No   2. Have you ever had surgery on your heart or blood vessels, such as a stent placement, a coronary artery bypass, or surgery on an artery in your head, neck, heart, or legs? No   3. Do you have chest pain with activity? No   4. Do you have a history of  heart  failure? No   5. Do you currently have a cold, bronchitis or symptoms of other infection? No   6. Do you have a cough, shortness of breath, or wheezing? No   7. Do you or anyone in your family have previous history of blood clots? No   8. Do you or does anyone in your family have a serious bleeding problem such as prolonged bleeding following surgeries or cuts? No   9. Have you ever had problems with anemia or been told to take iron pills? No   10. Have you had any abnormal blood loss such as black, tarry or bloody stools? No   11. Have you ever had a blood transfusion? YES -    11a. Have you ever had a transfusion reaction? No   12. Are you willing to have a blood transfusion if it is medically needed before, during, or after your surgery? Yes   13. Have you or any of your relatives ever had problems with anesthesia? YES -    14. Do you have sleep apnea, excessive snoring or daytime drowsiness? No   15. Do you have any artifical heart valves or other implanted medical devices like a pacemaker, defibrillator, or continuous glucose monitor? No   16. Do you have artificial joints? No   17. Are you allergic to latex? No     Health Care Directive:  Patient does not have a Health Care Directive or Living Will: Patient states has Advance Directive and will bring in a copy to clinic.    Preoperative Review of :        Status of Chronic Conditions:  See problem list for active medical problems.  Problems all longstanding and stable, except as noted/documented.  See ROS for pertinent symptoms related to these conditions.      Review of Systems  CONSTITUTIONAL: NEGATIVE for fever, chills, change in weight  INTEGUMENTARY/SKIN: NEGATIVE for worrisome rashes, moles or lesions  EYES: NEGATIVE for vision changes or irritation  ENT/MOUTH: NEGATIVE for ear, mouth and throat problems  RESP: NEGATIVE for significant cough or SOB  CV: NEGATIVE for chest pain, palpitations or peripheral edema  GI: NEGATIVE for nausea, abdominal pain,  heartburn, or change in bowel habits  : NEGATIVE for frequency, dysuria, or hematuria  MUSCULOSKELETAL: NEGATIVE for significant arthralgias or myalgia  NEURO: NEGATIVE for weakness, dizziness or paresthesias  ENDOCRINE: NEGATIVE for temperature intolerance, skin/hair changes  HEME: NEGATIVE for bleeding problems  PSYCHIATRIC: NEGATIVE for changes in mood or affect    Patient Active Problem List    Diagnosis Date Noted     Abnormal stress test 03/22/2023     Priority: Medium     Mixed hyperlipidemia 03/22/2023     Priority: Medium     History of COVID-19 06/30/2022     Priority: Medium     Epidermal inclusion cyst 04/26/2022     Priority: Medium     Mild aortic valve sclerosis- no stenosis 10/19/2021     Priority: Medium     Fever and chills 06/21/2021     Priority: Medium     Elevated LFTs 06/21/2021     Priority: Medium     Thrombocytopenia (H) 06/21/2021     Priority: Medium     Pancytopenia (H) 06/21/2021     Priority: Medium     Elevated transaminase level 06/21/2021     Priority: Medium     Bacteremia due to Gram-negative bacteria 06/21/2021     Priority: Medium     Sinus bradycardia 06/09/2021     Priority: Medium     Near syncope 06/09/2021     Priority: Medium     Abnormal LFTs 06/24/2020     Priority: Medium     Hypoxia 06/24/2020     Priority: Medium     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     Priority: Medium     Added automatically from request for surgery [id number]       AK (actinic keratosis) 11/28/2018     Priority: Medium     Encounter for diabetic foot exam (H) 05/23/2018     Priority: Medium     Type 2 diabetes mellitus with diabetic polyneuropathy, without long-term current use of insulin (H) 05/23/2018     Priority: Medium     Type 2 diabetes mellitus without complication, without long-term current use of insulin (H) 04/11/2018     Priority: Medium     Chronic gout 10/18/2017     Priority: Medium     Esophageal reflux 10/18/2017     Priority: Medium     Personal history of malignant neoplasm of  skin 11/14/2016     Priority: Medium     Overview:   Rt upper back mild 2013    Overview:   BCC right temple 2012       History of nonmelanoma skin cancer 11/14/2016     Priority: Medium     Overview:   BCC right temple 2012       ACP (advance care planning) 10/26/2016     Priority: Medium     Advance Care Planning 10/26/2016: ACP Review of Chart / Resources Provided:  Reviewed chart for advance care plan.  Rafa Bhatti has no plan or code status on file. Discussed available resources and provided with information. Confirmed code status reflects current choices pending further ACP discussions.  Confirmed/documented legally designated decision makers.  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 Provider to review       Basal cell carcinoma, face 09/05/2012     Priority: Medium      Past Medical History:   Diagnosis Date     Benign neoplasm of unspecified site 8/1/2012    Dermoid cyst     Calculus of kidney 5/20/2002     Chest pain, unspecified 3/2/2000     Diabetic eye exam (H) 06/06/2018    normal     Gout, unspecified 8/3/2011     Hyperlipidemia      Unspecified essential hypertension 7/18/2000     Past Surgical History:   Procedure Laterality Date     cardiolyte stress test  2000    chest pain     CHOLECYSTECTOMY, COMMON BILE DUCT EXPLORATION, COMBINED  04/15/2018     COLONOSCOPY  1999     fistula in ANO       HEMORRHOIDECTOMY       removal of dermoid cyst of mediastinum       VASECTOMY       Current Outpatient Medications   Medication Sig Dispense Refill     allopurinol (ZYLOPRIM) 100 MG tablet TAKE 1 TABLET BY MOUTH TWICE A  tablet 1     aspirin (ASA) 81 MG chewable tablet Take 81 mg by mouth  daily       blood glucose monitoring (FREESTYLE) lancets Use to test blood sugar 1 times daily. 100 each 3     calcium carbonate (TUMS) 500 MG chewable tablet Take 1 tablet by mouth daily as needed        colchicine (COLCYRS) 0.6 MG tablet TAKE 2 TABLETS AT THE ONSET OF GOUT PAIN. MAY TAKE ONE TABLET AGAIN IN ONE HOUR. MAX 4 TABS IN 24 HRS. 20 tablet 1     FREESTYLE LITE test strip USE TO TEST BLOOD SUGARS ONCE A DAY. 100 strip 0     losartan (COZAAR) 25 MG tablet TAKE 1 TABLET (25 MG) BY MOUTH DAILY 90 tablet 0     pantoprazole (PROTONIX) 40 MG EC tablet Take 40 mg by mouth daily       rosuvastatin (CRESTOR) 20 MG tablet Take 1 tablet (20 mg) by mouth daily 90 tablet 3     rosuvastatin (CRESTOR) 20 MG tablet Take 0.5 tablets (10 mg) by mouth daily 90 tablet 3     sildenafil (REVATIO) 20 MG tablet Take 1-2 tablets (20-40 mg) by mouth daily as needed (erectile dysfunction) 60 tablet 1     sitagliptin (JANUVIA) 100 MG tablet Take 1 tablet (100 mg) by mouth daily 90 tablet 3     tamsulosin (FLOMAX) 0.4 MG capsule Take 1 capsule (0.4 mg) by mouth daily 90 capsule 3       Allergies   Allergen Reactions     Lactose Unknown     LACTOSE INTOLERANT        Social History     Tobacco Use     Smoking status: Never     Smokeless tobacco: Never   Vaping Use     Vaping status: Not on file   Substance Use Topics     Alcohol use: Yes     Comment: socially     Family History   Problem Relation Age of Onset     C.A.D. Father 80     C.A.D. Mother      Hypertension Mother      C.A.D. Brother      C.A.D. Other         family hx     History   Drug Use No         Objective     BP (!) 148/80   Pulse 54   Temp 97  F (36.1  C) (Tympanic)   Wt 79.4 kg (175 lb)   SpO2 98%   BMI 27.00 kg/m      Physical Exam    GENERAL APPEARANCE: healthy, alert and no distress     EYES: EOMI,  PERRL     HENT: ear canals and TM's normal and nose and mouth without ulcers or lesions     NECK: no adenopathy, no asymmetry, masses, or scars and thyroid normal to  palpation     RESP: lungs clear to auscultation - no rales, rhonchi or wheezes     CV: regular rates and rhythm, normal S1 S2, no S3 or S4 and no murmur, click or rub     ABDOMEN:  soft, nontender, no HSM or masses and bowel sounds normal     MS: extremities normal- no gross deformities noted, no evidence of inflammation in joints, FROM in all extremities.     SKIN: no suspicious lesions or rashes     NEURO: Normal strength and tone, sensory exam grossly normal, mentation intact and speech normal     PSYCH: mentation appears normal. and affect normal/bright     LYMPHATICS: No cervical adenopathy    Recent Labs   Lab Test 01/24/23  0942 06/30/22  1010 03/22/22  1607 08/25/21  0900   HGB 13.8  --   --  12.7*   *  --   --  147*   * 137 136 139   POTASSIUM 4.4 4.3 4.0 3.8   CR 0.96 0.97 0.88 0.80   A1C  --  7.9* 7.6* 7.1*        Diagnostics:  Cbc stable.  Bmp and a1c pending.    No EKG required for low risk surgery (cataract, skin procedure, breast biopsy, etc).    Revised Cardiac Risk Index (RCRI):  The patient has the following serious cardiovascular risks for perioperative complications:   - No serious cardiac risks = 0 points     RCRI Interpretation: 0 points: Class I (very low risk - 0.4% complication rate)           Signed Electronically by: Harry Olson MD  Copy of this evaluation report is provided to requesting physician.

## 2023-05-18 NOTE — H&P (VIEW-ONLY)
[address]  [facility] [address]  Phone: [phone]  Primary Provider: Michael Vasquez  Pre-op Performing Provider: MICHAEL VASQUEZ      PREOPERATIVE EVALUATION:  Today's date: 6/12/2023    Rafa Bhatti is a 77 year old male who presents for a preoperative evaluation.       View : No data to display.              Surgical Information:  Surgery/Procedure: cataract surgery   [place] [facility] [place]  [place]geon: Dr Go  Surgery Date: 6/27-left eye and 7/11/23 right eye   Time of Surgery: TBD  Where patient plans to recover: At home with family  Fax number for surgical facility: Note does not need to be faxed, will be available electronically in Epic.    Assessment & Plan     The proposed surgical procedure is considered LOW risk.    Preoperative examination  Doing well.  Stable presentation.  No change in cares.    - Basic metabolic panel; Future  - CBC with Platelets & Differential; Future  - Basic metabolic panel  - CBC with Platelets & Differential    Type 2 diabetes mellitus with diabetic polyneuropathy, without long-term current use of insulin (H)  Stable.  Update.    - Hemoglobin A1c; Future  - Hemoglobin A1c    Cataract of both eyes, unspecified cataract type  Upcoming surgeries.  No contraindications to these low risk procedures.                - No identified additional risk factors other than previously addressed        RECOMMENDATION:  APPROVAL GIVEN to proceed with proposed procedure, without further diagnostic evaluation.            Subjective       HPI related to upcoming procedure: bilateral cataracts          6/12/2023     9:51 AM   Preop Questions   1. Have you ever had a heart attack or stroke? No   2. Have you ever had surgery on your heart or blood vessels, such as a stent placement, a coronary artery bypass, or surgery on an artery in your head, neck, heart, or legs? No   3. Do you have chest pain with activity? No   4. Do you have a history of  heart  failure? No   5. Do you currently have a cold, bronchitis or symptoms of other infection? No   6. Do you have a cough, shortness of breath, or wheezing? No   7. Do you or anyone in your family have previous history of blood clots? No   8. Do you or does anyone in your family have a serious bleeding problem such as prolonged bleeding following surgeries or cuts? No   9. Have you ever had problems with anemia or been told to take iron pills? No   10. Have you had any abnormal blood loss such as black, tarry or bloody stools? No   11. Have you ever had a blood transfusion? YES -    11a. Have you ever had a transfusion reaction? No   12. Are you willing to have a blood transfusion if it is medically needed before, during, or after your surgery? Yes   13. Have you or any of your relatives ever had problems with anesthesia? YES -    14. Do you have sleep apnea, excessive snoring or daytime drowsiness? No   15. Do you have any artifical heart valves or other implanted medical devices like a pacemaker, defibrillator, or continuous glucose monitor? No   16. Do you have artificial joints? No   17. Are you allergic to latex? No     Health Care Directive:  Patient does not have a Health Care Directive or Living Will: Patient states has Advance Directive and will bring in a copy to clinic.    Preoperative Review of :        Status of Chronic Conditions:  See problem list for active medical problems.  Problems all longstanding and stable, except as noted/documented.  See ROS for pertinent symptoms related to these conditions.      Review of Systems  CONSTITUTIONAL: NEGATIVE for fever, chills, change in weight  INTEGUMENTARY/SKIN: NEGATIVE for worrisome rashes, moles or lesions  EYES: NEGATIVE for vision changes or irritation  ENT/MOUTH: NEGATIVE for ear, mouth and throat problems  RESP: NEGATIVE for significant cough or SOB  CV: NEGATIVE for chest pain, palpitations or peripheral edema  GI: NEGATIVE for nausea, abdominal pain,  heartburn, or change in bowel habits  : NEGATIVE for frequency, dysuria, or hematuria  MUSCULOSKELETAL: NEGATIVE for significant arthralgias or myalgia  NEURO: NEGATIVE for weakness, dizziness or paresthesias  ENDOCRINE: NEGATIVE for temperature intolerance, skin/hair changes  HEME: NEGATIVE for bleeding problems  PSYCHIATRIC: NEGATIVE for changes in mood or affect    Patient Active Problem List    Diagnosis Date Noted     Abnormal stress test 03/22/2023     Priority: Medium     Mixed hyperlipidemia 03/22/2023     Priority: Medium     History of COVID-19 06/30/2022     Priority: Medium     Epidermal inclusion cyst 04/26/2022     Priority: Medium     Mild aortic valve sclerosis- no stenosis 10/19/2021     Priority: Medium     Fever and chills 06/21/2021     Priority: Medium     Elevated LFTs 06/21/2021     Priority: Medium     Thrombocytopenia (H) 06/21/2021     Priority: Medium     Pancytopenia (H) 06/21/2021     Priority: Medium     Elevated transaminase level 06/21/2021     Priority: Medium     Bacteremia due to Gram-negative bacteria 06/21/2021     Priority: Medium     Sinus bradycardia 06/09/2021     Priority: Medium     Near syncope 06/09/2021     Priority: Medium     Abnormal LFTs 06/24/2020     Priority: Medium     Hypoxia 06/24/2020     Priority: Medium     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     Priority: Medium     Added automatically from request for surgery [id number]       AK (actinic keratosis) 11/28/2018     Priority: Medium     Encounter for diabetic foot exam (H) 05/23/2018     Priority: Medium     Type 2 diabetes mellitus with diabetic polyneuropathy, without long-term current use of insulin (H) 05/23/2018     Priority: Medium     Type 2 diabetes mellitus without complication, without long-term current use of insulin (H) 04/11/2018     Priority: Medium     Chronic gout 10/18/2017     Priority: Medium     Esophageal reflux 10/18/2017     Priority: Medium     Personal history of malignant neoplasm of  skin 11/14/2016     Priority: Medium     Overview:   Rt upper back mild 2013    Overview:   BCC right temple 2012       History of nonmelanoma skin cancer 11/14/2016     Priority: Medium     Overview:   BCC right temple 2012       ACP (advance care planning) 10/26/2016     Priority: Medium     Advance Care Planning 10/26/2016: ACP Review of Chart / Resources Provided:  Reviewed chart for advance care plan.  Rafa Bhatti has no plan or code status on file. Discussed available resources and provided with information. Confirmed code status reflects current choices pending further ACP discussions.  Confirmed/documented legally designated decision makers.  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 Provider to review       Basal cell carcinoma, face 09/05/2012     Priority: Medium      Past Medical History:   Diagnosis Date     Benign neoplasm of unspecified site 8/1/2012    Dermoid cyst     Calculus of kidney 5/20/2002     Chest pain, unspecified 3/2/2000     Diabetic eye exam (H) 06/06/2018    normal     Gout, unspecified 8/3/2011     Hyperlipidemia      Unspecified essential hypertension 7/18/2000     Past Surgical History:   Procedure Laterality Date     cardiolyte stress test  2000    chest pain     CHOLECYSTECTOMY, COMMON BILE DUCT EXPLORATION, COMBINED  04/15/2018     COLONOSCOPY  1999     fistula in ANO       HEMORRHOIDECTOMY       removal of dermoid cyst of mediastinum       VASECTOMY       Current Outpatient Medications   Medication Sig Dispense Refill     allopurinol (ZYLOPRIM) 100 MG tablet TAKE 1 TABLET BY MOUTH TWICE A  tablet 1     aspirin (ASA) 81 MG chewable tablet Take 81 mg by mouth  daily       blood glucose monitoring (FREESTYLE) lancets Use to test blood sugar 1 times daily. 100 each 3     calcium carbonate (TUMS) 500 MG chewable tablet Take 1 tablet by mouth daily as needed        colchicine (COLCYRS) 0.6 MG tablet TAKE 2 TABLETS AT THE ONSET OF GOUT PAIN. MAY TAKE ONE TABLET AGAIN IN ONE HOUR. MAX 4 TABS IN 24 HRS. 20 tablet 1     FREESTYLE LITE test strip USE TO TEST BLOOD SUGARS ONCE A DAY. 100 strip 0     losartan (COZAAR) 25 MG tablet TAKE 1 TABLET (25 MG) BY MOUTH DAILY 90 tablet 0     pantoprazole (PROTONIX) 40 MG EC tablet Take 40 mg by mouth daily       rosuvastatin (CRESTOR) 20 MG tablet Take 1 tablet (20 mg) by mouth daily 90 tablet 3     rosuvastatin (CRESTOR) 20 MG tablet Take 0.5 tablets (10 mg) by mouth daily 90 tablet 3     sildenafil (REVATIO) 20 MG tablet Take 1-2 tablets (20-40 mg) by mouth daily as needed (erectile dysfunction) 60 tablet 1     sitagliptin (JANUVIA) 100 MG tablet Take 1 tablet (100 mg) by mouth daily 90 tablet 3     tamsulosin (FLOMAX) 0.4 MG capsule Take 1 capsule (0.4 mg) by mouth daily 90 capsule 3       Allergies   Allergen Reactions     Lactose Unknown     LACTOSE INTOLERANT        Social History     Tobacco Use     Smoking status: Never     Smokeless tobacco: Never   Vaping Use     Vaping status: Not on file   Substance Use Topics     Alcohol use: Yes     Comment: socially     Family History   Problem Relation Age of Onset     C.A.D. Father 80     C.A.D. Mother      Hypertension Mother      C.A.D. Brother      C.A.D. Other         family hx     History   Drug Use No         Objective     BP (!) 148/80   Pulse 54   Temp 97  F (36.1  C) (Tympanic)   Wt 79.4 kg (175 lb)   SpO2 98%   BMI 27.00 kg/m      Physical Exam    GENERAL APPEARANCE: healthy, alert and no distress     EYES: EOMI,  PERRL     HENT: ear canals and TM's normal and nose and mouth without ulcers or lesions     NECK: no adenopathy, no asymmetry, masses, or scars and thyroid normal to  palpation     RESP: lungs clear to auscultation - no rales, rhonchi or wheezes     CV: regular rates and rhythm, normal S1 S2, no S3 or S4 and no murmur, click or rub     ABDOMEN:  soft, nontender, no HSM or masses and bowel sounds normal     MS: extremities normal- no gross deformities noted, no evidence of inflammation in joints, FROM in all extremities.     SKIN: no suspicious lesions or rashes     NEURO: Normal strength and tone, sensory exam grossly normal, mentation intact and speech normal     PSYCH: mentation appears normal. and affect normal/bright     LYMPHATICS: No cervical adenopathy    Recent Labs   Lab Test 01/24/23  0942 06/30/22  1010 03/22/22  1607 08/25/21  0900   HGB 13.8  --   --  12.7*   *  --   --  147*   * 137 136 139   POTASSIUM 4.4 4.3 4.0 3.8   CR 0.96 0.97 0.88 0.80   A1C  --  7.9* 7.6* 7.1*        Diagnostics:  Cbc stable.  Bmp and a1c pending.    No EKG required for low risk surgery (cataract, skin procedure, breast biopsy, etc).    Revised Cardiac Risk Index (RCRI):  The patient has the following serious cardiovascular risks for perioperative complications:   - No serious cardiac risks = 0 points     RCRI Interpretation: 0 points: Class I (very low risk - 0.4% complication rate)           Signed Electronically by: Harry Olson MD  Copy of this evaluation report is provided to requesting physician.

## 2023-05-18 NOTE — PROGRESS NOTES
[address]  [facility] [place] [address]  Phone: [phone]  Primary Provider: Michael Vasquez  Pre-op Performing Provider: MICHAEL VASQUEZ      PREOPERATIVE EVALUATION:  Today's date: 6/12/2023    Rafa Bhatti is a 77 year old male who presents for a preoperative evaluation.       View : No data to display.              Surgical Information:  Surgery/Procedure: cataract surgery   Surgery Location: [facility] [place]  Surgeon: Dr Go  Surgery Date: 6/27-left eye and 7/11/23 right eye   Time of Surgery: TBD  Where patient plans to recover: At home with family  Fax number for surgical facility: Note does not need to be faxed, will be available electronically in Epic.    Assessment & Plan     The proposed surgical procedure is considered LOW risk.    Preoperative examination  Doing well.  Stable presentation.  No change in cares.    - Basic metabolic panel; Future  - CBC with Platelets & Differential; Future  - Basic metabolic panel  - CBC with Platelets & Differential    Type 2 diabetes mellitus with diabetic polyneuropathy, without long-term current use of insulin (H)  Stable.  Update.    - Hemoglobin A1c; Future  - Hemoglobin A1c    Cataract of both eyes, unspecified cataract type  Upcoming surgeries.  No contraindications to these low risk procedures.                - No identified additional risk factors other than previously addressed        RECOMMENDATION:  APPROVAL GIVEN to proceed with proposed procedure, without further diagnostic evaluation.            Subjective       HPI related to upcoming procedure: bilateral cataracts          6/12/2023     9:51 AM   Preop Questions   1. Have you ever had a heart attack or stroke? No   2. Have you ever had surgery on your heart or blood vessels, such as a stent placement, a coronary artery bypass, or surgery on an artery in your head, neck, heart, or legs? No   3. Do you have chest pain with activity? No   4. Do you have a history of  heart  failure? No   5. Do you currently have a cold, bronchitis or symptoms of other infection? No   6. Do you have a cough, shortness of breath, or wheezing? No   7. Do you or anyone in your family have previous history of blood clots? No   8. Do you or does anyone in your family have a serious bleeding problem such as prolonged bleeding following surgeries or cuts? No   9. Have you ever had problems with anemia or been told to take iron pills? No   10. Have you had any abnormal blood loss such as black, tarry or bloody stools? No   11. Have you ever had a blood transfusion? YES -    11a. Have you ever had a transfusion reaction? No   12. Are you willing to have a blood transfusion if it is medically needed before, during, or after your surgery? Yes   13. Have you or any of your relatives ever had problems with anesthesia? YES -    14. Do you have sleep apnea, excessive snoring or daytime drowsiness? No   15. Do you have any artifical heart valves or other implanted medical devices like a pacemaker, defibrillator, or continuous glucose monitor? No   16. Do you have artificial joints? No   17. Are you allergic to latex? No     Health Care Directive:  Patient does not have a Health Care Directive or Living Will: Patient states has Advance Directive and will bring in a copy to clinic.    Preoperative Review of :        Status of Chronic Conditions:  See problem list for active medical problems.  Problems all longstanding and stable, except as noted/documented.  See ROS for pertinent symptoms related to these conditions.      Review of Systems  CONSTITUTIONAL: NEGATIVE for fever, chills, change in weight  INTEGUMENTARY/SKIN: NEGATIVE for worrisome rashes, moles or lesions  EYES: NEGATIVE for vision changes or irritation  ENT/MOUTH: NEGATIVE for ear, mouth and throat problems  RESP: NEGATIVE for significant cough or SOB  CV: NEGATIVE for chest pain, palpitations or peripheral edema  GI: NEGATIVE for nausea, abdominal pain,  heartburn, or change in bowel habits  : NEGATIVE for frequency, dysuria, or hematuria  MUSCULOSKELETAL: NEGATIVE for significant arthralgias or myalgia  NEURO: NEGATIVE for weakness, dizziness or paresthesias  ENDOCRINE: NEGATIVE for temperature intolerance, skin/hair changes  HEME: NEGATIVE for bleeding problems  PSYCHIATRIC: NEGATIVE for changes in mood or affect    Patient Active Problem List    Diagnosis Date Noted     Abnormal stress test 03/22/2023     Priority: Medium     Mixed hyperlipidemia 03/22/2023     Priority: Medium     History of COVID-19 06/30/2022     Priority: Medium     Epidermal inclusion cyst 04/26/2022     Priority: Medium     Mild aortic valve sclerosis- no stenosis 10/19/2021     Priority: Medium     Fever and chills 06/21/2021     Priority: Medium     Elevated LFTs 06/21/2021     Priority: Medium     Thrombocytopenia (H) 06/21/2021     Priority: Medium     Pancytopenia (H) 06/21/2021     Priority: Medium     Elevated transaminase level 06/21/2021     Priority: Medium     Bacteremia due to Gram-negative bacteria 06/21/2021     Priority: Medium     Sinus bradycardia 06/09/2021     Priority: Medium     Near syncope 06/09/2021     Priority: Medium     Abnormal LFTs 06/24/2020     Priority: Medium     Hypoxia 06/24/2020     Priority: Medium     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     Priority: Medium     Added automatically from request for surgery [id number]       AK (actinic keratosis) 11/28/2018     Priority: Medium     Encounter for diabetic foot exam (H) 05/23/2018     Priority: Medium     Type 2 diabetes mellitus with diabetic polyneuropathy, without long-term current use of insulin (H) 05/23/2018     Priority: Medium     Type 2 diabetes mellitus without complication, without long-term current use of insulin (H) 04/11/2018     Priority: Medium     Chronic gout 10/18/2017     Priority: Medium     Esophageal reflux 10/18/2017     Priority: Medium     Personal history of malignant neoplasm of  skin 11/14/2016     Priority: Medium     Overview:   Rt upper back mild 2013    Overview:   BCC right temple 2012       History of nonmelanoma skin cancer 11/14/2016     Priority: Medium     Overview:   BCC right temple 2012       ACP (advance care planning) 10/26/2016     Priority: Medium     Advance Care Planning 10/26/2016: ACP Review of Chart / Resources Provided:  Reviewed chart for advance care plan.  Rafa Bhatti has no plan or code status on file. Discussed available resources and provided with information. Confirmed code status reflects current choices pending further ACP discussions.  Confirmed/documented legally designated decision makers.  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 Provider to review       Basal cell carcinoma, face 09/05/2012     Priority: Medium      Past Medical History:   Diagnosis Date     Benign neoplasm of unspecified site 8/1/2012    Dermoid cyst     Calculus of kidney 5/20/2002     Chest pain, unspecified 3/2/2000     Diabetic eye exam (H) 06/06/2018    normal     Gout, unspecified 8/3/2011     Hyperlipidemia      Unspecified essential hypertension 7/18/2000     Past Surgical History:   Procedure Laterality Date     cardiolyte stress test  2000    chest pain     CHOLECYSTECTOMY, COMMON BILE DUCT EXPLORATION, COMBINED  04/15/2018     COLONOSCOPY  1999     fistula in ANO       HEMORRHOIDECTOMY       removal of dermoid cyst of mediastinum       VASECTOMY       Current Outpatient Medications   Medication Sig Dispense Refill     allopurinol (ZYLOPRIM) 100 MG tablet TAKE 1 TABLET BY MOUTH TWICE A  tablet 1     aspirin (ASA) 81 MG chewable tablet Take 81 mg by mouth  daily       blood glucose monitoring (FREESTYLE) lancets Use to test blood sugar 1 times daily. 100 each 3     calcium carbonate (TUMS) 500 MG chewable tablet Take 1 tablet by mouth daily as needed        colchicine (COLCYRS) 0.6 MG tablet TAKE 2 TABLETS AT THE ONSET OF GOUT PAIN. MAY TAKE ONE TABLET AGAIN IN ONE HOUR. MAX 4 TABS IN 24 HRS. 20 tablet 1     FREESTYLE LITE test strip USE TO TEST BLOOD SUGARS ONCE A DAY. 100 strip 0     losartan (COZAAR) 25 MG tablet TAKE 1 TABLET (25 MG) BY MOUTH DAILY 90 tablet 0     pantoprazole (PROTONIX) 40 MG EC tablet Take 40 mg by mouth daily       rosuvastatin (CRESTOR) 20 MG tablet Take 1 tablet (20 mg) by mouth daily 90 tablet 3     rosuvastatin (CRESTOR) 20 MG tablet Take 0.5 tablets (10 mg) by mouth daily 90 tablet 3     sildenafil (REVATIO) 20 MG tablet Take 1-2 tablets (20-40 mg) by mouth daily as needed (erectile dysfunction) 60 tablet 1     sitagliptin (JANUVIA) 100 MG tablet Take 1 tablet (100 mg) by mouth daily 90 tablet 3     tamsulosin (FLOMAX) 0.4 MG capsule Take 1 capsule (0.4 mg) by mouth daily 90 capsule 3       Allergies   Allergen Reactions     Lactose Unknown     LACTOSE INTOLERANT        Social History     Tobacco Use     Smoking status: Never     Smokeless tobacco: Never   Vaping Use     Vaping status: Not on file   Substance Use Topics     Alcohol use: Yes     Comment: socially     Family History   Problem Relation Age of Onset     C.A.D. Father 80     C.A.D. Mother      Hypertension Mother      C.A.D. Brother      C.A.D. Other         family hx     History   Drug Use No         Objective     BP (!) 148/80   Pulse 54   Temp 97  F (36.1  C) (Tympanic)   Wt 79.4 kg (175 lb)   SpO2 98%   BMI 27.00 kg/m      Physical Exam    GENERAL APPEARANCE: healthy, alert and no distress     EYES: EOMI,  PERRL     HENT: ear canals and TM's normal and nose and mouth without ulcers or lesions     NECK: no adenopathy, no asymmetry, masses, or scars and thyroid normal to  palpation     RESP: lungs clear to auscultation - no rales, rhonchi or wheezes     CV: regular rates and rhythm, normal S1 S2, no S3 or S4 and no murmur, click or rub     ABDOMEN:  soft, nontender, no HSM or masses and bowel sounds normal     MS: extremities normal- no gross deformities noted, no evidence of inflammation in joints, FROM in all extremities.     SKIN: no suspicious lesions or rashes     NEURO: Normal strength and tone, sensory exam grossly normal, mentation intact and speech normal     PSYCH: mentation appears normal. and affect normal/bright     LYMPHATICS: No cervical adenopathy    Recent Labs   Lab Test 01/24/23  0942 06/30/22  1010 03/22/22  1607 08/25/21  0900   HGB 13.8  --   --  12.7*   *  --   --  147*   * 137 136 139   POTASSIUM 4.4 4.3 4.0 3.8   CR 0.96 0.97 0.88 0.80   A1C  --  7.9* 7.6* 7.1*        Diagnostics:  Cbc stable.  Bmp and a1c pending.    No EKG required for low risk surgery (cataract, skin procedure, breast biopsy, etc).    Revised Cardiac Risk Index (RCRI):  The patient has the following serious cardiovascular risks for perioperative complications:   - No serious cardiac risks = 0 points     RCRI Interpretation: 0 points: Class I (very low risk - 0.4% complication rate)           Signed Electronically by: Harry Olson MD  Copy of this evaluation report is provided to requesting physician.

## 2023-06-03 DIAGNOSIS — I10 BENIGN ESSENTIAL HYPERTENSION: ICD-10-CM

## 2023-06-03 DIAGNOSIS — E11.42 TYPE 2 DIABETES MELLITUS WITH DIABETIC POLYNEUROPATHY, WITHOUT LONG-TERM CURRENT USE OF INSULIN (H): ICD-10-CM

## 2023-06-05 RX ORDER — LOSARTAN POTASSIUM 25 MG/1
25 TABLET ORAL DAILY
Qty: 90 TABLET | Refills: 0 | Status: SHIPPED | OUTPATIENT
Start: 2023-06-05 | End: 2023-09-07

## 2023-06-12 ENCOUNTER — TELEPHONE (OUTPATIENT)
Dept: FAMILY MEDICINE | Facility: OTHER | Age: 78
End: 2023-06-12

## 2023-06-12 ENCOUNTER — OFFICE VISIT (OUTPATIENT)
Dept: FAMILY MEDICINE | Facility: OTHER | Age: 78
End: 2023-06-12
Attending: FAMILY MEDICINE
Payer: COMMERCIAL

## 2023-06-12 VITALS
DIASTOLIC BLOOD PRESSURE: 80 MMHG | WEIGHT: 175 LBS | OXYGEN SATURATION: 98 % | BODY MASS INDEX: 27 KG/M2 | HEART RATE: 54 BPM | TEMPERATURE: 97 F | SYSTOLIC BLOOD PRESSURE: 148 MMHG

## 2023-06-12 DIAGNOSIS — E11.42 TYPE 2 DIABETES MELLITUS WITH DIABETIC POLYNEUROPATHY, WITHOUT LONG-TERM CURRENT USE OF INSULIN (H): ICD-10-CM

## 2023-06-12 DIAGNOSIS — Z01.818 PREOPERATIVE EXAMINATION: Primary | ICD-10-CM

## 2023-06-12 DIAGNOSIS — H26.9 CATARACT OF BOTH EYES, UNSPECIFIED CATARACT TYPE: ICD-10-CM

## 2023-06-12 DIAGNOSIS — E11.42 TYPE 2 DIABETES MELLITUS WITH DIABETIC POLYNEUROPATHY, WITHOUT LONG-TERM CURRENT USE OF INSULIN (H): Primary | ICD-10-CM

## 2023-06-12 LAB
ANION GAP SERPL CALCULATED.3IONS-SCNC: 12 MMOL/L (ref 7–15)
BASOPHILS # BLD AUTO: 0 10E3/UL (ref 0–0.2)
BASOPHILS NFR BLD AUTO: 1 %
BUN SERPL-MCNC: 19.7 MG/DL (ref 8–23)
CALCIUM SERPL-MCNC: 9.6 MG/DL (ref 8.8–10.2)
CHLORIDE SERPL-SCNC: 101 MMOL/L (ref 98–107)
CREAT SERPL-MCNC: 0.89 MG/DL (ref 0.67–1.17)
DEPRECATED HCO3 PLAS-SCNC: 25 MMOL/L (ref 22–29)
EOSINOPHIL # BLD AUTO: 0 10E3/UL (ref 0–0.7)
EOSINOPHIL NFR BLD AUTO: 0 %
ERYTHROCYTE [DISTWIDTH] IN BLOOD BY AUTOMATED COUNT: 12.9 % (ref 10–15)
EST. AVERAGE GLUCOSE BLD GHB EST-MCNC: 194 MG/DL
GFR SERPL CREATININE-BSD FRML MDRD: 89 ML/MIN/1.73M2
GLUCOSE SERPL-MCNC: 204 MG/DL (ref 70–99)
HBA1C MFR BLD: 8.4 %
HCT VFR BLD AUTO: 40.4 % (ref 40–53)
HGB BLD-MCNC: 13.2 G/DL (ref 13.3–17.7)
IMM GRANULOCYTES # BLD: 0 10E3/UL
IMM GRANULOCYTES NFR BLD: 0 %
LYMPHOCYTES # BLD AUTO: 1.6 10E3/UL (ref 0.8–5.3)
LYMPHOCYTES NFR BLD AUTO: 24 %
MCH RBC QN AUTO: 29.6 PG (ref 26.5–33)
MCHC RBC AUTO-ENTMCNC: 32.7 G/DL (ref 31.5–36.5)
MCV RBC AUTO: 91 FL (ref 78–100)
MONOCYTES # BLD AUTO: 0.5 10E3/UL (ref 0–1.3)
MONOCYTES NFR BLD AUTO: 7 %
NEUTROPHILS # BLD AUTO: 4.3 10E3/UL (ref 1.6–8.3)
NEUTROPHILS NFR BLD AUTO: 68 %
PLATELET # BLD AUTO: 147 10E3/UL (ref 150–450)
POTASSIUM SERPL-SCNC: 5.1 MMOL/L (ref 3.4–5.3)
RBC # BLD AUTO: 4.46 10E6/UL (ref 4.4–5.9)
SODIUM SERPL-SCNC: 138 MMOL/L (ref 136–145)
WBC # BLD AUTO: 6.4 10E3/UL (ref 4–11)

## 2023-06-12 PROCEDURE — 80048 BASIC METABOLIC PNL TOTAL CA: CPT | Mod: ZL | Performed by: FAMILY MEDICINE

## 2023-06-12 PROCEDURE — 83036 HEMOGLOBIN GLYCOSYLATED A1C: CPT | Mod: ZL | Performed by: FAMILY MEDICINE

## 2023-06-12 PROCEDURE — G0463 HOSPITAL OUTPT CLINIC VISIT: HCPCS

## 2023-06-12 PROCEDURE — 36415 COLL VENOUS BLD VENIPUNCTURE: CPT | Mod: ZL | Performed by: FAMILY MEDICINE

## 2023-06-12 PROCEDURE — 99214 OFFICE O/P EST MOD 30 MIN: CPT | Performed by: FAMILY MEDICINE

## 2023-06-12 PROCEDURE — 85025 COMPLETE CBC W/AUTO DIFF WBC: CPT | Mod: ZL | Performed by: FAMILY MEDICINE

## 2023-06-12 ASSESSMENT — PAIN SCALES - GENERAL: PAINLEVEL: NO PAIN (0)

## 2023-06-14 ENCOUNTER — TELEPHONE (OUTPATIENT)
Dept: EDUCATION SERVICES | Facility: HOSPITAL | Age: 78
End: 2023-06-14

## 2023-06-14 ENCOUNTER — HOSPITAL ENCOUNTER (OUTPATIENT)
Dept: EDUCATION SERVICES | Facility: HOSPITAL | Age: 78
Discharge: HOME | End: 2023-06-14
Attending: DIETITIAN, REGISTERED | Admitting: NURSE PRACTITIONER
Payer: COMMERCIAL

## 2023-06-14 VITALS
DIASTOLIC BLOOD PRESSURE: 90 MMHG | WEIGHT: 174.9 LBS | OXYGEN SATURATION: 97 % | SYSTOLIC BLOOD PRESSURE: 167 MMHG | HEART RATE: 71 BPM | BODY MASS INDEX: 26.51 KG/M2 | HEIGHT: 68 IN | RESPIRATION RATE: 16 BRPM

## 2023-06-14 DIAGNOSIS — E11.42 TYPE 2 DIABETES MELLITUS WITH DIABETIC POLYNEUROPATHY, WITHOUT LONG-TERM CURRENT USE OF INSULIN (H): ICD-10-CM

## 2023-06-14 PROCEDURE — G0108 DIAB MANAGE TRN  PER INDIV: HCPCS | Performed by: DIETITIAN, REGISTERED

## 2023-06-14 ASSESSMENT — PAIN SCALES - GENERAL: PAINLEVEL: NO PAIN (0)

## 2023-06-14 NOTE — LETTER
6/14/2023        RE: Rafa Bhatti  [address] [facility] [id number]        Diabetes Self-Management Education & Support    Presents for: Individual review    Type of Service: In Person Visit    ASSESSMENT:  Recent A1c 8.4% which is down from previous A1c of 8.4% in March but still above desired target.  Review of diet does not note excessive intake of high carbohydrate food items.  He does walk for exercise most days of the week.  Provider ordered Jardiance in addition to his Januvia recently but pt has not started yet.  He is agreeable.      Patient's most recent   Lab Results   Component Value Date    A1C 8.4 06/12/2023    A1C 10.9 06/16/2021    HEMOGLOBINA1 9.3 03/09/2023     is not meeting goal of <7.5% for age.     Diabetes knowledge and skills assessment:   Patient is knowledgeable in diabetes management concepts related to: Healthy Eating, Being Active, Monitoring.     Continue education with the following diabetes management concepts: Taking Medication-Reviewed mechanism of action of Jardiance, possible side effects.     Based on learning assessment above, most appropriate setting for further diabetes education would be: Individual setting.      PLAN  Continue to work on healthy, low carbohydrate food choices.  Try alternate breakfast food items vs cereal.  Continue walking for exercise.   Try to test 1x/day at alternating times once begin Jardiance to monitor effectiveness.   Message sent to provider to refer to podiatry for diabetic shoes r/t c/o neuropathy.     Follow-up: Via phone one month.     See Care Plan for co-developed, patient-state behavior change goals.  AVS provided for patient today.    Education Materials Provided:  No new materials today.     SUBJECTIVE/OBJECTIVE:  Presents for: Individual review  Accompanied by: Self  Diabetes education in the past 24mo: Yes  Focus of Visit: Monitoring  Diabetes type: Type 2  Date of diagnosis: 4/2018  Disease course: Getting harder to  "manage  How confident are you filling out medical forms by yourself:: Extremely  Diabetes management related comments/concerns: A1c still elevated.  Transportation concerns: No  Difficulty affording diabetes medication?: No (If patient uses his mail order pharmacy the highest cost is $ 60.00 for a three month supply.)  Difficulty affording diabetes testing supplies?: No  Other concerns:: None  Cultural Influences/Ethnic Background:  Not  or     Diabetes Symptoms & Complications:  Fatigue: No  Neuropathy: Yes (some numbness)  Polydipsia: No  Polyphagia: No  Polyuria: No  Visual change: Yes (Having cataract surgery soon)  Slow healing wounds: No  Symptom course: Stable  Weight trend: Stable  Complications assessed today?: Yes  CVA: No  Heart disease: No  Nephropathy: No  Retinopathy: No    Patient Problem List and Family Medical History reviewed for relevant medical history, current medical status, and diabetes risk factors.    Vitals:  /90   Pulse 71   Resp 16   Ht 1.715 m (5' 7.5\")   Wt 79.3 kg (174 lb 14.4 oz)   SpO2 97%   BMI 26.99 kg/m    Estimated body mass index is 26.99 kg/m  as calculated from the following:    Height as of this encounter: 1.715 m (5' 7.5\").    Weight as of this encounter: 79.3 kg (174 lb 14.4 oz).   Last 3 BP:   BP Readings from Last 3 Encounters:   06/14/23 167/90   06/12/23 (!) 148/80   03/30/23 (!) 161/92       History   Smoking Status     Never   Smokeless Tobacco     Never       Labs:  Lab Results   Component Value Date    A1C 8.4 06/12/2023    A1C 10.9 06/16/2021    HEMOGLOBINA1 9.3 03/09/2023     Lab Results   Component Value Date     06/12/2023     06/30/2022     06/30/2021     Lab Results   Component Value Date    LDL 46 06/30/2022    LDL 32 06/16/2021     HDL Cholesterol   Date Value Ref Range Status   06/16/2021 32 (L) >39 mg/dL Final     Direct Measure HDL   Date Value Ref Range Status   06/30/2022 37 (L) >=40 mg/dL Final   ]  GFR " Estimate   Date Value Ref Range Status   06/12/2023 89 >60 mL/min/1.73m2 Final     Comment:     eGFR calculated using 2021 CKD-EPI equation.   06/30/2021 90 >60 mL/min/[1.73_m2] Final     Comment:     Non  GFR Calc  Starting 12/18/2018, serum creatinine based estimated GFR (eGFR) will be   calculated using the Chronic Kidney Disease Epidemiology Collaboration   (CKD-EPI) equation.       GFR Estimate If Black   Date Value Ref Range Status   06/30/2021 >90 >60 mL/min/[1.73_m2] Final     Comment:      GFR Calc  Starting 12/18/2018, serum creatinine based estimated GFR (eGFR) will be   calculated using the Chronic Kidney Disease Epidemiology Collaboration   (CKD-EPI) equation.       Lab Results   Component Value Date    CR 0.89 06/12/2023    CR 0.76 06/30/2021     No results found for: MICROALBUMIN    Healthy Eating:  Healthy Eating Assessed Today: Yes  Cultural/Church diet restrictions?: No  Meal planning/habits: None  How many times a week on average do you eat food made away from home (restaurant/take-out)?: 0  Meals include: Breakfast, Lunch, Dinner  Breakfast: honey nut cheerios with fruit sometimes- milk - coffee with sugar free creamer  Lunch: 1/2 sandwich, carrots or small bag of chips  Dinner: meat, grilled veggies/potatoes  Snacks: nuts  Other: If dines out pizza - < 1x/week  Beverages: Water, Coffee, Diet soda, Sports drinks (sugar free sports drink)  Has patient met with a dietitian in the past?: Yes    Being Active:  Being Active Assessed Today: Yes  Exercise:: Yes (walking)  Days per week of moderate to strenuous exercise (like a brisk walk): 7  On average, minutes per day of exercise at this level: 60  How intense was your typical exercise? : Moderate (like brisk walking)  Exercise Minutes per Week: 420  Barrier to exercise: None    Monitoring:  Monitoring Assessed Today: Yes  Did patient bring glucose meter to appointment? : Yes  Blood Glucose Meter: FreeStyle  Times  checking blood sugar at home (number):  (Pt has three tests in the past two weeks)  Blood glucose trend: Fluctuating  Fasting-145, 158  2 hours post breakfast-141.    Taking Medications:  Diabetes Medication(s)     Dipeptidyl Peptidase-4 (DPP-4) Inhibitors       sitagliptin (JANUVIA) 100 MG tablet    Take 1 tablet (100 mg) by mouth daily    Sodium-Glucose Co-Transporter 2 (SGLT2) Inhibitors       empagliflozin (JARDIANCE) 25 MG TABS tablet    Take 1 tablet (25 mg) by mouth daily          Taking Medication Assessed Today: Yes  Current Treatments: Oral Medication (taken by mouth) (Januvia 100 mg daily)  Problems taking diabetes medications regularly?: No  Diabetes medication side effects?: Yes (Had diarrhea from Metformin XR - stopped April 2022.  Did not tolerate Glimepiride.)    Problem Solving:  Problem Solving Assessed Today: Yes  Is the patient at risk for hypoglycemia?: No  Hypoglycemia Frequency: Rarely  Hypoglycemia Treatment: Glucose (tablets or gel)  Is the patient at risk for DKA?: No    Hypoglycemia symptoms  Feeling shaky: Yes    Reducing Risks:  Reducing Risks Assessed Today: Yes  Diabetes Risks: Age over 45 years  CAD Risks: Diabetes Mellitus, Male sex  Has dilated eye exam at least once a year?: Yes (Requested 6/14/23)  Sees dentist every 6 months?: Yes  Feet checked by healthcare provider in the last year?: Yes    Healthy Coping:  Healthy Coping Assessed Today: Yes  Emotional response to diabetes: Acceptance, Concern for health and well-being  Informal Support system:: Spouse  Stage of change: MAINTENANCE (Working to maintain change, with risk of relapse)  Patient Activation Measure Survey Score:      2/20/2020     2:00 PM   ALICIA Score (Last Two)   ALICIA Raw Score 38   Activation Score 83.7   ALICIA Level 4     Care Plan and Education Provided:  Care Plan: Diabetes   Updates made by Ava Templeton RD since 6/14/2023 12:00 AM      Problem: Diabetes Self-Management Education Needed to Optimize Self-Care  Behaviors       Goal: Healthy Eating - follow a healthy eating pattern for diabetes       Task: Provide education on managing carbohydrate intake (carbohydrate counting, plate planning method, etc.) Completed 6/14/2023   Responsible User: Ava Templeton RD      Goal: Being Active - get regular physical activity, working up to at least 150 minutes per week       Task: Provide education on relationship of activity to glucose and precautions to take if at risk for low glucose Completed 6/14/2023   Responsible User: Ava Templeton RD      Goal: Monitoring - monitor glucose and ketones as directed Completed 6/14/2023   Start Date: 3/9/2023   Recent Progress: 0%   Note:    Pt will keep food logs while wearing CGM to determine best option for medication change.       Task: Provide education on blood glucose monitoring (purpose, proper technique, frequency, glucose targets, interpreting results, when to use glucose control solution, sharps disposal) Completed 6/14/2023   Responsible User: Ava Templeton RD      Task: Provide education on ketone monitoring (when to monitor, frequency, etc.) Completed 6/14/2023   Responsible User: Ava Templeton RD      Goal: Taking Medication - patient is consistently taking medications as directed       Task: Provide education on action of prescribed medication, including when to take and possible side effects Completed 6/14/2023   Responsible User: Ava Templeton RD      Goal: Reducing Risks - know how to prevent and treat long-term diabetes complications       Task: Provide education on major complications of diabetes, prevention, early diagnostic measures and treatment of complications Completed 6/14/2023   Responsible User: Ava Templeton RD        Time Spent: 45 minutes  Encounter Type: Individual    Any diabetes medication dose changes were made via the CDE Protocol per the patient's referring provider. A copy of this encounter was shared with the  provider.      Sincerely,        Ava Templeton RD

## 2023-06-14 NOTE — PROGRESS NOTES
Diabetes Self-Management Education & Support    Presents for: Individual review    Type of Service: In Person Visit    ASSESSMENT:  Recent A1c 8.4% which is down from previous A1c of 9.3% in March but still above desired target.  Review of diet does not note excessive intake of high carbohydrate food items.  He does walk for exercise most days of the week.  Provider ordered Jardiance in addition to his Januvia recently but pt has not started yet.  He is agreeable.  BP high on two checks today.  Pt has BP cuff at home.      Patient's most recent   Lab Results   Component Value Date    A1C 8.4 06/12/2023    A1C 10.9 06/16/2021    HEMOGLOBINA1 9.3 03/09/2023     is not meeting goal of <7.5% for age.     Diabetes knowledge and skills assessment:   Patient is knowledgeable in diabetes management concepts related to: Healthy Eating, Being Active, Monitoring.     Continue education with the following diabetes management concepts: Taking Medication-Reviewed mechanism of action of Jardiance, possible side effects.     Based on learning assessment above, most appropriate setting for further diabetes education would be: Individual setting.      PLAN  Continue to work on healthy, low carbohydrate food choices.  Try alternate breakfast food items vs cereal.  Continue walking for exercise.   Try to test 1x/day at alternating times once begin Jardiance to monitor effectiveness.   Message sent to provider to refer to podiatry for diabetic shoes r/t c/o neuropathy.   Pt instructed to check BP 2x/day at home x 2 weeks and contact provider if levels are trending above 140/90.     Follow-up: Via phone one month.     See Care Plan for co-developed, patient-state behavior change goals.  AVS provided for patient today.    Education Materials Provided:  No new materials today.     SUBJECTIVE/OBJECTIVE:  Presents for: Individual review  Accompanied by: Self  Diabetes education in the past 24mo: Yes  Focus of Visit: Monitoring  Diabetes  "type: Type 2  Date of diagnosis: 4/2018  Disease course: Getting harder to manage  How confident are you filling out medical forms by yourself:: Extremely  Diabetes management related comments/concerns: A1c still elevated.  Transportation concerns: No  Difficulty affording diabetes medication?: No (If patient uses his mail order pharmacy the highest cost is $ 60.00 for a three month supply.)  Difficulty affording diabetes testing supplies?: No  Other concerns:: None  Cultural Influences/Ethnic Background:  Not  or     Diabetes Symptoms & Complications:  Fatigue: No  Neuropathy: Yes (some numbness)  Polydipsia: No  Polyphagia: No  Polyuria: No  Visual change: Yes (Having cataract surgery soon)  Slow healing wounds: No  Symptom course: Stable  Weight trend: Stable  Complications assessed today?: Yes  CVA: No  Heart disease: No  Nephropathy: No  Retinopathy: No    Patient Problem List and Family Medical History reviewed for relevant medical history, current medical status, and diabetes risk factors.    Vitals:  /90   Pulse 71   Resp 16   Ht 1.715 m (5' 7.5\")   Wt 79.3 kg (174 lb 14.4 oz)   SpO2 97%   BMI 26.99 kg/m    Estimated body mass index is 26.99 kg/m  as calculated from the following:    Height as of this encounter: 1.715 m (5' 7.5\").    Weight as of this encounter: 79.3 kg (174 lb 14.4 oz).   Last 3 BP:   BP Readings from Last 3 Encounters:   06/14/23 167/90   06/12/23 (!) 148/80   03/30/23 (!) 161/92       History   Smoking Status     Never   Smokeless Tobacco     Never       Labs:  Lab Results   Component Value Date    A1C 8.4 06/12/2023    A1C 10.9 06/16/2021    HEMOGLOBINA1 9.3 03/09/2023     Lab Results   Component Value Date     06/12/2023     06/30/2022     06/30/2021     Lab Results   Component Value Date    LDL 46 06/30/2022    LDL 32 06/16/2021     HDL Cholesterol   Date Value Ref Range Status   06/16/2021 32 (L) >39 mg/dL Final     Direct Measure HDL "   Date Value Ref Range Status   06/30/2022 37 (L) >=40 mg/dL Final   ]  GFR Estimate   Date Value Ref Range Status   06/12/2023 89 >60 mL/min/1.73m2 Final     Comment:     eGFR calculated using 2021 CKD-EPI equation.   06/30/2021 90 >60 mL/min/[1.73_m2] Final     Comment:     Non  GFR Calc  Starting 12/18/2018, serum creatinine based estimated GFR (eGFR) will be   calculated using the Chronic Kidney Disease Epidemiology Collaboration   (CKD-EPI) equation.       GFR Estimate If Black   Date Value Ref Range Status   06/30/2021 >90 >60 mL/min/[1.73_m2] Final     Comment:      GFR Calc  Starting 12/18/2018, serum creatinine based estimated GFR (eGFR) will be   calculated using the Chronic Kidney Disease Epidemiology Collaboration   (CKD-EPI) equation.       Lab Results   Component Value Date    CR 0.89 06/12/2023    CR 0.76 06/30/2021     No results found for: MICROALBUMIN    Healthy Eating:  Healthy Eating Assessed Today: Yes  Cultural/Restorationism diet restrictions?: No  Meal planning/habits: None  How many times a week on average do you eat food made away from home (restaurant/take-out)?: 0  Meals include: Breakfast, Lunch, Dinner  Breakfast: honey nut cheerios with fruit sometimes- milk - coffee with sugar free creamer  Lunch: 1/2 sandwich, carrots or small bag of chips  Dinner: meat, grilled veggies/potatoes  Snacks: nuts  Other: If dines out pizza - < 1x/week  Beverages: Water, Coffee, Diet soda, Sports drinks (sugar free sports drink)  Has patient met with a dietitian in the past?: Yes    Being Active:  Being Active Assessed Today: Yes  Exercise:: Yes (walking)  Days per week of moderate to strenuous exercise (like a brisk walk): 7  On average, minutes per day of exercise at this level: 60  How intense was your typical exercise? : Moderate (like brisk walking)  Exercise Minutes per Week: 420  Barrier to exercise: None    Monitoring:  Monitoring Assessed Today: Yes  Did patient bring  glucose meter to appointment? : Yes  Blood Glucose Meter: FreeStyle  Times checking blood sugar at home (number):  (Pt has three tests in the past two weeks)  Blood glucose trend: Fluctuating  Fasting-145, 158  2 hours post breakfast-141.    Taking Medications:  Diabetes Medication(s)     Dipeptidyl Peptidase-4 (DPP-4) Inhibitors       sitagliptin (JANUVIA) 100 MG tablet    Take 1 tablet (100 mg) by mouth daily    Sodium-Glucose Co-Transporter 2 (SGLT2) Inhibitors       empagliflozin (JARDIANCE) 25 MG TABS tablet    Take 1 tablet (25 mg) by mouth daily          Taking Medication Assessed Today: Yes  Current Treatments: Oral Medication (taken by mouth) (Januvia 100 mg daily)  Problems taking diabetes medications regularly?: No  Diabetes medication side effects?: Yes (Had diarrhea from Metformin XR - stopped April 2022.  Did not tolerate Glimepiride.)    Problem Solving:  Problem Solving Assessed Today: Yes  Is the patient at risk for hypoglycemia?: No  Hypoglycemia Frequency: Rarely  Hypoglycemia Treatment: Glucose (tablets or gel)  Is the patient at risk for DKA?: No    Hypoglycemia symptoms  Feeling shaky: Yes    Reducing Risks:  Reducing Risks Assessed Today: Yes  Diabetes Risks: Age over 45 years  CAD Risks: Diabetes Mellitus, Male sex  Has dilated eye exam at least once a year?: Yes (Requested 6/14/23)  Sees dentist every 6 months?: Yes  Feet checked by healthcare provider in the last year?: Yes    Healthy Coping:  Healthy Coping Assessed Today: Yes  Emotional response to diabetes: Acceptance, Concern for health and well-being  Informal Support system:: Spouse  Stage of change: MAINTENANCE (Working to maintain change, with risk of relapse)  Patient Activation Measure Survey Score:      2/20/2020     2:00 PM   ALICIA Score (Last Two)   ALICIA Raw Score 38   Activation Score 83.7   ALICIA Level 4     Care Plan and Education Provided:  Care Plan: Diabetes   Updates made by Ava Templeton RD since 6/14/2023 12:00 AM       Problem: Diabetes Self-Management Education Needed to Optimize Self-Care Behaviors       Goal: Healthy Eating - follow a healthy eating pattern for diabetes       Task: Provide education on managing carbohydrate intake (carbohydrate counting, plate planning method, etc.) Completed 6/14/2023   Responsible User: Ava Templeton RD      Goal: Being Active - get regular physical activity, working up to at least 150 minutes per week       Task: Provide education on relationship of activity to glucose and precautions to take if at risk for low glucose Completed 6/14/2023   Responsible User: Ava Templeton RD      Goal: Monitoring - monitor glucose and ketones as directed Completed 6/14/2023   Start Date: 3/9/2023   Recent Progress: 0%   Note:    Pt will keep food logs while wearing CGM to determine best option for medication change.       Task: Provide education on blood glucose monitoring (purpose, proper technique, frequency, glucose targets, interpreting results, when to use glucose control solution, sharps disposal) Completed 6/14/2023   Responsible User: Ava Templeton RD      Task: Provide education on ketone monitoring (when to monitor, frequency, etc.) Completed 6/14/2023   Responsible User: Ava Templeton RD      Goal: Taking Medication - patient is consistently taking medications as directed       Task: Provide education on action of prescribed medication, including when to take and possible side effects Completed 6/14/2023   Responsible User: Ava Templeton RD      Goal: Reducing Risks - know how to prevent and treat long-term diabetes complications       Task: Provide education on major complications of diabetes, prevention, early diagnostic measures and treatment of complications Completed 6/14/2023   Responsible User: Ava Templeton RD        Time Spent: 45 minutes  Encounter Type: Individual    Any diabetes medication dose changes were made via the CDE Protocol per the patient's referring provider. A copy  of this encounter was shared with the provider.

## 2023-06-14 NOTE — PATIENT INSTRUCTIONS
-Keep working on healthy, low carbohydrate food choices.  Maybe try some alternate breakfasts - eggs, greek yogurt/fruit, cottage cheese/fruit, toast or english muffin with peanut butter.  -Your walking is great.  Keep it up!  -Good times to test your glucose are fasting or 2 hours after a meal.  -Target levels are fasting , 2 hours after a meal < 180.   -Recent A1c 8.4% - goal is < 7.5%, < 7.0% is better.  -Keep taking your Januvia.  -Let me know if Jardiance needs to be sent to mail order.  Make sure you drink lots of fluids once you begin Jardiance.    -I will call you in about 1 month to see if glucose levels are trending down.  Call with any side effects or concerns.   -MICHAEL Seals, [facility] [phone].

## 2023-06-22 ENCOUNTER — ANESTHESIA EVENT (OUTPATIENT)
Dept: SURGERY | Facility: HOSPITAL | Age: 78
End: 2023-06-22
Payer: COMMERCIAL

## 2023-06-22 ASSESSMENT — ENCOUNTER SYMPTOMS: DYSRHYTHMIAS: 1

## 2023-06-22 NOTE — ANESTHESIA PREPROCEDURE EVALUATION
Anesthesia Pre-Procedure Evaluation    Patient: Rafa Bhatti   MRN: [id number] : 1945        Procedure : Procedure(s):  Phacoemulsification Cataract Extraction Posterior Chamber Lens Left Eye          Past Medical History:   Diagnosis Date     Benign neoplasm of unspecified site 2012    Dermoid cyst     Calculus of kidney 2002     Chest pain, unspecified 3/2/2000     Diabetic eye exam (H) 2018    normal     Gout, unspecified 8/3/2011     Hyperlipidemia      Unspecified essential hypertension 2000      Past Surgical History:   Procedure Laterality Date     cardiolyte stress test      chest pain     CHOLECYSTECTOMY, COMMON BILE DUCT EXPLORATION, COMBINED  04/15/2018     COLONOSCOPY       fistula in ANO       HEMORRHOIDECTOMY       removal of dermoid cyst of mediastinum       VASECTOMY        Allergies   Allergen Reactions     Lactose Unknown     LACTOSE INTOLERANT      Social History     Tobacco Use     Smoking status: Never     Smokeless tobacco: Never   Substance Use Topics     Alcohol use: Yes     Comment: socially      Wt Readings from Last 1 Encounters:   23 79.3 kg (174 lb 14.4 oz)        Anesthesia Evaluation   Pt has had prior anesthetic. Type: MAC and General.    History of anesthetic complications   slow to wake.    ROS/MED HX  ENT/Pulmonary:     (+) OSITO risk factors, hypertension,     Neurologic:     (+) peripheral neuropathy,     Cardiovascular:     (+) Dyslipidemia hypertension-----fainting (syncope) (2020, heat exhaustion). dysrhythmias, Other, valvular problems/murmurs (linda villagran does not need follow up per patient) mild AV sclerosis-no stenosis?. Previous cardiac testing   Echo: Date: Results:    Stress Test: Date: Results:    ECG Reviewed: Date: 3/2023 Results:  NSR  Left axis deviation  Cath: Date: Results:      METS/Exercise Tolerance: >4 METS Comment: Walks 3 miles a day   Hematologic: Comments: Thrombocytopenia  - neg hematologic  ROS     Musculoskeletal:    (+) arthritis,     GI/Hepatic:     (+) GERD, Asymptomatic on medication,     Renal/Genitourinary:     (+) Nephrolithiasis ,     Endo:     (+) type II DM, Last HgA1c: 8.4, date: 6/12/23, Not using insulin, Diabetic complications: nephropathy.     Psychiatric/Substance Use:  - neg psychiatric ROS     Infectious Disease:  - neg infectious disease ROS     Malignancy:   (+) Malignancy, History of Skin.Skin CA status post Surgery.        Other:  - neg other ROS          Physical Exam    Airway        Mallampati: II   TM distance: > 3 FB   Neck ROM: limited   Mouth opening: > 3 cm    Respiratory Devices and Support         Dental     Comment: Partial upper    (+) Removable bridges or other hardware      Cardiovascular   cardiovascular exam normal       Rhythm and rate: regular and normal     Pulmonary   pulmonary exam normal        breath sounds clear to auscultation           OUTSIDE LABS:  CBC:   Lab Results   Component Value Date    WBC 6.4 06/12/2023    WBC 5.0 01/24/2023    HGB 13.2 (L) 06/12/2023    HGB 13.8 01/24/2023    HCT 40.4 06/12/2023    HCT 40.2 01/24/2023     (L) 06/12/2023     (L) 01/24/2023     BMP:   Lab Results   Component Value Date     06/12/2023     (L) 01/24/2023    POTASSIUM 5.1 06/12/2023    POTASSIUM 4.4 01/24/2023    CHLORIDE 101 06/12/2023    CHLORIDE 99 01/24/2023    CO2 25 06/12/2023    CO2 26 01/24/2023    BUN 19.7 06/12/2023    BUN 20.3 01/24/2023    CR 0.89 06/12/2023    CR 0.96 01/24/2023     (H) 06/12/2023     (H) 01/24/2023     COAGS:   Lab Results   Component Value Date    PTT 23 (L) 04/12/2018    INR 1.14 04/12/2018     POC:   Lab Results   Component Value Date     (H) 06/10/2021     HEPATIC:   Lab Results   Component Value Date    ALBUMIN 4.1 01/24/2023    PROTTOTAL 7.1 01/24/2023    ALT 96 (H) 01/24/2023     (H) 01/24/2023    ALKPHOS 117 01/24/2023    BILITOTAL 0.7 01/24/2023     OTHER:   Lab Results   Component Value Date     LACT 1.7 06/20/2021    A1C 8.4 (H) 06/12/2023    HUY 9.6 06/12/2023    MAG 1.6 06/09/2021    LIPASE 25 01/24/2023    AMYLASE 43 05/18/2020    TSH 1.62 08/25/2021    CRP <2.9 03/13/2020       Anesthesia Plan    ASA Status:  2   NPO Status:  NPO Appropriate    Anesthesia Type: MAC.              Consents    Anesthesia Plan(s) and associated risks, benefits, and realistic alternatives discussed. Questions answered and patient/representative(s) expressed understanding.    - Discussed:     - Discussed with:  Patient         Postoperative Care            Comments:    Other Comments: Hp 6/12/23    Risks and benefits of MAC anesthetic discussed including dental damage, aspiration, loss of airway, conversion to general anesthetic, CV complications, MI, stroke, death. Pt wishes to proceed.             EMI Villegas CRNA

## 2023-06-27 ENCOUNTER — HOSPITAL ENCOUNTER (OUTPATIENT)
Facility: HOSPITAL | Age: 78
Discharge: HOME | End: 2023-06-27
Attending: OPHTHALMOLOGY | Admitting: OPHTHALMOLOGY
Payer: COMMERCIAL

## 2023-06-27 ENCOUNTER — ANESTHESIA (OUTPATIENT)
Dept: SURGERY | Facility: HOSPITAL | Age: 78
End: 2023-06-27
Payer: COMMERCIAL

## 2023-06-27 VITALS
SYSTOLIC BLOOD PRESSURE: 148 MMHG | BODY MASS INDEX: 25.94 KG/M2 | OXYGEN SATURATION: 98 % | HEART RATE: 56 BPM | WEIGHT: 171.2 LBS | HEIGHT: 68 IN | TEMPERATURE: 96.8 F | RESPIRATION RATE: 18 BRPM | DIASTOLIC BLOOD PRESSURE: 77 MMHG

## 2023-06-27 LAB — GLUCOSE BLDC GLUCOMTR-MCNC: 126 MG/DL (ref 70–99)

## 2023-06-27 PROCEDURE — 250N000013 HC RX MED GY IP 250 OP 250 PS 637: Performed by: OPHTHALMOLOGY

## 2023-06-27 PROCEDURE — 999N000141 HC STATISTIC PRE-PROCEDURE NURSING ASSESSMENT: Performed by: OPHTHALMOLOGY

## 2023-06-27 PROCEDURE — 99100 ANES PT EXTEME AGE<1 YR&>70: CPT | Performed by: NURSE ANESTHETIST, CERTIFIED REGISTERED

## 2023-06-27 PROCEDURE — 710N000012 HC RECOVERY PHASE 2, PER MINUTE: Performed by: OPHTHALMOLOGY

## 2023-06-27 PROCEDURE — 250N000011 HC RX IP 250 OP 636: Performed by: OPHTHALMOLOGY

## 2023-06-27 PROCEDURE — 272N000001 HC OR GENERAL SUPPLY STERILE: Performed by: OPHTHALMOLOGY

## 2023-06-27 PROCEDURE — 370N000017 HC ANESTHESIA TECHNICAL FEE, PER MIN: Performed by: OPHTHALMOLOGY

## 2023-06-27 PROCEDURE — 250N000011 HC RX IP 250 OP 636: Mod: JZ | Performed by: NURSE ANESTHETIST, CERTIFIED REGISTERED

## 2023-06-27 PROCEDURE — 82962 GLUCOSE BLOOD TEST: CPT

## 2023-06-27 PROCEDURE — 360N000076 HC SURGERY LEVEL 3, PER MIN: Performed by: OPHTHALMOLOGY

## 2023-06-27 PROCEDURE — 66982 XCAPSL CTRC RMVL CPLX WO ECP: CPT | Performed by: NURSE ANESTHETIST, CERTIFIED REGISTERED

## 2023-06-27 PROCEDURE — V2632 POST CHMBR INTRAOCULAR LENS: HCPCS | Performed by: OPHTHALMOLOGY

## 2023-06-27 PROCEDURE — 250N000009 HC RX 250: Performed by: OPHTHALMOLOGY

## 2023-06-27 RX ORDER — ACETAMINOPHEN 325 MG/1
650 TABLET ORAL ONCE
Status: COMPLETED | OUTPATIENT
Start: 2023-06-27 | End: 2023-06-27

## 2023-06-27 RX ORDER — HYDRALAZINE HYDROCHLORIDE 20 MG/ML
INJECTION INTRAMUSCULAR; INTRAVENOUS PRN
Status: DISCONTINUED | OUTPATIENT
Start: 2023-06-27 | End: 2023-06-27

## 2023-06-27 RX ORDER — LEVOBUNOLOL HYDROCHLORIDE 5 MG/ML
SOLUTION/ DROPS OPHTHALMIC PRN
Status: DISCONTINUED | OUTPATIENT
Start: 2023-06-27 | End: 2023-06-27 | Stop reason: HOSPADM

## 2023-06-27 RX ORDER — LIDOCAINE HYDROCHLORIDE 10 MG/ML
INJECTION, SOLUTION EPIDURAL; INFILTRATION; INTRACAUDAL; PERINEURAL PRN
Status: DISCONTINUED | OUTPATIENT
Start: 2023-06-27 | End: 2023-06-27 | Stop reason: HOSPADM

## 2023-06-27 RX ORDER — LIDOCAINE 40 MG/G
CREAM TOPICAL
Status: DISCONTINUED | OUTPATIENT
Start: 2023-06-27 | End: 2023-06-27 | Stop reason: HOSPADM

## 2023-06-27 RX ORDER — PROPARACAINE HYDROCHLORIDE 5 MG/ML
1 SOLUTION/ DROPS OPHTHALMIC ONCE
Status: COMPLETED | OUTPATIENT
Start: 2023-06-27 | End: 2023-06-27

## 2023-06-27 RX ORDER — PHENYLEPHRINE HYDROCHLORIDE 25 MG/ML
1 SOLUTION/ DROPS OPHTHALMIC ONCE
Status: COMPLETED | OUTPATIENT
Start: 2023-06-27 | End: 2023-06-27

## 2023-06-27 RX ORDER — CYCLOPENTOLATE HYDROCHLORIDE 20 MG/ML
1 SOLUTION/ DROPS OPHTHALMIC
Status: COMPLETED | OUTPATIENT
Start: 2023-06-27 | End: 2023-06-27

## 2023-06-27 RX ORDER — PHENYLEPHRINE HYDROCHLORIDE 100 MG/ML
1 SOLUTION/ DROPS OPHTHALMIC
Status: COMPLETED | OUTPATIENT
Start: 2023-06-27 | End: 2023-06-27

## 2023-06-27 RX ORDER — ONDANSETRON 2 MG/ML
4 INJECTION INTRAMUSCULAR; INTRAVENOUS EVERY 30 MIN PRN
Status: DISCONTINUED | OUTPATIENT
Start: 2023-06-27 | End: 2023-06-27 | Stop reason: HOSPADM

## 2023-06-27 RX ORDER — TETRACAINE HYDROCHLORIDE 5 MG/ML
SOLUTION OPHTHALMIC PRN
Status: DISCONTINUED | OUTPATIENT
Start: 2023-06-27 | End: 2023-06-27 | Stop reason: HOSPADM

## 2023-06-27 RX ORDER — PILOCARPINE HYDROCHLORIDE 10 MG/ML
SOLUTION/ DROPS OPHTHALMIC PRN
Status: DISCONTINUED | OUTPATIENT
Start: 2023-06-27 | End: 2023-06-27 | Stop reason: HOSPADM

## 2023-06-27 RX ORDER — HYDRALAZINE HYDROCHLORIDE 20 MG/ML
2.5-5 INJECTION INTRAMUSCULAR; INTRAVENOUS EVERY 10 MIN PRN
Status: DISCONTINUED | OUTPATIENT
Start: 2023-06-27 | End: 2023-06-27 | Stop reason: HOSPADM

## 2023-06-27 RX ORDER — ONDANSETRON 4 MG/1
4 TABLET, ORALLY DISINTEGRATING ORAL EVERY 30 MIN PRN
Status: DISCONTINUED | OUTPATIENT
Start: 2023-06-27 | End: 2023-06-27 | Stop reason: HOSPADM

## 2023-06-27 RX ORDER — MOXIFLOXACIN 5 MG/ML
SOLUTION/ DROPS OPHTHALMIC PRN
Status: DISCONTINUED | OUTPATIENT
Start: 2023-06-27 | End: 2023-06-27 | Stop reason: HOSPADM

## 2023-06-27 RX ORDER — SODIUM CHLORIDE, SODIUM LACTATE, POTASSIUM CHLORIDE, CALCIUM CHLORIDE 600; 310; 30; 20 MG/100ML; MG/100ML; MG/100ML; MG/100ML
INJECTION, SOLUTION INTRAVENOUS CONTINUOUS
Status: DISCONTINUED | OUTPATIENT
Start: 2023-06-27 | End: 2023-06-27 | Stop reason: HOSPADM

## 2023-06-27 RX ORDER — LABETALOL 20 MG/4 ML (5 MG/ML) INTRAVENOUS SYRINGE
10
Status: DISCONTINUED | OUTPATIENT
Start: 2023-06-27 | End: 2023-06-27 | Stop reason: HOSPADM

## 2023-06-27 RX ORDER — PHENYLEPHRINE HYDROCHLORIDE 100 MG/ML
1 SOLUTION/ DROPS OPHTHALMIC ONCE
Status: COMPLETED | OUTPATIENT
Start: 2023-06-27 | End: 2023-06-27

## 2023-06-27 RX ADMIN — PROPARACAINE HYDROCHLORIDE 1 DROP: 5 SOLUTION/ DROPS OPHTHALMIC at 11:10

## 2023-06-27 RX ADMIN — PHENYLEPHRINE HYDROCHLORIDE 1 DROP: 100 SOLUTION/ DROPS OPHTHALMIC at 11:11

## 2023-06-27 RX ADMIN — HYDRALAZINE HYDROCHLORIDE 10 MG: 20 INJECTION INTRAMUSCULAR; INTRAVENOUS at 13:28

## 2023-06-27 RX ADMIN — PHENYLEPHRINE HYDROCHLORIDE 1 DROP: 2.5 SOLUTION/ DROPS OPHTHALMIC at 11:21

## 2023-06-27 RX ADMIN — PHENYLEPHRINE HYDROCHLORIDE 0.5 ML: 100 SOLUTION/ DROPS OPHTHALMIC at 11:21

## 2023-06-27 RX ADMIN — ACETAMINOPHEN 650 MG: 325 TABLET ORAL at 11:03

## 2023-06-27 RX ADMIN — CYCLOPENTOLATE HYDROCHLORIDE 1 DROP: 20 SOLUTION/ DROPS OPHTHALMIC at 11:10

## 2023-06-27 RX ADMIN — PHENYLEPHRINE HYDROCHLORIDE 1 DROP: 100 SOLUTION/ DROPS OPHTHALMIC at 11:44

## 2023-06-27 RX ADMIN — CYCLOPENTOLATE HYDROCHLORIDE 1 DROP: 20 SOLUTION/ DROPS OPHTHALMIC at 11:21

## 2023-06-27 ASSESSMENT — ACTIVITIES OF DAILY LIVING (ADL)
ADLS_ACUITY_SCORE: 35
ADLS_ACUITY_SCORE: 35

## 2023-06-27 NOTE — OP NOTE
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  Lidocaine 2% jelly topically and Lidocaine 1% preservative-free intracamerally.     PROCEDURE:  The patient was brought to the Operating Room and prepped and draped in a sterile manner.  A wire lid speculum was placed.  A paracentesis was created and 1% Lidocaine was instilled in the anterior chamber.  Poor dilation and later floppy (Flomax use) The anterior chamber was then filled with Amvisc viscoelastic.  A clear cornea temporal wound was created using a 2.8 mm keratome. The pupil was then stretched open with a 6.25 mm Malyugin ring. A cystotome was used to initiate a flap in the anterior capsule and a Utrata forceps was used to create a continuous tear capsulorhexis.  Hydrodissection was performed.  The phacoemulsification tip was inserted into the eye and the nucleus and epinucleus were removed using a divide and conquer technique.  The residual cortex was removed using the I/A handpiece.  The anterior chamber was then refilled with viscoelastic and the wound was enlarged with the keratome.  The intraocular lens, 20 diopter, Model LI61AO, was placed into the injector and injected into the capsular bag. It was checked to make sure that it was central and stable.  The Malyugin ring was removed. Residual viscoelastic was removed using the I/A.  The anterior chamber was refilled with BSS.  The wounds  were hydrated with BSS and were noted to be watertight with no suture necessary.  Topical pilocarpine 1%, Betagan, and Vigamox was applied.  A hard shield was placed.     The patient tolerated the procedure well and was sent to the Recovery Room in satisfactory condition.

## 2023-06-27 NOTE — ANESTHESIA POSTPROCEDURE EVALUATION
Patient: Rafa Bhatti    Procedure: Procedure(s):  Phacoemulsification Cataract Extraction Posterior Chamber Lens Left Eye,Complex Cataract       Anesthesia Type:  MAC    Note:  Disposition: Outpatient   Postop Pain Control: Uneventful            Sign Out: Well controlled pain   PONV: No   Neuro/Psych: Uneventful            Sign Out: Acceptable/Baseline neuro status   Airway/Respiratory: Uneventful            Sign Out: Acceptable/Baseline resp. status   CV/Hemodynamics: Uneventful            Sign Out: Acceptable CV status; No obvious hypovolemia; No obvious fluid overload   Other NRE: NONE   DID A NON-ROUTINE EVENT OCCUR? No           Last vitals:  Vitals Value Taken Time   /77 06/27/23 1410   Temp 96.8  F (36  C) 06/27/23 1350   Pulse 56 06/27/23 1410   Resp 18 06/27/23 1410   SpO2 98 % 06/27/23 1410       Electronically Signed By: EMI Sanchez CRNA  June 27, 2023  2:21 PM

## 2023-06-27 NOTE — ANESTHESIA CARE TRANSFER NOTE
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    Anesthesia Type:   MAC     Note:    Oropharynx: oropharynx clear of all foreign objects and spontaneously breathing  Level of Consciousness: awake  Oxygen Supplementation: room air    Independent Airway: airway patency satisfactory and stable  Dentition: dentition unchanged  Vital Signs Stable: post-procedure vital signs reviewed and stable  Report to RN Given: handoff report given  Patient transferred to: Phase II    Handoff Report: Identifed the Patient, Identified the Reponsible Provider, Reviewed the pertinent medical history, Discussed the surgical course, Reviewed Intra-OP anesthesia mangement and issues during anesthesia, Set expectations for post-procedure period and Allowed opportunity for questions and acknowledgement of understanding      Vitals:  Vitals Value Taken Time   BP     Temp     Pulse     Resp     SpO2         Electronically Signed By: EMI FREEMAN CRNA  June 27, 2023  1:47 PM

## 2023-06-27 NOTE — INTERVAL H&P NOTE
"I have reviewed the surgical (or preoperative) H&P that is linked to this encounter, and examined the patient. There are no significant changes    Clinical Conditions Present on Arrival:  Clinically Significant Risk Factors Present on Admission                 # Drug Induced Platelet Defect: home medication list includes an antiplatelet medication  # DMII: A1C = 8.4 % (Ref range: <5.7 %) within past 6 months  # Overweight: Estimated body mass index is 26.4 kg/m  as calculated from the following:    Height as of this encounter: 1.715 m (5' 7.52\").    Weight as of this encounter: 77.7 kg (171 lb 3.2 oz).       "

## 2023-06-28 ENCOUNTER — TELEPHONE (OUTPATIENT)
Dept: FAMILY MEDICINE | Facility: OTHER | Age: 78
End: 2023-06-28

## 2023-06-28 ENCOUNTER — TRANSFERRED RECORDS (OUTPATIENT)
Dept: HEALTH INFORMATION MANAGEMENT | Facility: CLINIC | Age: 78
End: 2023-06-28

## 2023-06-28 DIAGNOSIS — E11.9 TYPE 2 DIABETES MELLITUS WITHOUT COMPLICATION, WITHOUT LONG-TERM CURRENT USE OF INSULIN (H): ICD-10-CM

## 2023-06-28 NOTE — TELEPHONE ENCOUNTER
Pt states that in the prescription for jardiance it said not to take if you have had pancreas surgery.  Pt had pancreas surgery 2-3 years ago.  Also he needs a refill of januvia

## 2023-06-28 NOTE — TELEPHONE ENCOUNTER
8:56 AM    Reason for Call: Phone Call    Description: pt is tired of holding for triage, called mult times/he prescribed Jardian which is new for him/description says not to take if you have had  pancreatic surgery/should he be taking it/    Was an appointment offered for this call? No  If yes : Appointment type              Date    Preferred method for responding to this message: Telephone Call  What is your phone number ?[phone]    If we cannot reach you directly, may we leave a detailed response at the number you provided? Yes    Can this message wait until your PCP/provider returns, if available today? Not applicable    Rosetta Marx

## 2023-06-29 NOTE — TELEPHONE ENCOUNTER
Thank you for letting me know.  I found it in your [facility] chart.  With the history of pancreatitis we should look at other options for the treatment.  Suggest we send back to DM ed to explore options to get this a little better.  They are very good at finding medicines that are covered and helpful.  Thanks.  Harry Olson MD

## 2023-06-29 NOTE — TELEPHONE ENCOUNTER
Yes, we can take it but we should not.  It can contribute to pancreatitis, but probably wouldn't cause it.  With the history of pancreatitis we should try and find other means.  I am sorry I sent that medicine as I wasn't actively aware of that history but I want to stress that you did a good job reading that and reporting that to me.  If you choose to take you can but there is a slightly increased risk of developing pancreatitis so we should really be careful and try and find a different route.  Thanks.  Harry Olson MD

## 2023-07-05 ENCOUNTER — TELEPHONE (OUTPATIENT)
Dept: FAMILY MEDICINE | Facility: OTHER | Age: 78
End: 2023-07-05

## 2023-07-05 DIAGNOSIS — E11.42 TYPE 2 DIABETES MELLITUS WITH DIABETIC POLYNEUROPATHY, WITHOUT LONG-TERM CURRENT USE OF INSULIN (H): Primary | ICD-10-CM

## 2023-07-07 ENCOUNTER — ANESTHESIA EVENT (OUTPATIENT)
Dept: SURGERY | Facility: HOSPITAL | Age: 78
End: 2023-07-07
Payer: COMMERCIAL

## 2023-07-07 NOTE — ANESTHESIA PREPROCEDURE EVALUATION
Anesthesia Pre-Procedure Evaluation    Patient: Rafa Bhatti   MRN: [id number] : 1945        Procedure : Procedure(s):  Phacoemulsification Cataract Extraction Posterior Chamber Lens Left Eye          Past Medical History:   Diagnosis Date     Benign neoplasm of unspecified site 2012    Dermoid cyst     Calculus of kidney 2002     Chest pain, unspecified 3/2/2000     Diabetic eye exam (H) 2018    normal     Gout, unspecified 8/3/2011     Hyperlipidemia      Unspecified essential hypertension 2000      Past Surgical History:   Procedure Laterality Date     cardiolyte stress test      chest pain     CHOLECYSTECTOMY, COMMON BILE DUCT EXPLORATION, COMBINED  04/15/2018     COLONOSCOPY       fistula in ANO       HEMORRHOIDECTOMY       PHACOEMULSIFICATION WITH STANDARD INTRAOCULAR LENS IMPLANT Left 2023    Procedure: Phacoemulsification Cataract Extraction Posterior Chamber Lens Left Eye,Complex Cataract;  Surgeon: Sameer Go MD;  Location: [facility]     removal of dermoid cyst of mediastinum       VASECTOMY        Allergies   Allergen Reactions     Lactose Unknown     LACTOSE INTOLERANT      Social History     Tobacco Use     Smoking status: Never     Smokeless tobacco: Never   Substance Use Topics     Alcohol use: Yes     Comment: socially      Wt Readings from Last 1 Encounters:   23 77.7 kg (171 lb 3.2 oz)        Anesthesia Evaluation   Pt has had prior anesthetic. Type: MAC and General.    History of anesthetic complications   slow to wake.    ROS/MED HX  ENT/Pulmonary:     (+) OSITO risk factors, hypertension,     Neurologic:     (+) peripheral neuropathy,     Cardiovascular:     (+) Dyslipidemia hypertension (took bp meds today)-----fainting (syncope) (2020, heat exhaustion). dysrhythmias (sinus dread), Other, valvular problems/murmurs (linda villagran does not need follow up per patient) mild AV sclerosis-no stenosis?. Previous cardiac testing   Echo: Date:  Results:    Stress Test: Date: Results:    ECG Reviewed: Date: 3/2023 Results:  NSR  Left axis deviation  Cath: Date: Results:      METS/Exercise Tolerance: >4 METS Comment: Walks 3 miles a day   Hematologic: Comments: Thrombocytopenia  - neg hematologic  ROS     Musculoskeletal:   (+) arthritis,     GI/Hepatic:     (+) GERD, Asymptomatic on medication,     Renal/Genitourinary:     (+) Nephrolithiasis ,     Endo:     (+) type II DM, Last HgA1c: 8.4, date: 6/12/23, Not using insulin, Diabetic complications: nephropathy.     Psychiatric/Substance Use:  - neg psychiatric ROS     Infectious Disease:  - neg infectious disease ROS     Malignancy:   (+) Malignancy, History of Skin.Skin CA status post Surgery.        Other:  - neg other ROS          Physical Exam    Airway        Mallampati: II   TM distance: > 3 FB   Neck ROM: limited   Mouth opening: > 3 cm    Respiratory Devices and Support         Dental     Comment: Partial upper    (+) Removable bridges or other hardware      Cardiovascular   cardiovascular exam normal       Rhythm and rate: regular and bradycardia     Pulmonary   pulmonary exam normal        breath sounds clear to auscultation           OUTSIDE LABS:  CBC:   Lab Results   Component Value Date    WBC 6.4 06/12/2023    WBC 5.0 01/24/2023    HGB 13.2 (L) 06/12/2023    HGB 13.8 01/24/2023    HCT 40.4 06/12/2023    HCT 40.2 01/24/2023     (L) 06/12/2023     (L) 01/24/2023     BMP:   Lab Results   Component Value Date     06/12/2023     (L) 01/24/2023    POTASSIUM 5.1 06/12/2023    POTASSIUM 4.4 01/24/2023    CHLORIDE 101 06/12/2023    CHLORIDE 99 01/24/2023    CO2 25 06/12/2023    CO2 26 01/24/2023    BUN 19.7 06/12/2023    BUN 20.3 01/24/2023    CR 0.89 06/12/2023    CR 0.96 01/24/2023     (H) 06/27/2023     (H) 06/12/2023     COAGS:   Lab Results   Component Value Date    PTT 23 (L) 04/12/2018    INR 1.14 04/12/2018     POC:   Lab Results   Component Value Date      (H) 06/10/2021     HEPATIC:   Lab Results   Component Value Date    ALBUMIN 4.1 01/24/2023    PROTTOTAL 7.1 01/24/2023    ALT 96 (H) 01/24/2023     (H) 01/24/2023    ALKPHOS 117 01/24/2023    BILITOTAL 0.7 01/24/2023     OTHER:   Lab Results   Component Value Date    LACT 1.7 06/20/2021    A1C 8.4 (H) 06/12/2023    HUY 9.6 06/12/2023    MAG 1.6 06/09/2021    LIPASE 25 01/24/2023    AMYLASE 43 05/18/2020    TSH 1.62 08/25/2021    CRP <2.9 03/13/2020       Anesthesia Plan    ASA Status:  3   NPO Status:  NPO Appropriate    Anesthesia Type: MAC.              Consents    Anesthesia Plan(s) and associated risks, benefits, and realistic alternatives discussed. Questions answered and patient/representative(s) expressed understanding.     - Discussed: Risks, Benefits and Alternatives for BOTH SEDATION and the PROCEDURE were discussed     - Discussed with:  Patient      - Extended Intubation/Ventilatory Support Discussed: No.      - Patient is DNR/DNI Status: No    Use of blood products discussed: No .     Postoperative Care            Comments:    Other Comments: Hp 6/12/23    Pt had no IV sedation with first eye 6/27, states he was happy with this. He did receive 10mg hydralazine for BP. Today his BP is 158/98 in pre-op.    Risks and benefits of MAC anesthetic discussed including dental damage, aspiration, loss of airway, conversion to general anesthetic, CV complications, MI, stroke, death. Pt wishes to proceed.                 EMI Ma CRNA

## 2023-07-11 ENCOUNTER — HOSPITAL ENCOUNTER (OUTPATIENT)
Facility: HOSPITAL | Age: 78
Discharge: HOME | End: 2023-07-11
Attending: OPHTHALMOLOGY | Admitting: OPHTHALMOLOGY
Payer: COMMERCIAL

## 2023-07-11 ENCOUNTER — ANESTHESIA (OUTPATIENT)
Dept: SURGERY | Facility: HOSPITAL | Age: 78
End: 2023-07-11
Payer: COMMERCIAL

## 2023-07-11 VITALS
OXYGEN SATURATION: 96 % | HEIGHT: 71 IN | DIASTOLIC BLOOD PRESSURE: 83 MMHG | RESPIRATION RATE: 16 BRPM | SYSTOLIC BLOOD PRESSURE: 161 MMHG | TEMPERATURE: 97.4 F | HEART RATE: 52 BPM | WEIGHT: 170.6 LBS | BODY MASS INDEX: 23.88 KG/M2

## 2023-07-11 LAB — GLUCOSE BLDC GLUCOMTR-MCNC: 136 MG/DL (ref 70–99)

## 2023-07-11 PROCEDURE — 272N000001 HC OR GENERAL SUPPLY STERILE: Performed by: OPHTHALMOLOGY

## 2023-07-11 PROCEDURE — 99100 ANES PT EXTEME AGE<1 YR&>70: CPT | Performed by: NURSE ANESTHETIST, CERTIFIED REGISTERED

## 2023-07-11 PROCEDURE — V2632 POST CHMBR INTRAOCULAR LENS: HCPCS | Performed by: OPHTHALMOLOGY

## 2023-07-11 PROCEDURE — 360N000076 HC SURGERY LEVEL 3, PER MIN: Performed by: OPHTHALMOLOGY

## 2023-07-11 PROCEDURE — 82962 GLUCOSE BLOOD TEST: CPT

## 2023-07-11 PROCEDURE — 370N000017 HC ANESTHESIA TECHNICAL FEE, PER MIN: Performed by: OPHTHALMOLOGY

## 2023-07-11 PROCEDURE — 250N000013 HC RX MED GY IP 250 OP 250 PS 637: Performed by: OPHTHALMOLOGY

## 2023-07-11 PROCEDURE — 710N000012 HC RECOVERY PHASE 2, PER MINUTE: Performed by: OPHTHALMOLOGY

## 2023-07-11 PROCEDURE — 250N000009 HC RX 250: Performed by: OPHTHALMOLOGY

## 2023-07-11 PROCEDURE — 66982 XCAPSL CTRC RMVL CPLX WO ECP: CPT | Performed by: NURSE ANESTHETIST, CERTIFIED REGISTERED

## 2023-07-11 PROCEDURE — 999N000141 HC STATISTIC PRE-PROCEDURE NURSING ASSESSMENT: Performed by: OPHTHALMOLOGY

## 2023-07-11 PROCEDURE — 250N000011 HC RX IP 250 OP 636: Performed by: OPHTHALMOLOGY

## 2023-07-11 DEVICE — LENS-SOFPORT 21.0: Type: IMPLANTABLE DEVICE | Site: EYE | Status: FUNCTIONAL

## 2023-07-11 RX ORDER — LEVOBUNOLOL HYDROCHLORIDE 5 MG/ML
SOLUTION/ DROPS OPHTHALMIC PRN
Status: DISCONTINUED | OUTPATIENT
Start: 2023-07-11 | End: 2023-07-11 | Stop reason: HOSPADM

## 2023-07-11 RX ORDER — ACETAMINOPHEN 325 MG/1
650 TABLET ORAL ONCE
Status: COMPLETED | OUTPATIENT
Start: 2023-07-11 | End: 2023-07-11

## 2023-07-11 RX ORDER — LIDOCAINE HYDROCHLORIDE 10 MG/ML
INJECTION, SOLUTION EPIDURAL; INFILTRATION; INTRACAUDAL; PERINEURAL PRN
Status: DISCONTINUED | OUTPATIENT
Start: 2023-07-11 | End: 2023-07-11 | Stop reason: HOSPADM

## 2023-07-11 RX ORDER — LIDOCAINE 40 MG/G
CREAM TOPICAL
Status: DISCONTINUED | OUTPATIENT
Start: 2023-07-11 | End: 2023-07-11 | Stop reason: HOSPADM

## 2023-07-11 RX ORDER — PILOCARPINE HYDROCHLORIDE 10 MG/ML
SOLUTION/ DROPS OPHTHALMIC PRN
Status: DISCONTINUED | OUTPATIENT
Start: 2023-07-11 | End: 2023-07-11 | Stop reason: HOSPADM

## 2023-07-11 RX ORDER — TETRACAINE HYDROCHLORIDE 5 MG/ML
SOLUTION OPHTHALMIC PRN
Status: DISCONTINUED | OUTPATIENT
Start: 2023-07-11 | End: 2023-07-11 | Stop reason: HOSPADM

## 2023-07-11 RX ORDER — MOXIFLOXACIN 5 MG/ML
SOLUTION/ DROPS OPHTHALMIC PRN
Status: DISCONTINUED | OUTPATIENT
Start: 2023-07-11 | End: 2023-07-11 | Stop reason: HOSPADM

## 2023-07-11 RX ORDER — PHENYLEPHRINE HYDROCHLORIDE 25 MG/ML
1 SOLUTION/ DROPS OPHTHALMIC ONCE
Status: COMPLETED | OUTPATIENT
Start: 2023-07-11 | End: 2023-07-11

## 2023-07-11 RX ORDER — ONDANSETRON 4 MG/1
4 TABLET, ORALLY DISINTEGRATING ORAL EVERY 30 MIN PRN
Status: DISCONTINUED | OUTPATIENT
Start: 2023-07-11 | End: 2023-07-11 | Stop reason: HOSPADM

## 2023-07-11 RX ORDER — PROPARACAINE HYDROCHLORIDE 5 MG/ML
1 SOLUTION/ DROPS OPHTHALMIC ONCE
Status: COMPLETED | OUTPATIENT
Start: 2023-07-11 | End: 2023-07-11

## 2023-07-11 RX ORDER — PHENYLEPHRINE HYDROCHLORIDE 100 MG/ML
1 SOLUTION/ DROPS OPHTHALMIC
Status: COMPLETED | OUTPATIENT
Start: 2023-07-11 | End: 2023-07-11

## 2023-07-11 RX ORDER — PHENYLEPHRINE HYDROCHLORIDE 100 MG/ML
1 SOLUTION/ DROPS OPHTHALMIC ONCE
Status: COMPLETED | OUTPATIENT
Start: 2023-07-11 | End: 2023-07-11

## 2023-07-11 RX ORDER — CYCLOPENTOLATE HYDROCHLORIDE 20 MG/ML
1 SOLUTION/ DROPS OPHTHALMIC
Status: COMPLETED | OUTPATIENT
Start: 2023-07-11 | End: 2023-07-11

## 2023-07-11 RX ORDER — ONDANSETRON 2 MG/ML
4 INJECTION INTRAMUSCULAR; INTRAVENOUS EVERY 30 MIN PRN
Status: DISCONTINUED | OUTPATIENT
Start: 2023-07-11 | End: 2023-07-11 | Stop reason: HOSPADM

## 2023-07-11 RX ADMIN — ACETAMINOPHEN 650 MG: 325 TABLET, FILM COATED ORAL at 09:13

## 2023-07-11 RX ADMIN — CYCLOPENTOLATE HYDROCHLORIDE 1 DROP: 20 SOLUTION/ DROPS OPHTHALMIC at 09:18

## 2023-07-11 RX ADMIN — PHENYLEPHRINE HYDROCHLORIDE 0.5 ML: 100 SOLUTION/ DROPS OPHTHALMIC at 09:31

## 2023-07-11 RX ADMIN — PHENYLEPHRINE HYDROCHLORIDE 1 DROP: 100 SOLUTION/ DROPS OPHTHALMIC at 09:40

## 2023-07-11 RX ADMIN — CYCLOPENTOLATE HYDROCHLORIDE 1 DROP: 20 SOLUTION/ DROPS OPHTHALMIC at 09:24

## 2023-07-11 RX ADMIN — PHENYLEPHRINE HYDROCHLORIDE 1 DROP: 2.5 SOLUTION/ DROPS OPHTHALMIC at 09:24

## 2023-07-11 RX ADMIN — PHENYLEPHRINE HYDROCHLORIDE 1 DROP: 100 SOLUTION/ DROPS OPHTHALMIC at 09:18

## 2023-07-11 RX ADMIN — PROPARACAINE HYDROCHLORIDE 1 DROP: 5 SOLUTION/ DROPS OPHTHALMIC at 09:15

## 2023-07-11 ASSESSMENT — ENCOUNTER SYMPTOMS: DYSRHYTHMIAS: 1

## 2023-07-11 ASSESSMENT — ACTIVITIES OF DAILY LIVING (ADL)
ADLS_ACUITY_SCORE: 33
ADLS_ACUITY_SCORE: 35

## 2023-07-11 NOTE — ANESTHESIA POSTPROCEDURE EVALUATION
Patient: Rafa Bhatti    Procedure: Procedure(s):  Complex Phacoemulsification Cataract Extraction Posterior Chamber Lens Right Eye       Anesthesia Type:  MAC    Note:  Disposition: Outpatient   Postop Pain Control: Uneventful            Sign Out: Well controlled pain   PONV: No   Neuro/Psych: Uneventful            Sign Out: Acceptable/Baseline neuro status   Airway/Respiratory: Uneventful            Sign Out: Acceptable/Baseline resp. status   CV/Hemodynamics: Uneventful            Sign Out: Acceptable CV status; No obvious hypovolemia; No obvious fluid overload   Other NRE: NONE   DID A NON-ROUTINE EVENT OCCUR? No           Last vitals:  Vitals Value Taken Time   /83 07/11/23 1100   Temp 97.4  F (36.3  C) 07/11/23 1100   Pulse 52 07/11/23 1100   Resp 16 07/11/23 1100   SpO2 96 % 07/11/23 1102   Vitals shown include unvalidated device data.    Electronically Signed By: EMI Ma CRNA  July 11, 2023  11:18 AM

## 2023-07-11 NOTE — INTERVAL H&P NOTE
I have reviewed the surgical (or preoperative) H&P that is linked to this encounter, and examined the patient. There are no significant changes.  Sameer Go MD      Clinical Conditions Present on Arrival:  Clinically Significant Risk Factors Present on Admission

## 2023-07-11 NOTE — OP NOTE
[facility]  Ophthalmology Full Operative Note    Pre-operative diagnosis: Combined form of age-related cataract, right eye [H25.811], Pupil miosis Right eye   Post-operative diagnosis Same   Procedure: Procedure(s):  Complex Phacoemulsification Cataract Extraction Posterior Chamber Lens Right Eye   Surgeon: Sameer Go MD   Assistants(s):    Anesthesia: MAC with Local    Estimated blood loss: None    Total IV fluids: (See anesthesia record)   Specimens: None   Implants: 21 LI61AO   Findings:    Complications: None   Condition: Stable   Comments:       PROCEDURAL ANESTHESIA:     Topical/MAC.  Lidocaine 2% jelly topically and Lidocaine 1% preservative-free intracamerally.     PROCEDURE:  The patient was brought to the Operating Room and prepped and draped in a sterile manner.  A wire lid speculum was placed.  A paracentesis was created and 1% Lidocaine was instilled in the anterior chamber.  Poor dilation and later floppy iris noted (history of Flomax use). The anterior chamber was then filled with Amvisc viscoelastic.  A clear cornea temporal wound was created using a 2.8 mm keratome.  The pupil was then stretched open using a 6.25 mm Malyugin ring. A cystotome was used to initiate a flap in the anterior capsule and a Utrata forceps was used to create a continuous tear capsulorhexis.  Hydrodissection was performed.  The phacoemulsification tip was inserted into the eye and the nucleus and epinucleus were removed using a divide and conquer technique.  The residual cortex was removed using the I/A handpiece.  The anterior chamber was then refilled with viscoelastic and the wound was enlarged with the keratome.  The intraocular lens, 21 LI61AO diopter, Model LI61AO, was placed into the injector and injected into the capsular bag. It was checked to make sure that it was central and stable.  The Malyugin ring was removed. Residual viscoelastic was removed using the I/A.  The anterior chamber was refilled  with BSS.  The wounds were hydrated with BSS and were noted to be watertight with no suture necessary.  Topical pilocarpine 1%, Betagan, and Vigamox was applied.  A hard shield was placed.     The patient tolerated the procedure well and was sent to the Recovery Room in satisfactory condition.

## 2023-07-11 NOTE — ANESTHESIA CARE TRANSFER NOTE
Patient: Rafa Bhatti    Procedure: Procedure(s):  Complex Phacoemulsification Cataract Extraction Posterior Chamber Lens Right Eye       Diagnosis: Combined form of age-related cataract, right eye [H25.811]  Diagnosis Additional Information: No value filed.    Anesthesia Type:   MAC     Note:    Oropharynx: oropharynx clear of all foreign objects and spontaneously breathing  Level of Consciousness: awake  Oxygen Supplementation: room air    Independent Airway: airway patency satisfactory and stable  Dentition: dentition unchanged  Vital Signs Stable: post-procedure vital signs reviewed and stable  Report to RN Given: handoff report given  Patient transferred to: Phase II    Handoff Report: Identifed the Patient, Identified the Reponsible Provider, Reviewed the pertinent medical history, Discussed the surgical course, Reviewed Intra-OP anesthesia mangement and issues during anesthesia, Set expectations for post-procedure period and Allowed opportunity for questions and acknowledgement of understanding      Vitals:  Vitals Value Taken Time   BP     Temp     Pulse     Resp     SpO2         Electronically Signed By: EMI Sanchez CRNA  July 11, 2023  10:51 AM

## 2023-07-11 NOTE — OR NURSING
TAMEKA Meneses notified patient took baby ASA this am and heart rate running upper 40's-low 50's, patient asymptomatic with low heart rates and not on beta blockers.

## 2023-07-11 NOTE — OR NURSING
Dr. Go stopped and spoke with patient and wife prior to discharge.  IV was removed and discharge instructions reviewed with patient and wife; both verbalize understanding and have no further questions and/or concerns.      Patient and responsible adult given discharge instructions with no questions regarding instructions. Marty score 19. Pain level 3/10.  Discharged from unit via ambulatory with wife. Patient discharged to home.

## 2023-07-17 ENCOUNTER — HOSPITAL ENCOUNTER (OUTPATIENT)
Dept: EDUCATION SERVICES | Facility: HOSPITAL | Age: 78
Discharge: HOME | End: 2023-07-17
Attending: DIETITIAN, REGISTERED | Admitting: NURSE PRACTITIONER
Payer: COMMERCIAL

## 2023-07-17 VITALS
SYSTOLIC BLOOD PRESSURE: 165 MMHG | RESPIRATION RATE: 16 BRPM | HEIGHT: 68 IN | HEART RATE: 60 BPM | BODY MASS INDEX: 26.18 KG/M2 | DIASTOLIC BLOOD PRESSURE: 85 MMHG | WEIGHT: 172.7 LBS | OXYGEN SATURATION: 98 %

## 2023-07-17 DIAGNOSIS — E11.65 TYPE 2 DIABETES MELLITUS WITH HYPERGLYCEMIA, WITHOUT LONG-TERM CURRENT USE OF INSULIN (H): Primary | ICD-10-CM

## 2023-07-17 PROCEDURE — G0108 DIAB MANAGE TRN  PER INDIV: HCPCS | Performed by: DIETITIAN, REGISTERED

## 2023-07-17 ASSESSMENT — PAIN SCALES - GENERAL: PAINLEVEL: WORST PAIN (10)

## 2023-07-17 NOTE — PATIENT INSTRUCTIONS
-Continue to work on healthy food choices - limit foods high in carbohydrates - bread products, cereal, corn, peas, potatoes, rice, pasta, crackers, chips, sweets, drinks with sugar.   -Your walking is great!  Keep it up!  -Last A1c was 8.4%.  Goal is <7.5%.  -Please send me Jardiance insert for review.    -We can wait and see what your next A1c is in Sept - if not to target we will need to consider insulin.   -Follow up in Sept after your appointment with Dr. Olson.   -Bring your BP cuff to appointment so we can compare to make sure it is accurate.   -MICHAEL Seals, [facility] [phone].

## 2023-07-17 NOTE — LETTER
7/17/2023        RE: Rafa Bhatti  [address] [facility] [id number]        Diabetes Self-Management Education & Support    Presents for: Individual review    Type of Service: In Person Visit    ASSESSMENT:  Pt is here today to discuss diabetes meds.  He has been on Januvia long term.  Recently prescribed Jardiance but when he received it from his mail order pharmacy the insert said not to take if you have had pancreatitis or pancreatic surgery.  He has had pancreatic surgery so he does not want to take it.  Review of drug insert and discussion with Valeriy Muhammad, PharmD notes Jardiance not contraindicated with pancreas surgery but Januvia would be a concern.   Pt does not want to take Jardiance and wants to continue Januvia as he has been on it long term with no issues.  Pt has not tolerated Metformin or Glimepiride in the past and GLP-1 meds not an option with hx of pancreas issues.  Discussed insulin but pt declines for now until next A1c check in Sept.  BP also high on two checks today but pt states wnl when he tests at home.     Patient's most recent   Lab Results   Component Value Date    A1C 8.4 06/12/2023    A1C 10.9 06/16/2021    HEMOGLOBINA1 9.3 03/09/2023     is not meeting goal of <7.5% for age.     Diabetes knowledge and skills assessment:   Patient is knowledgeable in diabetes management concepts related to: Healthy Eating and Being Active    Continue education with the following diabetes management concepts: Monitoring-discussed doing some alternate time testing as glucose levels in am after his walk have been in target.  Taking Medication-discussed option of beginning long acting insulin 1x/day.      Based on learning assessment above, most appropriate setting for further diabetes education would be: Individual setting.      PLAN  Pt will continue to work on healthy food choices  Pt will continue his daily walking.  Test glucose 1x/day - alternate times.  Phone not compatible with Dexcom G7  "or Freestyle Mary 2 christ so cannot provide sample sensor for him to use.   Pt agrees to mail info that came from his mail order pharmacy regarding Jardiance for review.    Pt instructed to bring BP cuff to next medical appointment so it can be checked for accuracy.     Follow-up: Sept after provider visit and A1c check.     See Care Plan for co-developed, patient-state behavior change goals.  AVS provided for patient today.    Education Materials Provided:  No new materials today.       SUBJECTIVE/OBJECTIVE:  Presents for: Individual review  Accompanied by: Self  Diabetes education in the past 24mo: Yes  Diabetes type: Type 2  Date of diagnosis: 4/2018  Disease course: Getting harder to manage  How confident are you filling out medical forms by yourself:: Extremely  Diabetes management related comments/concerns: Pt has concerns regarding Jardiance.  Transportation concerns: No  Difficulty affording diabetes medication?: No (If patient uses his mail order pharmacy the highest cost is $ 60.00 for a three month supply.)  Difficulty affording diabetes testing supplies?: No  Other concerns:: None  Cultural Influences/Ethnic Background:  Not  or     Diabetes Symptoms & Complications:  Fatigue: No  Neuropathy: Yes (some numbness)  Polydipsia: No  Polyphagia: No  Polyuria: No  Visual change: Yes (Having cataract surgery soon)  Slow healing wounds: No  Symptom course: Stable  Weight trend: Stable  Complications assessed today?: No  CVA: No  Heart disease: No  Nephropathy: No  Retinopathy: No    Patient Problem List and Family Medical History reviewed for relevant medical history, current medical status, and diabetes risk factors.    Vitals:  /85   Pulse 60   Resp 16   Ht 1.715 m (5' 7.5\")   Wt 78.3 kg (172 lb 11.2 oz)   SpO2 98%   BMI 26.65 kg/m    Estimated body mass index is 26.65 kg/m  as calculated from the following:    Height as of this encounter: 1.715 m (5' 7.5\").    Weight as of this " encounter: 78.3 kg (172 lb 11.2 oz).   Last 3 BP:   BP Readings from Last 3 Encounters:   07/17/23 165/85   07/11/23 161/83   06/27/23 148/77       History   Smoking Status     Never   Smokeless Tobacco     Never       Labs:  Lab Results   Component Value Date    A1C 8.4 06/12/2023    A1C 10.9 06/16/2021    HEMOGLOBINA1 9.3 03/09/2023     Lab Results   Component Value Date     07/11/2023     06/30/2022     06/30/2021     Lab Results   Component Value Date    LDL 46 06/30/2022    LDL 32 06/16/2021     HDL Cholesterol   Date Value Ref Range Status   06/16/2021 32 (L) >39 mg/dL Final     Direct Measure HDL   Date Value Ref Range Status   06/30/2022 37 (L) >=40 mg/dL Final   ]  GFR Estimate   Date Value Ref Range Status   06/12/2023 89 >60 mL/min/1.73m2 Final     Comment:     eGFR calculated using 2021 CKD-EPI equation.   06/30/2021 90 >60 mL/min/[1.73_m2] Final     Comment:     Non  GFR Calc  Starting 12/18/2018, serum creatinine based estimated GFR (eGFR) will be   calculated using the Chronic Kidney Disease Epidemiology Collaboration   (CKD-EPI) equation.       GFR Estimate If Black   Date Value Ref Range Status   06/30/2021 >90 >60 mL/min/[1.73_m2] Final     Comment:      GFR Calc  Starting 12/18/2018, serum creatinine based estimated GFR (eGFR) will be   calculated using the Chronic Kidney Disease Epidemiology Collaboration   (CKD-EPI) equation.       Lab Results   Component Value Date    CR 0.89 06/12/2023    CR 0.76 06/30/2021     No results found for: MICROALBUMIN    Healthy Eating:  Healthy Eating Assessed Today: Yes  Cultural/Mosque diet restrictions?: No  Meal planning/habits: None  How many times a week on average do you eat food made away from home (restaurant/take-out)?: 0  Meals include: Breakfast, Lunch, Dinner  Breakfast: yogurt/granola or rice krispies with fruit (stopped honey nut cheerios)  Lunch: 1/2 sandwich, carrots or small bag of  chips  Dinner: meat, grilled veggies/potatoes  Snacks: nuts  Other: If dines out pizza - < 1x/week  Beverages: Water, Coffee, Diet soda, Sports drinks (sugar free sports drink)  Has patient met with a dietitian in the past?: Yes    Being Active:  Being Active Assessed Today: Yes  Exercise:: Yes (walking)  Days per week of moderate to strenuous exercise (like a brisk walk): 7  On average, minutes per day of exercise at this level: 60  How intense was your typical exercise? : Moderate (like brisk walking)  Exercise Minutes per Week: 420  Barrier to exercise: None    Monitoring:  Monitoring Assessed Today: Yes  Did patient bring glucose meter to appointment? : No  Blood Glucose Meter: FreeStyle    Taking Medications:  Diabetes Medication(s)     Dipeptidyl Peptidase-4 (DPP-4) Inhibitors       sitagliptin (JANUVIA) 100 MG tablet    Take 1 tablet (100 mg) by mouth daily          Taking Medication Assessed Today: Yes  Current Treatments: Oral Medication (taken by mouth) (Januvia 100 mg daily)  Problems taking diabetes medications regularly?: No  Diabetes medication side effects?: Yes (Had diarrhea from Metformin XR - stopped April 2022.  Did not tolerate Glimepiride.)    Problem Solving:  Problem Solving Assessed Today: Yes  Is the patient at risk for hypoglycemia?: No  Hypoglycemia Frequency: Rarely  Hypoglycemia Treatment: Glucose (tablets or gel)  Is the patient at risk for DKA?: No    Hypoglycemia symptoms  Feeling shaky: Yes    Reducing Risks:  Reducing Risks Assessed Today: Yes  Diabetes Risks: Age over 45 years  CAD Risks: Diabetes Mellitus, Male sex  Has dilated eye exam at least once a year?: Yes (Requested 6/14/23)  Sees dentist every 6 months?: Yes  Feet checked by healthcare provider in the last year?: No    Healthy Coping:  Healthy Coping Assessed Today: Yes  Emotional response to diabetes: Acceptance, Concern for health and well-being  Informal Support system:: Spouse  Stage of change: MAINTENANCE (Working to  maintain change, with risk of relapse)  Patient Activation Measure Survey Score:      2/20/2020     2:00 PM   ALICIA Score (Last Two)   ALICIA Raw Score 38   Activation Score 83.7   ALICIA Level 4       Care Plan and Education Provided:  Care Plan: Diabetes   Updates made by Ava Templeton RD since 7/17/2023 12:00 AM      Problem: HbA1C Not In Goal       Goal: Establish Regular Follow-Ups with PCP       Task: Discuss schedule for PCP visits with patient Completed 7/17/2023   Responsible User: Ava Templeton RD      Problem: Diabetes Self-Management Education Needed to Optimize Self-Care Behaviors       Goal: Healthy Eating - follow a healthy eating pattern for diabetes       Task: Provide education on portion control and consistency in amount, composition and timing of food intake Completed 7/17/2023   Responsible User: Ava Templeton RD      Goal: Reducing Risks - know how to prevent and treat long-term diabetes complications       Task: Provide education on recommendations for heart health - lipid levels and goals, blood pressure and goals, and aspirin therapy, if indicated Completed 7/17/2023   Responsible User: Ava Templeton RD          Time Spent: 30 minutes  Encounter Type: Individual    Any diabetes medication dose changes were made via the CDE Protocol per the patient's referring provider. A copy of this encounter was shared with the provider.      Sincerely,        Ava Templeton RD

## 2023-07-17 NOTE — PROGRESS NOTES
Diabetes Self-Management Education & Support    Presents for: Individual review    Type of Service: In Person Visit    ASSESSMENT:  Pt is here today to discuss diabetes meds.  He has been on Januvia long term.  Recently prescribed Jardiance but when he received it from his mail order pharmacy the insert said not to take if you have had pancreatitis or pancreatic surgery.  He has had pancreatic surgery so he does not want to take it.  Review of drug insert and discussion with Valeriy Muhammad, PharmD notes Jardiance not contraindicated with pancreas surgery but Januvia would be a concern.   Pt does not want to take Jardiance and wants to continue Januvia as he has been on it long term with no issues.  Pt has not tolerated Metformin or Glimepiride in the past and GLP-1 meds not an option with hx of pancreas issues.  Discussed insulin but pt declines for now until next A1c check in Sept.  BP also high on two checks today but pt states wnl when he tests at home.     Patient's most recent   Lab Results   Component Value Date    A1C 8.4 06/12/2023    A1C 10.9 06/16/2021    HEMOGLOBINA1 9.3 03/09/2023     is not meeting goal of <7.5% for age.     Diabetes knowledge and skills assessment:   Patient is knowledgeable in diabetes management concepts related to: Healthy Eating and Being Active    Continue education with the following diabetes management concepts: Monitoring-discussed doing some alternate time testing as glucose levels in am after his walk have been in target.  Taking Medication-discussed option of beginning long acting insulin 1x/day.      Based on learning assessment above, most appropriate setting for further diabetes education would be: Individual setting.      PLAN  Pt will continue to work on healthy food choices  Pt will continue his daily walking.  Test glucose 1x/day - alternate times.  Phone not compatible with Dexcom G7 or Freestyle Mary 2 christ so cannot provide sample sensor for him to use.   Pt agrees  "to mail info that came from his mail order pharmacy regarding Jardiance for review.    Pt instructed to bring BP cuff to next medical appointment so it can be checked for accuracy.     Follow-up: Sept after provider visit and A1c check.     See Care Plan for co-developed, patient-state behavior change goals.  AVS provided for patient today.    Education Materials Provided:  No new materials today.       SUBJECTIVE/OBJECTIVE:  Presents for: Individual review  Accompanied by: Self  Diabetes education in the past 24mo: Yes  Diabetes type: Type 2  Date of diagnosis: 4/2018  Disease course: Getting harder to manage  How confident are you filling out medical forms by yourself:: Extremely  Diabetes management related comments/concerns: Pt has concerns regarding Jardiance.  Transportation concerns: No  Difficulty affording diabetes medication?: No (If patient uses his mail order pharmacy the highest cost is $ 60.00 for a three month supply.)  Difficulty affording diabetes testing supplies?: No  Other concerns:: None  Cultural Influences/Ethnic Background:  Not  or     Diabetes Symptoms & Complications:  Fatigue: No  Neuropathy: Yes (some numbness)  Polydipsia: No  Polyphagia: No  Polyuria: No  Visual change: Yes (Having cataract surgery soon)  Slow healing wounds: No  Symptom course: Stable  Weight trend: Stable  Complications assessed today?: No  CVA: No  Heart disease: No  Nephropathy: No  Retinopathy: No    Patient Problem List and Family Medical History reviewed for relevant medical history, current medical status, and diabetes risk factors.    Vitals:  /85   Pulse 60   Resp 16   Ht 1.715 m (5' 7.5\")   Wt 78.3 kg (172 lb 11.2 oz)   SpO2 98%   BMI 26.65 kg/m    Estimated body mass index is 26.65 kg/m  as calculated from the following:    Height as of this encounter: 1.715 m (5' 7.5\").    Weight as of this encounter: 78.3 kg (172 lb 11.2 oz).   Last 3 BP:   BP Readings from Last 3 Encounters: "   07/17/23 165/85   07/11/23 161/83   06/27/23 148/77       History   Smoking Status     Never   Smokeless Tobacco     Never       Labs:  Lab Results   Component Value Date    A1C 8.4 06/12/2023    A1C 10.9 06/16/2021    HEMOGLOBINA1 9.3 03/09/2023     Lab Results   Component Value Date     07/11/2023     06/30/2022     06/30/2021     Lab Results   Component Value Date    LDL 46 06/30/2022    LDL 32 06/16/2021     HDL Cholesterol   Date Value Ref Range Status   06/16/2021 32 (L) >39 mg/dL Final     Direct Measure HDL   Date Value Ref Range Status   06/30/2022 37 (L) >=40 mg/dL Final   ]  GFR Estimate   Date Value Ref Range Status   06/12/2023 89 >60 mL/min/1.73m2 Final     Comment:     eGFR calculated using 2021 CKD-EPI equation.   06/30/2021 90 >60 mL/min/[1.73_m2] Final     Comment:     Non  GFR Calc  Starting 12/18/2018, serum creatinine based estimated GFR (eGFR) will be   calculated using the Chronic Kidney Disease Epidemiology Collaboration   (CKD-EPI) equation.       GFR Estimate If Black   Date Value Ref Range Status   06/30/2021 >90 >60 mL/min/[1.73_m2] Final     Comment:      GFR Calc  Starting 12/18/2018, serum creatinine based estimated GFR (eGFR) will be   calculated using the Chronic Kidney Disease Epidemiology Collaboration   (CKD-EPI) equation.       Lab Results   Component Value Date    CR 0.89 06/12/2023    CR 0.76 06/30/2021     No results found for: MICROALBUMIN    Healthy Eating:  Healthy Eating Assessed Today: Yes  Cultural/Zoroastrian diet restrictions?: No  Meal planning/habits: None  How many times a week on average do you eat food made away from home (restaurant/take-out)?: 0  Meals include: Breakfast, Lunch, Dinner  Breakfast: yogurt/granola or rice krispies with fruit (stopped honey nut cheerios)  Lunch: 1/2 sandwich, carrots or small bag of chips  Dinner: meat, grilled veggies/potatoes  Snacks: nuts  Other: If dines out pizza - <  1x/week  Beverages: Water, Coffee, Diet soda, Sports drinks (sugar free sports drink)  Has patient met with a dietitian in the past?: Yes    Being Active:  Being Active Assessed Today: Yes  Exercise:: Yes (walking)  Days per week of moderate to strenuous exercise (like a brisk walk): 7  On average, minutes per day of exercise at this level: 60  How intense was your typical exercise? : Moderate (like brisk walking)  Exercise Minutes per Week: 420  Barrier to exercise: None    Monitoring:  Monitoring Assessed Today: Yes  Did patient bring glucose meter to appointment? : No  Blood Glucose Meter: FreeStyle    Taking Medications:  Diabetes Medication(s)     Dipeptidyl Peptidase-4 (DPP-4) Inhibitors       sitagliptin (JANUVIA) 100 MG tablet    Take 1 tablet (100 mg) by mouth daily          Taking Medication Assessed Today: Yes  Current Treatments: Oral Medication (taken by mouth) (Januvia 100 mg daily)  Problems taking diabetes medications regularly?: No  Diabetes medication side effects?: Yes (Had diarrhea from Metformin XR - stopped April 2022.  Did not tolerate Glimepiride.)    Problem Solving:  Problem Solving Assessed Today: Yes  Is the patient at risk for hypoglycemia?: No  Hypoglycemia Frequency: Rarely  Hypoglycemia Treatment: Glucose (tablets or gel)  Is the patient at risk for DKA?: No    Hypoglycemia symptoms  Feeling shaky: Yes    Reducing Risks:  Reducing Risks Assessed Today: Yes  Diabetes Risks: Age over 45 years  CAD Risks: Diabetes Mellitus, Male sex  Has dilated eye exam at least once a year?: Yes (Requested 6/14/23)  Sees dentist every 6 months?: Yes  Feet checked by healthcare provider in the last year?: No    Healthy Coping:  Healthy Coping Assessed Today: Yes  Emotional response to diabetes: Acceptance, Concern for health and well-being  Informal Support system:: Spouse  Stage of change: MAINTENANCE (Working to maintain change, with risk of relapse)  Patient Activation Measure Survey Score:       2/20/2020     2:00 PM   ALICIA Score (Last Two)   ALICIA Raw Score 38   Activation Score 83.7   ALICIA Level 4       Care Plan and Education Provided:  Care Plan: Diabetes   Updates made by Ava Templeton RD since 7/17/2023 12:00 AM      Problem: HbA1C Not In Goal       Goal: Establish Regular Follow-Ups with PCP       Task: Discuss schedule for PCP visits with patient Completed 7/17/2023   Responsible User: Ava Templeton RD      Problem: Diabetes Self-Management Education Needed to Optimize Self-Care Behaviors       Goal: Healthy Eating - follow a healthy eating pattern for diabetes       Task: Provide education on portion control and consistency in amount, composition and timing of food intake Completed 7/17/2023   Responsible User: Ava Templeton RD      Goal: Reducing Risks - know how to prevent and treat long-term diabetes complications       Task: Provide education on recommendations for heart health - lipid levels and goals, blood pressure and goals, and aspirin therapy, if indicated Completed 7/17/2023   Responsible User: Ava Templeton RD          Time Spent: 30 minutes  Encounter Type: Individual    Any diabetes medication dose changes were made via the CDE Protocol per the patient's referring provider. A copy of this encounter was shared with the provider.

## 2023-09-03 DIAGNOSIS — M1A.0720 CHRONIC IDIOPATHIC GOUT INVOLVING TOE OF LEFT FOOT WITHOUT TOPHUS: ICD-10-CM

## 2023-09-05 RX ORDER — ALLOPURINOL 100 MG/1
TABLET ORAL
Qty: 180 TABLET | Refills: 0 | Status: SHIPPED | OUTPATIENT
Start: 2023-09-05 | End: 2023-10-19

## 2023-09-05 NOTE — TELEPHONE ENCOUNTER
Allopurinol      Last Written Prescription Date:  3/8/23  Last Fill Quantity: 180,   # refills: 1  Last Office Visit: 6/12/23  Future Office visit:    Next 5 appointments (look out 90 days)      Sep 21, 2023  8:45 AM  (Arrive by 8:30 AM)  [facility] with Harry Olson MD  [facility] [address]  [facility] [place] [address]  [phone]             Routing refill request to provider for review/approval because:

## 2023-09-07 DIAGNOSIS — I10 BENIGN ESSENTIAL HYPERTENSION: ICD-10-CM

## 2023-09-07 DIAGNOSIS — E11.42 TYPE 2 DIABETES MELLITUS WITH DIABETIC POLYNEUROPATHY, WITHOUT LONG-TERM CURRENT USE OF INSULIN (H): ICD-10-CM

## 2023-09-07 RX ORDER — LOSARTAN POTASSIUM 25 MG/1
25 TABLET ORAL DAILY
Qty: 90 TABLET | Refills: 3 | Status: SHIPPED | OUTPATIENT
Start: 2023-09-07 | End: 2024-08-30

## 2023-09-14 NOTE — PROGRESS NOTES
"  Assessment & Plan     Type 2 diabetes mellitus with diabetic polyneuropathy, without long-term current use of insulin (H)  Doing well.  Update and follow.    - Albumin Random Urine Quantitative with Creat Ratio; Future  - Comprehensive metabolic panel; Future  - Lipid Profile; Future  - MD FOOT EXAM NO CHARGE  - TSH with free T4 reflex; Future    Erectile dysfunction, unspecified erectile dysfunction type  Monitor and follow.   Refill sent.    - sildenafil (REVATIO) 20 MG tablet; Take 1-2 tablets (20-40 mg) by mouth daily as needed (erectile dysfunction)    Need for prophylactic vaccination and inoculation against influenza  Update.   - INFLUENZA VACCINE 65+ (FLUZONE HD)    Benign essential hypertension  Stable.     Mixed hyperlipidemia  Update.               BMI:   Estimated body mass index is 25.77 kg/m  as calculated from the following:    Height as of 7/17/23: 1.715 m (5' 7.5\").    Weight as of this encounter: 75.8 kg (167 lb).           No follow-ups on file.    Harry Olson MD  [facility]   Rafa is a 78 year old, presenting for the following health issues:  Diabetes and Imm/Inj (Flu Shot)      HPI     Diabetes Follow-up    How often are you checking your blood sugar? A few times a month  What time of day are you checking your blood sugars (select all that apply)?  Before and after meals  Have you had any blood sugars above 200?  No  Have you had any blood sugars below 70?  No  What symptoms do you notice when your blood sugar is low?  None  What concerns do you have today about your diabetes? None   Do you have any of these symptoms? (Select all that apply)  Numbness in feet          Hyperlipidemia Follow-Up    Are you regularly taking any medication or supplement to lower your cholesterol?   No  Are you having muscle aches or other side effects that you think could be caused by your cholesterol lowering medication?  No    Hypertension Follow-up    Do you check " your blood pressure regularly outside of the clinic? Yes   Are you following a low salt diet? No  Are your blood pressures ever more than 140 on the top number (systolic) OR more   than 90 on the bottom number (diastolic), for example 140/90? No    BP Readings from Last 2 Encounters:   09/21/23 134/86   07/17/23 165/85     Hemoglobin A1C (%)   Date Value   06/12/2023 8.4 (H)   06/30/2022 7.9 (H)   06/16/2021 10.9 (H)   08/12/2020 6.4 (H)     LDL Cholesterol Calculated (mg/dL)   Date Value   06/30/2022 46   06/16/2021 32   02/21/2020 38     Diabetic foot exam   1. foot deformity   Yes  2. Current or previous foot ulceration     No  3. Current or previous pre-ulcerative calluses     Yes  4. previous partial amputation of one or both feet or complete amputation of one foot     No  5. peripheral neuropathy with evidence of callus formation     No  6. poor circulation     No  Approval given for 3 pairs of diabetic shoes and inserts if patient desires.          Review of Systems   Constitutional, HEENT, cardiovascular, pulmonary, gi and gu systems are negative, except as otherwise noted.      Objective    /86   Pulse 60   Temp 97.3  F (36.3  C) (Tympanic)   Wt 75.8 kg (167 lb)   SpO2 97%   BMI 25.77 kg/m    Body mass index is 25.77 kg/m .  Physical Exam   GENERAL: healthy, alert and no distress  NECK: no adenopathy, no asymmetry, masses, or scars and thyroid normal to palpation  RESP: lungs clear to auscultation - no rales, rhonchi or wheezes  CV: regular rate and rhythm, normal S1 S2, no S3 or S4, no murmur, click or rub, no peripheral edema and peripheral pulses strong  ABDOMEN: soft, nontender, no hepatosplenomegaly, no masses and bowel sounds normal  MS: no gross musculoskeletal defects noted, no edema        Labs pending.

## 2023-09-21 ENCOUNTER — OFFICE VISIT (OUTPATIENT)
Dept: FAMILY MEDICINE | Facility: OTHER | Age: 78
End: 2023-09-21
Attending: FAMILY MEDICINE
Payer: COMMERCIAL

## 2023-09-21 VITALS
HEART RATE: 60 BPM | WEIGHT: 167 LBS | TEMPERATURE: 97.3 F | OXYGEN SATURATION: 97 % | BODY MASS INDEX: 25.77 KG/M2 | DIASTOLIC BLOOD PRESSURE: 86 MMHG | SYSTOLIC BLOOD PRESSURE: 134 MMHG

## 2023-09-21 DIAGNOSIS — E11.42 TYPE 2 DIABETES MELLITUS WITH DIABETIC POLYNEUROPATHY, WITHOUT LONG-TERM CURRENT USE OF INSULIN (H): Primary | ICD-10-CM

## 2023-09-21 DIAGNOSIS — N52.9 ERECTILE DYSFUNCTION, UNSPECIFIED ERECTILE DYSFUNCTION TYPE: ICD-10-CM

## 2023-09-21 DIAGNOSIS — Z23 NEED FOR PROPHYLACTIC VACCINATION AND INOCULATION AGAINST INFLUENZA: ICD-10-CM

## 2023-09-21 DIAGNOSIS — E78.2 MIXED HYPERLIPIDEMIA: ICD-10-CM

## 2023-09-21 DIAGNOSIS — I10 BENIGN ESSENTIAL HYPERTENSION: ICD-10-CM

## 2023-09-21 LAB
ALBUMIN SERPL BCG-MCNC: 4.5 G/DL (ref 3.5–5.2)
ALP SERPL-CCNC: 69 U/L (ref 40–129)
ALT SERPL W P-5'-P-CCNC: 21 U/L (ref 0–70)
ANION GAP SERPL CALCULATED.3IONS-SCNC: 12 MMOL/L (ref 7–15)
AST SERPL W P-5'-P-CCNC: 21 U/L (ref 0–45)
BILIRUB SERPL-MCNC: 0.4 MG/DL
BUN SERPL-MCNC: 28.1 MG/DL (ref 8–23)
CALCIUM SERPL-MCNC: 9.5 MG/DL (ref 8.8–10.2)
CHLORIDE SERPL-SCNC: 102 MMOL/L (ref 98–107)
CHOLEST SERPL-MCNC: 94 MG/DL
CREAT SERPL-MCNC: 0.98 MG/DL (ref 0.67–1.17)
CREAT UR-MCNC: 145.2 MG/DL
DEPRECATED HCO3 PLAS-SCNC: 25 MMOL/L (ref 22–29)
EGFRCR SERPLBLD CKD-EPI 2021: 80 ML/MIN/1.73M2
GLUCOSE SERPL-MCNC: 129 MG/DL (ref 70–99)
HDLC SERPL-MCNC: 37 MG/DL
LDLC SERPL CALC-MCNC: 46 MG/DL
MICROALBUMIN UR-MCNC: <12 MG/L
MICROALBUMIN/CREAT UR: NORMAL MG/G{CREAT}
NONHDLC SERPL-MCNC: 57 MG/DL
POTASSIUM SERPL-SCNC: 4.2 MMOL/L (ref 3.4–5.3)
PROT SERPL-MCNC: 7.4 G/DL (ref 6.4–8.3)
SODIUM SERPL-SCNC: 139 MMOL/L (ref 136–145)
TRIGL SERPL-MCNC: 53 MG/DL
TSH SERPL DL<=0.005 MIU/L-ACNC: 1.71 UIU/ML (ref 0.3–4.2)

## 2023-09-21 PROCEDURE — 82570 ASSAY OF URINE CREATININE: CPT | Mod: ZL | Performed by: FAMILY MEDICINE

## 2023-09-21 PROCEDURE — 83036 HEMOGLOBIN GLYCOSYLATED A1C: CPT | Mod: ZL | Performed by: FAMILY MEDICINE

## 2023-09-21 PROCEDURE — 84443 ASSAY THYROID STIM HORMONE: CPT | Mod: ZL | Performed by: FAMILY MEDICINE

## 2023-09-21 PROCEDURE — G0008 ADMIN INFLUENZA VIRUS VAC: HCPCS

## 2023-09-21 PROCEDURE — 36415 COLL VENOUS BLD VENIPUNCTURE: CPT | Mod: ZL | Performed by: FAMILY MEDICINE

## 2023-09-21 PROCEDURE — 80053 COMPREHEN METABOLIC PANEL: CPT | Mod: ZL | Performed by: FAMILY MEDICINE

## 2023-09-21 PROCEDURE — G0463 HOSPITAL OUTPT CLINIC VISIT: HCPCS | Mod: 25

## 2023-09-21 PROCEDURE — 99214 OFFICE O/P EST MOD 30 MIN: CPT | Performed by: FAMILY MEDICINE

## 2023-09-21 PROCEDURE — 80061 LIPID PANEL: CPT | Mod: ZL | Performed by: FAMILY MEDICINE

## 2023-09-21 RX ORDER — SILDENAFIL CITRATE 20 MG/1
20-40 TABLET ORAL DAILY PRN
Qty: 60 TABLET | Refills: 3 | Status: SHIPPED | OUTPATIENT
Start: 2023-09-21 | End: 2023-09-21

## 2023-09-21 RX ORDER — SILDENAFIL CITRATE 20 MG/1
20-40 TABLET ORAL DAILY PRN
Qty: 60 TABLET | Refills: 3 | Status: SHIPPED | OUTPATIENT
Start: 2023-09-21

## 2023-09-21 ASSESSMENT — PAIN SCALES - GENERAL: PAINLEVEL: NO PAIN (0)

## 2023-09-21 NOTE — LETTER
September 22, 2023      Rafa Bhatti  [address] [facility] [address]        Dear ,    We are writing to inform you of your test results.    All labs were stable, follow up in 3 months.     Resulted Orders   TSH with free T4 reflex   Result Value Ref Range    TSH 1.71 0.30 - 4.20 uIU/mL   Lipid Profile   Result Value Ref Range    Cholesterol 94 <200 mg/dL    Triglycerides 53 <150 mg/dL    Direct Measure HDL 37 (L) >=40 mg/dL    LDL Cholesterol Calculated 46 <=100 mg/dL    Non HDL Cholesterol 57 <130 mg/dL    Narrative    Cholesterol  Desirable:  <200 mg/dL    Triglycerides  Normal:  Less than 150 mg/dL  Borderline High:  150-199 mg/dL  High:  200-499 mg/dL  Very High:  Greater than or equal to 500 mg/dL    Direct Measure HDL  Female:  Greater than or equal to 50 mg/dL   Male:  Greater than or equal to 40 mg/dL    LDL Cholesterol  Desirable:  <100mg/dL  Above Desirable:  100-129 mg/dL   Borderline High:  130-159 mg/dL   High:  160-189 mg/dL   Very High:  >= 190 mg/dL    Non HDL Cholesterol  Desirable:  130 mg/dL  Above Desirable:  130-159 mg/dL  Borderline High:  160-189 mg/dL  High:  190-219 mg/dL  Very High:  Greater than or equal to 220 mg/dL   Comprehensive metabolic panel   Result Value Ref Range    Sodium 139 136 - 145 mmol/L    Potassium 4.2 3.4 - 5.3 mmol/L    Chloride 102 98 - 107 mmol/L    Carbon Dioxide (CO2) 25 22 - 29 mmol/L    Anion Gap 12 7 - 15 mmol/L    Urea Nitrogen 28.1 (H) 8.0 - 23.0 mg/dL    Creatinine 0.98 0.67 - 1.17 mg/dL    Calcium 9.5 8.8 - 10.2 mg/dL    Glucose 129 (H) 70 - 99 mg/dL    Alkaline Phosphatase 69 40 - 129 U/L    AST 21 0 - 45 U/L      Comment:      Reference intervals for this test were updated on 6/12/2023 to more accurately reflect our healthy population. There may be differences in the flagging of prior results with similar values performed with this method. Interpretation of those prior results can be made in the context of the updated reference intervals.     ALT 21 0 - 70 U/L      Comment:      Reference intervals for this test were updated on 6/12/2023 to more accurately reflect our healthy population. There may be differences in the flagging of prior results with similar values performed with this method. Interpretation of those prior results can be made in the context of the updated reference intervals.      Protein Total 7.4 6.4 - 8.3 g/dL    Albumin 4.5 3.5 - 5.2 g/dL    Bilirubin Total 0.4 <=1.2 mg/dL    GFR Estimate 80 >60 mL/min/1.73m2   Albumin Random Urine Quantitative with Creat Ratio   Result Value Ref Range    Creatinine Urine mg/dL 145.2 mg/dL      Comment:      The reference ranges have not been established in urine creatinine. The results should be integrated into the clinical context for interpretation.    Albumin Urine mg/L <12.0 mg/L      Comment:      The reference ranges have not been established in urine albumin. The results should be integrated into the clinical context for interpretation.    Albumin Urine mg/g Cr        Comment:      Unable to calculate, urine albumin and/or urine creatinine is outside detectable limits.  Microalbuminuria is defined as an albumin:creatinine ratio of 17 to 299 for males and 25 to 299 for females. A ratio of albumin:creatinine of 300 or higher is indicative of overt proteinuria.  Due to biologic variability, positive results should be confirmed by a second, first-morning random or 24-hour timed urine specimen. If there is discrepancy, a third specimen is recommended. When 2 out of 3 results are in the microalbuminuria range, this is evidence for incipient nephropathy and warrants increased efforts at glucose control, blood pressure control, and institution of therapy with an angiotensin-converting-enzyme (ACE) inhibitor (if the patient can tolerate it).     Hemoglobin A1c   Result Value Ref Range    Estimated Average Glucose 154 mg/dL    Hemoglobin A1C 7.0 (H) <5.7 %      Comment:      Normal <5.7%   Prediabetes  5.7-6.4%    Diabetes 6.5% or higher     Note: Adopted from ADA consensus guidelines.       If you have any questions or concerns, please call the clinic at the number listed above.       Sincerely,      Harry Olson MD

## 2023-09-21 NOTE — LETTER
September 22, 2023      Rafa Bhatti  [address] [facility] [address]        Dear ,    We are writing to inform you of your test results.    Your test results fall within the expected range(s) or remain unchanged from previous results.  Please continue with current treatment plan.    Resulted Orders   TSH with free T4 reflex   Result Value Ref Range    TSH 1.71 0.30 - 4.20 uIU/mL   Lipid Profile   Result Value Ref Range    Cholesterol 94 <200 mg/dL    Triglycerides 53 <150 mg/dL    Direct Measure HDL 37 (L) >=40 mg/dL    LDL Cholesterol Calculated 46 <=100 mg/dL    Non HDL Cholesterol 57 <130 mg/dL    Narrative    Cholesterol  Desirable:  <200 mg/dL    Triglycerides  Normal:  Less than 150 mg/dL  Borderline High:  150-199 mg/dL  High:  200-499 mg/dL  Very High:  Greater than or equal to 500 mg/dL    Direct Measure HDL  Female:  Greater than or equal to 50 mg/dL   Male:  Greater than or equal to 40 mg/dL    LDL Cholesterol  Desirable:  <100mg/dL  Above Desirable:  100-129 mg/dL   Borderline High:  130-159 mg/dL   High:  160-189 mg/dL   Very High:  >= 190 mg/dL    Non HDL Cholesterol  Desirable:  130 mg/dL  Above Desirable:  130-159 mg/dL  Borderline High:  160-189 mg/dL  High:  190-219 mg/dL  Very High:  Greater than or equal to 220 mg/dL   Comprehensive metabolic panel   Result Value Ref Range    Sodium 139 136 - 145 mmol/L    Potassium 4.2 3.4 - 5.3 mmol/L    Chloride 102 98 - 107 mmol/L    Carbon Dioxide (CO2) 25 22 - 29 mmol/L    Anion Gap 12 7 - 15 mmol/L    Urea Nitrogen 28.1 (H) 8.0 - 23.0 mg/dL    Creatinine 0.98 0.67 - 1.17 mg/dL    Calcium 9.5 8.8 - 10.2 mg/dL    Glucose 129 (H) 70 - 99 mg/dL    Alkaline Phosphatase 69 40 - 129 U/L    AST 21 0 - 45 U/L      Comment:      Reference intervals for this test were updated on 6/12/2023 to more accurately reflect our healthy population. There may be differences in the flagging of prior results with similar values performed with this method.  Interpretation of those prior results can be made in the context of the updated reference intervals.    ALT 21 0 - 70 U/L      Comment:      Reference intervals for this test were updated on 6/12/2023 to more accurately reflect our healthy population. There may be differences in the flagging of prior results with similar values performed with this method. Interpretation of those prior results can be made in the context of the updated reference intervals.      Protein Total 7.4 6.4 - 8.3 g/dL    Albumin 4.5 3.5 - 5.2 g/dL    Bilirubin Total 0.4 <=1.2 mg/dL    GFR Estimate 80 >60 mL/min/1.73m2   Albumin Random Urine Quantitative with Creat Ratio   Result Value Ref Range    Creatinine Urine mg/dL 145.2 mg/dL      Comment:      The reference ranges have not been established in urine creatinine. The results should be integrated into the clinical context for interpretation.    Albumin Urine mg/L <12.0 mg/L      Comment:      The reference ranges have not been established in urine albumin. The results should be integrated into the clinical context for interpretation.    Albumin Urine mg/g Cr        Comment:      Unable to calculate, urine albumin and/or urine creatinine is outside detectable limits.  Microalbuminuria is defined as an albumin:creatinine ratio of 17 to 299 for males and 25 to 299 for females. A ratio of albumin:creatinine of 300 or higher is indicative of overt proteinuria.  Due to biologic variability, positive results should be confirmed by a second, first-morning random or 24-hour timed urine specimen. If there is discrepancy, a third specimen is recommended. When 2 out of 3 results are in the microalbuminuria range, this is evidence for incipient nephropathy and warrants increased efforts at glucose control, blood pressure control, and institution of therapy with an angiotensin-converting-enzyme (ACE) inhibitor (if the patient can tolerate it).         If you have any questions or concerns, please call  the clinic at the number listed above.       Sincerely,      Harry Olson MD

## 2023-09-22 LAB
EST. AVERAGE GLUCOSE BLD GHB EST-MCNC: 154 MG/DL
HBA1C MFR BLD: 7 %

## 2023-10-03 ENCOUNTER — HOSPITAL ENCOUNTER (OUTPATIENT)
Dept: EDUCATION SERVICES | Facility: HOSPITAL | Age: 78
Discharge: HOME | End: 2023-10-03
Attending: DIETITIAN, REGISTERED | Admitting: FAMILY MEDICINE
Payer: COMMERCIAL

## 2023-10-03 VITALS
BODY MASS INDEX: 26.54 KG/M2 | DIASTOLIC BLOOD PRESSURE: 81 MMHG | HEART RATE: 60 BPM | WEIGHT: 169.1 LBS | SYSTOLIC BLOOD PRESSURE: 159 MMHG | RESPIRATION RATE: 16 BRPM | HEIGHT: 67 IN | OXYGEN SATURATION: 96 %

## 2023-10-03 DIAGNOSIS — E11.9 TYPE 2 DIABETES MELLITUS WITHOUT COMPLICATION, WITHOUT LONG-TERM CURRENT USE OF INSULIN (H): Primary | ICD-10-CM

## 2023-10-03 PROCEDURE — 97803 MED NUTRITION INDIV SUBSEQ: CPT | Performed by: DIETITIAN, REGISTERED

## 2023-10-03 ASSESSMENT — PAIN SCALES - GENERAL: PAINLEVEL: SEVERE PAIN (6)

## 2023-10-03 NOTE — PROGRESS NOTES
Diabetes Self-Management Education & Support    Presents for: Individual review    Type of Service: In Person Visit    ASSESSMENT:  Recent A1c in target at 7.0% noting improvement from previous A1c of 8.4%.  Pt has been trying to make healthier food choices and has been active walking, kayaking over the summer months.  He is tolerating Januvia without side effects.  Eye exam and foot exam are up to date.  BP high on two checks today and pt agrees to BP only recheck.      Patient's most recent   Lab Results   Component Value Date    A1C 7.0 09/21/2023    A1C 10.9 06/16/2021    HEMOGLOBINA1 9.3 03/09/2023     is meeting goal of  <7.5% for age.     Diabetes knowledge and skills assessment:   Patient is knowledgeable in diabetes management concepts related to: Healthy Eating, Being Active, Monitoring, Taking Medication, Problem Solving, Reducing Risks, and Healthy Coping    Continue education with the following diabetes management concepts:  No concerns today.  Pt is doing well.     Based on learning assessment above, most appropriate setting for further diabetes education would be: Individual setting.      PLAN  Continue to work on healthy, low carbohydrate food choices.    Try to be active over the winter months.   Test glucose 1x/day at alternating times.   BP recheck.     Follow-up: 3 months - A1c.     See Care Plan for co-developed, patient-state behavior change goals.  AVS provided for patient today.    Education Materials Provided:  No new materials today.     SUBJECTIVE/OBJECTIVE:  Presents for: Individual review  Accompanied by: Self  Diabetes education in the past 24mo: Yes  Focus of Visit: Assistance w/ making life changes  Diabetes type: Type 2  Date of diagnosis: 4/2018  Disease course: Improving  How confident are you filling out medical forms by yourself:: Extremely  Diabetes management related comments/concerns: No concerns.  Transportation concerns: No  Difficulty affording diabetes medication?: No (If  "patient uses his mail order pharmacy the highest cost is $ 60.00 for a three month supply.)  Difficulty affording diabetes testing supplies?: No  Other concerns:: None  Cultural Influences/Ethnic Background:  Not  or     Diabetes Symptoms & Complications:  Fatigue: No  Neuropathy: Yes (some numbness)  Polydipsia: No  Polyphagia: No  Polyuria: No  Visual change: Yes (cataracts)  Slow healing wounds: No  Symptom course: Stable  Weight trend: Stable  Complications assessed today?: No  CVA: No  Heart disease: No  Nephropathy: No  Retinopathy: No    Patient Problem List and Family Medical History reviewed for relevant medical history, current medical status, and diabetes risk factors.    Vitals:  /81   Pulse 60   Resp 16   Ht 1.708 m (5' 7.25\")   Wt 76.7 kg (169 lb 1.6 oz)   SpO2 96%   BMI 26.29 kg/m    Estimated body mass index is 26.29 kg/m  as calculated from the following:    Height as of this encounter: 1.708 m (5' 7.25\").    Weight as of this encounter: 76.7 kg (169 lb 1.6 oz).   Last 3 BP:   BP Readings from Last 3 Encounters:   10/03/23 159/81   09/21/23 134/86   07/17/23 165/85       History   Smoking Status    Never   Smokeless Tobacco    Never       Labs:  Lab Results   Component Value Date    A1C 7.0 09/21/2023    A1C 10.9 06/16/2021    HEMOGLOBINA1 9.3 03/09/2023     Lab Results   Component Value Date     09/21/2023     07/11/2023     06/30/2022     06/30/2021     Lab Results   Component Value Date    LDL 46 09/21/2023    LDL 32 06/16/2021     HDL Cholesterol   Date Value Ref Range Status   06/16/2021 32 (L) >39 mg/dL Final     Direct Measure HDL   Date Value Ref Range Status   09/21/2023 37 (L) >=40 mg/dL Final   ]  GFR Estimate   Date Value Ref Range Status   09/21/2023 80 >60 mL/min/1.73m2 Final   06/30/2021 90 >60 mL/min/[1.73_m2] Final     Comment:     Non  GFR Calc  Starting 12/18/2018, serum creatinine based estimated GFR (eGFR) " will be   calculated using the Chronic Kidney Disease Epidemiology Collaboration   (CKD-EPI) equation.       GFR Estimate If Black   Date Value Ref Range Status   06/30/2021 >90 >60 mL/min/[1.73_m2] Final     Comment:      GFR Calc  Starting 12/18/2018, serum creatinine based estimated GFR (eGFR) will be   calculated using the Chronic Kidney Disease Epidemiology Collaboration   (CKD-EPI) equation.       Lab Results   Component Value Date    CR 0.98 09/21/2023    CR 0.76 06/30/2021     No results found for: MICROALBUMIN    Healthy Eating:  Healthy Eating Assessed Today: Yes  Cultural/Tenriism diet restrictions?: No  Meal planning/habits: None  How many times a week on average do you eat food made away from home (restaurant/take-out)?: 0  Meals include: Breakfast, Lunch, Dinner  Breakfast: rice krispies with fruit (stopped honey nut cheerios) or oatmeal - sometimes fruit  Lunch: 1/2 sandwich, carrots or small bag of chips or soup/crackers - sometimes yogurt/granola  Dinner: meat, grilled veggies/potatoes or salad with chicken  Snacks: popcorn or small zero sugar candy bar or peanuts  Other: If dines out pizza - < 1x/week  Beverages: Water, Coffee, Diet soda, Sports drinks (sugar free sports drink)  Has patient met with a dietitian in the past?: Yes    Being Active:  Being Active Assessed Today: Yes  Exercise:: Yes (walking)  Days per week of moderate to strenuous exercise (like a brisk walk): 5  On average, minutes per day of exercise at this level: 60  How intense was your typical exercise? : Moderate (like brisk walking)  Exercise Minutes per Week: 300  Barrier to exercise: None    Monitoring:  Monitoring Assessed Today: Yes  Did patient bring glucose meter to appointment? : No  Blood Glucose Meter: FreeStyle  Times checking blood sugar at home (number): Other (Pt has one test in the past two weeks.)  Noon-118    Taking Medications:  Diabetes Medication(s)       Dipeptidyl Peptidase-4 (DPP-4)  Inhibitors       sitagliptin (JANUVIA) 100 MG tablet    Take 1 tablet (100 mg) by mouth daily            Taking Medication Assessed Today: Yes  Current Treatments: Oral Medication (taken by mouth) (Januvia 100 mg daily)  Problems taking diabetes medications regularly?: No  Diabetes medication side effects?: Yes (Had diarrhea from Metformin XR - stopped April 2022.  Did not tolerate Glimepiride.  Does not want to take Jardiance.)    Problem Solving:  Problem Solving Assessed Today: Yes  Is the patient at risk for hypoglycemia?: No  Hypoglycemia Frequency: Rarely  Hypoglycemia Treatment: Glucose (tablets or gel)  Is the patient at risk for DKA?: No    Hypoglycemia symptoms  Feeling shaky: Yes    Reducing Risks:  Reducing Risks Assessed Today: Yes  Diabetes Risks: Age over 45 years  CAD Risks: Diabetes Mellitus, Male sex  Has dilated eye exam at least once a year?: Yes  Sees dentist every 6 months?: Yes  Feet checked by healthcare provider in the last year?: Yes (9/21/23 [facility])    Healthy Coping:  Healthy Coping Assessed Today: Yes  Emotional response to diabetes: Acceptance, Concern for health and well-being  Informal Support system:: Spouse  Stage of change: MAINTENANCE (Working to maintain change, with risk of relapse)  Patient Activation Measure Survey Score:      2/20/2020     2:00 PM   ALICIA Score (Last Two)   ALICIA Raw Score 38   Activation Score 83.7   ALICIA Level 4       Time Spent: 30 minutes  Encounter Type: Individual    Any diabetes medication dose changes were made via the CDE Protocol per the patient's referring provider. A copy of this encounter was shared with the provider.

## 2023-10-03 NOTE — PATIENT INSTRUCTIONS
-Keep working on healthy food choices.    -Your exercise is great!  Try to keep active during the winter months.   -Keep checking your glucose 1x/day as able.  Good times are fasting or 2 hours after a meal.  -Target glucose levels are fasting , 2 hours after a meal < 180.  -Recent A1c was 7.0% - Great job!  Goal is < 7.5%, < 7.0% is better.   -A1c should be checked in three months - anytime after December 21st.    -Follow up in diabetes center annually or for A1c check.    -MICHAEL Seals, [facility] [phone].

## 2023-10-03 NOTE — LETTER
10/3/2023        RE: Rafa Bhatti  [address] [facility] [address]        Diabetes Self-Management Education & Support    Presents for: Individual review    Type of Service: In Person Visit    ASSESSMENT:  Recent A1c in target at 7.0% noting improvement from previous A1c of 8.4%.  Pt has been trying to make healthier food choices and has been active walking, kayaking over the summer months.  He is tolerating Januvia without side effects.  Eye exam and foot exam are up to date.  BP high on two checks today and pt agrees to BP only recheck.      Patient's most recent   Lab Results   Component Value Date    A1C 7.0 09/21/2023    A1C 10.9 06/16/2021    HEMOGLOBINA1 9.3 03/09/2023     is meeting goal of  <7.5% for age.     Diabetes knowledge and skills assessment:   Patient is knowledgeable in diabetes management concepts related to: Healthy Eating, Being Active, Monitoring, Taking Medication, Problem Solving, Reducing Risks, and Healthy Coping    Continue education with the following diabetes management concepts:  No concerns today.  Pt is doing well.     Based on learning assessment above, most appropriate setting for further diabetes education would be: Individual setting.      PLAN  Continue to work on healthy, low carbohydrate food choices.    Try to be active over the winter months.   Test glucose 1x/day at alternating times.   BP recheck.     Follow-up: 3 months - A1c.     See Care Plan for co-developed, patient-state behavior change goals.  AVS provided for patient today.    Education Materials Provided:  No new materials today.     SUBJECTIVE/OBJECTIVE:  Presents for: Individual review  Accompanied by: Self  Diabetes education in the past 24mo: Yes  Focus of Visit: Assistance w/ making life changes  Diabetes type: Type 2  Date of diagnosis: 4/2018  Disease course: Improving  How confident are you filling out medical forms by yourself:: Extremely  Diabetes management related comments/concerns: No  "concerns.  Transportation concerns: No  Difficulty affording diabetes medication?: No (If patient uses his mail order pharmacy the highest cost is $ 60.00 for a three month supply.)  Difficulty affording diabetes testing supplies?: No  Other concerns:: None  Cultural Influences/Ethnic Background:  Not  or     Diabetes Symptoms & Complications:  Fatigue: No  Neuropathy: Yes (some numbness)  Polydipsia: No  Polyphagia: No  Polyuria: No  Visual change: Yes (cataracts)  Slow healing wounds: No  Symptom course: Stable  Weight trend: Stable  Complications assessed today?: No  CVA: No  Heart disease: No  Nephropathy: No  Retinopathy: No    Patient Problem List and Family Medical History reviewed for relevant medical history, current medical status, and diabetes risk factors.    Vitals:  /81   Pulse 60   Resp 16   Ht 1.708 m (5' 7.25\")   Wt 76.7 kg (169 lb 1.6 oz)   SpO2 96%   BMI 26.29 kg/m    Estimated body mass index is 26.29 kg/m  as calculated from the following:    Height as of this encounter: 1.708 m (5' 7.25\").    Weight as of this encounter: 76.7 kg (169 lb 1.6 oz).   Last 3 BP:   BP Readings from Last 3 Encounters:   10/03/23 159/81   09/21/23 134/86   07/17/23 165/85       History   Smoking Status     Never   Smokeless Tobacco     Never       Labs:  Lab Results   Component Value Date    A1C 7.0 09/21/2023    A1C 10.9 06/16/2021    HEMOGLOBINA1 9.3 03/09/2023     Lab Results   Component Value Date     09/21/2023     07/11/2023     06/30/2022     06/30/2021     Lab Results   Component Value Date    LDL 46 09/21/2023    LDL 32 06/16/2021     HDL Cholesterol   Date Value Ref Range Status   06/16/2021 32 (L) >39 mg/dL Final     Direct Measure HDL   Date Value Ref Range Status   09/21/2023 37 (L) >=40 mg/dL Final   ]  GFR Estimate   Date Value Ref Range Status   09/21/2023 80 >60 mL/min/1.73m2 Final   06/30/2021 90 >60 mL/min/[1.73_m2] Final     Comment:     Non "  GFR Calc  Starting 12/18/2018, serum creatinine based estimated GFR (eGFR) will be   calculated using the Chronic Kidney Disease Epidemiology Collaboration   (CKD-EPI) equation.       GFR Estimate If Black   Date Value Ref Range Status   06/30/2021 >90 >60 mL/min/[1.73_m2] Final     Comment:      GFR Calc  Starting 12/18/2018, serum creatinine based estimated GFR (eGFR) will be   calculated using the Chronic Kidney Disease Epidemiology Collaboration   (CKD-EPI) equation.       Lab Results   Component Value Date    CR 0.98 09/21/2023    CR 0.76 06/30/2021     No results found for: MICROALBUMIN    Healthy Eating:  Healthy Eating Assessed Today: Yes  Cultural/Synagogue diet restrictions?: No  Meal planning/habits: None  How many times a week on average do you eat food made away from home (restaurant/take-out)?: 0  Meals include: Breakfast, Lunch, Dinner  Breakfast: rice krispies with fruit (stopped honey nut cheerios) or oatmeal - sometimes fruit  Lunch: 1/2 sandwich, carrots or small bag of chips or soup/crackers - sometimes yogurt/granola  Dinner: meat, grilled veggies/potatoes or salad with chicken  Snacks: popcorn or small zero sugar candy bar or peanuts  Other: If dines out pizza - < 1x/week  Beverages: Water, Coffee, Diet soda, Sports drinks (sugar free sports drink)  Has patient met with a dietitian in the past?: Yes    Being Active:  Being Active Assessed Today: Yes  Exercise:: Yes (walking)  Days per week of moderate to strenuous exercise (like a brisk walk): 5  On average, minutes per day of exercise at this level: 60  How intense was your typical exercise? : Moderate (like brisk walking)  Exercise Minutes per Week: 300  Barrier to exercise: None    Monitoring:  Monitoring Assessed Today: Yes  Did patient bring glucose meter to appointment? : No  Blood Glucose Meter: FreeStyle  Times checking blood sugar at home (number): Other (Pt has one test in the past two  weeks.)  Noon-118    Taking Medications:  Diabetes Medication(s)       Dipeptidyl Peptidase-4 (DPP-4) Inhibitors       sitagliptin (JANUVIA) 100 MG tablet    Take 1 tablet (100 mg) by mouth daily            Taking Medication Assessed Today: Yes  Current Treatments: Oral Medication (taken by mouth) (Januvia 100 mg daily)  Problems taking diabetes medications regularly?: No  Diabetes medication side effects?: Yes (Had diarrhea from Metformin XR - stopped April 2022.  Did not tolerate Glimepiride.  Does not want to take Jardiance.)    Problem Solving:  Problem Solving Assessed Today: Yes  Is the patient at risk for hypoglycemia?: No  Hypoglycemia Frequency: Rarely  Hypoglycemia Treatment: Glucose (tablets or gel)  Is the patient at risk for DKA?: No    Hypoglycemia symptoms  Feeling shaky: Yes    Reducing Risks:  Reducing Risks Assessed Today: Yes  Diabetes Risks: Age over 45 years  CAD Risks: Diabetes Mellitus, Male sex  Has dilated eye exam at least once a year?: Yes  Sees dentist every 6 months?: Yes  Feet checked by healthcare provider in the last year?: Yes (9/21/23 [facility])    Healthy Coping:  Healthy Coping Assessed Today: Yes  Emotional response to diabetes: Acceptance, Concern for health and well-being  Informal Support system:: Spouse  Stage of change: MAINTENANCE (Working to maintain change, with risk of relapse)  Patient Activation Measure Survey Score:      2/20/2020     2:00 PM   ALICIA Score (Last Two)   ALICIA Raw Score 38   Activation Score 83.7   ALICIA Level 4       Time Spent: 30 minutes  Encounter Type: Individual    Any diabetes medication dose changes were made via the CDE Protocol per the patient's referring provider. A copy of this encounter was shared with the provider.       Sincerely,        Ava Templeton RD

## 2023-10-16 DIAGNOSIS — M1A.0720 CHRONIC IDIOPATHIC GOUT INVOLVING TOE OF LEFT FOOT WITHOUT TOPHUS: ICD-10-CM

## 2023-10-18 NOTE — TELEPHONE ENCOUNTER
Allopurinol      Last Written Prescription Date:  9/5/23  Last Fill Quantity: 180,   # refills: 0  Last Office Visit: 9/21/23  Future Office visit:       Routing refill request to provider for review/approval because:

## 2023-10-19 RX ORDER — ALLOPURINOL 100 MG/1
TABLET ORAL
Qty: 180 TABLET | Refills: 0 | Status: SHIPPED | OUTPATIENT
Start: 2023-10-19 | End: 2024-03-20

## 2023-10-25 ENCOUNTER — TRANSFERRED RECORDS (OUTPATIENT)
Dept: HEALTH INFORMATION MANAGEMENT | Facility: CLINIC | Age: 78
End: 2023-10-25

## 2023-10-25 LAB — RETINOPATHY: NEGATIVE

## 2024-01-23 ENCOUNTER — HOSPITAL ENCOUNTER (EMERGENCY)
Facility: HOSPITAL | Age: 79
Discharge: HOME | End: 2024-01-23
Attending: STUDENT IN AN ORGANIZED HEALTH CARE EDUCATION/TRAINING PROGRAM | Admitting: STUDENT IN AN ORGANIZED HEALTH CARE EDUCATION/TRAINING PROGRAM
Payer: COMMERCIAL

## 2024-01-23 ENCOUNTER — APPOINTMENT (OUTPATIENT)
Dept: GENERAL RADIOLOGY | Facility: HOSPITAL | Age: 79
End: 2024-01-23
Attending: STUDENT IN AN ORGANIZED HEALTH CARE EDUCATION/TRAINING PROGRAM
Payer: COMMERCIAL

## 2024-01-23 VITALS
SYSTOLIC BLOOD PRESSURE: 139 MMHG | HEART RATE: 60 BPM | OXYGEN SATURATION: 95 % | DIASTOLIC BLOOD PRESSURE: 81 MMHG | RESPIRATION RATE: 16 BRPM | TEMPERATURE: 97.5 F

## 2024-01-23 DIAGNOSIS — R07.9 CHEST PAIN, UNSPECIFIED TYPE: ICD-10-CM

## 2024-01-23 DIAGNOSIS — R94.39 ABNORMAL STRESS TEST: ICD-10-CM

## 2024-01-23 LAB
ANION GAP SERPL CALCULATED.3IONS-SCNC: 11 MMOL/L (ref 7–15)
BASOPHILS # BLD AUTO: 0 10E3/UL (ref 0–0.2)
BASOPHILS NFR BLD AUTO: 1 %
BUN SERPL-MCNC: 22.2 MG/DL (ref 8–23)
CALCIUM SERPL-MCNC: 9.3 MG/DL (ref 8.8–10.2)
CHLORIDE SERPL-SCNC: 98 MMOL/L (ref 98–107)
CREAT SERPL-MCNC: 1.03 MG/DL (ref 0.67–1.17)
DEPRECATED HCO3 PLAS-SCNC: 27 MMOL/L (ref 22–29)
EGFRCR SERPLBLD CKD-EPI 2021: 75 ML/MIN/1.73M2
EOSINOPHIL # BLD AUTO: 0 10E3/UL (ref 0–0.7)
EOSINOPHIL NFR BLD AUTO: 0 %
ERYTHROCYTE [DISTWIDTH] IN BLOOD BY AUTOMATED COUNT: 13 % (ref 10–15)
FLUAV RNA SPEC QL NAA+PROBE: NEGATIVE
FLUBV RNA RESP QL NAA+PROBE: NEGATIVE
GLUCOSE SERPL-MCNC: 306 MG/DL (ref 70–99)
HCT VFR BLD AUTO: 42.4 % (ref 40–53)
HGB BLD-MCNC: 14.1 G/DL (ref 13.3–17.7)
HOLD SPECIMEN: NORMAL
IMM GRANULOCYTES # BLD: 0 10E3/UL
IMM GRANULOCYTES NFR BLD: 0 %
LYMPHOCYTES # BLD AUTO: 1.1 10E3/UL (ref 0.8–5.3)
LYMPHOCYTES NFR BLD AUTO: 19 %
MCH RBC QN AUTO: 30.3 PG (ref 26.5–33)
MCHC RBC AUTO-ENTMCNC: 33.3 G/DL (ref 31.5–36.5)
MCV RBC AUTO: 91 FL (ref 78–100)
MONOCYTES # BLD AUTO: 0.3 10E3/UL (ref 0–1.3)
MONOCYTES NFR BLD AUTO: 6 %
NEUTROPHILS # BLD AUTO: 4.2 10E3/UL (ref 1.6–8.3)
NEUTROPHILS NFR BLD AUTO: 74 %
NRBC # BLD AUTO: 0 10E3/UL
NRBC BLD AUTO-RTO: 0 /100
PLATELET # BLD AUTO: 136 10E3/UL (ref 150–450)
POTASSIUM SERPL-SCNC: 4.6 MMOL/L (ref 3.4–5.3)
RBC # BLD AUTO: 4.65 10E6/UL (ref 4.4–5.9)
RSV RNA SPEC NAA+PROBE: NEGATIVE
SARS-COV-2 RNA RESP QL NAA+PROBE: NEGATIVE
SODIUM SERPL-SCNC: 136 MMOL/L (ref 135–145)
TROPONIN T SERPL HS-MCNC: 10 NG/L
TROPONIN T SERPL HS-MCNC: 9 NG/L
WBC # BLD AUTO: 5.6 10E3/UL (ref 4–11)

## 2024-01-23 PROCEDURE — 84484 ASSAY OF TROPONIN QUANT: CPT | Performed by: STUDENT IN AN ORGANIZED HEALTH CARE EDUCATION/TRAINING PROGRAM

## 2024-01-23 PROCEDURE — 93005 ELECTROCARDIOGRAM TRACING: CPT

## 2024-01-23 PROCEDURE — 36415 COLL VENOUS BLD VENIPUNCTURE: CPT | Performed by: STUDENT IN AN ORGANIZED HEALTH CARE EDUCATION/TRAINING PROGRAM

## 2024-01-23 PROCEDURE — 99284 EMERGENCY DEPT VISIT MOD MDM: CPT | Performed by: STUDENT IN AN ORGANIZED HEALTH CARE EDUCATION/TRAINING PROGRAM

## 2024-01-23 PROCEDURE — 85048 AUTOMATED LEUKOCYTE COUNT: CPT | Performed by: STUDENT IN AN ORGANIZED HEALTH CARE EDUCATION/TRAINING PROGRAM

## 2024-01-23 PROCEDURE — 71046 X-RAY EXAM CHEST 2 VIEWS: CPT

## 2024-01-23 PROCEDURE — 93010 ELECTROCARDIOGRAM REPORT: CPT | Performed by: INTERNAL MEDICINE

## 2024-01-23 PROCEDURE — 250N000013 HC RX MED GY IP 250 OP 250 PS 637: Performed by: STUDENT IN AN ORGANIZED HEALTH CARE EDUCATION/TRAINING PROGRAM

## 2024-01-23 PROCEDURE — 99285 EMERGENCY DEPT VISIT HI MDM: CPT | Mod: 25

## 2024-01-23 PROCEDURE — 80048 BASIC METABOLIC PNL TOTAL CA: CPT | Performed by: STUDENT IN AN ORGANIZED HEALTH CARE EDUCATION/TRAINING PROGRAM

## 2024-01-23 PROCEDURE — 87637 SARSCOV2&INF A&B&RSV AMP PRB: CPT | Performed by: STUDENT IN AN ORGANIZED HEALTH CARE EDUCATION/TRAINING PROGRAM

## 2024-01-23 RX ORDER — MAGNESIUM HYDROXIDE/ALUMINUM HYDROXICE/SIMETHICONE 120; 1200; 1200 MG/30ML; MG/30ML; MG/30ML
15 SUSPENSION ORAL ONCE
Status: DISCONTINUED | OUTPATIENT
Start: 2024-01-23 | End: 2024-01-23 | Stop reason: HOSPADM

## 2024-01-23 RX ORDER — NITROGLYCERIN 0.4 MG/1
0.4 TABLET SUBLINGUAL EVERY 5 MIN PRN
Status: DISCONTINUED | OUTPATIENT
Start: 2024-01-23 | End: 2024-01-23 | Stop reason: HOSPADM

## 2024-01-23 RX ORDER — ACETAMINOPHEN 325 MG/1
975 TABLET ORAL ONCE
Status: COMPLETED | OUTPATIENT
Start: 2024-01-23 | End: 2024-01-23

## 2024-01-23 RX ADMIN — ACETAMINOPHEN 975 MG: 325 TABLET, FILM COATED ORAL at 11:26

## 2024-01-23 ASSESSMENT — ACTIVITIES OF DAILY LIVING (ADL): ADLS_ACUITY_SCORE: 35

## 2024-01-23 NOTE — ED PROVIDER NOTES
[facility]  ED Provider Note    Chief Complaint   Patient presents with    Hypertension     History:  Rafa Bhatti is a 78 year old male with vague chest pain. He notes that he woke up this morning with a racing heart and felt generally unwell.  He states that he felt well when he went to bed last night.  No diaphoresis or lightheadedness associated with the symptoms.  He states it lasted for about a minute.  He came to the emergency department while waiting to be seen here he developed chest pain in the left chest is nonradiating.  Again no lightheadedness or diaphoresis associated.  No ripping tearing in the chest going to the back.  Some nausea no vomiting.  No cough no fever    Review of Systems   Performed; see HPI for pertinent positives and negatives.     Medical history, surgical history, and social history was reviewed.  Nursing documentation, triage note, and vitals were reviewed.    Vitals:  BP: 166/82  Pulse: 63  Temp: (!) 96.7  F (35.9  C)  Resp: 16  SpO2: 98 %    Physical Exam:  Constitutional: Alert and conversant. NAD   HENT: NCAT   Eyes: Normal pupils   Neck: supple   CV: No pallor  Pulmonary/Chest: Non-labored respirations  Abdominal: non-distended, soft no rebound no guarding nontender negative Perez sign no pain McBurney's  MSK: VARGAS.   Neuro: Alert and appropriate   Skin: Warm and dry. No diaphoresis. No rashes on exposed skin    Psych: Appropriate mood and affect       MDM:      ED Course as of 01/23/24 1501   Tue Jan 23, 2024   1214 Rafa Bhatti is a 78 year old male presenting with chest pain.    Differential includes but is not limited to acute coronary syndrome, pulmonary embolism, pneumonia, aortic dissection, pneumothorax, GERD, pericarditis, myocarditis, MSK/costochondritis, shingles.    Vitals reassuring  Exam patient nontoxic and well-appearing  EKG no signs of acute ischemia or arrhythmia.  Normal sinus rhythm  CXR unremarkable  Labs all within normal  limits    History and exam suggest a low likelihood of pulmonary embolism, aortic dissection, or pulmonary pathology as the etiology of this patient's pain.      Given the patient's few risk factors and atypical history for acute coronary syndrome with studies results suggesting low suspicion of a coronary event, the patient is stable for outpatient management. The etiology of the chest pain is unclear but resolved with tylenol. Given the nature of the patient's symptoms a stress echo with cardiology follow up is  necessary and has been ordered.        Impression:  Final diagnoses:   Abnormal stress test   Chest pain, unspecified type          Mark Rae MD  01/23/24 4518

## 2024-01-23 NOTE — ED NOTES
"Patient presents to the emergency room with complaints of \"heart racing\" and chest tightness. Pt reports he woke up with his heart racing, chest tightness, and right shoulder pain. C/o nausea, dizziness, \"feeling unsteady,\" and SOB with the chest pressure. Chest pain is not reproducible. No longer feels like his heart is racing. Rates chest pressure 4/10.   "

## 2024-01-23 NOTE — DISCHARGE INSTRUCTIONS
Return to emergency room if worsening symptoms and concerning symptoms.  Follow with primary care provider or cardiology within the next week or 2.  Get the Iris scan done soon as possible.

## 2024-01-23 NOTE — ED TRIAGE NOTES
"Pt states that he \"just hasn't felt right for the last couple of days\". Pt states that his BP has been elevated, his heart feels like its racing at times. Denies any hx of afib. Pt states his shoulders are bothering him, 6/10 pain. Pt also states that he feels unsteady while walking.         "

## 2024-01-24 LAB
ATRIAL RATE - MUSE: 59 BPM
DIASTOLIC BLOOD PRESSURE - MUSE: NORMAL MMHG
INTERPRETATION ECG - MUSE: NORMAL
P AXIS - MUSE: 8 DEGREES
PR INTERVAL - MUSE: 196 MS
QRS DURATION - MUSE: 110 MS
QT - MUSE: 398 MS
QTC - MUSE: 394 MS
R AXIS - MUSE: -20 DEGREES
SYSTOLIC BLOOD PRESSURE - MUSE: NORMAL MMHG
T AXIS - MUSE: 47 DEGREES
VENTRICULAR RATE- MUSE: 59 BPM

## 2024-01-29 ENCOUNTER — TELEPHONE (OUTPATIENT)
Dept: NUCLEAR MEDICINE | Facility: HOSPITAL | Age: 79
End: 2024-01-29

## 2024-01-29 NOTE — TELEPHONE ENCOUNTER
1/29: Staff spoke to patient to remind him of his Nuc Med Appointment tomorrow morning at 0715 arrival for a 0730 appointment.  Reminded of no Caffeine after 6PM tonight and NPO after midnight.  OK to take meds with sips of water. Patient had no questions.

## 2024-01-30 ENCOUNTER — HOSPITAL ENCOUNTER (OUTPATIENT)
Dept: NUCLEAR MEDICINE | Facility: HOSPITAL | Age: 79
Setting detail: NUCLEAR MEDICINE
Discharge: HOME | End: 2024-01-30
Attending: STUDENT IN AN ORGANIZED HEALTH CARE EDUCATION/TRAINING PROGRAM
Payer: COMMERCIAL

## 2024-01-30 ENCOUNTER — HOSPITAL ENCOUNTER (OUTPATIENT)
Dept: CARDIOLOGY | Facility: HOSPITAL | Age: 79
Setting detail: NUCLEAR MEDICINE
Discharge: HOME | End: 2024-01-30
Attending: STUDENT IN AN ORGANIZED HEALTH CARE EDUCATION/TRAINING PROGRAM
Payer: COMMERCIAL

## 2024-01-30 DIAGNOSIS — R94.39 ABNORMAL STRESS TEST: ICD-10-CM

## 2024-01-30 DIAGNOSIS — R07.9 CHEST PAIN, UNSPECIFIED TYPE: ICD-10-CM

## 2024-01-30 LAB
CV BLOOD PRESSURE: 58 MMHG
CV STRESS MAX HR HE: 87
NUC STRESS EJECTION FRACTION: 73 %
RATE PRESSURE PRODUCT: 8700
STRESS ECHO BASELINE DIASTOLIC HE: 80
STRESS ECHO BASELINE HR: 53 BPM
STRESS ECHO BASELINE SYSTOLIC BP: 140
STRESS ECHO CALCULATED PERCENT HR: 60 %
STRESS ECHO LAST STRESS DIASTOLIC BP: 54
STRESS ECHO LAST STRESS SYSTOLIC BP: 100
STRESS ECHO TARGET HR: 144

## 2024-01-30 PROCEDURE — 93018 CV STRESS TEST I&R ONLY: CPT | Performed by: INTERNAL MEDICINE

## 2024-01-30 PROCEDURE — 250N000011 HC RX IP 250 OP 636: Performed by: INTERNAL MEDICINE

## 2024-01-30 PROCEDURE — 343N000001 HC RX 343: Performed by: RADIOLOGY

## 2024-01-30 PROCEDURE — 93017 CV STRESS TEST TRACING ONLY: CPT

## 2024-01-30 PROCEDURE — A9500 TC99M SESTAMIBI: HCPCS | Performed by: RADIOLOGY

## 2024-01-30 PROCEDURE — 78452 HT MUSCLE IMAGE SPECT MULT: CPT

## 2024-01-30 PROCEDURE — 93016 CV STRESS TEST SUPVJ ONLY: CPT | Performed by: INTERNAL MEDICINE

## 2024-01-30 RX ORDER — AMINOPHYLLINE 25 MG/ML
INJECTION, SOLUTION INTRAVENOUS
Status: DISCONTINUED
Start: 2024-01-30 | End: 2024-01-30 | Stop reason: WASHOUT

## 2024-01-30 RX ORDER — REGADENOSON 0.08 MG/ML
0.4 INJECTION, SOLUTION INTRAVENOUS ONCE
Status: COMPLETED | OUTPATIENT
Start: 2024-01-30 | End: 2024-01-30

## 2024-01-30 RX ADMIN — Medication 10.8 MILLICURIE: at 07:55

## 2024-01-30 RX ADMIN — Medication 30.6 MILLICURIE: at 09:40

## 2024-01-30 RX ADMIN — REGADENOSON 0.4 MG: 0.08 INJECTION, SOLUTION INTRAVENOUS at 09:34

## 2024-02-05 ENCOUNTER — TELEPHONE (OUTPATIENT)
Dept: FAMILY MEDICINE | Facility: OTHER | Age: 79
End: 2024-02-05

## 2024-02-05 NOTE — TELEPHONE ENCOUNTER
I see it it was ordered through the ER.  There is a small area of question that was the same when he did a stress test last year.  Overall very stable but still a questionable blockage on the stress test.  I would have him follow up with cardiology on this for further investigation if they deem necessary.  Thanks.  Harry Olson MD

## 2024-02-05 NOTE — TELEPHONE ENCOUNTER
8:19 AM    Reason for Call: Phone Call    Description: pt called about the stress test results. Please call pt    Was an appointment offered for this call? No  If yes : Appointment type              Date    Preferred method for responding to this message: Telephone Call  What is your phone number ? [phone]     If we cannot reach you directly, may we leave a detailed response at the number you provided? Yes    Can this message wait until your PCP/provider returns, if available today? Lyssa Charles

## 2024-03-01 DIAGNOSIS — E78.2 MIXED HYPERLIPIDEMIA: ICD-10-CM

## 2024-03-04 RX ORDER — ROSUVASTATIN CALCIUM 20 MG/1
20 TABLET, COATED ORAL DAILY
Qty: 90 TABLET | Refills: 3 | Status: SHIPPED | OUTPATIENT
Start: 2024-03-04

## 2024-03-04 NOTE — TELEPHONE ENCOUNTER
Rosuvastatin (crestor)      Last Written Prescription Date:  5/15/23  Last Fill Quantity: 90,   # refills: 3  Last Office Visit: 03/22/23  Future Office visit:    Next 5 appointments (look out 90 days)      Mar 25, 2024 10:00 AM  (Arrive by 9:45 AM)  Return Visit with Jimbo Crawford DO  [facility] [place] ([facility] [place] ) [address] [place] [address]  [phone]           Prescription approved per [facility] Refill Protocol.

## 2024-03-04 NOTE — TELEPHONE ENCOUNTER
Disp Refills Start End BOBBY   rosuvastatin (CRESTOR) 20 MG tablet 90 tablet 3 5/15/2023 -- --     Last Office Visit: 3/22/2023  Future Office visit:    Next 5 appointments (look out 90 days)      Mar 25, 2024 10:00 AM  (Arrive by 9:45 AM)  Return Visit with Jimbo Crawford DO  [facility] [place] ([facility] [place] ) [address]  [place] [address]  [phone]             Routing refill request to provider for review/approval because:

## 2024-03-20 DIAGNOSIS — M1A.0720 CHRONIC IDIOPATHIC GOUT INVOLVING TOE OF LEFT FOOT WITHOUT TOPHUS: ICD-10-CM

## 2024-03-20 RX ORDER — ALLOPURINOL 100 MG/1
TABLET ORAL
Qty: 180 TABLET | Refills: 0 | Status: SHIPPED | OUTPATIENT
Start: 2024-03-20 | End: 2024-07-15

## 2024-03-20 NOTE — TELEPHONE ENCOUNTER
Allopurinol      Last Written Prescription Date:  10/19/23  Last Fill Quantity: 180,   # refills: 0  Last Office Visit: 9/21/23  Future Office visit:    Next 5 appointments (look out 90 days)      Mar 25, 2024 10:00 AM  (Arrive by 9:45 AM)  Return Visit with Jimbo Crawford DO  [facility] [place] ([facility] [place] ) [address]  [place] [address]  [phone]             Routing refill request to provider for review/approval because:

## 2024-03-26 NOTE — PROGRESS NOTES
[facility] HEART CARE   CARDIOLOGY PROGRESS NOTE     Chief Complaint   Patient presents with    Follow Up     I year          Diagnosis:  1.  Abnormal stress test.    -1/30/2024.  -2/14/2023.  2.  Syncope/positional lightheadedness on 6/9/21.    -Resolved off of beta-blocker.  3.  Hypertension-uncontrolled.  4.  [id number].  5.  Hyperlipidemia-controlled.  6.  Hypertriglyceridemia-controlled.  7.  Anemia-resolved.    Assessment/Plan:   1.  Lightheadedness.  Noticed most frequently when bending over and standing up.  Has had x2 severe episodes resulting in ER visit along with palpitations.  Most recently on 1/23/2024.  Patient has intermittent episodes.  At his last visit on 3/22/2023, we discussed his poor fluid consumption.  He drinks x2 coffees at 8-10 ounces each.  He may drink 2 glasses of water and may be half a bottle of propel per day.  At his last visit, I stressed the importance of increasing his fluid consumption.  He has not done this.  His wife is with him today who also pushes him to drink more fluids which he has not done.  I suggested he consume 6-8 glasses of water a day.  Patient is aware coffee does not count towards his fluids.  2.  Generalized weakness.  He states he has a hard time lifting objects.  He gives an example of rotating the tires on his vehicle.  He cannot lift these tires.  He also has difficulty doing other heavy activities which he would have been able to do previously.  He describes proximal weakness.  It is particularly worse in his right shoulder.  Suggested x-ray of his shoulder with possible referral to orthopedics.  He states intermittent not ready for chest x-ray/referral I also suggested labs looking at PMR.  Patient agreeable.  Referred to physical therapy.  3.  Labs today.  Labs focusing on PMR as well as other routine labs.  To include magnesium, BMP, PSA, A1c, CCP antibody IgG, RA, vitamin D, TSH, CRP, sed rate, CBC, lipid, and CMP.  4.  DM-2: Uncontrolled.  Patient  aware.  Plan for A1c today.  Should follow-up with primary.  5.  Palpitations: Mainly has palpitations at night.  He is a light sleeper and it does wake him up.  These palpitations last 20-30 seconds.  Since his ER visit on 1/23/2024, he has had 6-10 episodes of these.  Plan for Zio patch.  Previously had a Zio patch in 2021.  Will plan for another Zio patch.  6.  Chest discomfort: Denies chest discomfort.  Was having chest discomfort at his ER visit on 1/23/2024.  Had a stress test on 1/3/2024 which did not show reversible ischemia.    7.  Follow-up after completion of Zio patch, labs, and to assess for increased fluid consumption.      Interval history:  Rafa was last seen on 10/19/2021 by Evelyn Childs.  He is being seen in follow-up related to an ER visit on 1/24/2023.  He had presented to the ER with chest discomfort.  EKG and troponin unremarkable.  Underwent stress testing.  Stress test does show reversible defect but small.  He states his chest discomfort has resolved.  Has been active shoveling and doing other activities outside.  He does admit to being dyspneic on exertion but has been going on for years.  Has not had tightness or pressure.  Due to his lack of symptoms, we discussed medical management versus angiogram.  It was decided he would continue with medical management.  If he starts having symptoms in future, we will consider an angiogram at that time.  We will increase Crestor from 10 to 20 mg daily.  He is to take aspirin 81 mg daily.  He cannot take nitro as he does take sildenafil.  He will follow-up 1 year unless he has any cardiac issues, then will be seen sooner.        HPI:    Mr. Bhatti presents for cardiology follow-up visit on 8/24/2021 with recent near syncope event.  Patient has a history of hypertension, bradycardia, hyperlipidemia, elevated LFT's, pancytopenia, DM 2 with polyneuropathy, hx of pancreatic surgery in May 2020 (pancreas preserving duodenectomy, choledochojejunostomy,  duodenojejunostomy resection of remnant cystic duct), gout and GERD.      Patient was hospitalized from 6/9/2021 to 6/10/2021 as a result of near syncope with underlying sinus bradycardia.  Patient had been working outside in the heat when he presented to the ED with lightheadedness, near syncope episode and bradycardia with rates identified in the 30's to 40's bpm on ECG.  No acute ST-T wave changes identified.  He has been on beta-blocker atenolol prior to this with continued daily use.  He received IV fluids during this hospitalization serial troponins negative.  He was advised to hold his atenolol with his heart rate improved to 81 bpm by discharge.  He was advised to remain off the beta-blocker following hospital discharge.  He has remained on amlodipine and losartan for hypertension.     He returned to the ED on 6/20/2021 with reports of back pain, diaphoresis, fever, chills, fatigue, body aches, decreased appetite and loose stools.  He does describe increased lightheadedness again.  He was identified to have elevated LFT's, blood cultures positive for gram-negative rods.  Therefore, he was admitted to the hospital receiving IV antibiotics and IV fluids.  He had received 2 g of Rocephin in the ED.  He was diagnosed with an E. coli bacteremia receiving IV Rocephin.  Elevated LFT's suspected secondary to tickborne illness with pancytopenia.  He was started on doxycycline empirically.  He was discharged on 6/23/2021.     At his last visit patient reported continued episodes of brief lightheadedness noted only when working in the heat with position changes from bent forward to standing quickly. No resulting syncope or falls. No palpitations. No edema. Symptom onset in June was the first time he experienced lightheadedness, resulting in hospitalization with bradycardia at that time. No recurrence of bradycardia since off of his beta blocker, he has been monitoring his HR and BP regularly. He denied any chest pain  or pressure. No increased edema. No increased dyspnea.      TTE on 9/24/21 revealing normal biventricular function, LVEF 55 to 60%.  LV diastolic function was indeterminate.  No regional wall motion abnormalities.  Trileaflet aortic sclerosis without stenosis, trace aortic insufficiency.  No significant valvular abnormalities.  No pericardial effusion.     Carotid US on 9/24/2021 was negative, no evidence of internal carotid artery stenosis.     ZIO revealing average heart rate 74 bpm.  His heart rate ranged from 47 to 190 bpm.  No symptomatic or significant bradycardia, no pauses, no Mobitz type II or third-degree AV block.  No atrial fibrillation.  He had 2 runs of ventricular tachycardia which were nonsustained, lasting up to 4 beats with a maximum heart rate of 190 bpm, asymptomatic.  3 runs of SVT with the longest lasting 20 beats and a maximum heart rate of 160 bpm, asymptomatic.  Rare SVE and VE, less than 1%.  No ventricular bigeminy or ventricular trigeminy.  He had 3 triggered events and 0 diary entries.  The patient triggered events corresponded to sinus rhythm.      Relevant testing:  Stress test on 1/30/2024:    The nuclear stress test is abnormal.     There is a questionable small area of mild ischemia in the apical   segment(s) of the left ventricle. No change from 2/14/2023     The left ventricular ejection fraction at rest is 58%.  The left   ventricular ejection fraction at stress is 73%.     A prior study was conducted on 2/14/2023.       Stress test on 2/14/2023:    The nuclear stress test is equivocal.     There is a small area of infarction in the apical segment(s) of the  left ventricle associated with rose-infarct ischemia.     Left ventricular function is low normal.     The left ventricular ejection fraction at rest is 64%.  The left   ventricular ejection fraction at stress is 53%.     There is no prior study for comparison.     Zio patch 9/24/2021:  Underlying rhythm was sinus.  Hrt rate  ranged from 47 bpm, maximum heart rate of 190 bmp, averaging 74 bmp.  No significant bradycardia, pauses, Mobitz type II or 3rd degree heart block.  No atrial fibrillation on this study.  x3 triggered events and x0 diary entries.  These corresponded to sinus rhythm.  x2 runs of VT lasting up to 4 beats with a maximum heart rate of 190 bmp.  x3 runs of SVT lasting up to 20 beats with a maximum heart rate of 160 bmp.  Rare, less than 1% of PAC's, atrial couplets, atrial triplets, PVC's, ventricular couplets, and ventricular triplets.  0 episodes of ventricular bigeminy.  0 episodes of ventricular trigeminy.    Echo on 9/24/2021:  Global and regional left ventricular function is normal with an EF of 55-60%.  Left ventricular diastolic function is indeterminate.  The right ventricle is normal size.Global right ventricular function is  normal.  No significant valvular abnormalities were noted.  The inferior vena cava was normal in size with preserved respiratory  variability.  No pericardial effusion is present.    US carotids on 9/24/2021:  Negative study. No evidence of internal carotid artery stenosis.        ICD-10-CM    1. Tachycardia  R00.0 EKG 12-lead complete w/read - (Clinic Performed)     ZIO PATCH 8-14 DAYS (additional cost to patient)     ZIO PATCH 8-14 DAYS APPLICATION      2. Sinus bradycardia  R00.1 ZIO PATCH 8-14 DAYS (additional cost to patient)     ZIO PATCH 8-14 DAYS APPLICATION      3. Near syncope  R55 ZIO PATCH 8-14 DAYS (additional cost to patient)     ZIO PATCH 8-14 DAYS APPLICATION      4. Palpitations  R00.2 ZIO PATCH 8-14 DAYS (additional cost to patient)     ZIO PATCH 8-14 DAYS APPLICATION      5. Benign essential hypertension  I10 N terminal pro BNP outpatient     N terminal pro BNP outpatient      6. Mild aortic valve sclerosis- no stenosis  I35.8 N terminal pro BNP outpatient     N terminal pro BNP outpatient      7. Type 2 diabetes mellitus without complication, without long-term current  use of insulin (H)  E11.9 Hemoglobin A1c     Hemoglobin A1c     Lipid Profile (Chol, Trig, HDL, LDL calc)     Lipid Profile (Chol, Trig, HDL, LDL calc)      8. Mixed hyperlipidemia  E78.2 Lipid Profile (Chol, Trig, HDL, LDL calc)     Lipid Profile (Chol, Trig, HDL, LDL calc)      9. Type 2 diabetes mellitus with diabetic polyneuropathy, without long-term current use of insulin (H)  E11.42 Lipid Profile (Chol, Trig, HDL, LDL calc)     Lipid Profile (Chol, Trig, HDL, LDL calc)      10. Lightheadedness  R42 N terminal pro BNP outpatient     N terminal pro BNP outpatient      11. Generalized muscle weakness  M62.81 Comprehensive metabolic panel     CBC with platelets     ESR: Erythrocyte sedimentation rate     CRP, inflammation     TSH with free T4 reflex     Vitamin D Deficiency Screening     Rheumatoid factor     Cyclic Citrullinated Peptide Antibody IgG     N terminal pro BNP outpatient     Magnesium     Physical Therapy  Referral     Comprehensive metabolic panel     CBC with platelets     ESR: Erythrocyte sedimentation rate     CRP, inflammation     TSH with free T4 reflex     Vitamin D Deficiency Screening     Rheumatoid factor     Cyclic Citrullinated Peptide Antibody IgG     N terminal pro BNP outpatient     Magnesium      12. Screening for prostate cancer  Z12.5 PSA Diagnostic     PSA Diagnostic      13. PMR (polymyalgia rheumatica) (H24)  M35.3 ESR: Erythrocyte sedimentation rate     CRP, inflammation     TSH with free T4 reflex     Rheumatoid factor     Cyclic Citrullinated Peptide Antibody IgG     ESR: Erythrocyte sedimentation rate     CRP, inflammation     TSH with free T4 reflex     Rheumatoid factor     Cyclic Citrullinated Peptide Antibody IgG      14. Vitamin D deficiency  E55.9 Vitamin D Deficiency Screening     Vitamin D Deficiency Screening      15. Inflammatory disease of prostate, unspecified  N41.9 PSA Diagnostic     PSA Diagnostic      16. Dyspnea, unspecified type  R06.00 N terminal  pro BNP outpatient     N terminal pro BNP outpatient            Past Medical History:   Diagnosis Date    Benign neoplasm of unspecified site 8/1/2012    Dermoid cyst    Calculus of kidney 5/20/2002    Chest pain, unspecified 3/2/2000    Diabetic eye exam (H) 06/06/2018    normal    Gout, unspecified 8/3/2011    Hyperlipidemia     Unspecified essential hypertension 7/18/2000       Past Surgical History:   Procedure Laterality Date    cardiolyte stress test  2000    chest pain    CHOLECYSTECTOMY, COMMON BILE DUCT EXPLORATION, COMBINED  04/15/2018    COLONOSCOPY  1999    fistula in ANO      HEMORRHOIDECTOMY      PHACOEMULSIFICATION WITH STANDARD INTRAOCULAR LENS IMPLANT Left 6/27/2023    Procedure: Phacoemulsification Cataract Extraction Posterior Chamber Lens Left Eye,Complex Cataract;  Surgeon: Sameer Go MD;  Location: [facility] OR    PHACOEMULSIFICATION WITH STANDARD INTRAOCULAR LENS IMPLANT Right 7/11/2023    Procedure: Complex Phacoemulsification Cataract Extraction Posterior Chamber Lens Right Eye;  Surgeon: Sameer Go MD;  Location: [facility] OR    removal of dermoid cyst of mediastinum      VASECTOMY         Allergies   Allergen Reactions    Influenza Virus Vaccine Diarrhea    Lactose Unknown     LACTOSE INTOLERANT       Current Outpatient Medications   Medication Sig Dispense Refill    allopurinol (ZYLOPRIM) 100 MG tablet TAKE 1 TABLET BY MOUTH TWICE A  tablet 0    aspirin (ASA) 81 MG chewable tablet Take 81 mg by mouth daily      blood glucose monitoring (FREESTYLE) lancets Use to test blood sugar 1 times daily. 100 each 3    FREESTYLE LITE test strip USE TO TEST BLOOD SUGARS ONCE A DAY. 100 strip 0    losartan (COZAAR) 25 MG tablet Take 1 tablet (25 mg) by mouth daily 90 tablet 3    pantoprazole (PROTONIX) 40 MG EC tablet Take 40 mg by mouth daily      rosuvastatin (CRESTOR) 20 MG tablet TAKE 1 TABLET (20 MG) BY MOUTH DAILY 90 tablet 3    sildenafil (REVATIO) 20 MG tablet Take 1-2 tablets (20-40 mg)  by mouth daily as needed (erectile dysfunction) 60 tablet 3    sitagliptin (JANUVIA) 100 MG tablet Take 1 tablet (100 mg) by mouth daily 90 tablet 3    tamsulosin (FLOMAX) 0.4 MG capsule Take 1 capsule (0.4 mg) by mouth daily 90 capsule 3    calcium carbonate (TUMS) 500 MG chewable tablet Take 1 tablet by mouth daily as needed  (Patient not taking: Reported on 3/27/2024)      colchicine (COLCYRS) 0.6 MG tablet TAKE 2 TABLETS AT THE ONSET OF GOUT PAIN. MAY TAKE ONE TABLET AGAIN IN ONE HOUR. MAX 4 TABS IN 24 HRS. (Patient not taking: Reported on 3/27/2024) 20 tablet 1       Social History     Socioeconomic History    Marital status:      Spouse name: Not on file    Number of children: Not on file    Years of education: Not on file    Highest education level: Not on file   Occupational History    Occupation: retired   Tobacco Use    Smoking status: Never    Smokeless tobacco: Never   Vaping Use    Vaping Use: Never used   Substance and Sexual Activity    Alcohol use: Yes     Comment: rare    Drug use: No    Sexual activity: Not Currently     Partners: Female   Other Topics Concern     Service Yes    Blood Transfusions Yes    Caffeine Concern Yes     Comment: 3 cups daily    Occupational Exposure No    Hobby Hazards No    Sleep Concern Yes    Stress Concern No    Weight Concern No    Special Diet No    Back Care Yes    Exercise Yes    Bike Helmet No    Seat Belt Yes    Self-Exams Not Asked    Parent/sibling w/ CABG, MI or angioplasty before 65F 55M? No   Social History Narrative    Not on file     Social Determinants of Health     Financial Resource Strain: Low Risk  (9/21/2023)    Financial Resource Strain     Within the past 12 months, have you or your family members you live with been unable to get utilities (heat, electricity) when it was really needed?: No   Food Insecurity: Low Risk  (9/21/2023)    Food Insecurity     Within the past 12 months, did you worry that your food would run out before you  got money to buy more?: No     Within the past 12 months, did the food you bought just not last and you didn t have money to get more?: No   Transportation Needs: Low Risk  (9/21/2023)    Transportation Needs     Within the past 12 months, has lack of transportation kept you from medical appointments, getting your medicines, non-medical meetings or appointments, work, or from getting things that you need?: No   Physical Activity: Not on file   Stress: Not on file   Social Connections: Not on file   Interpersonal Safety: Low Risk  (9/21/2023)    Interpersonal Safety     Do you feel physically and emotionally safe where you currently live?: Yes     Within the past 12 months, have you been hit, slapped, kicked or otherwise physically hurt by someone?: No     Within the past 12 months, have you been humiliated or emotionally abused in other ways by your partner or ex-partner?: No   Housing Stability: Low Risk  (9/21/2023)    Housing Stability     Do you have housing? : Yes     Are you worried about losing your housing?: No       LAB RESULTS:   No visits with results within 2 Month(s) from this visit.   Latest known visit with results is:   Office Visit on 06/30/2022   Component Date Value Ref Range Status    Creatinine Urine mg/dL 06/30/2022 136  mg/dL Final    Albumin Urine mg/L 06/30/2022 15  mg/L Final    Albumin Urine mg/g Cr 06/30/2022 11.03  0.00 - 17.00 mg/g Cr Final    Sodium 06/30/2022 137  133 - 144 mmol/L Final    Potassium 06/30/2022 4.3  3.4 - 5.3 mmol/L Final    Chloride 06/30/2022 106  94 - 109 mmol/L Final    Carbon Dioxide (CO2) 06/30/2022 26  20 - 32 mmol/L Final    Anion Gap 06/30/2022 5  3 - 14 mmol/L Final    Urea Nitrogen 06/30/2022 23  7 - 30 mg/dL Final    Creatinine 06/30/2022 0.97  0.66 - 1.25 mg/dL Final    Calcium 06/30/2022 9.3  8.5 - 10.1 mg/dL Final    Glucose 06/30/2022 154 (H)  70 - 99 mg/dL Final    Alkaline Phosphatase 06/30/2022 74  40 - 150 U/L Final    AST 06/30/2022 20  0 - 45 U/L  "Final    ALT 06/30/2022 32  0 - 70 U/L Final    Protein Total 06/30/2022 8.1  6.8 - 8.8 g/dL Final    Albumin 06/30/2022 4.0  3.4 - 5.0 g/dL Final    Bilirubin Total 06/30/2022 0.4  0.2 - 1.3 mg/dL Final    GFR Estimate 06/30/2022 81  >60 mL/min/1.73m2 Final    Cholesterol 06/30/2022 100  <200 mg/dL Final    Triglycerides 06/30/2022 85  <150 mg/dL Final    Direct Measure HDL 06/30/2022 37 (L)  >=40 mg/dL Final    LDL Cholesterol Calculated 06/30/2022 46  <=100 mg/dL Final    Non HDL Cholesterol 06/30/2022 63  <130 mg/dL Final    Patient Fasting > 8hrs? 06/30/2022 Yes   Final    Estimated Average Glucose 06/30/2022 180  mg/dL Final    Hemoglobin A1C 06/30/2022 7.9 (H)  0.0 - 5.6 % Final        Review of systems: Negative except that which was noted in the HPI.    Physical examination:  /72   Pulse 88   Resp 18   Ht 1.778 m (5' 10\")   Wt 78.2 kg (172 lb 6.4 oz)   SpO2 96%   BMI 24.74 kg/m      GENERAL APPEARANCE: healthy, alert and no distress  CHEST: lungs clear to auscultation - no rales, rhonchi or wheezes, no use of accessory muscles, no retractions, respirations are unlabored, normal respiratory rate  CARDIOVASCULAR: regular rhythm, normal S1 with physiologic split S2, no S3 or S4 and no murmur, click or rub  EXTREMITIES: no clubbing, cyanosis or edema.    Total time spent on day of visit, including review of tests, obtaining/reviewing separately obtained history, ordering medications/tests/procedures, communicating with PCP/consultants, and documenting in electronic medical record: 33 minutes.               Thank you for allowing me to participate in the care of your patient. Please do not hesitate to contact me if you have any questions.     Jimbo Crawford, DO          "

## 2024-03-27 ENCOUNTER — OFFICE VISIT (OUTPATIENT)
Dept: CARDIOLOGY | Facility: OTHER | Age: 79
End: 2024-03-27
Attending: INTERNAL MEDICINE
Payer: COMMERCIAL

## 2024-03-27 VITALS
SYSTOLIC BLOOD PRESSURE: 139 MMHG | HEART RATE: 88 BPM | RESPIRATION RATE: 18 BRPM | DIASTOLIC BLOOD PRESSURE: 72 MMHG | BODY MASS INDEX: 24.68 KG/M2 | WEIGHT: 172.4 LBS | HEIGHT: 70 IN | OXYGEN SATURATION: 96 %

## 2024-03-27 DIAGNOSIS — E55.9 VITAMIN D DEFICIENCY: ICD-10-CM

## 2024-03-27 DIAGNOSIS — E11.42 TYPE 2 DIABETES MELLITUS WITH DIABETIC POLYNEUROPATHY, WITHOUT LONG-TERM CURRENT USE OF INSULIN (H): ICD-10-CM

## 2024-03-27 DIAGNOSIS — R00.1 SINUS BRADYCARDIA: ICD-10-CM

## 2024-03-27 DIAGNOSIS — M62.81 GENERALIZED MUSCLE WEAKNESS: ICD-10-CM

## 2024-03-27 DIAGNOSIS — R00.2 PALPITATIONS: ICD-10-CM

## 2024-03-27 DIAGNOSIS — R00.0 TACHYCARDIA: Primary | ICD-10-CM

## 2024-03-27 DIAGNOSIS — E11.9 TYPE 2 DIABETES MELLITUS WITHOUT COMPLICATION, WITHOUT LONG-TERM CURRENT USE OF INSULIN (H): ICD-10-CM

## 2024-03-27 DIAGNOSIS — Z12.5 SCREENING FOR PROSTATE CANCER: ICD-10-CM

## 2024-03-27 DIAGNOSIS — I10 BENIGN ESSENTIAL HYPERTENSION: ICD-10-CM

## 2024-03-27 DIAGNOSIS — M35.3 PMR (POLYMYALGIA RHEUMATICA) (H): ICD-10-CM

## 2024-03-27 DIAGNOSIS — N41.9 INFLAMMATORY DISEASE OF PROSTATE, UNSPECIFIED: ICD-10-CM

## 2024-03-27 DIAGNOSIS — E11.42 TYPE 2 DIABETES MELLITUS WITH DIABETIC POLYNEUROPATHY, WITHOUT LONG-TERM CURRENT USE OF INSULIN (H): Primary | ICD-10-CM

## 2024-03-27 DIAGNOSIS — E78.2 MIXED HYPERLIPIDEMIA: ICD-10-CM

## 2024-03-27 DIAGNOSIS — R42 LIGHTHEADEDNESS: ICD-10-CM

## 2024-03-27 DIAGNOSIS — R97.20 ELEVATED PROSTATE SPECIFIC ANTIGEN (PSA): ICD-10-CM

## 2024-03-27 DIAGNOSIS — R06.00 DYSPNEA, UNSPECIFIED TYPE: ICD-10-CM

## 2024-03-27 DIAGNOSIS — I35.8 MILD AORTIC VALVE SCLEROSIS: ICD-10-CM

## 2024-03-27 DIAGNOSIS — R55 NEAR SYNCOPE: ICD-10-CM

## 2024-03-27 LAB
ALBUMIN SERPL BCG-MCNC: 4.3 G/DL (ref 3.5–5.2)
ALP SERPL-CCNC: 76 U/L (ref 40–150)
ALT SERPL W P-5'-P-CCNC: 22 U/L (ref 0–70)
ANION GAP SERPL CALCULATED.3IONS-SCNC: 12 MMOL/L (ref 7–15)
AST SERPL W P-5'-P-CCNC: 18 U/L (ref 0–45)
ATRIAL RATE - MUSE: 71 BPM
BILIRUB SERPL-MCNC: 0.4 MG/DL
BUN SERPL-MCNC: 19.7 MG/DL (ref 8–23)
CALCIUM SERPL-MCNC: 9.7 MG/DL (ref 8.8–10.2)
CHLORIDE SERPL-SCNC: 96 MMOL/L (ref 98–107)
CHOLEST SERPL-MCNC: 99 MG/DL
CREAT SERPL-MCNC: 0.91 MG/DL (ref 0.67–1.17)
CRP SERPL-MCNC: <3 MG/L
DEPRECATED HCO3 PLAS-SCNC: 24 MMOL/L (ref 22–29)
DIASTOLIC BLOOD PRESSURE - MUSE: NORMAL MMHG
EGFRCR SERPLBLD CKD-EPI 2021: 87 ML/MIN/1.73M2
ERYTHROCYTE [DISTWIDTH] IN BLOOD BY AUTOMATED COUNT: 12.9 % (ref 10–15)
ERYTHROCYTE [SEDIMENTATION RATE] IN BLOOD BY WESTERGREN METHOD: 7 MM/HR (ref 0–20)
EST. AVERAGE GLUCOSE BLD GHB EST-MCNC: 283 MG/DL
GLUCOSE SERPL-MCNC: 415 MG/DL (ref 70–99)
HBA1C MFR BLD: 11.5 %
HCT VFR BLD AUTO: 41.2 % (ref 40–53)
HDLC SERPL-MCNC: 35 MG/DL
HGB BLD-MCNC: 14.1 G/DL (ref 13.3–17.7)
INTERPRETATION ECG - MUSE: NORMAL
LDLC SERPL CALC-MCNC: 45 MG/DL
MAGNESIUM SERPL-MCNC: 1.8 MG/DL (ref 1.7–2.3)
MCH RBC QN AUTO: 30.3 PG (ref 26.5–33)
MCHC RBC AUTO-ENTMCNC: 34.2 G/DL (ref 31.5–36.5)
MCV RBC AUTO: 89 FL (ref 78–100)
NONHDLC SERPL-MCNC: 64 MG/DL
NT-PROBNP SERPL-MCNC: <36 PG/ML (ref 0–1800)
P AXIS - MUSE: -3 DEGREES
PLATELET # BLD AUTO: 142 10E3/UL (ref 150–450)
POTASSIUM SERPL-SCNC: 4.6 MMOL/L (ref 3.4–5.3)
PR INTERVAL - MUSE: 180 MS
PROT SERPL-MCNC: 7.5 G/DL (ref 6.4–8.3)
PSA SERPL DL<=0.01 NG/ML-MCNC: 9.23 NG/ML (ref 0–6.5)
QRS DURATION - MUSE: 120 MS
QT - MUSE: 376 MS
QTC - MUSE: 408 MS
R AXIS - MUSE: -28 DEGREES
RBC # BLD AUTO: 4.65 10E6/UL (ref 4.4–5.9)
SODIUM SERPL-SCNC: 132 MMOL/L (ref 135–145)
SYSTOLIC BLOOD PRESSURE - MUSE: NORMAL MMHG
T AXIS - MUSE: 37 DEGREES
TRIGL SERPL-MCNC: 93 MG/DL
TSH SERPL DL<=0.005 MIU/L-ACNC: 1.85 UIU/ML (ref 0.3–4.2)
VENTRICULAR RATE- MUSE: 71 BPM
WBC # BLD AUTO: 5.1 10E3/UL (ref 4–11)

## 2024-03-27 PROCEDURE — 85014 HEMATOCRIT: CPT | Mod: ZL | Performed by: INTERNAL MEDICINE

## 2024-03-27 PROCEDURE — 83036 HEMOGLOBIN GLYCOSYLATED A1C: CPT | Mod: ZL | Performed by: INTERNAL MEDICINE

## 2024-03-27 PROCEDURE — 93005 ELECTROCARDIOGRAM TRACING: CPT | Performed by: INTERNAL MEDICINE

## 2024-03-27 PROCEDURE — 99214 OFFICE O/P EST MOD 30 MIN: CPT | Performed by: INTERNAL MEDICINE

## 2024-03-27 PROCEDURE — 83735 ASSAY OF MAGNESIUM: CPT | Mod: ZL | Performed by: INTERNAL MEDICINE

## 2024-03-27 PROCEDURE — 36415 COLL VENOUS BLD VENIPUNCTURE: CPT | Mod: ZL | Performed by: INTERNAL MEDICINE

## 2024-03-27 PROCEDURE — 85652 RBC SED RATE AUTOMATED: CPT | Mod: ZL | Performed by: INTERNAL MEDICINE

## 2024-03-27 PROCEDURE — 84443 ASSAY THYROID STIM HORMONE: CPT | Mod: ZL | Performed by: INTERNAL MEDICINE

## 2024-03-27 PROCEDURE — 80053 COMPREHEN METABOLIC PANEL: CPT | Mod: ZL | Performed by: INTERNAL MEDICINE

## 2024-03-27 PROCEDURE — 80061 LIPID PANEL: CPT | Mod: ZL | Performed by: INTERNAL MEDICINE

## 2024-03-27 PROCEDURE — 93246 EXT ECG>7D<15D RECORDING: CPT | Performed by: INTERNAL MEDICINE

## 2024-03-27 PROCEDURE — 83880 ASSAY OF NATRIURETIC PEPTIDE: CPT | Mod: ZL | Performed by: INTERNAL MEDICINE

## 2024-03-27 PROCEDURE — 86431 RHEUMATOID FACTOR QUANT: CPT | Mod: ZL | Performed by: INTERNAL MEDICINE

## 2024-03-27 PROCEDURE — 86140 C-REACTIVE PROTEIN: CPT | Mod: ZL | Performed by: INTERNAL MEDICINE

## 2024-03-27 PROCEDURE — 82306 VITAMIN D 25 HYDROXY: CPT | Mod: ZL | Performed by: INTERNAL MEDICINE

## 2024-03-27 PROCEDURE — G0463 HOSPITAL OUTPT CLINIC VISIT: HCPCS

## 2024-03-27 PROCEDURE — 84153 ASSAY OF PSA TOTAL: CPT | Mod: ZL | Performed by: INTERNAL MEDICINE

## 2024-03-27 PROCEDURE — 93010 ELECTROCARDIOGRAM REPORT: CPT | Performed by: INTERNAL MEDICINE

## 2024-03-27 PROCEDURE — 86200 CCP ANTIBODY: CPT | Mod: ZL | Performed by: INTERNAL MEDICINE

## 2024-03-27 RX ORDER — DAPAGLIFLOZIN 10 MG/1
10 TABLET, FILM COATED ORAL DAILY
Qty: 90 TABLET | Refills: 3 | Status: SHIPPED | OUTPATIENT
Start: 2024-03-27 | End: 2024-09-09

## 2024-03-27 ASSESSMENT — PAIN SCALES - GENERAL: PAINLEVEL: NO PAIN (0)

## 2024-03-27 NOTE — Clinical Note
Hi Dr. Crawford, I've pended two orders for Zio for you to sign.  The previous orders contained an order that wasn't future and wouldn't go through to scheduling.  Thank you.

## 2024-03-27 NOTE — PATIENT INSTRUCTIONS
Thank you for allowing Dr ALEX Crawford and our  team to participate in your care. Please call our office at [phone] with scheduling questions or if you need to cancel or change your appointment. With any other questions or concerns you may call cardiology nurse at  [phone].       If you experience chest pain, chest pressure, chest tightness, shortness of breath, fainting, lightheadedness, nausea, vomiting, or other concerning symptoms, please report to the Emergency Department or call 911. These symptoms may be emergent, and best treated in the Emergency Department.

## 2024-03-27 NOTE — LETTER
April 2, 2024      Rafa Bhatti  [address] [facility] [address]        Dear ,    We are writing to inform you of your test results.    There is a copy of the lab results we discussed over the phone.  As mentioned, your PSA has increased to 9.23 and I have referred you to urology.  If you do not hear from urology, please let us know.  Second of all, your A1c increased from 7.0% to 11.5%.  Your glucose was severely elevated at 415.  Your platelets which are part of your blood that help your blood to clot were slightly reduced at 142.  This is something I would continue to follow with your primary.  If they continue to be low, I would consider seeing a blood doctor or hematologist.  Lastly, your vitamin D level was reduced at 28.  Ideally, it should be between 50-70.  I would recommend you take 8424-3346 national units of vitamin D over-the-counter on a daily basis.  Please reach out with questions or concerns.    Resulted Orders   Comprehensive metabolic panel   Result Value Ref Range    Sodium 132 (L) 135 - 145 mmol/L      Comment:      Reference intervals for this test were updated on 09/26/2023 to more accurately reflect our healthy population. There may be differences in the flagging of prior results with similar values performed with this method. Interpretation of those prior results can be made in the context of the updated reference intervals.     Potassium 4.6 3.4 - 5.3 mmol/L    Carbon Dioxide (CO2) 24 22 - 29 mmol/L    Anion Gap 12 7 - 15 mmol/L    Urea Nitrogen 19.7 8.0 - 23.0 mg/dL    Creatinine 0.91 0.67 - 1.17 mg/dL    GFR Estimate 87 >60 mL/min/1.73m2    Calcium 9.7 8.8 - 10.2 mg/dL    Chloride 96 (L) 98 - 107 mmol/L    Glucose 415 (H) 70 - 99 mg/dL    Alkaline Phosphatase 76 40 - 150 U/L      Comment:      Reference intervals for this test were updated on 11/14/2023 to more accurately reflect our healthy population. There may be differences in the flagging of prior results with similar  values performed with this method. Interpretation of those prior results can be made in the context of the updated reference intervals.    AST 18 0 - 45 U/L      Comment:      Reference intervals for this test were updated on 6/12/2023 to more accurately reflect our healthy population. There may be differences in the flagging of prior results with similar values performed with this method. Interpretation of those prior results can be made in the context of the updated reference intervals.    ALT 22 0 - 70 U/L      Comment:      Reference intervals for this test were updated on 6/12/2023 to more accurately reflect our healthy population. There may be differences in the flagging of prior results with similar values performed with this method. Interpretation of those prior results can be made in the context of the updated reference intervals.      Protein Total 7.5 6.4 - 8.3 g/dL    Albumin 4.3 3.5 - 5.2 g/dL    Bilirubin Total 0.4 <=1.2 mg/dL   CBC with platelets   Result Value Ref Range    WBC Count 5.1 4.0 - 11.0 10e3/uL    RBC Count 4.65 4.40 - 5.90 10e6/uL    Hemoglobin 14.1 13.3 - 17.7 g/dL    Hematocrit 41.2 40.0 - 53.0 %    MCV 89 78 - 100 fL    MCH 30.3 26.5 - 33.0 pg    MCHC 34.2 31.5 - 36.5 g/dL    RDW 12.9 10.0 - 15.0 %    Platelet Count 142 (L) 150 - 450 10e3/uL   ESR: Erythrocyte sedimentation rate   Result Value Ref Range    Erythrocyte Sedimentation Rate 7 0 - 20 mm/hr   CRP, inflammation   Result Value Ref Range    CRP Inflammation <3.00 <5.00 mg/L   TSH with free T4 reflex   Result Value Ref Range    TSH 1.85 0.30 - 4.20 uIU/mL   Vitamin D Deficiency Screening   Result Value Ref Range    Vitamin D, Total (25-Hydroxy) 28 20 - 50 ng/mL      Comment:      optimum levels    Narrative    Season, race, dietary intake, and treatment affect the concentration of 25-hydroxy-Vitamin D. Values may decrease during winter months and increase during summer months.    Vitamin D determination is routinely performed by  an immunoassay specific for 25 hydroxyvitamin D3.  If an individual is on vitamin D2(ergocalciferol) supplementation, please specify 25 OH vitamin D2 and D3 level determination by LCMSMS test VITD23.     Rheumatoid factor   Result Value Ref Range    Rheumatoid Factor <10 <14 IU/mL   Hemoglobin A1c   Result Value Ref Range    Estimated Average Glucose 283 mg/dL    Hemoglobin A1C 11.5 (H) <5.7 %      Comment:      Normal <5.7%   Prediabetes 5.7-6.4%    Diabetes 6.5% or higher     Note: Adopted from ADA consensus guidelines.   PSA Diagnostic   Result Value Ref Range    PSA Tumor Marker 9.23 (H) 0.00 - 6.50 ng/mL    Narrative    This result is obtained using the Roche Elecsys total PSA method on the senthil e601 immunoassay analyzer. Results obtained with different assay methods or kits cannot be used interchangeably.   N terminal pro BNP outpatient   Result Value Ref Range    N Terminal Pro BNP Outpatient <36 0 - 1,800 pg/mL      Comment:      Reference range shown and results flagged as abnormal are for the outpatient, non acute settings. Establishing a baseline value for each individual patient is useful for follow-up.    Suggested inpatient cut points for confirming diagnosis of CHF in an acute setting are:  >450 pg/mL (age 18 to less than 50)  >900 pg/mL (age 50 to less than 75)  >1800 pg/mL (75 yrs and older)    An inpatient or emergency department NT-proPBNP <300 pg/mL effectively rules out acute CHF, with 99% negative predictive value.       Magnesium   Result Value Ref Range    Magnesium 1.8 1.7 - 2.3 mg/dL   Lipid Profile (Chol, Trig, HDL, LDL calc)   Result Value Ref Range    Cholesterol 99 <200 mg/dL    Triglycerides 93 <150 mg/dL    Direct Measure HDL 35 (L) >=40 mg/dL    LDL Cholesterol Calculated 45 <=100 mg/dL    Non HDL Cholesterol 64 <130 mg/dL    Narrative    Cholesterol  Desirable:  <200 mg/dL    Triglycerides  Normal:  Less than 150 mg/dL  Borderline High:  150-199 mg/dL  High:  200-499 mg/dL  Very  High:  Greater than or equal to 500 mg/dL    Direct Measure HDL  Female:  Greater than or equal to 50 mg/dL   Male:  Greater than or equal to 40 mg/dL    LDL Cholesterol  Desirable:  <100mg/dL  Above Desirable:  100-129 mg/dL   Borderline High:  130-159 mg/dL   High:  160-189 mg/dL   Very High:  >= 190 mg/dL    Non HDL Cholesterol  Desirable:  130 mg/dL  Above Desirable:  130-159 mg/dL  Borderline High:  160-189 mg/dL  High:  190-219 mg/dL  Very High:  Greater than or equal to 220 mg/dL       If you have any questions or concerns, please call the clinic at the number listed above.       Sincerely,      Jimbo Crawford, DO

## 2024-03-29 LAB
RHEUMATOID FACT SERPL-ACNC: <10 IU/ML
VIT D+METAB SERPL-MCNC: 28 NG/ML (ref 20–50)

## 2024-04-03 LAB — CCP AB SER IA-ACNC: 1.9 U/ML

## 2024-04-04 ENCOUNTER — ALLIED HEALTH/NURSE VISIT (OUTPATIENT)
Dept: FAMILY MEDICINE | Facility: OTHER | Age: 79
End: 2024-04-04
Attending: INTERNAL MEDICINE
Payer: COMMERCIAL

## 2024-04-04 DIAGNOSIS — R55 NEAR SYNCOPE: ICD-10-CM

## 2024-04-04 DIAGNOSIS — R42 LIGHTHEADEDNESS: ICD-10-CM

## 2024-04-04 DIAGNOSIS — R00.1 SINUS BRADYCARDIA: ICD-10-CM

## 2024-04-04 DIAGNOSIS — R00.2 PALPITATIONS: ICD-10-CM

## 2024-04-04 DIAGNOSIS — R06.00 DYSPNEA, UNSPECIFIED TYPE: ICD-10-CM

## 2024-04-04 DIAGNOSIS — R00.0 TACHYCARDIA: ICD-10-CM

## 2024-04-04 PROCEDURE — 93246 EXT ECG>7D<15D RECORDING: CPT

## 2024-04-04 NOTE — LETTER
April 23, 2024      Rafa Bhatti  [address] [facility] [address]        Dear ,    We are writing to inform you of your test results.    Here is a copy of the results of your Zio patch.  You did not have any life-threatening rhythms.  You did not have any A-fib which when found, would require a blood thinner to prevent a stroke.  No pauses or heart block which would require pacemaker.  You had some extra/early beats and runs of extra/early beats.  These are common finding and are the cause of your symptoms.  Essentially, everybody has these.  Some people feel them and some people do not.  Treatment is generally not required unless you are having significant symptoms.  If having significant symptoms, adjustments or changes can be made to your medications.  Otherwise, nothing more would need to be done.    Resulted Orders   ZIO PATCH 8-14 DAYS (additional cost to patient)    Narrative    Zio XT patch report on Rafa Bhatti.  Ordered secondary to tachycardia.     Worn for 7 days and 17 hr's.  After removing artifact, total time was 7   days and 4 hr's. Placed on 4/4/2024 at 1:25 PM and completed on 4/12/2024   at 6:44 AM.     Underlying rhythm was sinus.     Hrt rate ranged from 45 bpm, maximum heart rate of 154 bmp, averaging 64   bmp.     No significant bradycardia, pauses, Mobitz type II or 3rd degree heart   block.    No atrial fibrillation on this study.     x4 triggered events and x3 diary entries.  These corresponded to VE and   sinus rhythm.     x0 runs of VT.       x1 run of SVT lasting 15 beats with a maximum heart rate of 154 bmp.     Rare, <1% of PAC's, atrial couplets, atrial triplets, PVC's, and   ventricular couplets.     0 episodes of ventricular bigeminy.    0 episodes of ventricular trigeminy.       If you have any questions or concerns, please call the clinic at the number listed above.       Sincerely,      Jimbo Crawford, DO

## 2024-04-04 NOTE — PROGRESS NOTES
Rafa Bhatti arrived here on 4/4/2024 1:27 PM for 8-14 Days  Zio monitor placement per ordering provider Dr. Crawford for the diagnosis Tachycardia.  Patient s skin was prepped per protocol.  Zio monitor was placed.  Instructions were reviewed with and given to the patient.  Patient verbalized understanding of wear, troubleshooting and monitor return instructions.

## 2024-04-12 ENCOUNTER — TELEPHONE (OUTPATIENT)
Dept: FAMILY MEDICINE | Facility: OTHER | Age: 79
End: 2024-04-12

## 2024-04-12 DIAGNOSIS — E11.42 TYPE 2 DIABETES MELLITUS WITH DIABETIC POLYNEUROPATHY, WITHOUT LONG-TERM CURRENT USE OF INSULIN (H): Primary | ICD-10-CM

## 2024-04-15 DIAGNOSIS — R33.9 URINARY RETENTION: ICD-10-CM

## 2024-04-15 RX ORDER — TAMSULOSIN HYDROCHLORIDE 0.4 MG/1
0.4 CAPSULE ORAL DAILY
Qty: 90 CAPSULE | Refills: 3 | Status: SHIPPED | OUTPATIENT
Start: 2024-04-15

## 2024-04-16 ENCOUNTER — HOSPITAL ENCOUNTER (OUTPATIENT)
Dept: EDUCATION SERVICES | Facility: HOSPITAL | Age: 79
Discharge: HOME | End: 2024-04-16
Attending: DIETITIAN, REGISTERED | Admitting: DIETITIAN, REGISTERED
Payer: COMMERCIAL

## 2024-04-16 VITALS
RESPIRATION RATE: 16 BRPM | HEIGHT: 67 IN | WEIGHT: 171.8 LBS | SYSTOLIC BLOOD PRESSURE: 168 MMHG | OXYGEN SATURATION: 98 % | BODY MASS INDEX: 26.97 KG/M2 | HEART RATE: 58 BPM | DIASTOLIC BLOOD PRESSURE: 81 MMHG

## 2024-04-16 DIAGNOSIS — E11.65 TYPE 2 DIABETES MELLITUS WITH HYPERGLYCEMIA, WITHOUT LONG-TERM CURRENT USE OF INSULIN (H): Primary | ICD-10-CM

## 2024-04-16 DIAGNOSIS — E11.9 TYPE 2 DIABETES MELLITUS WITHOUT COMPLICATION, WITHOUT LONG-TERM CURRENT USE OF INSULIN (H): ICD-10-CM

## 2024-04-16 PROCEDURE — G0108 DIAB MANAGE TRN  PER INDIV: HCPCS | Performed by: DIETITIAN, REGISTERED

## 2024-04-16 ASSESSMENT — PAIN SCALES - GENERAL: PAINLEVEL: MODERATE PAIN (5)

## 2024-04-16 NOTE — PATIENT INSTRUCTIONS
-Keep food logs for review at next visit.  -Maintain usual diet and activity.    -If sensor falls off before you return place it in a ziplock bag and bring it to the appointment.   -Keep taking your same dose of Januvia and Farxiga.  Check and see if you are able to obtain more via [employer].   -Recent A1c was 11.5%.  Goal is < 7.5%.   -Return on April 30th - 3:30 pm.    -Call with any concerns - MICHAEL Seals, [facility] [phone].

## 2024-04-16 NOTE — LETTER
4/16/2024        RE: Rafa Bhatti  [address] [facility] [address]        Diabetes Self-Management Education & Support    Presents for: Individual review    Type of Service: In Person Visit    ASSESSMENT:  Pt is here today r/t recent increase in A1c to 11.5%.  Previous A1c was 7.0% Sept 2023.  Pt is unsure of reason for increase other than he was eating more bakery items which he has since stopped.  Pt does have hx of pancreas surgery in 2020 - unsure if that plays a role in sudden poor glucose control.  Farxiga 10 mg daily was added by cardiology at visit on March 27th.  Pt reports no side effects.  He has made efforts to increase his fluid intake since beginning the Farxiga and is now consuming 6-7 8oz glasses of water daily.  Unsure of effectiveness of Farxiga as pt does not test glucose often.  Pt is agreeable to CGM study today.      Patient's most recent   Lab Results   Component Value Date    A1C 11.5 03/27/2024    A1C 10.9 06/16/2021    HEMOGLOBINA1 9.3 03/09/2023     is not meeting goal of  <7.5% for age.     Diabetes knowledge and skills assessment:   Patient is knowledgeable in diabetes management concepts related to: Being Active and Taking Medication    Education today focused on diabetes management concepts: CGM study, glucose goals, healthy eating.      Based on learning assessment above, most appropriate setting for further diabetes education would be: Individual setting.      PLAN  CGM sensor agreement signed.    Sensor attached to back of right arm. No redness, drainage or bleeding noted. Patient instructed on testing schedule, how to complete the patient log, restrictions during wear and to remove if having an x-ray, MRI or CT.    Patient return date requested from April 30th 3:30 pm.    Freestyle Mary Sensor:  Lot#:[id number]  Expiration Date: 08-  Sensor S/N: [id number]    Patient's identity was verified by using patient's name and date of birth prior to insertion.    Sensor  "started: 10:15 am  2 minute re-check completed: 10:17 am    Written instructions and patient log given to patient.    Follow-up: April 30th - review of CGM study.     See Care Plan for co-developed, patient-state behavior change goals.  AVS provided for patient today.    Education Materials Provided:  Food logs     SUBJECTIVE/OBJECTIVE:  Presents for: Individual review  Accompanied by: Self  Diabetes education in the past 24mo: Yes  Focus of Visit: Assistance w/ making life changes  Diabetes type: Type 2  Date of diagnosis: 4/2018  Disease course: Getting harder to manage  How confident are you filling out medical forms by yourself:: Extremely  Diabetes management related comments/concerns: No concerns.  Transportation concerns: No  Difficulty affording diabetes medication?: No (If patient uses his mail order pharmacy the highest cost is $ 60.00 for a three month supply.)  Difficulty affording diabetes testing supplies?: No  Other concerns:: None  Cultural Influences/Ethnic Background:  Not  or     Diabetes Symptoms & Complications:  Diabetes Related Symptoms: Fatigue, Neuropathy  Weight trend: Stable  Symptom course: Stable  Disease course: Getting harder to manage  Complications assessed today?: No  CVA: No  Heart disease: No  Nephropathy: No  Foot ulcerations: No  Retinopathy: No    Patient Problem List and Family Medical History reviewed for relevant medical history, current medical status, and diabetes risk factors.    Vitals:  /81   Pulse 58   Resp 16   Ht 1.708 m (5' 7.25\")   Wt 77.9 kg (171 lb 12.8 oz)   SpO2 98%   BMI 26.71 kg/m    Estimated body mass index is 26.71 kg/m  as calculated from the following:    Height as of this encounter: 1.708 m (5' 7.25\").    Weight as of this encounter: 77.9 kg (171 lb 12.8 oz).   Last 3 BP:   BP Readings from Last 3 Encounters:   04/16/24 168/81   03/27/24 139/72   01/23/24 139/81       History   Smoking Status     Never   Smokeless Tobacco     " "Never       Labs:  Lab Results   Component Value Date    A1C 11.5 03/27/2024    A1C 10.9 06/16/2021    HEMOGLOBINA1 9.3 03/09/2023     Lab Results   Component Value Date     03/27/2024     07/11/2023     06/30/2022     06/30/2021     Lab Results   Component Value Date    LDL 45 03/27/2024    LDL 32 06/16/2021     HDL Cholesterol   Date Value Ref Range Status   06/16/2021 32 (L) >39 mg/dL Final     Direct Measure HDL   Date Value Ref Range Status   03/27/2024 35 (L) >=40 mg/dL Final   ]  GFR Estimate   Date Value Ref Range Status   03/27/2024 87 >60 mL/min/1.73m2 Final   06/30/2021 90 >60 mL/min/[1.73_m2] Final     Comment:     Non  GFR Calc  Starting 12/18/2018, serum creatinine based estimated GFR (eGFR) will be   calculated using the Chronic Kidney Disease Epidemiology Collaboration   (CKD-EPI) equation.       GFR Estimate If Black   Date Value Ref Range Status   06/30/2021 >90 >60 mL/min/[1.73_m2] Final     Comment:      GFR Calc  Starting 12/18/2018, serum creatinine based estimated GFR (eGFR) will be   calculated using the Chronic Kidney Disease Epidemiology Collaboration   (CKD-EPI) equation.       Lab Results   Component Value Date    CR 0.91 03/27/2024    CR 0.76 06/30/2021     No results found for: \"MICROALBUMIN\"    Healthy Eating:  Healthy Eating Assessed Today: Yes  Cultural/Caodaism diet restrictions?: No  Do you have any food allergies or intolerances?: Yes  Please list your food allergies / intolerances: lactose  Meal planning/habits: None  Who cooks/prepares meals for you?: Self, Spouse  Who purchases food in  your home?: Self, Spouse  How many times a week on average do you eat food made away from home (restaurant/take-out)?: 0  Meals include: Breakfast, Lunch, Dinner  Breakfast: rice krispies with strawberries or 1 slice cinnamon toast - coffee with sugar free cream  Lunch: soup or sandwich or scrambled eggs  Dinner: meat, starch (baked " potato or noodle or rice dish), veggies or salad  Snacks: popcorn or peanuts or sugar free wafer cookies  Other: If dines out pizza - < 1x/week.  Pt reports prior to last A1c he was eating more bakery items.  Beverages: Water, Coffee, Diet soda, Sports drinks (sugar free sports drink)  Has patient met with a dietitian in the past?: Yes    Being Active:  Being Active Assessed Today: Yes  Exercise:: Yes (walking)  Days per week of moderate to strenuous exercise (like a brisk walk): 3  On average, minutes per day of exercise at this level: 60  How intense was your typical exercise? : Moderate (like brisk walking)  Exercise Minutes per Week: 180  Barrier to exercise: None    Monitoring:  Monitoring Assessed Today: Yes  Did patient bring glucose meter to appointment? : Yes  Blood Glucose Meter: FreeStyle  Times checking blood sugar at home (number): Other (see Comments)  Please elaborate:: Pt has seven tests in the past two weeks.  AM but not fasting-113, 204, 142, 225, 130, 212, 121   Lower ones are on days he exercises.     Taking Medications:  Diabetes Medication(s)       Dipeptidyl Peptidase-4 (DPP-4) Inhibitors       sitagliptin (JANUVIA) 100 MG tablet Take 1 tablet (100 mg) by mouth daily       Sodium-Glucose Co-Transporter 2 (SGLT2) Inhibitors       dapagliflozin (FARXIGA) 10 MG TABS tablet Take 1 tablet (10 mg) by mouth daily            Taking Medication Assessed Today: Yes  Current Treatments: Oral Medication (taken by mouth) (Januvia 100 mg daily, Farxiga 10 mg am)  Problems taking diabetes medications regularly?: No  Diabetes medication side effects?: Yes (Had diarrhea from Metformin XR - stopped April 2022.  Did not tolerate Glimepiride.  Does not want to take Jardiance.)    Problem Solving:  Problem Solving Assessed Today: Yes  Is the patient at risk for hypoglycemia?: No  Hypoglycemia Frequency: Never  Is the patient at risk for DKA?: No    Reducing Risks:  Reducing Risks Assessed Today: Yes  Diabetes  Risks: Age over 45 years  CAD Risks: Diabetes Mellitus, Male sex  Has dilated eye exam at least once a year?: Yes  Sees dentist every 6 months?: Yes  Feet checked by healthcare provider in the last year?: Yes (9/21/23 [facility])    Healthy Coping:  Healthy Coping Assessed Today: Yes  Emotional response to diabetes: Acceptance, Concern for health and well-being  Informal Support system:: Spouse  Stage of change: MAINTENANCE (Working to maintain change, with risk of relapse)  Patient Activation Measure Survey Score:      2/20/2020     2:00 PM   ALICIA Score (Last Two)   ALICIA Raw Score 38   Activation Score 83.7   ALICIA Level 4       Time Spent: 45 minutes  Encounter Type: Individual    Any diabetes medication dose changes were made via the CDE Protocol per the patient's referring provider. A copy of this encounter was shared with the provider.        Sincerely,        Ava Templeton RD

## 2024-04-16 NOTE — PROGRESS NOTES
Diabetes Self-Management Education & Support    Presents for: Individual review    Type of Service: In Person Visit    ASSESSMENT:  Pt is here today r/t recent increase in A1c to 11.5%.  Previous A1c was 7.0% Sept 2023.  Pt is unsure of reason for increase other than he was eating more bakery items which he has since stopped.  Pt does have hx of pancreas surgery in 2020 - unsure if that plays a role in sudden poor glucose control.  Farxiga 10 mg daily was added by cardiology at visit on March 27th.  Pt reports no side effects.  He has made efforts to increase his fluid intake since beginning the Farxiga and is now consuming 6-7 8oz glasses of water daily.  Unsure of effectiveness of Farxiga as pt does not test glucose often.  Pt is agreeable to CGM study today.      Patient's most recent   Lab Results   Component Value Date    A1C 11.5 03/27/2024    A1C 10.9 06/16/2021    HEMOGLOBINA1 9.3 03/09/2023     is not meeting goal of  <7.5% for age.     Diabetes knowledge and skills assessment:   Patient is knowledgeable in diabetes management concepts related to: Being Active and Taking Medication    Education today focused on diabetes management concepts: CGM study, glucose goals, healthy eating.      Based on learning assessment above, most appropriate setting for further diabetes education would be: Individual setting.      PLAN  CGM sensor agreement signed.    Sensor attached to back of right arm. No redness, drainage or bleeding noted. Patient instructed on testing schedule, how to complete the patient log, restrictions during wear and to remove if having an x-ray, MRI or CT.    Patient return date requested from April 30th 3:30 pm.    Freestyle Mary Sensor:  Lot#:[id number]  Expiration Date: 08-  Sensor S/N: [id number]    Patient's identity was verified by using patient's name and date of birth prior to insertion.    Sensor started: 10:15 am  2 minute re-check completed: 10:17 am    Written instructions and  "patient log given to patient.    Follow-up: April 30th - review of CGM study.     See Care Plan for co-developed, patient-state behavior change goals.  AVS provided for patient today.    Education Materials Provided:  Food logs     SUBJECTIVE/OBJECTIVE:  Presents for: Individual review  Accompanied by: Self  Diabetes education in the past 24mo: Yes  Focus of Visit: Assistance w/ making life changes  Diabetes type: Type 2  Date of diagnosis: 4/2018  Disease course: Getting harder to manage  How confident are you filling out medical forms by yourself:: Extremely  Diabetes management related comments/concerns: No concerns.  Transportation concerns: No  Difficulty affording diabetes medication?: No (If patient uses his mail order pharmacy the highest cost is $ 60.00 for a three month supply.)  Difficulty affording diabetes testing supplies?: No  Other concerns:: None  Cultural Influences/Ethnic Background:  Not  or     Diabetes Symptoms & Complications:  Diabetes Related Symptoms: Fatigue, Neuropathy  Weight trend: Stable  Symptom course: Stable  Disease course: Getting harder to manage  Complications assessed today?: No  CVA: No  Heart disease: No  Nephropathy: No  Foot ulcerations: No  Retinopathy: No    Patient Problem List and Family Medical History reviewed for relevant medical history, current medical status, and diabetes risk factors.    Vitals:  /81   Pulse 58   Resp 16   Ht 1.708 m (5' 7.25\")   Wt 77.9 kg (171 lb 12.8 oz)   SpO2 98%   BMI 26.71 kg/m    Estimated body mass index is 26.71 kg/m  as calculated from the following:    Height as of this encounter: 1.708 m (5' 7.25\").    Weight as of this encounter: 77.9 kg (171 lb 12.8 oz).   Last 3 BP:   BP Readings from Last 3 Encounters:   04/16/24 168/81   03/27/24 139/72   01/23/24 139/81       History   Smoking Status    Never   Smokeless Tobacco    Never       Labs:  Lab Results   Component Value Date    A1C 11.5 03/27/2024    A1C 10.9 " "06/16/2021    HEMOGLOBINA1 9.3 03/09/2023     Lab Results   Component Value Date     03/27/2024     07/11/2023     06/30/2022     06/30/2021     Lab Results   Component Value Date    LDL 45 03/27/2024    LDL 32 06/16/2021     HDL Cholesterol   Date Value Ref Range Status   06/16/2021 32 (L) >39 mg/dL Final     Direct Measure HDL   Date Value Ref Range Status   03/27/2024 35 (L) >=40 mg/dL Final   ]  GFR Estimate   Date Value Ref Range Status   03/27/2024 87 >60 mL/min/1.73m2 Final   06/30/2021 90 >60 mL/min/[1.73_m2] Final     Comment:     Non  GFR Calc  Starting 12/18/2018, serum creatinine based estimated GFR (eGFR) will be   calculated using the Chronic Kidney Disease Epidemiology Collaboration   (CKD-EPI) equation.       GFR Estimate If Black   Date Value Ref Range Status   06/30/2021 >90 >60 mL/min/[1.73_m2] Final     Comment:      GFR Calc  Starting 12/18/2018, serum creatinine based estimated GFR (eGFR) will be   calculated using the Chronic Kidney Disease Epidemiology Collaboration   (CKD-EPI) equation.       Lab Results   Component Value Date    CR 0.91 03/27/2024    CR 0.76 06/30/2021     No results found for: \"MICROALBUMIN\"    Healthy Eating:  Healthy Eating Assessed Today: Yes  Cultural/Adventism diet restrictions?: No  Do you have any food allergies or intolerances?: Yes  Please list your food allergies / intolerances: lactose  Meal planning/habits: None  Who cooks/prepares meals for you?: Self, Spouse  Who purchases food in  your home?: Self, Spouse  How many times a week on average do you eat food made away from home (restaurant/take-out)?: 0  Meals include: Breakfast, Lunch, Dinner  Breakfast: rice krispies with strawberries or 1 slice cinnamon toast - coffee with sugar free cream  Lunch: soup or sandwich or scrambled eggs  Dinner: meat, starch (baked potato or noodle or rice dish), veggies or salad  Snacks: popcorn or peanuts or sugar free " wafer cookies  Other: If dines out pizza - < 1x/week.  Pt reports prior to last A1c he was eating more bakery items.  Beverages: Water, Coffee, Diet soda, Sports drinks (sugar free sports drink)  Has patient met with a dietitian in the past?: Yes    Being Active:  Being Active Assessed Today: Yes  Exercise:: Yes (walking)  Days per week of moderate to strenuous exercise (like a brisk walk): 3  On average, minutes per day of exercise at this level: 60  How intense was your typical exercise? : Moderate (like brisk walking)  Exercise Minutes per Week: 180  Barrier to exercise: None    Monitoring:  Monitoring Assessed Today: Yes  Did patient bring glucose meter to appointment? : Yes  Blood Glucose Meter: FreeStyle  Times checking blood sugar at home (number): Other (see Comments)  Please elaborate:: Pt has seven tests in the past two weeks.  AM but not fasting-113, 204, 142, 225, 130, 212, 121   Lower ones are on days he exercises.     Taking Medications:  Diabetes Medication(s)       Dipeptidyl Peptidase-4 (DPP-4) Inhibitors       sitagliptin (JANUVIA) 100 MG tablet Take 1 tablet (100 mg) by mouth daily       Sodium-Glucose Co-Transporter 2 (SGLT2) Inhibitors       dapagliflozin (FARXIGA) 10 MG TABS tablet Take 1 tablet (10 mg) by mouth daily            Taking Medication Assessed Today: Yes  Current Treatments: Oral Medication (taken by mouth) (Januvia 100 mg daily, Farxiga 10 mg am)  Problems taking diabetes medications regularly?: No  Diabetes medication side effects?: Yes (Had diarrhea from Metformin XR - stopped April 2022.  Did not tolerate Glimepiride.  Does not want to take Jardiance.)    Problem Solving:  Problem Solving Assessed Today: Yes  Is the patient at risk for hypoglycemia?: No  Hypoglycemia Frequency: Never  Is the patient at risk for DKA?: No    Reducing Risks:  Reducing Risks Assessed Today: Yes  Diabetes Risks: Age over 45 years  CAD Risks: Diabetes Mellitus, Male sex  Has dilated eye exam at least  once a year?: Yes  Sees dentist every 6 months?: Yes  Feet checked by healthcare provider in the last year?: Yes (9/21/23 [facility])    Healthy Coping:  Healthy Coping Assessed Today: Yes  Emotional response to diabetes: Acceptance, Concern for health and well-being  Informal Support system:: Spouse  Stage of change: MAINTENANCE (Working to maintain change, with risk of relapse)  Patient Activation Measure Survey Score:      2/20/2020     2:00 PM   ALICIA Score (Last Two)   ALICIA Raw Score 38   Activation Score 83.7   ALICIA Level 4       Time Spent: 45 minutes  Encounter Type: Individual    Any diabetes medication dose changes were made via the CDE Protocol per the patient's referring provider. A copy of this encounter was shared with the provider.

## 2024-04-19 PROCEDURE — 93244 EXT ECG>48HR<7D REV&INTERPJ: CPT | Performed by: INTERNAL MEDICINE

## 2024-04-23 ENCOUNTER — THERAPY VISIT (OUTPATIENT)
Dept: PHYSICAL THERAPY | Facility: HOSPITAL | Age: 79
End: 2024-04-23
Attending: INTERNAL MEDICINE
Payer: COMMERCIAL

## 2024-04-23 DIAGNOSIS — M62.81 GENERALIZED MUSCLE WEAKNESS: ICD-10-CM

## 2024-04-23 PROCEDURE — 97162 PT EVAL MOD COMPLEX 30 MIN: CPT | Mod: GP

## 2024-04-23 PROCEDURE — 97110 THERAPEUTIC EXERCISES: CPT | Mod: GP

## 2024-04-23 NOTE — PROGRESS NOTES
PHYSICAL THERAPY EVALUATION  Type of Visit: Evaluation    See electronic medical record for Abuse and Falls Screening details.    Subjective       Pt presents to PT with chief complaints of R shoulder pain and ROM limitations, and R knee pain and instability with functional activity.     Presenting condition or subjective complaint: shoulder pain in right arm and right knee  Date of onset: 03/27/24    Relevant medical history: Diabetes; Foot drop; High blood pressure   Dates & types of surgery: unsure of dates    Prior diagnostic imaging/testing results: X-ray     Prior therapy history for the same diagnosis, illness or injury:    No    Prior Level of Function  Transfers: Independent  Ambulation: Independent  ADL: Independent  IADL: Driving, Medication management, Yard work    Living Environment  Social support: With a significant other or spouse   Type of home: House; 2-story; Basement   Stairs to enter the home: Yes 4 Is there a railing: Yes   Ramp: No   Stairs inside the home: Yes 13 Is there a railing: Yes   Help at home: None  Equipment owned:       Employment: No    Hobbies/Interests: walking camping fisingworking oncars    Patient goals for therapy: walk and move without pain and sleep without pain    Pain assessment: Pain present  Location: R shoulder/Rating: Mild to moderate  Location: R knee/Rating: Mild to moderate     Objective      Cognitive Status Examination  Orientation: Oriented to person, place and time   Level of Consciousness: Alert  Follows Commands and Answers Questions: 100% of the time  Personal Safety and Judgement: Intact  Memory: Intact    OBSERVATION: Pt ambulates into the therapy gym independently and without antalgic gait pattern.  INTEGUMENTARY: Intact, no edema noted in R knee or R shoulder  POSTURE: WFL  R knee presents in genu varus  PALPATION: Pain reported with palpation in R knee along joint line medially and laterally, along medial soft tissue and MCL.  Pain reported with  palpation surrounding R glenohumeral joint.  Tightness in all tissue surrounding R GH joint, specifically at axilla, pectoralis tendon, and RTC.  Decreased R GH joint mobility noted in all planes to moderate level.  RANGE OF MOTION: LE ROM WNL; L shoulder ROM WFL  R shoulder AROM: Flexion 100, abd 90, IR to waist band of pants, ER to 25, scaption WFL   STRENGTH: UE Strength WNL  B knee strength: L WNL, R WFL with decreased quality of strength compared to L     BED MOBILITY: WNL    TRANSFERS: WNL    WHEELCHAIR MOBILITY: NA    GAIT:   Level of [place]: WNL, Independent  Assistive Device(s): None  Gait Deviations:  Genu varus noted on R  Gait Distance: Unlimited  Stairs: WNL    Assessment & Plan   CLINICAL IMPRESSIONS  Medical Diagnosis: Generalized weakness    Treatment Diagnosis: R shoulder and R knee pain   Impression/Assessment: Patient is a 78 year old male with complaints of R knee and R shoulder pain, instability in R knee, and decreased ROM in R shoulder.  Pt reports his R knee feels unstable at times and has caused near falls during walking and transfers into standing, but he has not fallen to the ground.  The following significant findings have been identified: Pain, Decreased ROM/flexibility, Decreased joint mobility, Decreased strength, Impaired balance, Inflammation, Impaired gait, Impaired muscle performance, and Decreased activity tolerance. These impairments interfere with their ability to perform self care tasks, recreational activities, and community mobility as compared to previous level of function.     Clinical Decision Making (Complexity):  Clinical Presentation: Stable/Uncomplicated  Clinical Presentation Rationale: based on medical and personal factors listed in PT evaluation  Clinical Decision Making (Complexity): Moderate complexity    PLAN OF CARE  Treatment Interventions:  Interventions: Manual Therapy, Neuromuscular Re-education, Therapeutic Activity, Therapeutic Exercise    Long Term  Goals     PT Goal 1  Goal Identifier: STG 1  Goal Description: Pt will tolerate HEP daily for ROM, strengthening, stability without increased pain.  Rationale: to maximize safety and independence with performance of ADLs and functional tasks  Target Date: 06/04/24  PT Goal 2  Goal Identifier: STG 2  Goal Description: Pt will report improvement in ability to sleep with less R shoulder pain by 25% or greater.  Rationale: to maximize safety and independence with performance of ADLs and functional tasks  Target Date: 06/04/24  PT Goal 3  Goal Identifier: LTG 1  Goal Description: Pt will demonstrate improved R shoulder ROM to WFL in all planes without pain for improved functional activity tolerance.  Rationale: to maximize safety and independence with performance of ADLs and functional tasks  Target Date: 07/16/24  PT Goal 4  Goal Identifier: LTG 2  Goal Description: Pt will present with improved R knee strength to WNL to equal L, with improved stability per subjective report during functional activities.  Rationale: to maximize safety and independence with performance of ADLs and functional tasks  Target Date: 07/16/24      Frequency of Treatment: 1x/week  Duration of Treatment: 12 weeks    Education Assessment:   Learner/Method: Patient;Listening;Demonstration;Pictures/Video;No Barriers to Learning  Education Comments: HEP for shoulder AAROM    Risks and benefits of evaluation/treatment have been explained.   Patient/Family/caregiver agrees with Plan of Care.     Evaluation Time:     PT Eval, Moderate Complexity Minutes (64549): 35       Signing Clinician: Lenore Cole PT      [facility]                                                                                   OUTPATIENT PHYSICAL THERAPY      PLAN OF TREATMENT FOR OUTPATIENT REHABILITATION   Patient's Last Name, First Name, Rafa Alvarado YOB: 1945   Provider's Name   [facility]   Medical Record No.  [id number]     Onset Date: 03/27/24  Start of Care Date: 04/23/24     Medical Diagnosis:  Generalized weakness      PT Treatment Diagnosis:  R shoulder and R knee pain Plan of Treatment  Frequency/Duration: 1x/week/ 12 weeks    Certification date from 04/23/24 to 07/16/24         See note for plan of treatment details and functional goals     Lenore Cole, PT                         I CERTIFY THE NEED FOR THESE SERVICES FURNISHED UNDER        THIS PLAN OF TREATMENT AND WHILE UNDER MY CARE     (Physician attestation of this document indicates review and certification of the therapy plan).              Referring Provider:  Jimbo Crawford    Initial Assessment  See Epic Evaluation- Start of Care Date: 04/23/24

## 2024-04-30 ENCOUNTER — HOSPITAL ENCOUNTER (OUTPATIENT)
Dept: EDUCATION SERVICES | Facility: HOSPITAL | Age: 79
Discharge: HOME | End: 2024-04-30
Attending: DIETITIAN, REGISTERED | Admitting: DIETITIAN, REGISTERED
Payer: COMMERCIAL

## 2024-04-30 ENCOUNTER — THERAPY VISIT (OUTPATIENT)
Dept: PHYSICAL THERAPY | Facility: HOSPITAL | Age: 79
End: 2024-04-30
Attending: FAMILY MEDICINE
Payer: COMMERCIAL

## 2024-04-30 VITALS
RESPIRATION RATE: 16 BRPM | SYSTOLIC BLOOD PRESSURE: 161 MMHG | HEART RATE: 71 BPM | HEIGHT: 67 IN | BODY MASS INDEX: 26.76 KG/M2 | OXYGEN SATURATION: 97 % | WEIGHT: 170.5 LBS | DIASTOLIC BLOOD PRESSURE: 90 MMHG

## 2024-04-30 DIAGNOSIS — G89.29 CHRONIC PAIN OF RIGHT KNEE: ICD-10-CM

## 2024-04-30 DIAGNOSIS — M25.561 CHRONIC PAIN OF RIGHT KNEE: ICD-10-CM

## 2024-04-30 DIAGNOSIS — M25.511 CHRONIC RIGHT SHOULDER PAIN: Primary | ICD-10-CM

## 2024-04-30 DIAGNOSIS — E11.65 TYPE 2 DIABETES MELLITUS WITH HYPERGLYCEMIA, WITHOUT LONG-TERM CURRENT USE OF INSULIN (H): Primary | ICD-10-CM

## 2024-04-30 DIAGNOSIS — G89.29 CHRONIC RIGHT SHOULDER PAIN: Primary | ICD-10-CM

## 2024-04-30 PROCEDURE — 97110 THERAPEUTIC EXERCISES: CPT | Mod: GP

## 2024-04-30 PROCEDURE — 97803 MED NUTRITION INDIV SUBSEQ: CPT | Performed by: DIETITIAN, REGISTERED

## 2024-04-30 PROCEDURE — 97140 MANUAL THERAPY 1/> REGIONS: CPT | Mod: GP

## 2024-04-30 ASSESSMENT — PAIN SCALES - GENERAL: PAINLEVEL: MODERATE PAIN (5)

## 2024-04-30 NOTE — PROGRESS NOTES
Diabetes Self-Management Education & Support    Presents for: Individual review    Type of Service: In Person Visit    ASSESSMENT:  Pt is here today to review CGM study report.      Freestyle Mary AGP Report  04/16/2024 to 04/30/2024    % Time CGM is Active 100%    Average Glucose  157    Glucose Management Indicator  (GMI)    7.1%    Glucose Variabilty  23%    Time in Ranges:  >250 mg/dL   1%  181-250 mg/dL  21%   mg/dL   78%  54-69 mg/dL   0%  <54 mg/dL   0%     CGM study notes glucose levels much improved with addition of Farxiga and healthier food choices.   Pt notes no side effects from Farxiga.  Study does note that glucose trends above target daily after his breakfast of rice krispies/strawberries.  Pt willing to try alternate breakfast choices.      Patient's most recent   Lab Results   Component Value Date    A1C 11.5 03/27/2024    A1C 10.9 06/16/2021    HEMOGLOBINA1 9.3 03/09/2023     is not meeting goal of  <7.5% for age.     Diabetes knowledge and skills assessment:   Patient is knowledgeable in diabetes management concepts related to: Being Active and Taking Medication    Education today focused on the following diabetes management concepts: Healthy Eating - Reviewed alternate choices for breakfast meal.  Encouraged pt to always include protein/increase fiber.     Based on learning assessment above, most appropriate setting for further diabetes education would be: Individual setting.      PLAN  Try alternate breakfast vs cold cereal - add protein/increase fiber.   Continue walking for exercise.   Test glucose levels at current.   Continue current diabetes medications.  Call if Farxiga too costly.     Follow-up: July 2024 A1c check.     See Care Plan for co-developed, patient-state behavior change goals.  AVS provided for patient today.    Education Materials Provided:  No new materials today.     SUBJECTIVE/OBJECTIVE:  Presents for: Individual review  Accompanied by: Self  Diabetes education in  "the past 24mo: Yes  Focus of Visit: Assistance w/ making life changes, Healthy Eating  Diabetes type: Type 2  Date of diagnosis: 4/2018  Disease course: Improving  How confident are you filling out medical forms by yourself:: Extremely  Diabetes management related comments/concerns: No concerns.  Transportation concerns: No  Difficulty affording diabetes medication?: No (If patient uses his mail order pharmacy the highest cost is $ 60.00 for a three month supply.)  Difficulty affording diabetes testing supplies?: No  Other concerns:: None  Cultural Influences/Ethnic Background:  Not  or     Diabetes Symptoms & Complications:  Diabetes Related Symptoms: Fatigue, Neuropathy  Weight trend: Stable  Symptom course: Stable  Disease course: Improving  Complications assessed today?: No  CVA: No  Heart disease: No  Nephropathy: No  Foot ulcerations: No  Retinopathy: No    Patient Problem List and Family Medical History reviewed for relevant medical history, current medical status, and diabetes risk factors.    Vitals:  /90   Pulse 71   Resp 16   Ht 1.708 m (5' 7.25\")   Wt 77.3 kg (170 lb 8 oz)   SpO2 97%   BMI 26.51 kg/m    Estimated body mass index is 26.51 kg/m  as calculated from the following:    Height as of this encounter: 1.708 m (5' 7.25\").    Weight as of this encounter: 77.3 kg (170 lb 8 oz).   Last 3 BP:   BP Readings from Last 3 Encounters:   04/30/24 161/90   04/16/24 168/81   03/27/24 139/72       History   Smoking Status    Never   Smokeless Tobacco    Never       Labs:  Lab Results   Component Value Date    A1C 11.5 03/27/2024    A1C 10.9 06/16/2021    HEMOGLOBINA1 9.3 03/09/2023     Lab Results   Component Value Date     03/27/2024     07/11/2023     06/30/2022     06/30/2021     Lab Results   Component Value Date    LDL 45 03/27/2024    LDL 32 06/16/2021     HDL Cholesterol   Date Value Ref Range Status   06/16/2021 32 (L) >39 mg/dL Final     Direct " "Measure HDL   Date Value Ref Range Status   03/27/2024 35 (L) >=40 mg/dL Final   ]  GFR Estimate   Date Value Ref Range Status   03/27/2024 87 >60 mL/min/1.73m2 Final   06/30/2021 90 >60 mL/min/[1.73_m2] Final     Comment:     Non  GFR Calc  Starting 12/18/2018, serum creatinine based estimated GFR (eGFR) will be   calculated using the Chronic Kidney Disease Epidemiology Collaboration   (CKD-EPI) equation.       GFR Estimate If Black   Date Value Ref Range Status   06/30/2021 >90 >60 mL/min/[1.73_m2] Final     Comment:      GFR Calc  Starting 12/18/2018, serum creatinine based estimated GFR (eGFR) will be   calculated using the Chronic Kidney Disease Epidemiology Collaboration   (CKD-EPI) equation.       Lab Results   Component Value Date    CR 0.91 03/27/2024    CR 0.76 06/30/2021     No results found for: \"MICROALBUMIN\"    Healthy Eating:  Healthy Eating Assessed Today: Yes  Cultural/Rastafari diet restrictions?: No  Do you have any food allergies or intolerances?: Yes  Please list your food allergies / intolerances: lactose  Meal planning/habits: None  Who cooks/prepares meals for you?: Self, Spouse  Who purchases food in  your home?: Self, Spouse  How many times a week on average do you eat food made away from home (restaurant/take-out)?: 0  Meals include: Breakfast, Lunch, Dinner  Breakfast: rice krispies with strawberries and lactose free milk - coffee with sugar free creamer  Lunch: sandwich with veggies or fruit or eggs/salsa  or soup/crackers/cheese  Dinner: meat, starch (baked potato or noodle or rice dish), veggies or salad  Snacks: popcorn or peanuts or sugar free wafer cookies or sugar free ice cream treat  Other: If dines out pizza - < 1x/week.  Pt reports prior to last A1c he was eating more bakery items.  Beverages: Water, Coffee, Diet soda, Sports drinks (sugar free sports drink)  Has patient met with a dietitian in the past?: Yes    Being Active:  Being Active " Assessed Today: Yes  Exercise:: Yes (walking)  Days per week of moderate to strenuous exercise (like a brisk walk): 3  On average, minutes per day of exercise at this level: 60  How intense was your typical exercise? : Moderate (like brisk walking)  Exercise Minutes per Week: 180  Barrier to exercise: None    Monitoring:  Monitoring Assessed Today: Yes  Did patient bring glucose meter to appointment? : Yes  Blood Glucose Meter: FreeStyle  Times checking blood sugar at home (number): Other (see Comments)  Please elaborate:: Pt has seven tests in the past two weeks.  Blood glucose trend: Decreasing    Taking Medications:  Diabetes Medication(s)       Dipeptidyl Peptidase-4 (DPP-4) Inhibitors       sitagliptin (JANUVIA) 100 MG tablet Take 1 tablet (100 mg) by mouth daily       Sodium-Glucose Co-Transporter 2 (SGLT2) Inhibitors       dapagliflozin (FARXIGA) 10 MG TABS tablet Take 1 tablet (10 mg) by mouth daily            Taking Medication Assessed Today: Yes  Current Treatments: Oral Medication (taken by mouth) (Januvia 100 mg daily, Farxiga 10 mg am)  Problems taking diabetes medications regularly?: No  Diabetes medication side effects?: Yes (Had diarrhea from Metformin XR - stopped April 2022.  Did not tolerate Glimepiride.  Does not want to take Jardiance.)    Problem Solving:  Problem Solving Assessed Today: Yes  Is the patient at risk for hypoglycemia?: No  Hypoglycemia Frequency: Never  Is the patient at risk for DKA?: No    Reducing Risks:  Reducing Risks Assessed Today: Yes  Diabetes Risks: Age over 45 years  CAD Risks: Diabetes Mellitus, Male sex  Has dilated eye exam at least once a year?: Yes  Sees dentist every 6 months?: Yes  Feet checked by healthcare provider in the last year?: Yes (9/21/23 [facility])    Healthy Coping:  Healthy Coping Assessed Today: Yes  Emotional response to diabetes: Acceptance, Concern for health and well-being  Informal Support system:: Spouse  Stage of change: MAINTENANCE (Working  to maintain change, with risk of relapse)  Patient Activation Measure Survey Score:      2/20/2020     2:00 PM   ALICIA Score (Last Two)   ALICIA Raw Score 38   Activation Score 83.7   ALICIA Level 4       Time Spent: 20 minutes  Encounter Type: Individual    Any diabetes medication dose changes were made via the CDE Protocol per the patient's referring provider. A copy of this encounter was shared with the provider.

## 2024-04-30 NOTE — PROGRESS NOTES
Abnormal VS: elevated BP  Pt scheduled for BP check follow-up.  Pt is satisfied with this plan.    Della Pablo LPN

## 2024-04-30 NOTE — LETTER
4/30/2024        RE: Rafa Bhatti  [address] [facility] [place] [address]        Diabetes Self-Management Education & Support    Presents for: Individual review    Type of Service: In Person Visit    ASSESSMENT:  Pt is here today to review CGM study report.      Aylin Hernandez AGP Report  04/16/2024 to 04/30/2024    % Time CGM is Active 100%    Average Glucose  157    Glucose Management Indicator  (GMI)    7.1%    Glucose Variabilty  23%    Time in Ranges:  >250 mg/dL   1%  181-250 mg/dL  21%   mg/dL   78%  54-69 mg/dL   0%  <54 mg/dL   0%     CGM study notes glucose levels much improved with addition of Farxiga and healthier food choices.   Pt notes no side effects from Farxiga.  Study does note that glucose trends above target daily after his breakfast of rice krispies/strawberries.  Pt willing to try alternate breakfast choices.      Patient's most recent   Lab Results   Component Value Date    A1C 11.5 03/27/2024    A1C 10.9 06/16/2021    HEMOGLOBINA1 9.3 03/09/2023     is not meeting goal of  <7.5% for age.     Diabetes knowledge and skills assessment:   Patient is knowledgeable in diabetes management concepts related to: Being Active and Taking Medication    Education today focused on the following diabetes management concepts: Healthy Eating - Reviewed alternate choices for breakfast meal.  Encouraged pt to always include protein/increase fiber.     Based on learning assessment above, most appropriate setting for further diabetes education would be: Individual setting.      PLAN  Try alternate breakfast vs cold cereal - add protein/increase fiber.   Continue walking for exercise.   Test glucose levels at current.   Continue current diabetes medications.  Call if Farxiga too costly.     Follow-up: July 2024 A1c check.     See Care Plan for co-developed, patient-state behavior change goals.  AVS provided for patient today.    Education Materials Provided:  No new materials today.  "    SUBJECTIVE/OBJECTIVE:  Presents for: Individual review  Accompanied by: Self  Diabetes education in the past 24mo: Yes  Focus of Visit: Assistance w/ making life changes, Healthy Eating  Diabetes type: Type 2  Date of diagnosis: 4/2018  Disease course: Improving  How confident are you filling out medical forms by yourself:: Extremely  Diabetes management related comments/concerns: No concerns.  Transportation concerns: No  Difficulty affording diabetes medication?: No (If patient uses his mail order pharmacy the highest cost is $ 60.00 for a three month supply.)  Difficulty affording diabetes testing supplies?: No  Other concerns:: None  Cultural Influences/Ethnic Background:  Not  or     Diabetes Symptoms & Complications:  Diabetes Related Symptoms: Fatigue, Neuropathy  Weight trend: Stable  Symptom course: Stable  Disease course: Improving  Complications assessed today?: No  CVA: No  Heart disease: No  Nephropathy: No  Foot ulcerations: No  Retinopathy: No    Patient Problem List and Family Medical History reviewed for relevant medical history, current medical status, and diabetes risk factors.    Vitals:  /90   Pulse 71   Resp 16   Ht 1.708 m (5' 7.25\")   Wt 77.3 kg (170 lb 8 oz)   SpO2 97%   BMI 26.51 kg/m    Estimated body mass index is 26.51 kg/m  as calculated from the following:    Height as of this encounter: 1.708 m (5' 7.25\").    Weight as of this encounter: 77.3 kg (170 lb 8 oz).   Last 3 BP:   BP Readings from Last 3 Encounters:   04/30/24 161/90   04/16/24 168/81   03/27/24 139/72       History   Smoking Status     Never   Smokeless Tobacco     Never       Labs:  Lab Results   Component Value Date    A1C 11.5 03/27/2024    A1C 10.9 06/16/2021    HEMOGLOBINA1 9.3 03/09/2023     Lab Results   Component Value Date     03/27/2024     07/11/2023     06/30/2022     06/30/2021     Lab Results   Component Value Date    LDL 45 03/27/2024    LDL 32 " "06/16/2021     HDL Cholesterol   Date Value Ref Range Status   06/16/2021 32 (L) >39 mg/dL Final     Direct Measure HDL   Date Value Ref Range Status   03/27/2024 35 (L) >=40 mg/dL Final   ]  GFR Estimate   Date Value Ref Range Status   03/27/2024 87 >60 mL/min/1.73m2 Final   06/30/2021 90 >60 mL/min/[1.73_m2] Final     Comment:     Non  GFR Calc  Starting 12/18/2018, serum creatinine based estimated GFR (eGFR) will be   calculated using the Chronic Kidney Disease Epidemiology Collaboration   (CKD-EPI) equation.       GFR Estimate If Black   Date Value Ref Range Status   06/30/2021 >90 >60 mL/min/[1.73_m2] Final     Comment:      GFR Calc  Starting 12/18/2018, serum creatinine based estimated GFR (eGFR) will be   calculated using the Chronic Kidney Disease Epidemiology Collaboration   (CKD-EPI) equation.       Lab Results   Component Value Date    CR 0.91 03/27/2024    CR 0.76 06/30/2021     No results found for: \"MICROALBUMIN\"    Healthy Eating:  Healthy Eating Assessed Today: Yes  Cultural/Yarsani diet restrictions?: No  Do you have any food allergies or intolerances?: Yes  Please list your food allergies / intolerances: lactose  Meal planning/habits: None  Who cooks/prepares meals for you?: Self, Spouse  Who purchases food in  your home?: Self, Spouse  How many times a week on average do you eat food made away from home (restaurant/take-out)?: 0  Meals include: Breakfast, Lunch, Dinner  Breakfast: rice krispies with strawberries and lactose free milk - coffee with sugar free creamer  Lunch: sandwich with veggies or fruit or eggs/salsa  or soup/crackers/cheese  Dinner: meat, starch (baked potato or noodle or rice dish), veggies or salad  Snacks: popcorn or peanuts or sugar free wafer cookies or sugar free ice cream treat  Other: If dines out pizza - < 1x/week.  Pt reports prior to last A1c he was eating more bakery items.  Beverages: Water, Coffee, Diet soda, Sports drinks (sugar " free sports drink)  Has patient met with a dietitian in the past?: Yes    Being Active:  Being Active Assessed Today: Yes  Exercise:: Yes (walking)  Days per week of moderate to strenuous exercise (like a brisk walk): 3  On average, minutes per day of exercise at this level: 60  How intense was your typical exercise? : Moderate (like brisk walking)  Exercise Minutes per Week: 180  Barrier to exercise: None    Monitoring:  Monitoring Assessed Today: Yes  Did patient bring glucose meter to appointment? : Yes  Blood Glucose Meter: FreeStyle  Times checking blood sugar at home (number): Other (see Comments)  Please elaborate:: Pt has seven tests in the past two weeks.  Blood glucose trend: Decreasing    Taking Medications:  Diabetes Medication(s)       Dipeptidyl Peptidase-4 (DPP-4) Inhibitors       sitagliptin (JANUVIA) 100 MG tablet Take 1 tablet (100 mg) by mouth daily       Sodium-Glucose Co-Transporter 2 (SGLT2) Inhibitors       dapagliflozin (FARXIGA) 10 MG TABS tablet Take 1 tablet (10 mg) by mouth daily            Taking Medication Assessed Today: Yes  Current Treatments: Oral Medication (taken by mouth) (Januvia 100 mg daily, Farxiga 10 mg am)  Problems taking diabetes medications regularly?: No  Diabetes medication side effects?: Yes (Had diarrhea from Metformin XR - stopped April 2022.  Did not tolerate Glimepiride.  Does not want to take Jardiance.)    Problem Solving:  Problem Solving Assessed Today: Yes  Is the patient at risk for hypoglycemia?: No  Hypoglycemia Frequency: Never  Is the patient at risk for DKA?: No    Reducing Risks:  Reducing Risks Assessed Today: Yes  Diabetes Risks: Age over 45 years  CAD Risks: Diabetes Mellitus, Male sex  Has dilated eye exam at least once a year?: Yes  Sees dentist every 6 months?: Yes  Feet checked by healthcare provider in the last year?: Yes (9/21/23 Wesley)    Healthy Coping:  Healthy Coping Assessed Today: Yes  Emotional response to diabetes: Acceptance,  Concern for health and well-being  Informal Support system:: Spouse  Stage of change: MAINTENANCE (Working to maintain change, with risk of relapse)  Patient Activation Measure Survey Score:      2/20/2020     2:00 PM   ALICIA Score (Last Two)   ALICIA Raw Score 38   Activation Score 83.7   ALICIA Level 4       Time Spent: 20 minutes  Encounter Type: Individual    Any diabetes medication dose changes were made via the CDE Protocol per the patient's referring provider. A copy of this encounter was shared with the provider.       Sincerely,        Ava Templeton RD

## 2024-05-03 ENCOUNTER — TRANSFERRED RECORDS (OUTPATIENT)
Dept: HEALTH INFORMATION MANAGEMENT | Facility: CLINIC | Age: 79
End: 2024-05-03

## 2024-05-06 ENCOUNTER — ALLIED HEALTH/NURSE VISIT (OUTPATIENT)
Dept: EDUCATION SERVICES | Facility: OTHER | Age: 79
End: 2024-05-06
Payer: COMMERCIAL

## 2024-05-06 VITALS
SYSTOLIC BLOOD PRESSURE: 147 MMHG | OXYGEN SATURATION: 96 % | RESPIRATION RATE: 16 BRPM | HEART RATE: 72 BPM | DIASTOLIC BLOOD PRESSURE: 79 MMHG

## 2024-05-06 DIAGNOSIS — I10 BENIGN ESSENTIAL HYPERTENSION: Primary | ICD-10-CM

## 2024-05-07 ENCOUNTER — THERAPY VISIT (OUTPATIENT)
Dept: PHYSICAL THERAPY | Facility: HOSPITAL | Age: 79
End: 2024-05-07
Attending: INTERNAL MEDICINE
Payer: COMMERCIAL

## 2024-05-07 DIAGNOSIS — G89.29 CHRONIC RIGHT SHOULDER PAIN: Primary | ICD-10-CM

## 2024-05-07 DIAGNOSIS — G89.29 CHRONIC PAIN OF RIGHT KNEE: ICD-10-CM

## 2024-05-07 DIAGNOSIS — M25.511 CHRONIC RIGHT SHOULDER PAIN: Primary | ICD-10-CM

## 2024-05-07 DIAGNOSIS — M25.561 CHRONIC PAIN OF RIGHT KNEE: ICD-10-CM

## 2024-05-07 PROCEDURE — 97110 THERAPEUTIC EXERCISES: CPT | Mod: GP

## 2024-05-14 ENCOUNTER — THERAPY VISIT (OUTPATIENT)
Dept: PHYSICAL THERAPY | Facility: HOSPITAL | Age: 79
End: 2024-05-14
Attending: INTERNAL MEDICINE
Payer: COMMERCIAL

## 2024-05-14 DIAGNOSIS — G89.29 CHRONIC RIGHT SHOULDER PAIN: Primary | ICD-10-CM

## 2024-05-14 DIAGNOSIS — G89.29 CHRONIC PAIN OF RIGHT KNEE: ICD-10-CM

## 2024-05-14 DIAGNOSIS — M25.561 CHRONIC PAIN OF RIGHT KNEE: ICD-10-CM

## 2024-05-14 DIAGNOSIS — M25.511 CHRONIC RIGHT SHOULDER PAIN: Primary | ICD-10-CM

## 2024-05-14 PROCEDURE — 97110 THERAPEUTIC EXERCISES: CPT | Mod: GP,CQ

## 2024-05-21 ENCOUNTER — TRANSFERRED RECORDS (OUTPATIENT)
Dept: HEALTH INFORMATION MANAGEMENT | Facility: CLINIC | Age: 79
End: 2024-05-21

## 2024-05-30 ENCOUNTER — TRANSFERRED RECORDS (OUTPATIENT)
Dept: HEALTH INFORMATION MANAGEMENT | Facility: CLINIC | Age: 79
End: 2024-05-30

## 2024-06-07 ENCOUNTER — THERAPY VISIT (OUTPATIENT)
Dept: PHYSICAL THERAPY | Facility: HOSPITAL | Age: 79
End: 2024-06-07
Attending: INTERNAL MEDICINE
Payer: COMMERCIAL

## 2024-06-07 DIAGNOSIS — M25.511 CHRONIC RIGHT SHOULDER PAIN: Primary | ICD-10-CM

## 2024-06-07 DIAGNOSIS — G89.29 CHRONIC PAIN OF RIGHT KNEE: ICD-10-CM

## 2024-06-07 DIAGNOSIS — G89.29 CHRONIC RIGHT SHOULDER PAIN: Primary | ICD-10-CM

## 2024-06-07 DIAGNOSIS — M25.561 CHRONIC PAIN OF RIGHT KNEE: ICD-10-CM

## 2024-06-07 PROCEDURE — 97110 THERAPEUTIC EXERCISES: CPT | Mod: GP

## 2024-06-07 NOTE — PROGRESS NOTES
06/07/24 0500   Appointment Info   Signing clinician's name / credentials Lenore Cole PT   Total/Authorized Visits 365 through 12/31/24   Visits Used 4 [employer] medicare   Medical Diagnosis Generalized weakness   PT Tx Diagnosis R shoulder and R knee pain   Quick Adds Certification   Progress Note/Certification   Start of Care Date 04/23/24   Onset of illness/injury or Date of Surgery 03/27/24   Therapy Frequency 1x/week   Predicted Duration 12 weeks   Certification date from 04/23/24   Certification date to 07/16/24   PT Goal 1   Goal Identifier STG 1   Goal Description Pt will tolerate HEP daily for ROM, strengthening, stability without increased pain.   Rationale to maximize safety and independence with performance of ADLs and functional tasks   Goal Progress Doing well, goal met.   Target Date 06/04/24   Date Met 06/07/24   PT Goal 2   Goal Identifier STG 2   Goal Description Pt will report improvement in ability to sleep with less R shoulder pain by 25% or greater.   Rationale to maximize safety and independence with performance of ADLs and functional tasks   Target Date 07/16/24   Goal Progress Slow progress continue goal through 7/16/24   PT Goal 3   Goal Identifier LTG 1   Goal Description Pt will demonstrate improved R shoulder ROM to WFL in all planes without pain for improved functional activity tolerance.   Rationale to maximize safety and independence with performance of ADLs and functional tasks   Target Date 07/16/24   Goal Progress Slow progress, continue as written.   PT Goal 4   Goal Identifier LTG 2   Goal Description Pt will present with improved R knee strength to WNL to equal L, with improved stability per subjective report during functional activities.   Rationale to maximize safety and independence with performance of ADLs and functional tasks   Target Date 07/16/24   Goal Progress Slow progress, continue as written.   Subjective Report   Subjective Report Pt ststes he was just diagnosed  with prostate cancer.  He is still going through testing to determine what course of treatment he will go through.  He requested to hold PT at this time until he has everything worked out.   Therapeutic Procedure/Exercise   Therapeutic Procedures: strength, endurance, ROM, flexibility minutes (63677) 45   Skilled Intervention SciFit L4 x 15 min; forward  step ups onto 4 in step; stand to sits x 10; hip ext x 10; hip abd x 10; Wall slides with BUE:  flex, abd, circles CW/CCW, horizontal abd/add x 10 each; Pulleys Flexion and Abduction x 3 min ea   Patient Response/Progress Pt tolerated all exercises well and without pain   Plan   Home program Continue with shoulder and LE HEP as instructed, as tolerated   Updates to plan of care Will hold PT per patient request   Comments   Comments If pt does not return to PT be 9/1/24 then will d/c.   Total Session Time   Timed Code Treatment Minutes 45   Total Treatment Time (sum of timed and untimed services) 45

## 2024-06-21 ENCOUNTER — TRANSFERRED RECORDS (OUTPATIENT)
Dept: HEALTH INFORMATION MANAGEMENT | Facility: CLINIC | Age: 79
End: 2024-06-21

## 2024-06-24 ENCOUNTER — TELEPHONE (OUTPATIENT)
Dept: FAMILY MEDICINE | Facility: OTHER | Age: 79
End: 2024-06-24

## 2024-06-24 NOTE — TELEPHONE ENCOUNTER
2:25 PM    Reason for Call: Phone Call    Description: Dr. Raghu Cole from [facility] called looking to speak with Wesley regarding pts urology findings.     Was an appointment offered for this call? No  If yes : Appointment type              Date    Preferred method for responding to this message: Telephone Call  What is your phone number? [phone] - can be texted if Wesley would like to reach out directly.    If we cannot reach you directly, may we leave a detailed response at the number you provided? Yes    Can this message wait until your PCP/provider returns, if available today? YES    Meka Cornell

## 2024-06-26 ENCOUNTER — TELEPHONE (OUTPATIENT)
Dept: FAMILY MEDICINE | Facility: OTHER | Age: 79
End: 2024-06-26

## 2024-06-26 NOTE — PROGRESS NOTES
"  Assessment & Plan     Abnormal gastrointestinal PET scan  Recent PET/CT at [facility] ordered as part of new prostate cancer workup with unexpected findings.  Covering for PCP Dr. Olson and requested patient to come in to further review these results.  Did leave a message with Dr. Cole this morning but have been unable to connect with them yet.  Some unexpected findings in upper GI system complicated by prior surgical history.  Discussed differential including possibility of additional primary cancers and the need for further workup.  Patient and family very much in agreement with further workup.    Pancreatic lesion  14 mm hypodense lesion within the pancreatic head on recent PET/CT.  Recommendation for pancreas protocol MR which was ordered by Dr. Cole earlier this morning.  Pending MRI results discussed possible next steps of additional labs, GI versus oncology referral.  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  Suspect some of these findings are contributing to his abnormal PET scan although given history may need repeat EGD.  Will start with reconnecting him with Dr. Roman who was involved in this management in 2020.  - Adult Gen Surg  Referral    Prostate cancer (H)  Will continue to follow with Dr. Cole regarding next steps in management pending further guidance regarding these other abnormal PET scan findings.    BMI  Estimated body mass index is 24.87 kg/m  as calculated from the following:    Height as of 4/30/24: 1.708 m (5' 7.25\").    Weight as of this encounter: 72.6 kg (160 lb).     I spent a total of 55 minutes on the day of the visit.   Time spent by me doing chart review, history and exam, documentation and further activities per the note    Follow-up pending results.  Will also CC Dr. Olson " as well as Dr. Cole.    Terrance Craig is a 79 year old, presenting for the following health issues:  Results (PET scan )        6/27/2024     9:48 AM   Additional Questions   Roomed by Lázaro Rodrigues   Accompanied by Wife and daughter         6/27/2024     9:48 AM   Patient Reported Additional Medications   Patient reports taking the following new medications None     HPI     Pt is here to discuss PET scan results from [facility]    -Was notified of PET scan from [facility] and that Dr. Cole try to get a hold of Dr. Olson yesterday  -Reviewed PET scan with unexpected findings and recommended to add on this appointment today for further discussion    Recently established with [facility] urology, Dr. Raghu Cole for new prostate cancer  Underwent PET scan 6/21/2024    Impression:   1.  14 mm low-density lesion within the pancreatic head is not adequately characterized by the study with differential diagnosis including malignancy.  Correlate with patient history and other pertinent imaging.  Pancreatic protocol MRI would adequately characterize.  2.  Significant focal uptake with associated gastric wall thickening within the posterior gastric fundus, focal inflammation/ulcer versus malignancy (very unlikely prostate with note that primary gastric adenocarcinoma is also demonstrates increased PSMA avidity).  Correlate with EGD.  3.  Increased debility with postsurgical changes adjacent to the gastric pylorus, malignancy versus inflammation.  Correlate with patient history of prior imaging.  4.  Malignancy within the prostate.  5.  Low-grade avidity within the superior endplate of S1 is technically equivocal for metastatic disease, with differential diagnosis also to include degenerative changes or endplate compression fracture.    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      Objective    BP (!) 163/79 (BP Location: Left arm, Patient Position: Sitting, Cuff Size: Adult Regular)   Pulse 57   Temp 97.1  F (36.2  C) (Tympanic)   Resp 16   Wt 72.6 kg (160 lb)   SpO2 98%   BMI 24.87 kg/m    Body mass index is 24.87 kg/m .  Physical Exam  Vitals reviewed.   Constitutional:       Appearance: Normal appearance.   HENT:      Head: Normocephalic and atraumatic.   Neurological:      General: No focal deficit present.      Mental Status: He is alert and oriented to person, place, and time.   Psychiatric:         Mood and Affect: Mood normal.         Behavior: Behavior normal.       Signed Electronically by: Art Garcia MD

## 2024-06-26 NOTE — TELEPHONE ENCOUNTER
9:45 AM    Reason for Call: Phone Call    Description: Stepan from [facility] called in regards to a pt update. [facility] pt was recently diagnosed with prostate cancer. Pt had a PET scan done and there was concerns on the PET scan. [facility] pt has a mass on pancreas and gastric thickening. Wanting to inform PCP of these findings. [facility] PCP will need to take over the follow up in regards to these findings.    Stepan is also faxing over paperwork to [facility] for Dr. Olson to look at once back in office.     Was an appointment offered for this call? No  If yes : Appointment type              Date    Preferred method for responding to this message: Telephone Call  What is your phone number ?[phone]     If we cannot reach you directly, may we leave a detailed response at the number you provided? Yes    Can this message wait until your PCP/provider returns, if available today? No    Corinne Allen

## 2024-06-27 ENCOUNTER — OFFICE VISIT (OUTPATIENT)
Dept: FAMILY MEDICINE | Facility: OTHER | Age: 79
End: 2024-06-27
Attending: STUDENT IN AN ORGANIZED HEALTH CARE EDUCATION/TRAINING PROGRAM
Payer: COMMERCIAL

## 2024-06-27 ENCOUNTER — MEDICAL CORRESPONDENCE (OUTPATIENT)
Dept: MRI IMAGING | Facility: HOSPITAL | Age: 79
End: 2024-06-27

## 2024-06-27 VITALS
SYSTOLIC BLOOD PRESSURE: 163 MMHG | OXYGEN SATURATION: 98 % | RESPIRATION RATE: 16 BRPM | DIASTOLIC BLOOD PRESSURE: 79 MMHG | WEIGHT: 160 LBS | TEMPERATURE: 97.1 F | BODY MASS INDEX: 24.87 KG/M2 | HEART RATE: 57 BPM

## 2024-06-27 DIAGNOSIS — K86.9 PANCREATIC LESION: ICD-10-CM

## 2024-06-27 DIAGNOSIS — K31.89 GASTRIC WALL THICKENING: ICD-10-CM

## 2024-06-27 DIAGNOSIS — R94.8 ABNORMAL GASTROINTESTINAL PET SCAN: Primary | ICD-10-CM

## 2024-06-27 DIAGNOSIS — C61 PROSTATE CANCER (H): ICD-10-CM

## 2024-06-27 PROCEDURE — 99215 OFFICE O/P EST HI 40 MIN: CPT | Performed by: STUDENT IN AN ORGANIZED HEALTH CARE EDUCATION/TRAINING PROGRAM

## 2024-06-27 PROCEDURE — 99417 PROLNG OP E/M EACH 15 MIN: CPT | Performed by: STUDENT IN AN ORGANIZED HEALTH CARE EDUCATION/TRAINING PROGRAM

## 2024-06-27 PROCEDURE — G0463 HOSPITAL OUTPT CLINIC VISIT: HCPCS

## 2024-06-27 ASSESSMENT — PAIN SCALES - GENERAL: PAINLEVEL: NO PAIN (0)

## 2024-06-27 NOTE — Clinical Note
Wesley - CAITLYN Barron - can you fax a copy of my note as an FYI over to Dr. Raghu Cole at [facility] urology please.

## 2024-07-05 ENCOUNTER — HOSPITAL ENCOUNTER (OUTPATIENT)
Dept: MRI IMAGING | Facility: HOSPITAL | Age: 79
Discharge: HOME | End: 2024-07-05
Attending: UROLOGY | Admitting: UROLOGY
Payer: COMMERCIAL

## 2024-07-05 DIAGNOSIS — K86.9 DISEASE OF PANCREAS: ICD-10-CM

## 2024-07-05 PROCEDURE — A9585 GADOBUTROL INJECTION: HCPCS | Performed by: RADIOLOGY

## 2024-07-05 PROCEDURE — 74183 MRI ABD W/O CNTR FLWD CNTR: CPT

## 2024-07-05 PROCEDURE — 255N000002 HC RX 255 OP 636: Performed by: RADIOLOGY

## 2024-07-05 RX ORDER — GADOBUTROL 604.72 MG/ML
7.5 INJECTION INTRAVENOUS ONCE
Status: COMPLETED | OUTPATIENT
Start: 2024-07-05 | End: 2024-07-05

## 2024-07-05 RX ADMIN — GADOBUTROL 7.5 ML: 604.72 INJECTION INTRAVENOUS at 14:49

## 2024-07-08 ENCOUNTER — TELEPHONE (OUTPATIENT)
Dept: FAMILY MEDICINE | Facility: OTHER | Age: 79
End: 2024-07-08

## 2024-07-08 NOTE — TELEPHONE ENCOUNTER
12:25 PM    Reason for Call: Phone Call    Description: Rafa would like to know if Dr. Olson received his MRI results and if he would call him regarding them    Was an appointment offered for this call? No  If yes : Appointment type              Date    Preferred method for responding to this message: Telephone Call  What is your phone number ?  [phone]     If we cannot reach you directly, may we leave a detailed response at the number you provided?  yes    Can this message wait until your PCP/provider returns, if available today? Not applicable    Guera Stephens

## 2024-07-11 NOTE — PROGRESS NOTES
[facility]    RADIATION ONCOLOGY CONSULTATION      Rafa Bhatti  is referred by  ref. provider found for an oncology consultation.    PRIMARY PHYSICIAN: Harry Olson     Cancer Staging   No matching staging information was found for the patient.      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  He was found to have a PSA of 9.23 ng/mL with a repeat PSA showing elevation at 9.75 ng/ml.  An MRI was done which showed a PI-RADS 4 lesion and he underwent biopsy which demonstrated 5 of 15 cores positive with 3 cores positive for adenocarcinoma Shelia score of 4+3..  He has had a PSMA PET scan which also has shown metabolic activity in the prostate as expected however there was some uptake in the area of the stomach as well as a small hypodensity in the pancreas.  He has been referred for GI evaluation of these lesions.  He has had previous abdominal surgery, therefore the uptake appears to be related to his previous treatment.  There is also a single bone lesion identified in but we will not alter curative therapy based on 1 questionable bone finding on PSMA PET.  We will plan radiation treatment with the idea that the GI workup will be negative for another malignancy.  We would obviously modify our plan if he is found to have a second malignancy.  We discussed the risk and benefits of treatment in detail with the patient.  We have outlined a course of short-term ADT with hypofractionated radiation therapy to a dose of 70 Gy in 28 fractions to the prostate and medial seminal vesicles with a lymph nodes receiving 5040 cGy at 180 cGy per fraction.  The risk and benefits of treatment have been discussed in detail with the patient he is ready to proceed.  He will be referred for consideration of ADT and his urologist will also discuss fiducial marker placement and SpaceOAR insertion.  Assuming that his GI workup is negative, he will return approximately 8  weeks after the initiation of the ADT to start treatment planning.    PLAN: Proceed as outlined above.    No orders of the defined types were placed in this encounter.     ______________________________________________________________________  HISTORY OF PRESENT ILLNESS: Rafa is a 79-year-old male patient who presents for radiation therapy consultation with prostatic adenocarcinoma, [place]'s grade 4+3 (7), involving 5 of 15 cores positive.  Patient did undergo a staging PET PSMA which had some unexpected findings.  Patient is currently pending an MRI to evaluate the pancreas and pending EGD to evaluate the gastric wall thickening.  History as below    5/3/2024 MR prostate with and without contrast: Prostate volume 29 cc.  There is a 1.1 x 1.0 x 0.9 cm lesion in the right peripheral zone at the level of the apex at the 8 o'clock position.  No extracapsular extension or seminal vesicle invasion.  No ROSEMARIE prostatic or pelvic sidewall lymph nodes.  No other bony or soft tissue abnormalities identified.    5/21/2024 prostate biopsy: Prostatic adenocarcinoma highest Shelia's grade 4+3 (7), involving 5 of 15 cores     6/21/2024 PET PSMA: 14 mm low-density lesion within the pancreatic head not adequately characterized by the study with the differential diagnosis including malignancy.  Recommend pancreatic MR.  Significant focal uptake with associated gastric wall thickening within the posterior gastric fundus, focal inflammation/ulcer versus malignancy.  Increased avidity about postsurgical changes adjacent to gastric pylorus, malignancy versus inflammation.  Malignancy within the prostate.  Low-grade activity within the superior endplate of S1 is technically equivocal for metastatic disease with differential diagnosis also to include degenerative changes or endplate compression fracture.    7/5/24 MR abd: Motion artifact limits assessment of the pancreas.  Dilation of the pancreatic duct smoothly tapering distally.  No  evidence of gross pancreatic head or ampullary mass.  Consider ERCP with EUS    PSA history          Scheduling Conflicts: denies   Systemic Therapy: pending ADT  Port: no  Pacemaker: denies   Hx of autoimmune disorders: denies   Previous Radiation Therapy: denies       Depression Screening Follow Up        Follow Up Actions Taken  Patient counseled, no additional follow up at this time.               Past Medical History:   Diagnosis Date    Benign neoplasm of unspecified site 8/1/2012    Dermoid cyst    Calculus of kidney 5/20/2002    Chest pain, unspecified 3/2/2000    Diabetic eye exam (H) 06/06/2018    normal    Gout, unspecified 8/3/2011    Hyperlipidemia     Unspecified essential hypertension 7/18/2000       Past Surgical History:   Procedure Laterality Date    cardiolyte stress test  2000    chest pain    CHOLECYSTECTOMY, COMMON BILE DUCT EXPLORATION, COMBINED  04/15/2018    COLONOSCOPY  1999    fistula in ANO      HEMORRHOIDECTOMY      PHACOEMULSIFICATION WITH STANDARD INTRAOCULAR LENS IMPLANT Left 6/27/2023    Procedure: Phacoemulsification Cataract Extraction Posterior Chamber Lens Left Eye,Complex Cataract;  Surgeon: Sameer Go MD;  Location: [facility] OR    PHACOEMULSIFICATION WITH STANDARD INTRAOCULAR LENS IMPLANT Right 7/11/2023    Procedure: Complex Phacoemulsification Cataract Extraction Posterior Chamber Lens Right Eye;  Surgeon: Sameer Go MD;  Location: [facility] OR    removal of dermoid cyst of mediastinum      VASECTOMY         Family History   Problem Relation Age of Onset    C.A.D. Father 80    C.A.D. Mother     Hypertension Mother     C.A.D. Brother     C.A.D. Other         family hx       Social History     Tobacco Use    Smoking status: Never     Passive exposure: Never    Smokeless tobacco: Never   Substance Use Topics    Alcohol use: Yes     Comment: rare         CURRENT MEDICATIONS:   Current Outpatient Medications   Medication Sig Dispense Refill    allopurinol (ZYLOPRIM) 100 MG tablet  TAKE 1 TABLET BY MOUTH TWICE A  tablet 0    aspirin (ASA) 81 MG chewable tablet Take 81 mg by mouth daily      blood glucose monitoring (FREESTYLE) lancets Use to test blood sugar 1 times daily. 100 each 3    calcium carbonate (TUMS) 500 MG chewable tablet Take 1 tablet by mouth daily as needed  (Patient not taking: Reported on 3/27/2024)      colchicine (COLCYRS) 0.6 MG tablet TAKE 2 TABLETS AT THE ONSET OF GOUT PAIN. MAY TAKE ONE TABLET AGAIN IN ONE HOUR. MAX 4 TABS IN 24 HRS. (Patient not taking: Reported on 3/27/2024) 20 tablet 1    dapagliflozin (FARXIGA) 10 MG TABS tablet Take 1 tablet (10 mg) by mouth daily 90 tablet 3    FREESTYLE LITE test strip USE TO TEST BLOOD SUGARS ONCE A DAY. 100 strip 0    losartan (COZAAR) 25 MG tablet Take 1 tablet (25 mg) by mouth daily 90 tablet 3    pantoprazole (PROTONIX) 40 MG EC tablet Take 40 mg by mouth daily      rosuvastatin (CRESTOR) 20 MG tablet TAKE 1 TABLET (20 MG) BY MOUTH DAILY 90 tablet 3    sildenafil (REVATIO) 20 MG tablet Take 1-2 tablets (20-40 mg) by mouth daily as needed (erectile dysfunction) 60 tablet 3    sitagliptin (JANUVIA) 100 MG tablet Take 1 tablet (100 mg) by mouth daily 90 tablet 1    tamsulosin (FLOMAX) 0.4 MG capsule TAKE 1 CAPSULE (0.4 MG) BY MOUTH DAILY 90 capsule 3     No current facility-administered medications for this visit.         ALLERGIES:  Allergies   Allergen Reactions    Influenza Virus Vaccine Diarrhea    Lactose Unknown     LACTOSE INTOLERANT           ROS        VITAL SIGNS:  There were no vitals taken for this visit.  Wt Readings from Last 4 Encounters:   06/27/24 72.6 kg (160 lb)   04/30/24 77.3 kg (170 lb 8 oz)   04/16/24 77.9 kg (171 lb 12.8 oz)   03/27/24 78.2 kg (172 lb 6.4 oz)       PHYSICAL EXAM:  Constitutional: awake, alert, cooperative, no apparent distress, and appears stated age  Eyes: Lids and lashes normal, sclera clear, conjunctiva normal  ENT: normocephalic, without obvious abnormality  Hematologic /  Lymphatic: no cervical lymphadenopathy and no supraclavicular lymphadenopathy  Respiratory: No increased work of breathing, good air exchange, clear to auscultation bilaterally, no crackles or wheezing  Cardiovascular: normal S1 and S2  GI: Normal bowel sounds, soft, non-distended, non-tender  Skin: no jaundice  Musculoskeletal: tone is normal  Neurologic: Awake, alert, oriented to name, place and time.  gait is normal.  Neuropsychiatric: General: normal, calm, and normal eye contact    Physician attestation:  I saw and evaluated patient as part of a shared APRN visit. I personally reviewed imaging, laboratory data including pathology and physician documentation.. Key management decisions made by me and carried out under my direction include: Further workup of his PET findings, the role of radiation therapy with and without ADT in the treatment of unfavorable risk adenocarcinoma of the prostate..     DOLLY spent 30 minutes with the patient and 10 minutes on chart preparation and review, for a total of 40 minutes   Physician spent 40 minutes with the patient and 20 minutes on chart, image, and documentation review, for a total of 60 minutes.     Total billable time: 100 minutes on this date of service           EMI Moore CNP, MD

## 2024-07-15 ENCOUNTER — TELEPHONE (OUTPATIENT)
Dept: FAMILY MEDICINE | Facility: OTHER | Age: 79
End: 2024-07-15

## 2024-07-15 DIAGNOSIS — M1A.0720 CHRONIC IDIOPATHIC GOUT INVOLVING TOE OF LEFT FOOT WITHOUT TOPHUS: ICD-10-CM

## 2024-07-15 DIAGNOSIS — Q45.3 ABNORMALITY OF PANCREATIC DUCT: ICD-10-CM

## 2024-07-15 DIAGNOSIS — K31.89 GASTRIC WALL THICKENING: Primary | ICD-10-CM

## 2024-07-15 DIAGNOSIS — R94.8 ABNORMAL GASTROINTESTINAL PET SCAN: ICD-10-CM

## 2024-07-15 RX ORDER — ALLOPURINOL 100 MG/1
TABLET ORAL
Qty: 180 TABLET | Refills: 3 | Status: SHIPPED | OUTPATIENT
Start: 2024-07-15

## 2024-07-15 NOTE — PROGRESS NOTES
"[facility]  DEPARTMENT OF RADIATION ONCOLOGY  INITIAL PATIENT ASSESSMENT    Name: Rafa Bhatti MRN: [id number]   : 1945 (79 year old)  Date of Service: 07/15/2024   Referring: Clem Cole       Diagnosis: prostate cancer    Prior radiation therapy: None    Prior chemotherapy: None    Prior hormonal therapy: No    T/c to obtain health information.  Spoke to patient.  Discussed cancer rehab.  He declined because he was attending PT for pain in his shoulder and knee and stopped due to multiple appointments.  He will resume PT when he is able.  He does not have any financial concerns.  He does not have any transportation issues.  Appointment confirmed for 24.  He does have concerns regarding his recent MR abdomen.  No one has went over the results of this.  He does have a call into Dr. Olson's office regarding this.          Nutrition Assessment    Height: 5' 9.5\"            Weight: 160# (stated wt)   Usual Weight: 160# Weight Change: 0      Past Medical History:   Diagnosis Date    Abnormal gastrointestinal PET scan 2024    Abnormal stress test 2023    Adenomatous duodenal polyp 2020    Added automatically from request for surgery [id number]      AK (actinic keratosis) 2018    Basal cell carcinoma, face 2012    Benign neoplasm of unspecified site 2012    Dermoid cyst    Calculus of kidney 2002    Chest pain, unspecified 2000    Chronic gout 10/18/2017    Chronic pain of right knee 2024    Chronic right shoulder pain 2024    Diabetic eye exam (H) 2018    normal    Epidermal inclusion cyst 2022    Esophageal reflux 10/18/2017    Gastric wall thickening 2024    Gout, unspecified 2011    Hemangioma of spine 2015    Hyperlipidemia     Hypoxia 2020    Mild aortic valve sclerosis- no stenosis 10/19/2021    Mixed hyperlipidemia 2023    Near syncope 2021    Palpitations 2024    " Pancreatic lesion 06/27/2024    Pancytopenia (H) 06/21/2021    Prostate cancer (H) 05/21/2024    Sinus bradycardia 06/09/2021    Tachycardia 03/27/2024    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 Provider to review      Thrombocytopenia (H24) 06/21/2021    Type 2 diabetes mellitus with diabetic polyneuropathy, without long-term current use of insulin (H) 05/23/2018    Unspecified essential hypertension 07/18/2000       1. Receiving Chemotherapy? No - 0 points  2. Weight Loss per Month (in pounds) Based on Usual Weight: 0    100# 100-120# 120-150# 150-200# greater than 200# Pt. System   greater than 5 5 - 6 6 - 8 7 - 10 greater than 10 1 Point   greater than 7 7- 9 9 - 11 11 - 14 greater than 15 2 Points   greater than 10 10 - 12 12 - 15 15 - 20 greater than 20 3 Points       3. Eating Problems: ( 1 Point for Each Problem)       Loss of Appetite     No - 0 points    Difficulty Swallowing    No - 0 points   Nausea    No - 0 points    Early Satiety    No - 0 points   Vomiting    No - 0 points    Taste/Smell Aversions         No - 0 points    Diarrhea    No - 0 points    Esophageal Reflux   No - 0 points   Mouth Soreness/Difficulty Chewing  No - 0 points          Total: 0    4.  Automatic Referral for the Following Diagnosis or Situations:  N/a     Comments: no concerns    Total Score: 0 (Scores greater than or equal to 4 will receive a Dietician Consult)        Chelsy Wilkins RN

## 2024-07-15 NOTE — TELEPHONE ENCOUNTER
Dr Clem Cole urologist at [facility] is requesting you review his MRI done on 7/5 to see if there is any other testing that needs to be completed.  Pt is scheduled for radiation on Wednesday.  Dr Cole's number is [phone].

## 2024-07-15 NOTE — TELEPHONE ENCOUNTER
9:23 AM    Reason for Call: Phone Call    Description: pt needs Dr Olson to call his urologist Dr Rex Cole/to give ok to verify that nothing more needs to be done after he looks at mri ,  Urologist can't go ahead with treatment until Dr Olson looks at mri    Was an appointment offered for this call? No  If yes : Appointment type              Date    Preferred method for responding to this message: Telephone Call  What is your phone number ?[phone]     If we cannot reach you directly, may we leave a detailed response at the number you provided? Yes    Can this message wait until your PCP/provider returns, if available today? Not applicable    Rosetta Marx

## 2024-07-15 NOTE — TELEPHONE ENCOUNTER
The MRI is abnormal around the pancreas.  If he has a GI we have to have them sign off on this.  I cannot confidently say no further testing is needed as it's recommended to have an EUS with a GI doctor.  I have to refer back to GI for their disposition on this pancreas abnormality.  Thanks.   Harry Olson MD

## 2024-07-17 ENCOUNTER — ONCOLOGY VISIT (OUTPATIENT)
Dept: RADIATION ONCOLOGY | Facility: HOSPITAL | Age: 79
End: 2024-07-17
Attending: RADIOLOGY
Payer: COMMERCIAL

## 2024-07-17 VITALS
RESPIRATION RATE: 18 BRPM | BODY MASS INDEX: 26.63 KG/M2 | HEART RATE: 64 BPM | TEMPERATURE: 97.3 F | SYSTOLIC BLOOD PRESSURE: 146 MMHG | WEIGHT: 171.3 LBS | DIASTOLIC BLOOD PRESSURE: 78 MMHG | OXYGEN SATURATION: 98 %

## 2024-07-17 DIAGNOSIS — C61 PROSTATE CANCER (H): Primary | ICD-10-CM

## 2024-07-17 PROCEDURE — G0463 HOSPITAL OUTPT CLINIC VISIT: HCPCS

## 2024-07-17 PROCEDURE — 99205 OFFICE O/P NEW HI 60 MIN: CPT | Mod: FS | Performed by: RADIOLOGY

## 2024-07-17 ASSESSMENT — PAIN SCALES - GENERAL: PAINLEVEL: MODERATE PAIN (5)

## 2024-07-17 ASSESSMENT — PATIENT HEALTH QUESTIONNAIRE - PHQ9: SUM OF ALL RESPONSES TO PHQ QUESTIONS 1-9: 1

## 2024-07-17 NOTE — PROGRESS NOTES
"Radiation Oncology Rooming Note    July 17, 2024 8:55 AM     Rafa Bhatti is a 79 year old male who presents for:       Chief Complaint   Patient presents with    Consult     Radiation therapy consult       Initial Vitals: BP (!) 146/78 (BP Location: Right arm, Patient Position: Chair, Cuff Size: Adult Regular)   Pulse 64   Temp 97.3  F (36.3  C) (Tympanic)   Resp 18   Wt 77.7 kg (171 lb 4.8 oz)   SpO2 98%   BMI 26.63 kg/m     Estimated body mass index is 26.63 kg/m  as calculated from the following:    Height as of 4/30/24: 1.708 m (5' 7.25\").    Weight as of this encounter: 77.7 kg (171 lb 4.8 oz).   Body surface area is 1.92 meters squared.  Moderate Pain (5) Comment: left big toe(5) and shoulder(3)    Patient completed the following questionnaires: PHQ-9, AUA, and NCCN Distress Thermometer.       Patient was assessed using the NCCN psychosocial distress thermometer. Patient rated the score as a zero. Patient rated current stressors as pain and sexual health. Stressors brought to the attention of Mariela Myers CNP and provider for a score of 6 or greater or per nurses discretion.       Patient received folder containing the following:  financial letter  vaccines during cancer treatment hand out  mental health services and providers packet  cancer resource list  family support group handouts  radiation therapy business card      Financial assistance resources given to patient:  [employer] application       Ale Chicas LPN  " H

## 2024-07-18 ENCOUNTER — DOCUMENTATION ONLY (OUTPATIENT)
Dept: RADIATION ONCOLOGY | Facility: HOSPITAL | Age: 79
End: 2024-07-18
Payer: COMMERCIAL

## 2024-07-18 NOTE — NURSING NOTE
Urology referral, demographics, and radiation therapy consult note faxed to  [facility] requesting patient to be seen by Dr. Clem Cole to discuss ADT and spaceOAR/fiducial placement.

## 2024-08-06 ENCOUNTER — TELEPHONE (OUTPATIENT)
Dept: FAMILY MEDICINE | Facility: OTHER | Age: 79
End: 2024-08-06

## 2024-08-06 NOTE — TELEPHONE ENCOUNTER
3:33 PM    Reason for Call: OVERBOOK    Patient is having the following symptoms: Patient needs to be seen for preop/[facility]/09/26/4/hydro gel implant with dony/darinel. days.    The patient is requesting an appointment for Overbook with .    Was an appointment offered for this call? No  If yes : Appointment type              Date    Preferred method for responding to this message: Telephone Call  What is your phone number ?  [phone]    If we cannot reach you directly, may we leave a detailed response at the number you provided? Yes    Can this message wait until your PCP/provider returns, if unavailable today? Provider is in    Dana Man

## 2024-08-29 DIAGNOSIS — I10 BENIGN ESSENTIAL HYPERTENSION: ICD-10-CM

## 2024-08-29 DIAGNOSIS — E11.42 TYPE 2 DIABETES MELLITUS WITH DIABETIC POLYNEUROPATHY, WITHOUT LONG-TERM CURRENT USE OF INSULIN (H): ICD-10-CM

## 2024-08-29 NOTE — TELEPHONE ENCOUNTER
Losartan  Last Written Prescription Date: 9/7/23  Last Fill Quantity: 90 # of Refills: 3  Last Office Visit: 6/27/24

## 2024-08-30 RX ORDER — LOSARTAN POTASSIUM 25 MG/1
25 TABLET ORAL DAILY
Qty: 90 TABLET | Refills: 3 | Status: SHIPPED | OUTPATIENT
Start: 2024-08-30

## 2024-09-09 ENCOUNTER — OFFICE VISIT (OUTPATIENT)
Dept: FAMILY MEDICINE | Facility: OTHER | Age: 79
End: 2024-09-09
Attending: FAMILY MEDICINE
Payer: COMMERCIAL

## 2024-09-09 VITALS
WEIGHT: 168.1 LBS | HEART RATE: 60 BPM | RESPIRATION RATE: 20 BRPM | TEMPERATURE: 96.9 F | SYSTOLIC BLOOD PRESSURE: 160 MMHG | DIASTOLIC BLOOD PRESSURE: 74 MMHG | BODY MASS INDEX: 24.07 KG/M2 | OXYGEN SATURATION: 98 % | HEIGHT: 70 IN

## 2024-09-09 DIAGNOSIS — Z01.818 PREOPERATIVE EXAMINATION: Primary | ICD-10-CM

## 2024-09-09 DIAGNOSIS — R94.39 ABNORMAL STRESS TEST: ICD-10-CM

## 2024-09-09 DIAGNOSIS — E11.9 TYPE 2 DIABETES MELLITUS WITHOUT COMPLICATION, WITHOUT LONG-TERM CURRENT USE OF INSULIN (H): ICD-10-CM

## 2024-09-09 DIAGNOSIS — C61 PROSTATE CANCER (H): ICD-10-CM

## 2024-09-09 LAB
ANION GAP SERPL CALCULATED.3IONS-SCNC: 10 MMOL/L (ref 7–15)
BASOPHILS # BLD AUTO: 0 10E3/UL (ref 0–0.2)
BASOPHILS NFR BLD AUTO: 1 %
BUN SERPL-MCNC: 21.1 MG/DL (ref 8–23)
CALCIUM SERPL-MCNC: 9.5 MG/DL (ref 8.8–10.4)
CHLORIDE SERPL-SCNC: 101 MMOL/L (ref 98–107)
CREAT SERPL-MCNC: 0.96 MG/DL (ref 0.67–1.17)
EGFRCR SERPLBLD CKD-EPI 2021: 81 ML/MIN/1.73M2
EOSINOPHIL # BLD AUTO: 0 10E3/UL (ref 0–0.7)
EOSINOPHIL NFR BLD AUTO: 0 %
ERYTHROCYTE [DISTWIDTH] IN BLOOD BY AUTOMATED COUNT: 12.7 % (ref 10–15)
EST. AVERAGE GLUCOSE BLD GHB EST-MCNC: 217 MG/DL
GLUCOSE SERPL-MCNC: 191 MG/DL (ref 70–99)
HBA1C MFR BLD: 9.2 %
HCO3 SERPL-SCNC: 25 MMOL/L (ref 22–29)
HCT VFR BLD AUTO: 41 % (ref 40–53)
HGB BLD-MCNC: 13.8 G/DL (ref 13.3–17.7)
IMM GRANULOCYTES # BLD: 0 10E3/UL
IMM GRANULOCYTES NFR BLD: 0 %
LYMPHOCYTES # BLD AUTO: 1.1 10E3/UL (ref 0.8–5.3)
LYMPHOCYTES NFR BLD AUTO: 24 %
MCH RBC QN AUTO: 30.1 PG (ref 26.5–33)
MCHC RBC AUTO-ENTMCNC: 33.7 G/DL (ref 31.5–36.5)
MCV RBC AUTO: 89 FL (ref 78–100)
MONOCYTES # BLD AUTO: 0.4 10E3/UL (ref 0–1.3)
MONOCYTES NFR BLD AUTO: 8 %
NEUTROPHILS # BLD AUTO: 3.2 10E3/UL (ref 1.6–8.3)
NEUTROPHILS NFR BLD AUTO: 67 %
PLATELET # BLD AUTO: 122 10E3/UL (ref 150–450)
POTASSIUM SERPL-SCNC: 4.4 MMOL/L (ref 3.4–5.3)
RBC # BLD AUTO: 4.59 10E6/UL (ref 4.4–5.9)
SODIUM SERPL-SCNC: 136 MMOL/L (ref 135–145)
WBC # BLD AUTO: 4.7 10E3/UL (ref 4–11)

## 2024-09-09 PROCEDURE — 99214 OFFICE O/P EST MOD 30 MIN: CPT | Performed by: FAMILY MEDICINE

## 2024-09-09 PROCEDURE — 83036 HEMOGLOBIN GLYCOSYLATED A1C: CPT | Mod: ZL | Performed by: FAMILY MEDICINE

## 2024-09-09 PROCEDURE — 36415 COLL VENOUS BLD VENIPUNCTURE: CPT | Mod: ZL | Performed by: FAMILY MEDICINE

## 2024-09-09 PROCEDURE — 93005 ELECTROCARDIOGRAM TRACING: CPT | Performed by: FAMILY MEDICINE

## 2024-09-09 PROCEDURE — 85041 AUTOMATED RBC COUNT: CPT | Mod: ZL | Performed by: FAMILY MEDICINE

## 2024-09-09 PROCEDURE — G2211 COMPLEX E/M VISIT ADD ON: HCPCS | Performed by: FAMILY MEDICINE

## 2024-09-09 PROCEDURE — G0463 HOSPITAL OUTPT CLINIC VISIT: HCPCS | Performed by: FAMILY MEDICINE

## 2024-09-09 PROCEDURE — 93010 ELECTROCARDIOGRAM REPORT: CPT | Performed by: INTERNAL MEDICINE

## 2024-09-09 PROCEDURE — 80048 BASIC METABOLIC PNL TOTAL CA: CPT | Mod: ZL | Performed by: FAMILY MEDICINE

## 2024-09-09 ASSESSMENT — PAIN SCALES - GENERAL: PAINLEVEL: NO PAIN (0)

## 2024-09-09 NOTE — PROGRESS NOTES
Preoperative Evaluation  [facility]  [address]  [facility] [address]  Phone: [phone]  Primary Provider: Harry Olson MD  Pre-op Performing Provider: Harry Olson MD  Sep 9, 2024             9/9/2024   Surgical Information   What procedure is being done? spacer surgery   Facility or Hospital where procedure/surgery will be performed: [facility]   Who is doing the procedure / surgery? dr griffith   Date of surgery / procedure: 92624   Time of surgery / procedure: tbd   Where do you plan to recover after surgery? at home with family        Fax number for surgical facility:     Assessment & Plan     The proposed surgical procedure is considered LOW risk.    Preoperative examination  Doing well.  Excellent functional capacity without concerns.  No indication for further workup and procedure can proceed.    - CBC with Platelets & Differential; Future  - Basic metabolic panel; Future  - EKG 12-lead complete w/read - Clinics  - CBC with Platelets & Differential  - Basic metabolic panel    Prostate cancer (H)  With radiation.      Type 2 diabetes mellitus without complication, without long-term current use of insulin (H)  Stable.      Abnormal stress test  Cardiology has followed.  The stress test was the same as it was in 2021.  Medical tx was pursued and he continues to have a stable functional capacity.  He mowed by hand, week whacked, and took care of his yard just yesterday without any angina or concerns at all.              - No identified additional risk factors other than previously addressed         Recommendation  Approval given to proceed with proposed procedure, without further diagnostic evaluation.    Terrance Craig is a 79 year old, presenting for the following:  Pre-Op Exam          9/9/2024    10:00 AM   Additional Questions   Roomed by Gema PEARSON related to upcoming procedure: upcoming surgery as above.          9/9/2024   Pre-Op  Questionnaire   Have you ever had a heart attack or stroke? No   Have you ever had surgery on your heart or blood vessels, such as a stent placement, a coronary artery bypass, or surgery on an artery in your head, neck, heart, or legs? No   Do you have chest pain with activity? No   Do you have a history of heart failure? No   Do you currently have a cold, bronchitis or symptoms of other infection? No   Do you have a cough, shortness of breath, or wheezing? No   Do you or anyone in your family have previous history of blood clots? No   Do you or does anyone in your family have a serious bleeding problem such as prolonged bleeding following surgeries or cuts? No   Have you ever had problems with anemia or been told to take iron pills? No   Have you had any abnormal blood loss such as black, tarry or bloody stools? No   Have you ever had a blood transfusion? (!) YES   Have you ever had a transfusion reaction? No   Are you willing to have a blood transfusion if it is medically needed before, during, or after your surgery? Yes   Have you or any of your relatives ever had problems with anesthesia? (!) YES    Do you have sleep apnea, excessive snoring or daytime drowsiness? No   Do you have any artifical heart valves or other implanted medical devices like a pacemaker, defibrillator, or continuous glucose monitor? No   Do you have artificial joints? No   Are you allergic to latex? No        Health Care Directive  Patient does not have a Health Care Directive or Living Will: Patient states has Advance Directive and will bring in a copy to clinic.    Preoperative Review of         Status of Chronic Conditions:  See problem list for active medical problems.  Problems all longstanding and stable, except as noted/documented.  See ROS for pertinent symptoms related to these conditions.    Patient Active Problem List    Diagnosis Date Noted    Prostate cancer (H) 06/27/2024     Priority: Medium    Gastric wall thickening  06/27/2024     Priority: Medium    Pancreatic lesion 06/27/2024     Priority: Medium    Abnormal gastrointestinal PET scan 06/27/2024     Priority: Medium    Chronic right shoulder pain 04/30/2024     Priority: Medium    Chronic pain of right knee 04/30/2024     Priority: Medium    Palpitations 03/27/2024     Priority: Medium    Tachycardia 03/27/2024     Priority: Medium    Elevated prostate specific antigen (PSA) 03/27/2024     Priority: Medium    Abnormal stress test 03/22/2023     Priority: Medium    Mixed hyperlipidemia 03/22/2023     Priority: Medium    History of COVID-19 06/30/2022     Priority: Medium    Epidermal inclusion cyst 04/26/2022     Priority: Medium    Mild aortic valve sclerosis- no stenosis 10/19/2021     Priority: Medium    Fever and chills 06/21/2021     Priority: Medium    Elevated LFTs 06/21/2021     Priority: Medium    Thrombocytopenia (H24) 06/21/2021     Priority: Medium    Pancytopenia (H) 06/21/2021     Priority: Medium    Elevated transaminase level 06/21/2021     Priority: Medium    Bacteremia due to Gram-negative bacteria 06/21/2021     Priority: Medium    Sinus bradycardia 06/09/2021     Priority: Medium    Near syncope 06/09/2021     Priority: Medium    Abnormal LFTs 06/24/2020     Priority: Medium    Hypoxia 06/24/2020     Priority: Medium    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     Priority: Medium     Added automatically from request for surgery [id number]      AK (actinic keratosis) 11/28/2018     Priority: Medium    Encounter for diabetic foot exam (H) 05/23/2018     Priority: Medium    Type 2 diabetes mellitus with diabetic polyneuropathy, without long-term current use of insulin (H) 05/23/2018     Priority: Medium    Type 2 diabetes mellitus without complication, without long-term current use of insulin (H) 04/11/2018     Priority: Medium    Chronic gout 10/18/2017     Priority: Medium    Esophageal reflux 10/18/2017     Priority: Medium    Personal history of malignant  neoplasm of skin 11/14/2016     Priority: Medium     Overview:   Rt upper back mild 2013    Overview:   BCC right temple 2012      History of nonmelanoma skin cancer 11/14/2016     Priority: Medium     Overview:   BCC right temple 2012      ACP (advance care planning) 10/26/2016     Priority: Medium     Advance Care Planning 10/26/2016: ACP Review of Chart / Resources Provided:  Reviewed chart for advance care plan.  Rafa Bhatti has no plan or code status on file. Discussed available resources and provided with information. Confirmed code status reflects current choices pending further ACP discussions.  Confirmed/documented legally designated decision makers.  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 Provider to review      Basal cell carcinoma, face 09/05/2012     Priority: Medium      Past Medical History:   Diagnosis Date    Abnormal gastrointestinal PET scan 06/27/2024    Abnormal stress test 03/22/2023    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    Added automatically from request for surgery [id number]      AK (actinic keratosis) 11/28/2018    Basal cell carcinoma, face 09/05/2012    Benign neoplasm of unspecified site 08/01/2012    Dermoid cyst    Calculus of kidney 05/20/2002    Chest pain, unspecified 03/02/2000    Chronic gout 10/18/2017    Chronic pain of right knee 04/30/2024    Chronic right shoulder pain 04/30/2024    Diabetic eye exam (H) 06/06/2018    normal    Epidermal inclusion cyst 04/26/2022    Esophageal reflux 10/18/2017    Gastric wall thickening 06/27/2024    Gout, unspecified 08/03/2011    Hemangioma of spine 05/08/2015    Hyperlipidemia     Hypoxia 06/24/2020    Mild aortic valve  sclerosis- no stenosis 10/19/2021    Mixed hyperlipidemia 03/22/2023    Near syncope 06/09/2021    Palpitations 03/27/2024    Pancreatic lesion 06/27/2024    Pancytopenia (H) 06/21/2021    Prostate cancer (H) 05/21/2024    Sinus bradycardia 06/09/2021    Tachycardia 03/27/2024    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 Provider to review      Thrombocytopenia (H24) 06/21/2021    Type 2 diabetes mellitus with diabetic polyneuropathy, without long-term current use of insulin (H) 05/23/2018    Unspecified essential hypertension 07/18/2000     Past Surgical History:   Procedure Laterality Date    cardiolyte stress test  2000    chest pain    CHOLECYSTECTOMY, COMMON BILE DUCT EXPLORATION, COMBINED  04/15/2018    COLONOSCOPY  1999    EGD Left 11/05/2020    EGD N/A 04/28/2020    ENDOSCOPIC RETROGRADE CHOLANGIOPANCREATOGRAPHY N/A 11/05/2020    fistula in ANO      HEMORRHOIDECTOMY      OTHER SURGICAL HISTORY N/A 05/27/2020    pancreas-preserving duodenal resection, duodenojejunostomy, choledochojejunostomy, resection of remnant cystic lashaun    PHACOEMULSIFICATION WITH STANDARD INTRAOCULAR LENS IMPLANT Left 06/27/2023    Procedure: Phacoemulsification Cataract Extraction Posterior Chamber Lens Left Eye,Complex Cataract;  Surgeon: Sameer Go MD;  Location: [facility] OR    PHACOEMULSIFICATION WITH STANDARD INTRAOCULAR LENS IMPLANT Right 07/11/2023    Procedure: Complex Phacoemulsification Cataract Extraction Posterior Chamber Lens Right Eye;  Surgeon: Sameer Go MD;  Location: [facility] OR    removal of dermoid cyst of mediastinum      VASECTOMY       Current Outpatient Medications   Medication Sig Dispense Refill    allopurinol (ZYLOPRIM) 100 MG tablet TAKE 1 TABLET BY MOUTH TWICE A  tablet 3    aspirin (ASA) 81 MG chewable tablet Take 81 mg by mouth daily      blood glucose monitoring (FREESTYLE) lancets Use to test blood sugar 1 times daily. 100 each 3    calcium carbonate  (TUMS) 500 MG chewable tablet Take 1 tablet by mouth daily as needed.      colchicine (COLCYRS) 0.6 MG tablet TAKE 2 TABLETS AT THE ONSET OF GOUT PAIN. MAY TAKE ONE TABLET AGAIN IN ONE HOUR. MAX 4 TABS IN 24 HRS. 20 tablet 1    dapagliflozin (FARXIGA) 10 MG TABS tablet Take 1 tablet (10 mg) by mouth daily 90 tablet 3    FREESTYLE LITE test strip USE TO TEST BLOOD SUGARS ONCE A DAY. 100 strip 0    losartan (COZAAR) 25 MG tablet TAKE 1 TABLET (25 MG) BY MOUTH DAILY 90 tablet 3    pantoprazole (PROTONIX) 40 MG EC tablet Take 40 mg by mouth daily      rosuvastatin (CRESTOR) 20 MG tablet TAKE 1 TABLET (20 MG) BY MOUTH DAILY 90 tablet 3    sildenafil (REVATIO) 20 MG tablet Take 1-2 tablets (20-40 mg) by mouth daily as needed (erectile dysfunction) 60 tablet 3    sitagliptin (JANUVIA) 100 MG tablet Take 1 tablet (100 mg) by mouth daily 90 tablet 1    tamsulosin (FLOMAX) 0.4 MG capsule TAKE 1 CAPSULE (0.4 MG) BY MOUTH DAILY 90 capsule 3    VITAMIN D PO Take 4,000 Units by mouth daily         Allergies   Allergen Reactions    Influenza Virus Vaccine Diarrhea    Lactose Unknown     LACTOSE INTOLERANT        Social History     Tobacco Use    Smoking status: Never     Passive exposure: Never    Smokeless tobacco: Never   Substance Use Topics    Alcohol use: Not Currently     Comment: rare     Family History   Problem Relation Age of Onset    C.A.D. Father 80    C.A.D. Mother     Hypertension Mother     C.A.D. Brother     C.A.D. Other         family hx     History   Drug Use No             Review of Systems  CONSTITUTIONAL: NEGATIVE for fever, chills, change in weight  INTEGUMENTARY/SKIN: NEGATIVE for worrisome rashes, moles or lesions  EYES: NEGATIVE for vision changes or irritation  ENT/MOUTH: NEGATIVE for ear, mouth and throat problems  RESP: NEGATIVE for significant cough or SOB  BREAST: NEGATIVE for masses, tenderness or discharge  CV: NEGATIVE for chest pain, palpitations or peripheral edema  GI: NEGATIVE for nausea,  "abdominal pain, heartburn, or change in bowel habits  : NEGATIVE for frequency, dysuria, or hematuria  MUSCULOSKELETAL: NEGATIVE for significant arthralgias or myalgia  NEURO: NEGATIVE for weakness, dizziness or paresthesias  ENDOCRINE: NEGATIVE for temperature intolerance, skin/hair changes  HEME: NEGATIVE for bleeding problems  PSYCHIATRIC: NEGATIVE for changes in mood or affect    Objective    BP (!) 160/74 (BP Location: Right arm, Patient Position: Sitting, Cuff Size: Adult Regular)   Pulse 60   Temp 96.9  F (36.1  C) (Tympanic)   Resp 20   Ht 1.778 m (5' 10\")   Wt 76.2 kg (168 lb 1.6 oz)   SpO2 98%   BMI 24.12 kg/m     Estimated body mass index is 24.12 kg/m  as calculated from the following:    Height as of this encounter: 1.778 m (5' 10\").    Weight as of this encounter: 76.2 kg (168 lb 1.6 oz).  Physical Exam  GENERAL: alert and no distress  EYES: Eyes grossly normal to inspection, PERRL and conjunctivae and sclerae normal  HENT: ear canals and TM's normal, nose and mouth without ulcers or lesions  NECK: no adenopathy, no asymmetry, masses, or scars  RESP: lungs clear to auscultation - no rales, rhonchi or wheezes  CV: regular rate and rhythm, normal S1 S2, no S3 or S4, no murmur, click or rub, no peripheral edema  ABDOMEN: soft, nontender, no hepatosplenomegaly, no masses and bowel sounds normal  MS: no gross musculoskeletal defects noted, no edema  SKIN: no suspicious lesions or rashes  NEURO: Normal strength and tone, mentation intact and speech normal  PSYCH: mentation appears normal, affect normal/bright    Recent Labs   Lab Test 03/27/24  1158 01/23/24  0932 09/21/23  0913   HGB 14.1 14.1  --    * 136*  --    * 136 139   POTASSIUM 4.6 4.6 4.2   CR 0.91 1.03 0.98   A1C 11.5*  --  7.0*        Diagnostics  Cbc stable.  Bmp stable.  A1c pending.     EKG: appears normal, NSR, normal axis, normal intervals, no acute ST/T changes c/w ischemia, no LVH by voltage criteria, unchanged from " previous tracings    Revised Cardiac Risk Index (RCRI)  The patient has the following serious cardiovascular risks for perioperative complications:   - No serious cardiac risks = 0 points     RCRI Interpretation: 0 points: Class I (very low risk - 0.4% complication rate)         Signed Electronically by: Harry Olson MD  A copy of this evaluation report is provided to the requesting physician.

## 2024-09-10 ENCOUNTER — TELEPHONE (OUTPATIENT)
Dept: FAMILY MEDICINE | Facility: OTHER | Age: 79
End: 2024-09-10

## 2024-09-10 LAB
ATRIAL RATE - MUSE: 51 BPM
DIASTOLIC BLOOD PRESSURE - MUSE: NORMAL MMHG
INTERPRETATION ECG - MUSE: NORMAL
P AXIS - MUSE: 38 DEGREES
PR INTERVAL - MUSE: 184 MS
QRS DURATION - MUSE: 124 MS
QT - MUSE: 442 MS
QTC - MUSE: 407 MS
R AXIS - MUSE: -22 DEGREES
SYSTOLIC BLOOD PRESSURE - MUSE: NORMAL MMHG
T AXIS - MUSE: 27 DEGREES
VENTRICULAR RATE- MUSE: 51 BPM

## 2024-09-26 NOTE — PROGRESS NOTES
09/26/24 0500   Appointment Info   Signing clinician's name / credentials Lenore Cole, PT   Comments   Comments Pt has not returned to PT since 6/7/24, at which time he requested to hold therapy due to ongoing medical appointments and intervention for newly diagnosed cancer.  Current status is unknown and goals are not able to be reassessed at this time.  Per plan on 6/7/24, pt will be discharged from PT at this time due to not returning.         DISCHARGE  Reason for Discharge: Patient chooses to discontinue therapy.    Equipment Issued: NA    Discharge Plan: Patient to continue home program.    Referring Provider:  Jimbo Crawford

## 2024-09-27 ENCOUNTER — TELEPHONE (OUTPATIENT)
Dept: RADIATION ONCOLOGY | Facility: HOSPITAL | Age: 79
End: 2024-09-27

## 2024-10-07 ENCOUNTER — TELEPHONE (OUTPATIENT)
Dept: RADIATION ONCOLOGY | Facility: HOSPITAL | Age: 79
End: 2024-10-07

## 2024-10-07 ENCOUNTER — PROCEDURE ONLY VISIT (OUTPATIENT)
Dept: RADIATION ONCOLOGY | Facility: HOSPITAL | Age: 79
End: 2024-10-07

## 2024-10-07 ENCOUNTER — ALLIED HEALTH/NURSE VISIT (OUTPATIENT)
Dept: RADIATION ONCOLOGY | Facility: HOSPITAL | Age: 79
End: 2024-10-07
Attending: RADIOLOGY
Payer: COMMERCIAL

## 2024-10-07 DIAGNOSIS — C61 PROSTATE CANCER (H): Primary | ICD-10-CM

## 2024-10-07 PROCEDURE — 77334 RADIATION TREATMENT AID(S): CPT | Mod: 26 | Performed by: RADIOLOGY

## 2024-10-07 PROCEDURE — 77334 RADIATION TREATMENT AID(S): CPT | Performed by: RADIOLOGY

## 2024-10-07 PROCEDURE — 77263 THER RADIOLOGY TX PLNG CPLX: CPT | Performed by: RADIOLOGY

## 2024-10-07 NOTE — TELEPHONE ENCOUNTER
Dr. Henriquez would like the patient to have a irwin bladder so I called and instructed pt to empty bladder an hour before the sim and drink 20oz of water for the simulation. I also asked to pt to drink 3-4 bottles of water throughout the day. I also instructed pt to continue the Gas-X and to have an empty rectum.  MONET Ballard(T)(T)

## 2024-10-07 NOTE — PROGRESS NOTES
Radiation Oncology CT Simulation Note-Prostate    Diagnosis/Stage: Prognostic clinical stage IIC, [id number] unfavorable intermediate risk prostatic adenocarcinoma, Shelia grade group 3, 4+3 = 7, PSA of 9.75 NG/mL on May 1, 2024, status post transrectal ultrasound-guided prostatic biopsy on May 21, 2024, starting leuprolide 45 mg on August 6, 2024    Radiation Region: Prostate/seminal vesicles    The patient was brought to the simulation room to be set up for radiation treatment planning directed to the prostate and seminal vesicles region.  I verified the patient's identity and treatment location with the therapists.     The patient was placed supine on the simulation table to be positioned for radiation treatment planning.  His arms were placed on his chest with pelvis in a neutral position.  He was instructed to have an empty rectum and full bladder which he confirmed that he achieved.  A Vac-shital was created for immobilization of the pelvis.     The patient underwent a CT scan and 2 mm images were taken through the pelvis region.  I reviewed his CT simulation images and his bladder volume was not optimal, resulting in large bowel loops positioning right next to the GTV.    To this end, an instruction for bladder and bowel prep was explained to the patient and he will be rescheduled for CT simulation on October 9, 2024.

## 2024-10-09 ENCOUNTER — ALLIED HEALTH/NURSE VISIT (OUTPATIENT)
Dept: RADIATION ONCOLOGY | Facility: HOSPITAL | Age: 79
End: 2024-10-09
Payer: COMMERCIAL

## 2024-10-09 DIAGNOSIS — C61 PROSTATE CANCER (H): Primary | ICD-10-CM

## 2024-10-09 NOTE — PATIENT INSTRUCTIONS
Please sit on the toilet one hour prior to your radiation appointment. Sit there for 15 minutes and try to have a bowel movement and pass gas. Then drink 2 bottles of water right away(drink this quickly).

## 2024-10-15 ENCOUNTER — RESULTS ONLY (OUTPATIENT)
Dept: RADIATION ONCOLOGY | Facility: HOSPITAL | Age: 79
End: 2024-10-15

## 2024-10-15 ENCOUNTER — APPOINTMENT (OUTPATIENT)
Dept: RADIATION ONCOLOGY | Facility: HOSPITAL | Age: 79
End: 2024-10-15
Attending: RADIOLOGY
Payer: COMMERCIAL

## 2024-10-15 VITALS
DIASTOLIC BLOOD PRESSURE: 80 MMHG | BODY MASS INDEX: 24.29 KG/M2 | RESPIRATION RATE: 16 BRPM | SYSTOLIC BLOOD PRESSURE: 158 MMHG | OXYGEN SATURATION: 98 % | TEMPERATURE: 96.9 F | HEART RATE: 61 BPM | WEIGHT: 169.3 LBS

## 2024-10-15 DIAGNOSIS — C61 PROSTATE CANCER (H): Primary | ICD-10-CM

## 2024-10-15 LAB
RAD ONC ARIA COURSE ID: NORMAL
RAD ONC ARIA COURSE LAST TREATMENT DATE: NORMAL
RAD ONC ARIA COURSE START DATE: NORMAL
RAD ONC ARIA COURSE TREATMENT ELAPSED DAYS: 0
RAD ONC ARIA FIRST TREATMENT DATE: NORMAL
RAD ONC ARIA PLAN FRACTIONS TREATED TO DATE: 1
RAD ONC ARIA PLAN ID: NORMAL
RAD ONC ARIA PLAN PRESCRIBED DOSE PER FRACTION: 2.5 GY
RAD ONC ARIA PLAN TOTAL FRACTIONS PRESCRIBED: 28
RAD ONC ARIA PLAN TOTAL PRESCRIBED DOSE: 7000 CGY
RAD ONC ARIA REFERENCE POINT DOSAGE GIVEN TO DATE: NORMAL GY
RAD ONC ARIA REFERENCE POINT DOSAGE GIVEN TO DATE: NORMAL GY
RAD ONC ARIA REFERENCE POINT ID: NORMAL
RAD ONC ARIA REFERENCE POINT ID: NORMAL

## 2024-10-15 ASSESSMENT — PAIN SCALES - GENERAL: PAINLEVEL: NO PAIN (0)

## 2024-10-15 NOTE — PROGRESS NOTES
Progress Notes  Encounter Date: Oct 15, 2024  RADIATION ONCOLOGY WEEKLY MANAGEMENT PROGRESS NOTE     Patient Care Team         Relationship Specialty Notifications Start End    Harry Olson MD PCP - General Family Practice  8/12/20     Phone: [phone] Fax: [phone]         [address] [place] [address]    Ava Templeton RD Registered Dietitian Diabetes Education  8/14/18     Phone: [phone] Fax: [phone]        Sameer Go MD MD Ophthalmology  8/25/20     Phone: [phone] Fax: [phone] [address] [facility] [address]    Della Pablo LPN LPN   8/4/21     Harry Olson MD Assigned PCP   8/29/21     Phone: [phone] Fax: [phone]         [address] [place] [address]    Jimbo Crawford DO Assigned Heart and Vascular Provider   4/1/23     Phone: [phone] Fax: [phone]         [address] [facility] [address]    Rosa Muhammad, APRN CNP Nurse Practitioner Diabetes Education  7/1/24     Phone: [phone] Fax: [phone]         [address] [place] [address]    Clem Cole MD    7/15/24     Phone: [phone] Fax: [phone]         [facility] [address] [address]    Gilbert Noel MD Assigned Cancer Care Provider   7/23/24     Phone: [phone] Fax: [phone]         [address] [place] [address]                 DIAGNOSIS:  Cancer Staging   Prostate cancer (H)  Staging form: Prostate, AJCC 8th Edition  - Clinical stage from 10/4/2024: Stage IIC (cT2b, cN0, cM0, PSA: 9.8, Grade Group: 3) - Signed by Martinez Jimenez MD on 10/4/2024          RADIATION THERAPY:    Rafa Bhatti has received 250 cGy to date to prostate and seminal vesicles.   Treatment 1/28  Total planned dose: 7000 cGy      SUBJECTIVE:    Currently he finds treatment to be easy and has no questions or concerns.         OBJECTIVE:    WEIGHT: 169 lbs 4.8 oz. BP (!) 158/80 (BP Location:  Left arm, Patient Position: Chair, Cuff Size: Adult Regular)   Pulse 61   Temp 96.9  F (36.1  C) (Tympanic)   Resp 16   Wt 76.8 kg (169 lb 4.8 oz)   SpO2 98%   BMI 24.29 kg/m    He appears pleasant and in no acute distress.      IMPRESSION: He is tolerating radiation treatment well and there is no clinical evidence of acute grade 1 radiation related symptom on examination today.      PLAN: He will continue radiation treatment and supportive care as planned.      Martinez Jimenez MD

## 2024-10-16 ENCOUNTER — APPOINTMENT (OUTPATIENT)
Dept: RADIATION ONCOLOGY | Facility: HOSPITAL | Age: 79
End: 2024-10-16
Payer: COMMERCIAL

## 2024-10-16 ENCOUNTER — RESULTS ONLY (OUTPATIENT)
Dept: RADIATION ONCOLOGY | Facility: HOSPITAL | Age: 79
End: 2024-10-16

## 2024-10-16 LAB
RAD ONC ARIA COURSE ID: NORMAL
RAD ONC ARIA COURSE LAST TREATMENT DATE: NORMAL
RAD ONC ARIA COURSE START DATE: NORMAL
RAD ONC ARIA COURSE TREATMENT ELAPSED DAYS: 1
RAD ONC ARIA FIRST TREATMENT DATE: NORMAL
RAD ONC ARIA PLAN FRACTIONS TREATED TO DATE: 2
RAD ONC ARIA PLAN ID: NORMAL
RAD ONC ARIA PLAN PRESCRIBED DOSE PER FRACTION: 2.5 GY
RAD ONC ARIA PLAN TOTAL FRACTIONS PRESCRIBED: 28
RAD ONC ARIA PLAN TOTAL PRESCRIBED DOSE: 7000 CGY
RAD ONC ARIA REFERENCE POINT DOSAGE GIVEN TO DATE: NORMAL GY
RAD ONC ARIA REFERENCE POINT DOSAGE GIVEN TO DATE: NORMAL GY
RAD ONC ARIA REFERENCE POINT ID: NORMAL
RAD ONC ARIA REFERENCE POINT ID: NORMAL

## 2024-10-16 PROCEDURE — 77014 HC CT GUIDE FOR PLACEMENT RADIATION THERAPY FIELDS: CPT | Performed by: RADIOLOGY

## 2024-10-16 PROCEDURE — 77014 PR CT GUIDE FOR PLACEMENT RADIATION THERAPY FIELDS: CPT | Mod: 26 | Performed by: RADIOLOGY

## 2024-10-16 PROCEDURE — 77385 HC IMRT TREATMENT DELIVERY, SIMPLE: CPT | Performed by: RADIOLOGY

## 2024-10-17 ENCOUNTER — RESULTS ONLY (OUTPATIENT)
Dept: RADIATION ONCOLOGY | Facility: HOSPITAL | Age: 79
End: 2024-10-17

## 2024-10-17 ENCOUNTER — APPOINTMENT (OUTPATIENT)
Dept: RADIATION ONCOLOGY | Facility: HOSPITAL | Age: 79
End: 2024-10-17
Payer: COMMERCIAL

## 2024-10-17 LAB
RAD ONC ARIA COURSE ID: NORMAL
RAD ONC ARIA COURSE LAST TREATMENT DATE: NORMAL
RAD ONC ARIA COURSE START DATE: NORMAL
RAD ONC ARIA COURSE TREATMENT ELAPSED DAYS: 2
RAD ONC ARIA FIRST TREATMENT DATE: NORMAL
RAD ONC ARIA PLAN FRACTIONS TREATED TO DATE: 3
RAD ONC ARIA PLAN ID: NORMAL
RAD ONC ARIA PLAN PRESCRIBED DOSE PER FRACTION: 2.5 GY
RAD ONC ARIA PLAN TOTAL FRACTIONS PRESCRIBED: 28
RAD ONC ARIA PLAN TOTAL PRESCRIBED DOSE: 7000 CGY
RAD ONC ARIA REFERENCE POINT DOSAGE GIVEN TO DATE: NORMAL GY
RAD ONC ARIA REFERENCE POINT DOSAGE GIVEN TO DATE: NORMAL GY
RAD ONC ARIA REFERENCE POINT ID: NORMAL
RAD ONC ARIA REFERENCE POINT ID: NORMAL

## 2024-10-17 PROCEDURE — 77385 HC IMRT TREATMENT DELIVERY, SIMPLE: CPT | Performed by: RADIOLOGY

## 2024-10-17 PROCEDURE — 77014 PR CT GUIDE FOR PLACEMENT RADIATION THERAPY FIELDS: CPT | Mod: 26 | Performed by: RADIOLOGY

## 2024-10-17 PROCEDURE — 77014 HC CT GUIDE FOR PLACEMENT RADIATION THERAPY FIELDS: CPT | Performed by: RADIOLOGY

## 2024-10-18 ENCOUNTER — APPOINTMENT (OUTPATIENT)
Dept: RADIATION ONCOLOGY | Facility: HOSPITAL | Age: 79
End: 2024-10-18
Payer: COMMERCIAL

## 2024-10-18 ENCOUNTER — RESULTS ONLY (OUTPATIENT)
Dept: RADIATION ONCOLOGY | Facility: HOSPITAL | Age: 79
End: 2024-10-18

## 2024-10-18 LAB
RAD ONC ARIA COURSE ID: NORMAL
RAD ONC ARIA COURSE LAST TREATMENT DATE: NORMAL
RAD ONC ARIA COURSE START DATE: NORMAL
RAD ONC ARIA COURSE TREATMENT ELAPSED DAYS: 3
RAD ONC ARIA FIRST TREATMENT DATE: NORMAL
RAD ONC ARIA PLAN FRACTIONS TREATED TO DATE: 4
RAD ONC ARIA PLAN ID: NORMAL
RAD ONC ARIA PLAN PRESCRIBED DOSE PER FRACTION: 2.5 GY
RAD ONC ARIA PLAN TOTAL FRACTIONS PRESCRIBED: 28
RAD ONC ARIA PLAN TOTAL PRESCRIBED DOSE: 7000 CGY
RAD ONC ARIA REFERENCE POINT DOSAGE GIVEN TO DATE: NORMAL GY
RAD ONC ARIA REFERENCE POINT DOSAGE GIVEN TO DATE: NORMAL GY
RAD ONC ARIA REFERENCE POINT ID: NORMAL
RAD ONC ARIA REFERENCE POINT ID: NORMAL

## 2024-10-18 PROCEDURE — 77014 HC CT GUIDE FOR PLACEMENT RADIATION THERAPY FIELDS: CPT | Performed by: RADIOLOGY

## 2024-10-18 PROCEDURE — 77385 HC IMRT TREATMENT DELIVERY, SIMPLE: CPT | Performed by: RADIOLOGY

## 2024-10-18 PROCEDURE — 77014 PR CT GUIDE FOR PLACEMENT RADIATION THERAPY FIELDS: CPT | Mod: 26 | Performed by: RADIOLOGY

## 2024-10-21 ENCOUNTER — APPOINTMENT (OUTPATIENT)
Dept: RADIATION ONCOLOGY | Facility: HOSPITAL | Age: 79
End: 2024-10-21
Payer: COMMERCIAL

## 2024-10-21 ENCOUNTER — RESULTS ONLY (OUTPATIENT)
Dept: RADIATION ONCOLOGY | Facility: HOSPITAL | Age: 79
End: 2024-10-21

## 2024-10-21 LAB
RAD ONC ARIA COURSE ID: NORMAL
RAD ONC ARIA COURSE LAST TREATMENT DATE: NORMAL
RAD ONC ARIA COURSE START DATE: NORMAL
RAD ONC ARIA COURSE TREATMENT ELAPSED DAYS: 6
RAD ONC ARIA FIRST TREATMENT DATE: NORMAL
RAD ONC ARIA PLAN FRACTIONS TREATED TO DATE: 5
RAD ONC ARIA PLAN ID: NORMAL
RAD ONC ARIA PLAN PRESCRIBED DOSE PER FRACTION: 2.5 GY
RAD ONC ARIA PLAN TOTAL FRACTIONS PRESCRIBED: 28
RAD ONC ARIA PLAN TOTAL PRESCRIBED DOSE: 7000 CGY
RAD ONC ARIA REFERENCE POINT DOSAGE GIVEN TO DATE: NORMAL GY
RAD ONC ARIA REFERENCE POINT DOSAGE GIVEN TO DATE: NORMAL GY
RAD ONC ARIA REFERENCE POINT ID: NORMAL
RAD ONC ARIA REFERENCE POINT ID: NORMAL

## 2024-10-21 PROCEDURE — 77427 RADIATION TX MANAGEMENT X5: CPT | Performed by: RADIOLOGY

## 2024-10-21 PROCEDURE — 77385 HC IMRT TREATMENT DELIVERY, SIMPLE: CPT | Performed by: RADIOLOGY

## 2024-10-21 PROCEDURE — 77014 PR CT GUIDE FOR PLACEMENT RADIATION THERAPY FIELDS: CPT | Mod: 26 | Performed by: RADIOLOGY

## 2024-10-21 PROCEDURE — 77336 RADIATION PHYSICS CONSULT: CPT | Performed by: RADIOLOGY

## 2024-10-21 PROCEDURE — 77014 HC CT GUIDE FOR PLACEMENT RADIATION THERAPY FIELDS: CPT | Performed by: RADIOLOGY

## 2024-10-21 NOTE — PROGRESS NOTES
Progress Notes  Encounter Date: Oct 22, 2024  RADIATION ONCOLOGY WEEKLY MANAGEMENT PROGRESS NOTE     Patient Care Team         Relationship Specialty Notifications Start End    Harry Olson MD PCP - General Family Practice  8/12/20     Phone: [phone] Fax: [phone]         [address] [place] [address]    Ava Templeton RD Registered Dietitian Diabetes Education  8/14/18     Phone: [phone] Fax: [phone]        Sameer Go MD MD Ophthalmology  8/25/20     Phone: [phone] Fax: [phone] [address] [facility] [address]    Della Pablo LPN LPN   8/4/21     Harry Olson MD Assigned PCP   8/29/21     Phone: [phone] Fax: [phone]         [address] [place] [address]    Jimbo Crawford DO Assigned Heart and Vascular Provider   4/1/23     Phone: [phone] Fax: [phone]         [address] [facility] [address]    Rosa Muhammad, APRN CNP Nurse Practitioner Diabetes Education  7/1/24     Phone: [phone] Fax: [phone]         [address] [place] [address]    Clem Cole MD    7/15/24     Phone: [phone] Fax: [phone]         [facility] [address] [address]    Gilbert Noel MD Assigned Cancer Care Provider   7/23/24     Phone: [phone] Fax: [phone]         [address] [place] [address]             DIAGNOSIS:  Cancer Staging   Prostate cancer (H)  Staging form: Prostate, AJCC 8th Edition  - Clinical stage from 10/4/2024: Stage IIC (cT2b, cN0, cM0, PSA: 9.8, Grade Group: 3) - Signed by Martinez Jimenez MD on 10/4/2024      RADIATION THERAPY:    Rafa Bhatti has received 1500 cGy to date to prostate and seminal vesicles.   Treatment 6/28  Total planned dose: 7000 cGy      SUBJECTIVE:    Currently he has increased urinary frequency, but he is drinking more fluids.  Nocturia 3 times (baseline is 2 times).  Denies changes to bowels.   Experiencing hot flashes.  Notes some fatigue.       OBJECTIVE:    WEIGHT: 167 lbs 4.8 oz. /68   Pulse 59   Temp (!) 96.1  F (35.6  C) (Tympanic)   Resp 16   Wt 75.9 kg (167 lb 4.8 oz)   SpO2 99%   BMI 24.01 kg/m    He appears pleasant and in no acute distress.  His abdomen is soft and nontender with active bowel sound.      IMPRESSION: He is tolerating radiation treatment well and there is no clinical evidence of acute grade 1 radiation related symptom on examination today.  He does not need medication for hot flashes at this time.      PLAN: He will continue radiation treatment and supportive care as planned.      Martinez Jimenez MD

## 2024-10-22 ENCOUNTER — OFFICE VISIT (OUTPATIENT)
Dept: RADIATION ONCOLOGY | Facility: HOSPITAL | Age: 79
End: 2024-10-22
Payer: COMMERCIAL

## 2024-10-22 ENCOUNTER — RESULTS ONLY (OUTPATIENT)
Dept: RADIATION ONCOLOGY | Facility: HOSPITAL | Age: 79
End: 2024-10-22

## 2024-10-22 VITALS
WEIGHT: 167.3 LBS | BODY MASS INDEX: 24.01 KG/M2 | SYSTOLIC BLOOD PRESSURE: 130 MMHG | OXYGEN SATURATION: 99 % | DIASTOLIC BLOOD PRESSURE: 68 MMHG | HEART RATE: 59 BPM | TEMPERATURE: 96.1 F | RESPIRATION RATE: 16 BRPM

## 2024-10-22 DIAGNOSIS — C61 PROSTATE CANCER (H): Primary | ICD-10-CM

## 2024-10-22 LAB
RAD ONC ARIA COURSE ID: NORMAL
RAD ONC ARIA COURSE LAST TREATMENT DATE: NORMAL
RAD ONC ARIA COURSE START DATE: NORMAL
RAD ONC ARIA COURSE TREATMENT ELAPSED DAYS: 7
RAD ONC ARIA FIRST TREATMENT DATE: NORMAL
RAD ONC ARIA PLAN FRACTIONS TREATED TO DATE: 6
RAD ONC ARIA PLAN ID: NORMAL
RAD ONC ARIA PLAN PRESCRIBED DOSE PER FRACTION: 2.5 GY
RAD ONC ARIA PLAN TOTAL FRACTIONS PRESCRIBED: 28
RAD ONC ARIA PLAN TOTAL PRESCRIBED DOSE: 7000 CGY
RAD ONC ARIA REFERENCE POINT DOSAGE GIVEN TO DATE: NORMAL GY
RAD ONC ARIA REFERENCE POINT DOSAGE GIVEN TO DATE: NORMAL GY
RAD ONC ARIA REFERENCE POINT ID: NORMAL
RAD ONC ARIA REFERENCE POINT ID: NORMAL

## 2024-10-22 ASSESSMENT — PAIN SCALES - GENERAL: PAINLEVEL: NO PAIN (0)

## 2024-10-23 ENCOUNTER — APPOINTMENT (OUTPATIENT)
Dept: RADIATION ONCOLOGY | Facility: HOSPITAL | Age: 79
End: 2024-10-23
Payer: COMMERCIAL

## 2024-10-23 ENCOUNTER — RESULTS ONLY (OUTPATIENT)
Dept: RADIATION ONCOLOGY | Facility: HOSPITAL | Age: 79
End: 2024-10-23

## 2024-10-23 LAB
RAD ONC ARIA COURSE ID: NORMAL
RAD ONC ARIA COURSE LAST TREATMENT DATE: NORMAL
RAD ONC ARIA COURSE START DATE: NORMAL
RAD ONC ARIA COURSE TREATMENT ELAPSED DAYS: 8
RAD ONC ARIA FIRST TREATMENT DATE: NORMAL
RAD ONC ARIA PLAN FRACTIONS TREATED TO DATE: 7
RAD ONC ARIA PLAN ID: NORMAL
RAD ONC ARIA PLAN PRESCRIBED DOSE PER FRACTION: 2.5 GY
RAD ONC ARIA PLAN TOTAL FRACTIONS PRESCRIBED: 28
RAD ONC ARIA PLAN TOTAL PRESCRIBED DOSE: 7000 CGY
RAD ONC ARIA REFERENCE POINT DOSAGE GIVEN TO DATE: NORMAL GY
RAD ONC ARIA REFERENCE POINT DOSAGE GIVEN TO DATE: NORMAL GY
RAD ONC ARIA REFERENCE POINT ID: NORMAL
RAD ONC ARIA REFERENCE POINT ID: NORMAL

## 2024-10-23 PROCEDURE — 77014 HC CT GUIDE FOR PLACEMENT RADIATION THERAPY FIELDS: CPT | Performed by: RADIOLOGY

## 2024-10-23 PROCEDURE — 77385 HC IMRT TREATMENT DELIVERY, SIMPLE: CPT | Performed by: RADIOLOGY

## 2024-10-23 PROCEDURE — 77014 PR CT GUIDE FOR PLACEMENT RADIATION THERAPY FIELDS: CPT | Mod: 26 | Performed by: RADIOLOGY

## 2024-10-24 ENCOUNTER — RESULTS ONLY (OUTPATIENT)
Dept: RADIATION ONCOLOGY | Facility: HOSPITAL | Age: 79
End: 2024-10-24

## 2024-10-24 ENCOUNTER — APPOINTMENT (OUTPATIENT)
Dept: RADIATION ONCOLOGY | Facility: HOSPITAL | Age: 79
End: 2024-10-24
Payer: COMMERCIAL

## 2024-10-24 LAB
RAD ONC ARIA COURSE ID: NORMAL
RAD ONC ARIA COURSE LAST TREATMENT DATE: NORMAL
RAD ONC ARIA COURSE START DATE: NORMAL
RAD ONC ARIA COURSE TREATMENT ELAPSED DAYS: 9
RAD ONC ARIA FIRST TREATMENT DATE: NORMAL
RAD ONC ARIA PLAN FRACTIONS TREATED TO DATE: 8
RAD ONC ARIA PLAN ID: NORMAL
RAD ONC ARIA PLAN PRESCRIBED DOSE PER FRACTION: 2.5 GY
RAD ONC ARIA PLAN TOTAL FRACTIONS PRESCRIBED: 28
RAD ONC ARIA PLAN TOTAL PRESCRIBED DOSE: 7000 CGY
RAD ONC ARIA REFERENCE POINT DOSAGE GIVEN TO DATE: NORMAL GY
RAD ONC ARIA REFERENCE POINT DOSAGE GIVEN TO DATE: NORMAL GY
RAD ONC ARIA REFERENCE POINT ID: NORMAL
RAD ONC ARIA REFERENCE POINT ID: NORMAL

## 2024-10-24 PROCEDURE — 77385 HC IMRT TREATMENT DELIVERY, SIMPLE: CPT | Performed by: RADIOLOGY

## 2024-10-24 PROCEDURE — 77014 PR CT GUIDE FOR PLACEMENT RADIATION THERAPY FIELDS: CPT | Mod: 26 | Performed by: RADIOLOGY

## 2024-10-24 PROCEDURE — 77014 HC CT GUIDE FOR PLACEMENT RADIATION THERAPY FIELDS: CPT | Performed by: RADIOLOGY

## 2024-10-25 ENCOUNTER — APPOINTMENT (OUTPATIENT)
Dept: RADIATION ONCOLOGY | Facility: HOSPITAL | Age: 79
End: 2024-10-25
Payer: COMMERCIAL

## 2024-10-25 ENCOUNTER — RESULTS ONLY (OUTPATIENT)
Dept: RADIATION ONCOLOGY | Facility: HOSPITAL | Age: 79
End: 2024-10-25

## 2024-10-25 LAB
RAD ONC ARIA COURSE ID: NORMAL
RAD ONC ARIA COURSE LAST TREATMENT DATE: NORMAL
RAD ONC ARIA COURSE START DATE: NORMAL
RAD ONC ARIA COURSE TREATMENT ELAPSED DAYS: 10
RAD ONC ARIA FIRST TREATMENT DATE: NORMAL
RAD ONC ARIA PLAN FRACTIONS TREATED TO DATE: 9
RAD ONC ARIA PLAN ID: NORMAL
RAD ONC ARIA PLAN PRESCRIBED DOSE PER FRACTION: 2.5 GY
RAD ONC ARIA PLAN TOTAL FRACTIONS PRESCRIBED: 28
RAD ONC ARIA PLAN TOTAL PRESCRIBED DOSE: 7000 CGY
RAD ONC ARIA REFERENCE POINT DOSAGE GIVEN TO DATE: NORMAL GY
RAD ONC ARIA REFERENCE POINT DOSAGE GIVEN TO DATE: NORMAL GY
RAD ONC ARIA REFERENCE POINT ID: NORMAL
RAD ONC ARIA REFERENCE POINT ID: NORMAL

## 2024-10-25 PROCEDURE — 77385 HC IMRT TREATMENT DELIVERY, SIMPLE: CPT | Performed by: RADIOLOGY

## 2024-10-25 PROCEDURE — 77014 HC CT GUIDE FOR PLACEMENT RADIATION THERAPY FIELDS: CPT | Performed by: RADIOLOGY

## 2024-10-25 PROCEDURE — 77014 PR CT GUIDE FOR PLACEMENT RADIATION THERAPY FIELDS: CPT | Mod: 26 | Performed by: RADIOLOGY

## 2024-10-28 ENCOUNTER — APPOINTMENT (OUTPATIENT)
Dept: RADIATION ONCOLOGY | Facility: HOSPITAL | Age: 79
End: 2024-10-28
Payer: COMMERCIAL

## 2024-10-28 ENCOUNTER — RESULTS ONLY (OUTPATIENT)
Dept: RADIATION ONCOLOGY | Facility: HOSPITAL | Age: 79
End: 2024-10-28

## 2024-10-28 LAB
RAD ONC ARIA COURSE ID: NORMAL
RAD ONC ARIA COURSE LAST TREATMENT DATE: NORMAL
RAD ONC ARIA COURSE START DATE: NORMAL
RAD ONC ARIA COURSE TREATMENT ELAPSED DAYS: 13
RAD ONC ARIA FIRST TREATMENT DATE: NORMAL
RAD ONC ARIA PLAN FRACTIONS TREATED TO DATE: 10
RAD ONC ARIA PLAN ID: NORMAL
RAD ONC ARIA PLAN PRESCRIBED DOSE PER FRACTION: 2.5 GY
RAD ONC ARIA PLAN TOTAL FRACTIONS PRESCRIBED: 28
RAD ONC ARIA PLAN TOTAL PRESCRIBED DOSE: 7000 CGY
RAD ONC ARIA REFERENCE POINT DOSAGE GIVEN TO DATE: NORMAL GY
RAD ONC ARIA REFERENCE POINT DOSAGE GIVEN TO DATE: NORMAL GY
RAD ONC ARIA REFERENCE POINT ID: NORMAL
RAD ONC ARIA REFERENCE POINT ID: NORMAL

## 2024-10-28 PROCEDURE — 77336 RADIATION PHYSICS CONSULT: CPT | Performed by: RADIOLOGY

## 2024-10-28 PROCEDURE — 77014 PR CT GUIDE FOR PLACEMENT RADIATION THERAPY FIELDS: CPT | Mod: 26 | Performed by: RADIOLOGY

## 2024-10-28 PROCEDURE — 77427 RADIATION TX MANAGEMENT X5: CPT | Performed by: RADIOLOGY

## 2024-10-28 PROCEDURE — 77385 HC IMRT TREATMENT DELIVERY, SIMPLE: CPT | Performed by: RADIOLOGY

## 2024-10-28 PROCEDURE — 77014 HC CT GUIDE FOR PLACEMENT RADIATION THERAPY FIELDS: CPT | Performed by: RADIOLOGY

## 2024-10-28 NOTE — PROGRESS NOTES
Progress Notes  Encounter Date: Oct 29, 2024  RADIATION ONCOLOGY WEEKLY MANAGEMENT PROGRESS NOTE     Patient Care Team         Relationship Specialty Notifications Start End    Harry Olson MD PCP - General Family Practice  8/12/20     Phone: [phone] Fax: [phone]         [address] [place] [address]    Ava Templeton RD Registered Dietitian Diabetes Education  8/14/18     Phone: [phone] Fax: [phone]        Sameer Go MD MD Ophthalmology  8/25/20     Phone: [phone] Fax: [phone] [address] [facility] [address]    Della Pablo LPN LPN   8/4/21     Harry Olson MD Assigned PCP   8/29/21     Phone: [phone] Fax: [phone]         [address] [place] [address]    Jimbo Crawford DO Assigned Heart and Vascular Provider   4/1/23     Phone: [phone] Fax: [phone]         [address] [facility] [address]    Rosa Muhammad, APRN CNP Nurse Practitioner Diabetes Education  7/1/24     Phone: [phone] Fax: [phone]         [address] [place] [address]    Clem Cole MD    7/15/24     Phone: [phone] Fax: [phone]         [facility] [address] [address]    Gilbert Noel MD Assigned Cancer Care Provider   7/23/24     Phone: [phone] Fax: [phone]         [address] [place] [address]             DIAGNOSIS:  Cancer Staging   Prostate cancer (H)  Staging form: Prostate, AJCC 8th Edition  - Clinical stage from 10/4/2024: Stage IIC (cT2b, cN0, cM0, PSA: 9.8, Grade Group: 3) - Signed by Martinez Jimenez MD on 10/4/2024      RADIATION THERAPY:    Rafa Bhatti has received 2750 cGy to date to prostate and seminal vesicles.   Treatment 11/28  Total planned dose: 7000 cGy      SUBJECTIVE:    Currently he has increased urinary frequency every 15 minutes for about 4 hours following radiation therapy treatment.  Nocturia x 2.  Having daily bowel  movements that are soft.  He feels they are smaller and that he may not be emptying completely.  Continues to experience hot flashes.  Notes some fatigue.   He notes mild dysuria and has not started cranberry at this time.  Nocturia is at his baseline, twice at night.  He notes mild weaker urinary stream.  He denies any symptoms of bloody urine, bloody stool, dribbling, or incontinence.      OBJECTIVE:    WEIGHT: 168 lbs 4.8 oz. BP (!) 150/68 (BP Location: Right arm, Patient Position: Chair, Cuff Size: Adult Regular)   Pulse 78   Temp 97.4  F (36.3  C) (Tympanic)   Resp 16   Wt 76.3 kg (168 lb 4.8 oz)   SpO2 98%   BMI 24.15 kg/m    He appears pleasant and in no acute distress.  His abdomen is soft and nontender with active bowel sound.      IMPRESSION: His urinary frequency about 3 to 4 hours following radiation treatment may be related to increased intake for his bladder prep for his radiation treatment.  There is clinical evidence of acute grade 1 mild dysuria on examination today.  He has mild symptom of fatigue is probably related to ADT.      PLAN: He will start cranberry juice for dysuria at this time.  For constipation, he is going to take Dulcolax as needed.  He will continue radiation treatment and supportive care as planned.      Martinez Jimenez MD

## 2024-10-29 ENCOUNTER — OFFICE VISIT (OUTPATIENT)
Dept: RADIATION ONCOLOGY | Facility: HOSPITAL | Age: 79
End: 2024-10-29
Payer: COMMERCIAL

## 2024-10-29 ENCOUNTER — RESULTS ONLY (OUTPATIENT)
Dept: RADIATION ONCOLOGY | Facility: HOSPITAL | Age: 79
End: 2024-10-29

## 2024-10-29 VITALS
RESPIRATION RATE: 16 BRPM | SYSTOLIC BLOOD PRESSURE: 150 MMHG | DIASTOLIC BLOOD PRESSURE: 68 MMHG | HEART RATE: 78 BPM | WEIGHT: 168.3 LBS | OXYGEN SATURATION: 98 % | TEMPERATURE: 97.4 F | BODY MASS INDEX: 24.15 KG/M2

## 2024-10-29 DIAGNOSIS — C61 PROSTATE CANCER (H): Primary | ICD-10-CM

## 2024-10-29 LAB
RAD ONC ARIA COURSE ID: NORMAL
RAD ONC ARIA COURSE LAST TREATMENT DATE: NORMAL
RAD ONC ARIA COURSE START DATE: NORMAL
RAD ONC ARIA COURSE TREATMENT ELAPSED DAYS: 14
RAD ONC ARIA FIRST TREATMENT DATE: NORMAL
RAD ONC ARIA PLAN FRACTIONS TREATED TO DATE: 11
RAD ONC ARIA PLAN ID: NORMAL
RAD ONC ARIA PLAN PRESCRIBED DOSE PER FRACTION: 2.5 GY
RAD ONC ARIA PLAN TOTAL FRACTIONS PRESCRIBED: 28
RAD ONC ARIA PLAN TOTAL PRESCRIBED DOSE: 7000 CGY
RAD ONC ARIA REFERENCE POINT DOSAGE GIVEN TO DATE: NORMAL GY
RAD ONC ARIA REFERENCE POINT DOSAGE GIVEN TO DATE: NORMAL GY
RAD ONC ARIA REFERENCE POINT ID: NORMAL
RAD ONC ARIA REFERENCE POINT ID: NORMAL

## 2024-10-29 ASSESSMENT — PAIN SCALES - GENERAL: PAINLEVEL_OUTOF10: NO PAIN (0)

## 2024-10-30 ENCOUNTER — RESULTS ONLY (OUTPATIENT)
Dept: RADIATION ONCOLOGY | Facility: HOSPITAL | Age: 79
End: 2024-10-30

## 2024-10-30 ENCOUNTER — APPOINTMENT (OUTPATIENT)
Dept: RADIATION ONCOLOGY | Facility: HOSPITAL | Age: 79
End: 2024-10-30
Payer: COMMERCIAL

## 2024-10-30 LAB
RAD ONC ARIA COURSE ID: NORMAL
RAD ONC ARIA COURSE LAST TREATMENT DATE: NORMAL
RAD ONC ARIA COURSE START DATE: NORMAL
RAD ONC ARIA COURSE TREATMENT ELAPSED DAYS: 15
RAD ONC ARIA FIRST TREATMENT DATE: NORMAL
RAD ONC ARIA PLAN FRACTIONS TREATED TO DATE: 12
RAD ONC ARIA PLAN ID: NORMAL
RAD ONC ARIA PLAN PRESCRIBED DOSE PER FRACTION: 2.5 GY
RAD ONC ARIA PLAN TOTAL FRACTIONS PRESCRIBED: 28
RAD ONC ARIA PLAN TOTAL PRESCRIBED DOSE: 7000 CGY
RAD ONC ARIA REFERENCE POINT DOSAGE GIVEN TO DATE: NORMAL GY
RAD ONC ARIA REFERENCE POINT DOSAGE GIVEN TO DATE: NORMAL GY
RAD ONC ARIA REFERENCE POINT ID: NORMAL
RAD ONC ARIA REFERENCE POINT ID: NORMAL

## 2024-10-30 PROCEDURE — 77385 HC IMRT TREATMENT DELIVERY, SIMPLE: CPT | Performed by: RADIOLOGY

## 2024-10-30 PROCEDURE — 77014 HC CT GUIDE FOR PLACEMENT RADIATION THERAPY FIELDS: CPT | Performed by: RADIOLOGY

## 2024-10-30 PROCEDURE — 77014 PR CT GUIDE FOR PLACEMENT RADIATION THERAPY FIELDS: CPT | Mod: 26 | Performed by: RADIOLOGY

## 2024-10-31 ENCOUNTER — RESULTS ONLY (OUTPATIENT)
Dept: RADIATION ONCOLOGY | Facility: HOSPITAL | Age: 79
End: 2024-10-31

## 2024-10-31 ENCOUNTER — APPOINTMENT (OUTPATIENT)
Dept: RADIATION ONCOLOGY | Facility: HOSPITAL | Age: 79
End: 2024-10-31
Payer: COMMERCIAL

## 2024-10-31 LAB
RAD ONC ARIA COURSE ID: NORMAL
RAD ONC ARIA COURSE LAST TREATMENT DATE: NORMAL
RAD ONC ARIA COURSE START DATE: NORMAL
RAD ONC ARIA COURSE TREATMENT ELAPSED DAYS: 16
RAD ONC ARIA FIRST TREATMENT DATE: NORMAL
RAD ONC ARIA PLAN FRACTIONS TREATED TO DATE: 13
RAD ONC ARIA PLAN ID: NORMAL
RAD ONC ARIA PLAN PRESCRIBED DOSE PER FRACTION: 2.5 GY
RAD ONC ARIA PLAN TOTAL FRACTIONS PRESCRIBED: 28
RAD ONC ARIA PLAN TOTAL PRESCRIBED DOSE: 7000 CGY
RAD ONC ARIA REFERENCE POINT DOSAGE GIVEN TO DATE: NORMAL GY
RAD ONC ARIA REFERENCE POINT DOSAGE GIVEN TO DATE: NORMAL GY
RAD ONC ARIA REFERENCE POINT ID: NORMAL
RAD ONC ARIA REFERENCE POINT ID: NORMAL

## 2024-10-31 PROCEDURE — 77385 HC IMRT TREATMENT DELIVERY, SIMPLE: CPT | Performed by: RADIOLOGY

## 2024-10-31 PROCEDURE — 77014 HC CT GUIDE FOR PLACEMENT RADIATION THERAPY FIELDS: CPT | Performed by: RADIOLOGY

## 2024-10-31 PROCEDURE — 77427 RADIATION TX MANAGEMENT X5: CPT | Performed by: RADIOLOGY

## 2024-10-31 PROCEDURE — 77014 PR CT GUIDE FOR PLACEMENT RADIATION THERAPY FIELDS: CPT | Mod: 26 | Performed by: RADIOLOGY

## 2024-11-01 ENCOUNTER — APPOINTMENT (OUTPATIENT)
Dept: RADIATION ONCOLOGY | Facility: HOSPITAL | Age: 79
End: 2024-11-01
Attending: RADIOLOGY
Payer: COMMERCIAL

## 2024-11-01 ENCOUNTER — RESULTS ONLY (OUTPATIENT)
Dept: RADIATION ONCOLOGY | Facility: HOSPITAL | Age: 79
End: 2024-11-01

## 2024-11-01 LAB
RAD ONC ARIA COURSE ID: NORMAL
RAD ONC ARIA COURSE LAST TREATMENT DATE: NORMAL
RAD ONC ARIA COURSE START DATE: NORMAL
RAD ONC ARIA COURSE TREATMENT ELAPSED DAYS: 17
RAD ONC ARIA FIRST TREATMENT DATE: NORMAL
RAD ONC ARIA PLAN FRACTIONS TREATED TO DATE: 14
RAD ONC ARIA PLAN ID: NORMAL
RAD ONC ARIA PLAN PRESCRIBED DOSE PER FRACTION: 2.5 GY
RAD ONC ARIA PLAN TOTAL FRACTIONS PRESCRIBED: 28
RAD ONC ARIA PLAN TOTAL PRESCRIBED DOSE: 7000 CGY
RAD ONC ARIA REFERENCE POINT DOSAGE GIVEN TO DATE: NORMAL GY
RAD ONC ARIA REFERENCE POINT DOSAGE GIVEN TO DATE: NORMAL GY
RAD ONC ARIA REFERENCE POINT ID: NORMAL
RAD ONC ARIA REFERENCE POINT ID: NORMAL

## 2024-11-01 PROCEDURE — 77014 PR CT GUIDE FOR PLACEMENT RADIATION THERAPY FIELDS: CPT | Mod: 26 | Performed by: RADIOLOGY

## 2024-11-01 PROCEDURE — 77014 HC CT GUIDE FOR PLACEMENT RADIATION THERAPY FIELDS: CPT | Performed by: RADIOLOGY

## 2024-11-01 PROCEDURE — 77385 HC IMRT TREATMENT DELIVERY, SIMPLE: CPT | Performed by: RADIOLOGY

## 2024-11-04 ENCOUNTER — APPOINTMENT (OUTPATIENT)
Dept: RADIATION ONCOLOGY | Facility: HOSPITAL | Age: 79
End: 2024-11-04
Payer: COMMERCIAL

## 2024-11-04 ENCOUNTER — RESULTS ONLY (OUTPATIENT)
Dept: RADIATION ONCOLOGY | Facility: HOSPITAL | Age: 79
End: 2024-11-04

## 2024-11-04 LAB
RAD ONC ARIA COURSE ID: NORMAL
RAD ONC ARIA COURSE LAST TREATMENT DATE: NORMAL
RAD ONC ARIA COURSE START DATE: NORMAL
RAD ONC ARIA COURSE TREATMENT ELAPSED DAYS: 20
RAD ONC ARIA FIRST TREATMENT DATE: NORMAL
RAD ONC ARIA PLAN FRACTIONS TREATED TO DATE: 15
RAD ONC ARIA PLAN ID: NORMAL
RAD ONC ARIA PLAN PRESCRIBED DOSE PER FRACTION: 2.5 GY
RAD ONC ARIA PLAN TOTAL FRACTIONS PRESCRIBED: 28
RAD ONC ARIA PLAN TOTAL PRESCRIBED DOSE: 7000 CGY
RAD ONC ARIA REFERENCE POINT DOSAGE GIVEN TO DATE: NORMAL GY
RAD ONC ARIA REFERENCE POINT DOSAGE GIVEN TO DATE: NORMAL GY
RAD ONC ARIA REFERENCE POINT ID: NORMAL
RAD ONC ARIA REFERENCE POINT ID: NORMAL

## 2024-11-04 PROCEDURE — 77014 HC CT GUIDE FOR PLACEMENT RADIATION THERAPY FIELDS: CPT | Performed by: RADIOLOGY

## 2024-11-04 PROCEDURE — 77385 HC IMRT TREATMENT DELIVERY, SIMPLE: CPT | Performed by: RADIOLOGY

## 2024-11-04 PROCEDURE — 77014 PR CT GUIDE FOR PLACEMENT RADIATION THERAPY FIELDS: CPT | Mod: 26 | Performed by: RADIOLOGY

## 2024-11-04 PROCEDURE — 77336 RADIATION PHYSICS CONSULT: CPT | Performed by: RADIOLOGY

## 2024-11-05 ENCOUNTER — RESULTS ONLY (OUTPATIENT)
Dept: RADIATION ONCOLOGY | Facility: HOSPITAL | Age: 79
End: 2024-11-05

## 2024-11-05 ENCOUNTER — OFFICE VISIT (OUTPATIENT)
Dept: RADIATION ONCOLOGY | Facility: HOSPITAL | Age: 79
End: 2024-11-05
Attending: RADIOLOGY
Payer: COMMERCIAL

## 2024-11-05 VITALS
TEMPERATURE: 97.5 F | RESPIRATION RATE: 16 BRPM | WEIGHT: 170.9 LBS | BODY MASS INDEX: 24.52 KG/M2 | HEART RATE: 63 BPM | OXYGEN SATURATION: 98 % | DIASTOLIC BLOOD PRESSURE: 78 MMHG | SYSTOLIC BLOOD PRESSURE: 158 MMHG

## 2024-11-05 DIAGNOSIS — C61 PROSTATE CANCER (H): Primary | ICD-10-CM

## 2024-11-05 LAB
RAD ONC ARIA COURSE ID: NORMAL
RAD ONC ARIA COURSE LAST TREATMENT DATE: NORMAL
RAD ONC ARIA COURSE START DATE: NORMAL
RAD ONC ARIA COURSE TREATMENT ELAPSED DAYS: 21
RAD ONC ARIA FIRST TREATMENT DATE: NORMAL
RAD ONC ARIA PLAN FRACTIONS TREATED TO DATE: 16
RAD ONC ARIA PLAN ID: NORMAL
RAD ONC ARIA PLAN PRESCRIBED DOSE PER FRACTION: 2.5 GY
RAD ONC ARIA PLAN TOTAL FRACTIONS PRESCRIBED: 28
RAD ONC ARIA PLAN TOTAL PRESCRIBED DOSE: 7000 CGY
RAD ONC ARIA REFERENCE POINT DOSAGE GIVEN TO DATE: NORMAL GY
RAD ONC ARIA REFERENCE POINT DOSAGE GIVEN TO DATE: NORMAL GY
RAD ONC ARIA REFERENCE POINT ID: NORMAL
RAD ONC ARIA REFERENCE POINT ID: NORMAL

## 2024-11-05 RX ORDER — PHENAZOPYRIDINE HYDROCHLORIDE 200 MG/1
200 TABLET, FILM COATED ORAL 3 TIMES DAILY PRN
Qty: 30 TABLET | Refills: 2 | Status: SHIPPED | OUTPATIENT
Start: 2024-11-05

## 2024-11-05 ASSESSMENT — PAIN SCALES - GENERAL: PAINLEVEL_OUTOF10: NO PAIN (0)

## 2024-11-05 NOTE — PROGRESS NOTES
Progress Notes  Encounter Date: Nov 5, 2024  RADIATION ONCOLOGY WEEKLY MANAGEMENT PROGRESS NOTE     Patient Care Team         Relationship Specialty Notifications Start End    Harry Olson MD PCP - General Family Practice  8/12/20     Phone: [phone] Fax: [phone]         [address] [place] [address]    Ava Templeton RD Registered Dietitian Diabetes Education  8/14/18     Phone: [phone] Fax: [phone]        Sameer Go MD MD Ophthalmology  8/25/20     Phone: [phone] Fax: [phone] [address] [facility] [address]    Della Pablo LPN LPN   8/4/21     Harry Olson MD Assigned PCP   8/29/21     Phone: [phone] Fax: [phone]         [address] [place] [address]    Jimbo Crawford DO Assigned Heart and Vascular Provider   4/1/23     Phone: [phone] Fax: [phone]         [address] [facility] [address]    Rosa Muhammad, APRN CNP Nurse Practitioner Diabetes Education  7/1/24     Phone: [phone] Fax: [phone]         [address] [place] [address]    Clem Cole MD    7/15/24     Phone: [phone] Fax: [phone]         [facility] [address] [address]    Gilbert Noel MD Assigned Cancer Care Provider   7/23/24     Phone: [phone] Fax: [phone]         [address] [place] [address]             DIAGNOSIS:  Cancer Staging   Prostate cancer (H)  Staging form: Prostate, AJCC 8th Edition  - Clinical stage from 10/4/2024: Stage IIC (cT2b, cN0, cM0, PSA: 9.8, Grade Group: 3) - Signed by Martinez Jimenez MD on 10/4/2024      RADIATION THERAPY:    Rafa Bhatti has received 4000 cGy to date to prostate and seminal vesicles.   Treatment 16/28  Total planned dose: 7000 cGy      SUBJECTIVE:    Currently he has increased urinary frequency every 15 minutes for about 4 hours following radiation therapy treatment.  Nocturia x 2.  He started cranberry  juice, but feels the burning with urination has increased and that his urinary stream is weaker.  He did note a small amount of blood in his underwear after straining to have a bowel movement while trying to keep his bladder full.  This happened once last Friday when he had bowel movement while squeezing his penis so he did not urinate.  Having regular daily bowel movements with the use of Dulcolax.  Continues to experience hot flashes.  Notes some fatigue.       OBJECTIVE:    WEIGHT: 170 lbs 14.4 oz. BP (!) 158/78 (BP Location: Left arm, Patient Position: Chair, Cuff Size: Adult Regular)   Pulse 63   Temp 97.5  F (36.4  C) (Tympanic)   Resp 16   Wt 77.5 kg (170 lb 14.4 oz)   SpO2 98%   BMI 24.52 kg/m    He appears pleasant and in no acute distress.  His abdomen is soft and nontender with active bowel sound.      IMPRESSION: He experiences acute grade 2 radiation related urinary symptoms of increased frequency, urgency, and weaker stream.  His episode of bloody urine last Friday is probably mechanical related and patient is advised not to apply pressure on his penis to prevent bloody urine from mechanical trauma.      PLAN: He will increase tamsulosin to 0.8 mg daily at bedtime.  Prescription of Pyridium is given to the patient with instruction for dysuria. He will continue radiation treatment and supportive care as planned.      Martinez Jimenez MD

## 2024-11-06 ENCOUNTER — RESULTS ONLY (OUTPATIENT)
Dept: RADIATION ONCOLOGY | Facility: HOSPITAL | Age: 79
End: 2024-11-06

## 2024-11-06 ENCOUNTER — APPOINTMENT (OUTPATIENT)
Dept: RADIATION ONCOLOGY | Facility: HOSPITAL | Age: 79
End: 2024-11-06
Payer: COMMERCIAL

## 2024-11-06 LAB
RAD ONC ARIA COURSE ID: NORMAL
RAD ONC ARIA COURSE LAST TREATMENT DATE: NORMAL
RAD ONC ARIA COURSE START DATE: NORMAL
RAD ONC ARIA COURSE TREATMENT ELAPSED DAYS: 22
RAD ONC ARIA FIRST TREATMENT DATE: NORMAL
RAD ONC ARIA PLAN FRACTIONS TREATED TO DATE: 17
RAD ONC ARIA PLAN ID: NORMAL
RAD ONC ARIA PLAN PRESCRIBED DOSE PER FRACTION: 2.5 GY
RAD ONC ARIA PLAN TOTAL FRACTIONS PRESCRIBED: 28
RAD ONC ARIA PLAN TOTAL PRESCRIBED DOSE: 7000 CGY
RAD ONC ARIA REFERENCE POINT DOSAGE GIVEN TO DATE: NORMAL GY
RAD ONC ARIA REFERENCE POINT DOSAGE GIVEN TO DATE: NORMAL GY
RAD ONC ARIA REFERENCE POINT ID: NORMAL
RAD ONC ARIA REFERENCE POINT ID: NORMAL

## 2024-11-06 PROCEDURE — 77385 HC IMRT TREATMENT DELIVERY, SIMPLE: CPT | Performed by: RADIOLOGY

## 2024-11-06 PROCEDURE — 77014 PR CT GUIDE FOR PLACEMENT RADIATION THERAPY FIELDS: CPT | Mod: 26 | Performed by: RADIOLOGY

## 2024-11-06 PROCEDURE — 77014 HC CT GUIDE FOR PLACEMENT RADIATION THERAPY FIELDS: CPT | Performed by: RADIOLOGY

## 2024-11-07 ENCOUNTER — RESULTS ONLY (OUTPATIENT)
Dept: RADIATION ONCOLOGY | Facility: HOSPITAL | Age: 79
End: 2024-11-07

## 2024-11-07 LAB
RAD ONC ARIA COURSE ID: NORMAL
RAD ONC ARIA COURSE LAST TREATMENT DATE: NORMAL
RAD ONC ARIA COURSE START DATE: NORMAL
RAD ONC ARIA COURSE TREATMENT ELAPSED DAYS: 23
RAD ONC ARIA FIRST TREATMENT DATE: NORMAL
RAD ONC ARIA PLAN FRACTIONS TREATED TO DATE: 18
RAD ONC ARIA PLAN ID: NORMAL
RAD ONC ARIA PLAN PRESCRIBED DOSE PER FRACTION: 2.5 GY
RAD ONC ARIA PLAN TOTAL FRACTIONS PRESCRIBED: 28
RAD ONC ARIA PLAN TOTAL PRESCRIBED DOSE: 7000 CGY
RAD ONC ARIA REFERENCE POINT DOSAGE GIVEN TO DATE: NORMAL GY
RAD ONC ARIA REFERENCE POINT DOSAGE GIVEN TO DATE: NORMAL GY
RAD ONC ARIA REFERENCE POINT ID: NORMAL
RAD ONC ARIA REFERENCE POINT ID: NORMAL

## 2024-11-11 ENCOUNTER — APPOINTMENT (OUTPATIENT)
Dept: RADIATION ONCOLOGY | Facility: HOSPITAL | Age: 79
End: 2024-11-11
Payer: COMMERCIAL

## 2024-11-11 ENCOUNTER — RESULTS ONLY (OUTPATIENT)
Dept: RADIATION ONCOLOGY | Facility: HOSPITAL | Age: 79
End: 2024-11-11

## 2024-11-11 LAB
RAD ONC ARIA COURSE ID: NORMAL
RAD ONC ARIA COURSE LAST TREATMENT DATE: NORMAL
RAD ONC ARIA COURSE START DATE: NORMAL
RAD ONC ARIA COURSE TREATMENT ELAPSED DAYS: 27
RAD ONC ARIA FIRST TREATMENT DATE: NORMAL
RAD ONC ARIA PLAN FRACTIONS TREATED TO DATE: 19
RAD ONC ARIA PLAN ID: NORMAL
RAD ONC ARIA PLAN PRESCRIBED DOSE PER FRACTION: 2.5 GY
RAD ONC ARIA PLAN TOTAL FRACTIONS PRESCRIBED: 28
RAD ONC ARIA PLAN TOTAL PRESCRIBED DOSE: 7000 CGY
RAD ONC ARIA REFERENCE POINT DOSAGE GIVEN TO DATE: NORMAL GY
RAD ONC ARIA REFERENCE POINT DOSAGE GIVEN TO DATE: NORMAL GY
RAD ONC ARIA REFERENCE POINT ID: NORMAL
RAD ONC ARIA REFERENCE POINT ID: NORMAL

## 2024-11-11 PROCEDURE — 77014 HC CT GUIDE FOR PLACEMENT RADIATION THERAPY FIELDS: CPT | Performed by: RADIOLOGY

## 2024-11-11 PROCEDURE — 77385 HC IMRT TREATMENT DELIVERY, SIMPLE: CPT | Performed by: RADIOLOGY

## 2024-11-11 PROCEDURE — 77014 PR CT GUIDE FOR PLACEMENT RADIATION THERAPY FIELDS: CPT | Mod: 26 | Performed by: RADIOLOGY

## 2024-11-11 NOTE — PROGRESS NOTES
Progress Notes  Encounter Date: Nov 12, 2024  RADIATION ONCOLOGY WEEKLY MANAGEMENT PROGRESS NOTE     Patient Care Team         Relationship Specialty Notifications Start End    Harry Olson MD PCP - General Family Practice  8/12/20     Phone: [phone] Fax: [phone]         [address] [place] [address]    Ava Templeton RD Registered Dietitian Diabetes Education  8/14/18     Phone: [phone] Fax: [phone]        Sameer Go MD MD Ophthalmology  8/25/20     Phone: [phone] Fax: [phone] [address] [facility] [address]    Della Pablo LPN LPN   8/4/21     Harry Olson MD Assigned PCP   8/29/21     Phone: [phone] Fax: [phone]         [address] [place] [address]    Jimbo Crawford DO Assigned Heart and Vascular Provider   4/1/23     Phone: [phone] Fax: [phone]         [address] [facility] [address]    Rosa Muhammad, APRN CNP Nurse Practitioner Diabetes Education  7/1/24     Phone: [phone] Fax: [phone]         [address] [place] [address]    Clem Cole MD    7/15/24     Phone: [phone] Fax: [phone]         [facility] [address] [address]    Gilbert Noel MD Assigned Cancer Care Provider   7/23/24     Phone: [phone] Fax: [phone]         [address] [place] [address]             DIAGNOSIS:  Cancer Staging   Prostate cancer (H)  Staging form: Prostate, AJCC 8th Edition  - Clinical stage from 10/4/2024: Stage IIC (cT2b, cN0, cM0, PSA: 9.8, Grade Group: 3) - Signed by Martinez Jimenez MD on 10/4/2024      RADIATION THERAPY:    Rafa Bhatti has received 5000 cGy to date to prostate and seminal vesicles.   Treatment 20/28  Total planned dose: 7000 cGy      SUBJECTIVE:    Currently he continues to have increased urinary frequency and urgency every 15 minutes for about 4 hours following radiation therapy treatment.  Nocturia x  "2-3.  Burning is much improved with the use of Pyridium. Urinary stream continues to be weaker. Continues to experience hot flashes, not currently taking anything to manage hot flashes. Reports he had a hot flash so severe on Sunday that he passed out at a restaurant, he was unconscious for about 10 minutes according to witnesses, states he felt very fatigued afterwards. He notes that after the incident on Sunday he experienced \"very loose stools\". He has not had any bowel movements since the loose stools on Sunday and therefore took Dulcolax but has not had any results yet, prior to incident on Sunday his bowel movements had been softer. He also notes a small amount of blood with wiping after bowel movements.   Fatigue stable.      OBJECTIVE:    WEIGHT: 0 lbs 0 oz. There were no vitals taken for this visit.  He appears pleasant and in no acute distress.  His abdomen is soft and nontender with active bowel sound.      IMPRESSION: His episode of fainting spell is probably multifactorial and is not likely to be related to his radiation treatment for prostate cancer.  To this end, I discussed the above with him and he is going to see his primary care provider.  With respect to symptom of hot flashes, he will discuss that with Dr. Cole, his urologist.  He has underlying history of fistula, status post surgery and hemorrhoids.  Otherwise, he tolerates radiation treatment to the prostate and seminal vesicles well without acute radiation related side effects.      PLAN: He will continue radiation treatment and supportive care as planned.      Martinez Jimenez MD          "

## 2024-11-12 ENCOUNTER — RESULTS ONLY (OUTPATIENT)
Dept: RADIATION ONCOLOGY | Facility: HOSPITAL | Age: 79
End: 2024-11-12

## 2024-11-12 ENCOUNTER — OFFICE VISIT (OUTPATIENT)
Dept: RADIATION ONCOLOGY | Facility: HOSPITAL | Age: 79
End: 2024-11-12
Payer: COMMERCIAL

## 2024-11-12 VITALS
DIASTOLIC BLOOD PRESSURE: 84 MMHG | OXYGEN SATURATION: 97 % | TEMPERATURE: 97.1 F | BODY MASS INDEX: 24.11 KG/M2 | RESPIRATION RATE: 22 BRPM | WEIGHT: 168 LBS | HEART RATE: 70 BPM | SYSTOLIC BLOOD PRESSURE: 138 MMHG

## 2024-11-12 DIAGNOSIS — C61 PROSTATE CANCER (H): Primary | ICD-10-CM

## 2024-11-12 LAB
RAD ONC ARIA COURSE ID: NORMAL
RAD ONC ARIA COURSE LAST TREATMENT DATE: NORMAL
RAD ONC ARIA COURSE START DATE: NORMAL
RAD ONC ARIA COURSE TREATMENT ELAPSED DAYS: 28
RAD ONC ARIA FIRST TREATMENT DATE: NORMAL
RAD ONC ARIA PLAN FRACTIONS TREATED TO DATE: 20
RAD ONC ARIA PLAN ID: NORMAL
RAD ONC ARIA PLAN PRESCRIBED DOSE PER FRACTION: 2.5 GY
RAD ONC ARIA PLAN TOTAL FRACTIONS PRESCRIBED: 28
RAD ONC ARIA PLAN TOTAL PRESCRIBED DOSE: 7000 CGY
RAD ONC ARIA REFERENCE POINT DOSAGE GIVEN TO DATE: NORMAL GY
RAD ONC ARIA REFERENCE POINT DOSAGE GIVEN TO DATE: NORMAL GY
RAD ONC ARIA REFERENCE POINT ID: NORMAL
RAD ONC ARIA REFERENCE POINT ID: NORMAL

## 2024-11-13 ENCOUNTER — RESULTS ONLY (OUTPATIENT)
Dept: RADIATION ONCOLOGY | Facility: HOSPITAL | Age: 79
End: 2024-11-13

## 2024-11-13 ENCOUNTER — APPOINTMENT (OUTPATIENT)
Dept: RADIATION ONCOLOGY | Facility: HOSPITAL | Age: 79
End: 2024-11-13
Payer: COMMERCIAL

## 2024-11-13 LAB
RAD ONC ARIA COURSE ID: NORMAL
RAD ONC ARIA COURSE LAST TREATMENT DATE: NORMAL
RAD ONC ARIA COURSE START DATE: NORMAL
RAD ONC ARIA COURSE TREATMENT ELAPSED DAYS: 29
RAD ONC ARIA FIRST TREATMENT DATE: NORMAL
RAD ONC ARIA PLAN FRACTIONS TREATED TO DATE: 21
RAD ONC ARIA PLAN ID: NORMAL
RAD ONC ARIA PLAN PRESCRIBED DOSE PER FRACTION: 2.5 GY
RAD ONC ARIA PLAN TOTAL FRACTIONS PRESCRIBED: 28
RAD ONC ARIA PLAN TOTAL PRESCRIBED DOSE: 7000 CGY
RAD ONC ARIA REFERENCE POINT DOSAGE GIVEN TO DATE: NORMAL GY
RAD ONC ARIA REFERENCE POINT DOSAGE GIVEN TO DATE: NORMAL GY
RAD ONC ARIA REFERENCE POINT ID: NORMAL
RAD ONC ARIA REFERENCE POINT ID: NORMAL

## 2024-11-13 PROCEDURE — 77014 PR CT GUIDE FOR PLACEMENT RADIATION THERAPY FIELDS: CPT | Mod: 26 | Performed by: RADIOLOGY

## 2024-11-13 PROCEDURE — 77385 HC IMRT TREATMENT DELIVERY, SIMPLE: CPT | Performed by: RADIOLOGY

## 2024-11-13 PROCEDURE — 77014 HC CT GUIDE FOR PLACEMENT RADIATION THERAPY FIELDS: CPT | Performed by: RADIOLOGY

## 2024-11-14 ENCOUNTER — APPOINTMENT (OUTPATIENT)
Dept: RADIATION ONCOLOGY | Facility: HOSPITAL | Age: 79
End: 2024-11-14
Payer: COMMERCIAL

## 2024-11-14 ENCOUNTER — RESULTS ONLY (OUTPATIENT)
Dept: RADIATION ONCOLOGY | Facility: HOSPITAL | Age: 79
End: 2024-11-14

## 2024-11-14 LAB
RAD ONC ARIA COURSE ID: NORMAL
RAD ONC ARIA COURSE LAST TREATMENT DATE: NORMAL
RAD ONC ARIA COURSE START DATE: NORMAL
RAD ONC ARIA COURSE TREATMENT ELAPSED DAYS: 30
RAD ONC ARIA FIRST TREATMENT DATE: NORMAL
RAD ONC ARIA PLAN FRACTIONS TREATED TO DATE: 22
RAD ONC ARIA PLAN ID: NORMAL
RAD ONC ARIA PLAN PRESCRIBED DOSE PER FRACTION: 2.5 GY
RAD ONC ARIA PLAN TOTAL FRACTIONS PRESCRIBED: 28
RAD ONC ARIA PLAN TOTAL PRESCRIBED DOSE: 7000 CGY
RAD ONC ARIA REFERENCE POINT DOSAGE GIVEN TO DATE: NORMAL GY
RAD ONC ARIA REFERENCE POINT DOSAGE GIVEN TO DATE: NORMAL GY
RAD ONC ARIA REFERENCE POINT ID: NORMAL
RAD ONC ARIA REFERENCE POINT ID: NORMAL

## 2024-11-14 PROCEDURE — 77385 HC IMRT TREATMENT DELIVERY, SIMPLE: CPT | Performed by: RADIOLOGY

## 2024-11-14 PROCEDURE — 77014 HC CT GUIDE FOR PLACEMENT RADIATION THERAPY FIELDS: CPT | Performed by: RADIOLOGY

## 2024-11-15 ENCOUNTER — RESULTS ONLY (OUTPATIENT)
Dept: RADIATION ONCOLOGY | Facility: HOSPITAL | Age: 79
End: 2024-11-15

## 2024-11-15 ENCOUNTER — APPOINTMENT (OUTPATIENT)
Dept: RADIATION ONCOLOGY | Facility: HOSPITAL | Age: 79
End: 2024-11-15
Payer: COMMERCIAL

## 2024-11-15 LAB
RAD ONC ARIA COURSE ID: NORMAL
RAD ONC ARIA COURSE LAST TREATMENT DATE: NORMAL
RAD ONC ARIA COURSE START DATE: NORMAL
RAD ONC ARIA COURSE TREATMENT ELAPSED DAYS: 31
RAD ONC ARIA FIRST TREATMENT DATE: NORMAL
RAD ONC ARIA PLAN FRACTIONS TREATED TO DATE: 23
RAD ONC ARIA PLAN ID: NORMAL
RAD ONC ARIA PLAN PRESCRIBED DOSE PER FRACTION: 2.5 GY
RAD ONC ARIA PLAN TOTAL FRACTIONS PRESCRIBED: 28
RAD ONC ARIA PLAN TOTAL PRESCRIBED DOSE: 7000 CGY
RAD ONC ARIA REFERENCE POINT DOSAGE GIVEN TO DATE: NORMAL GY
RAD ONC ARIA REFERENCE POINT DOSAGE GIVEN TO DATE: NORMAL GY
RAD ONC ARIA REFERENCE POINT ID: NORMAL
RAD ONC ARIA REFERENCE POINT ID: NORMAL

## 2024-11-15 PROCEDURE — 77385 HC IMRT TREATMENT DELIVERY, SIMPLE: CPT | Performed by: RADIOLOGY

## 2024-11-15 PROCEDURE — 77014 HC CT GUIDE FOR PLACEMENT RADIATION THERAPY FIELDS: CPT | Performed by: RADIOLOGY

## 2024-11-18 ENCOUNTER — RESULTS ONLY (OUTPATIENT)
Dept: RADIATION ONCOLOGY | Facility: HOSPITAL | Age: 79
End: 2024-11-18

## 2024-11-18 ENCOUNTER — APPOINTMENT (OUTPATIENT)
Dept: RADIATION ONCOLOGY | Facility: HOSPITAL | Age: 79
End: 2024-11-18
Payer: COMMERCIAL

## 2024-11-18 LAB
RAD ONC ARIA COURSE ID: NORMAL
RAD ONC ARIA COURSE LAST TREATMENT DATE: NORMAL
RAD ONC ARIA COURSE START DATE: NORMAL
RAD ONC ARIA COURSE TREATMENT ELAPSED DAYS: 34
RAD ONC ARIA FIRST TREATMENT DATE: NORMAL
RAD ONC ARIA PLAN FRACTIONS TREATED TO DATE: 24
RAD ONC ARIA PLAN ID: NORMAL
RAD ONC ARIA PLAN PRESCRIBED DOSE PER FRACTION: 2.5 GY
RAD ONC ARIA PLAN TOTAL FRACTIONS PRESCRIBED: 28
RAD ONC ARIA PLAN TOTAL PRESCRIBED DOSE: 7000 CGY
RAD ONC ARIA REFERENCE POINT DOSAGE GIVEN TO DATE: NORMAL GY
RAD ONC ARIA REFERENCE POINT DOSAGE GIVEN TO DATE: NORMAL GY
RAD ONC ARIA REFERENCE POINT ID: NORMAL
RAD ONC ARIA REFERENCE POINT ID: NORMAL

## 2024-11-18 PROCEDURE — 77014 HC CT GUIDE FOR PLACEMENT RADIATION THERAPY FIELDS: CPT | Performed by: RADIOLOGY

## 2024-11-18 PROCEDURE — 77385 HC IMRT TREATMENT DELIVERY, SIMPLE: CPT | Performed by: RADIOLOGY

## 2024-11-18 NOTE — PROGRESS NOTES
Progress Notes  Encounter Date: Nov 19, 2024  RADIATION ONCOLOGY WEEKLY MANAGEMENT PROGRESS NOTE     Patient Care Team         Relationship Specialty Notifications Start End    Harry Olson MD PCP - General Family Practice  8/12/20     Phone: [phone] Fax: [phone]         [address] [place] [address]    Ava Templeton RD Registered Dietitian Diabetes Education  8/14/18     Phone: [phone] Fax: [phone]        Sameer oG MD MD Ophthalmology  8/25/20     Phone: [phone] Fax: [phone] [address] [facility] [address]    Della Pablo LPN LPN   8/4/21     Harry Olson MD Assigned PCP   8/29/21     Phone: [phone] Fax: [phone]         [address] [place] [address]    Jimbo Crawford DO Assigned Heart and Vascular Provider   4/1/23     Phone: [phone] Fax: [phone]         [address] [facility] [address]    Rosa Muhammad, APRN CNP Nurse Practitioner Diabetes Education  7/1/24     Phone: [phone] Fax: [phone]         [address] [place] [address]    Clem Cole MD    7/15/24     Phone: [phone] Fax: [phone]         [facility] [address] [address]    Gilbert Noel MD Assigned Cancer Care Provider   7/23/24     Phone: [phone] Fax: [phone]         [address] [place] [address]             DIAGNOSIS:  Cancer Staging   Prostate cancer (H)  Staging form: Prostate, AJCC 8th Edition  - Clinical stage from 10/4/2024: Stage IIC (cT2b, cN0, cM0, PSA: 9.8, Grade Group: 3) - Signed by Martinez Jimenez MD on 10/4/2024      RADIATION THERAPY:    Rafa Bhatti has received 6250 cGy to date to prostate and seminal vesicles.   Treatment 25/28  Total planned dose: 7000 cGy      SUBJECTIVE:    Currently he continues to have increased urinary frequency and urgency every 15 minutes for about 4 hours following radiation therapy treatment.  Nocturia x  2-3.  Burning is much improved with the use of Pyridium, will run out of this medication tomorrow morning. Urinary stream continues to be weaker. Continues to experience hot flashes, not currently taking anything to manage hot flashes. Stools have been looser. He also notes a small amount of blood with wiping after bowel movements.   Fatigue stable.      OBJECTIVE:    9:45 AM ON TREATMENT VISIT  for Oncology Clinic Visit  Weight: 75.7 kg (166 lb 12.8 oz)  BP: 132/74  He appears pleasant and in no acute distress.  His abdomen is soft and nontender with active bowel sound.      IMPRESSION: He tolerates the course of radiation treatment well with acute grade 2 radiation related urinary frequency and mild dysuria, alleviated by Pyridium. He has underlying history of fistula, status post surgery and hemorrhoids.        PLAN: He will continue radiation treatment and supportive care as planned.      Martinez Jimenez MD

## 2024-11-19 ENCOUNTER — RESULTS ONLY (OUTPATIENT)
Dept: RADIATION ONCOLOGY | Facility: HOSPITAL | Age: 79
End: 2024-11-19

## 2024-11-19 ENCOUNTER — OFFICE VISIT (OUTPATIENT)
Dept: RADIATION ONCOLOGY | Facility: HOSPITAL | Age: 79
End: 2024-11-19
Payer: COMMERCIAL

## 2024-11-19 VITALS
OXYGEN SATURATION: 96 % | SYSTOLIC BLOOD PRESSURE: 132 MMHG | HEART RATE: 76 BPM | RESPIRATION RATE: 18 BRPM | BODY MASS INDEX: 23.93 KG/M2 | TEMPERATURE: 97.9 F | WEIGHT: 166.8 LBS | DIASTOLIC BLOOD PRESSURE: 74 MMHG

## 2024-11-19 DIAGNOSIS — C61 PROSTATE CANCER (H): Primary | ICD-10-CM

## 2024-11-19 LAB
RAD ONC ARIA COURSE ID: NORMAL
RAD ONC ARIA COURSE LAST TREATMENT DATE: NORMAL
RAD ONC ARIA COURSE START DATE: NORMAL
RAD ONC ARIA COURSE TREATMENT ELAPSED DAYS: 35
RAD ONC ARIA FIRST TREATMENT DATE: NORMAL
RAD ONC ARIA PLAN FRACTIONS TREATED TO DATE: 25
RAD ONC ARIA PLAN ID: NORMAL
RAD ONC ARIA PLAN PRESCRIBED DOSE PER FRACTION: 2.5 GY
RAD ONC ARIA PLAN TOTAL FRACTIONS PRESCRIBED: 28
RAD ONC ARIA PLAN TOTAL PRESCRIBED DOSE: 7000 CGY
RAD ONC ARIA REFERENCE POINT DOSAGE GIVEN TO DATE: NORMAL GY
RAD ONC ARIA REFERENCE POINT DOSAGE GIVEN TO DATE: NORMAL GY
RAD ONC ARIA REFERENCE POINT ID: NORMAL
RAD ONC ARIA REFERENCE POINT ID: NORMAL

## 2024-11-19 RX ORDER — PHENAZOPYRIDINE HYDROCHLORIDE 200 MG/1
200 TABLET, FILM COATED ORAL 3 TIMES DAILY PRN
Qty: 30 TABLET | Refills: 2 | Status: SHIPPED | OUTPATIENT
Start: 2024-11-19

## 2024-11-19 ASSESSMENT — PAIN SCALES - GENERAL: PAINLEVEL_OUTOF10: NO PAIN (1)

## 2024-11-20 ENCOUNTER — APPOINTMENT (OUTPATIENT)
Dept: RADIATION ONCOLOGY | Facility: HOSPITAL | Age: 79
End: 2024-11-20
Payer: COMMERCIAL

## 2024-11-20 ENCOUNTER — RESULTS ONLY (OUTPATIENT)
Dept: RADIATION ONCOLOGY | Facility: HOSPITAL | Age: 79
End: 2024-11-20

## 2024-11-20 LAB
RAD ONC ARIA COURSE ID: NORMAL
RAD ONC ARIA COURSE LAST TREATMENT DATE: NORMAL
RAD ONC ARIA COURSE START DATE: NORMAL
RAD ONC ARIA COURSE TREATMENT ELAPSED DAYS: 36
RAD ONC ARIA FIRST TREATMENT DATE: NORMAL
RAD ONC ARIA PLAN FRACTIONS TREATED TO DATE: 26
RAD ONC ARIA PLAN ID: NORMAL
RAD ONC ARIA PLAN PRESCRIBED DOSE PER FRACTION: 2.5 GY
RAD ONC ARIA PLAN TOTAL FRACTIONS PRESCRIBED: 28
RAD ONC ARIA PLAN TOTAL PRESCRIBED DOSE: 7000 CGY
RAD ONC ARIA REFERENCE POINT DOSAGE GIVEN TO DATE: NORMAL GY
RAD ONC ARIA REFERENCE POINT DOSAGE GIVEN TO DATE: NORMAL GY
RAD ONC ARIA REFERENCE POINT ID: NORMAL
RAD ONC ARIA REFERENCE POINT ID: NORMAL

## 2024-11-20 PROCEDURE — 77014 HC CT GUIDE FOR PLACEMENT RADIATION THERAPY FIELDS: CPT | Performed by: RADIOLOGY

## 2024-11-20 PROCEDURE — 77385 HC IMRT TREATMENT DELIVERY, SIMPLE: CPT | Performed by: RADIOLOGY

## 2024-11-21 ENCOUNTER — RESULTS ONLY (OUTPATIENT)
Dept: RADIATION ONCOLOGY | Facility: HOSPITAL | Age: 79
End: 2024-11-21

## 2024-11-21 ENCOUNTER — APPOINTMENT (OUTPATIENT)
Dept: RADIATION ONCOLOGY | Facility: HOSPITAL | Age: 79
End: 2024-11-21
Payer: COMMERCIAL

## 2024-11-21 LAB
RAD ONC ARIA COURSE ID: NORMAL
RAD ONC ARIA COURSE LAST TREATMENT DATE: NORMAL
RAD ONC ARIA COURSE START DATE: NORMAL
RAD ONC ARIA COURSE TREATMENT ELAPSED DAYS: 37
RAD ONC ARIA FIRST TREATMENT DATE: NORMAL
RAD ONC ARIA PLAN FRACTIONS TREATED TO DATE: 27
RAD ONC ARIA PLAN ID: NORMAL
RAD ONC ARIA PLAN PRESCRIBED DOSE PER FRACTION: 2.5 GY
RAD ONC ARIA PLAN TOTAL FRACTIONS PRESCRIBED: 28
RAD ONC ARIA PLAN TOTAL PRESCRIBED DOSE: 7000 CGY
RAD ONC ARIA REFERENCE POINT DOSAGE GIVEN TO DATE: NORMAL GY
RAD ONC ARIA REFERENCE POINT DOSAGE GIVEN TO DATE: NORMAL GY
RAD ONC ARIA REFERENCE POINT ID: NORMAL
RAD ONC ARIA REFERENCE POINT ID: NORMAL

## 2024-11-21 PROCEDURE — 77385 HC IMRT TREATMENT DELIVERY, SIMPLE: CPT | Performed by: RADIOLOGY

## 2024-11-21 PROCEDURE — 77014 HC CT GUIDE FOR PLACEMENT RADIATION THERAPY FIELDS: CPT | Performed by: RADIOLOGY

## 2024-11-22 ENCOUNTER — APPOINTMENT (OUTPATIENT)
Dept: RADIATION ONCOLOGY | Facility: HOSPITAL | Age: 79
End: 2024-11-22
Payer: COMMERCIAL

## 2024-11-22 PROCEDURE — 77427 RADIATION TX MANAGEMENT X5: CPT | Performed by: RADIOLOGY

## 2024-11-22 PROCEDURE — 77014 HC CT GUIDE FOR PLACEMENT RADIATION THERAPY FIELDS: CPT | Performed by: RADIOLOGY

## 2024-11-22 PROCEDURE — 77336 RADIATION PHYSICS CONSULT: CPT | Performed by: RADIOLOGY

## 2024-11-22 PROCEDURE — 77014 PR CT GUIDE FOR PLACEMENT RADIATION THERAPY FIELDS: CPT | Mod: 26 | Performed by: RADIOLOGY

## 2024-11-22 PROCEDURE — 77385 HC IMRT TREATMENT DELIVERY, SIMPLE: CPT | Performed by: RADIOLOGY

## 2024-12-05 NOTE — PROGRESS NOTES
Assessment & Plan     Type 2 diabetes mellitus without complication, without long-term current use of insulin (H)  Stable.  Having radiation for prostate cancer and trouble with bowel and bladder is biggest issues overall.  Update dm labs and follow.    - Albumin Random Urine Quantitative with Creat Ratio; Future  - Basic metabolic panel; Future  - Hemoglobin A1c; Future  - NC FOOT EXAM NO CHARGE    Benign essential hypertension  Stable.     Prostate cancer (H)  As above.  Getting radiation.   Very pleased with our team here as well which is great.                  No follow-ups on file.    Terrance Craig is a 79 year old, presenting for the following health issues:  Diabetes        12/12/2024    10:21 AM   Additional Questions   Roomed by Maliha PEARSON     Diabetes Follow-up    How often are you checking your blood sugar? Not at all  What concerns do you have today about your diabetes? None   Do you have any of these symptoms? (Select all that apply)  Numbness in feet  Have you had a diabetic eye exam in the last 12 months? Yes- Date of last eye exam: unknown,  Location: [place]        BP Readings from Last 2 Encounters:   12/12/24 124/72   11/19/24 132/74     Hemoglobin A1C (%)   Date Value   09/09/2024 9.2 (H)   03/27/2024 11.5 (H)   06/16/2021 10.9 (H)   08/12/2020 6.4 (H)     LDL Cholesterol Calculated (mg/dL)   Date Value   03/27/2024 45   09/21/2023 46   06/16/2021 32   02/21/2020 38             Hypertension Follow-up    Do you check your blood pressure regularly outside of the clinic? Yes   Are you following a low salt diet? Yes  Are your blood pressures ever more than 140 on the top number (systolic) OR more   than 90 on the bottom number (diastolic), for example 140/90? No      Diabetic foot exam   1. foot deformity     2. Current or previous foot ulceration       3. Current or previous pre-ulcerative calluses       4. 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 poor circulation       Approval given for 3 pairs of diabetic shoes and inserts if patient desires.          Review of Systems  Constitutional, HEENT, cardiovascular, pulmonary, gi and gu systems are negative, except as otherwise noted.      Objective    /72   Pulse 79   Temp 97.2  F (36.2  C) (Tympanic)   Wt 74.8 kg (165 lb)   SpO2 96%   BMI 23.68 kg/m    Body mass index is 23.68 kg/m .  Physical Exam   GENERAL: alert and no distress  NECK: no adenopathy, no asymmetry, masses, or scars  RESP: lungs clear to auscultation - no rales, rhonchi or wheezes  CV: regular rate and rhythm, normal S1 S2, no S3 or S4, no murmur, click or rub, no peripheral edema  ABDOMEN: soft, nontender, no hepatosplenomegaly, no masses and bowel sounds normal  MS: no gross musculoskeletal defects noted, no edema    Labs pending.      The longitudinal plan of care for the diagnosis(es)/condition(s) as documented were addressed during this visit. Due to the added complexity in care, I will continue to support Rafa in the subsequent management and with ongoing continuity of care.        Signed Electronically by: Harry Olson MD

## 2024-12-12 ENCOUNTER — TELEPHONE (OUTPATIENT)
Dept: FAMILY MEDICINE | Facility: OTHER | Age: 79
End: 2024-12-12

## 2024-12-12 ENCOUNTER — OFFICE VISIT (OUTPATIENT)
Dept: FAMILY MEDICINE | Facility: OTHER | Age: 79
End: 2024-12-12
Attending: FAMILY MEDICINE
Payer: COMMERCIAL

## 2024-12-12 VITALS
SYSTOLIC BLOOD PRESSURE: 124 MMHG | HEART RATE: 79 BPM | WEIGHT: 165 LBS | BODY MASS INDEX: 23.68 KG/M2 | TEMPERATURE: 97.2 F | OXYGEN SATURATION: 96 % | DIASTOLIC BLOOD PRESSURE: 72 MMHG

## 2024-12-12 DIAGNOSIS — I10 BENIGN ESSENTIAL HYPERTENSION: ICD-10-CM

## 2024-12-12 DIAGNOSIS — C61 PROSTATE CANCER (H): ICD-10-CM

## 2024-12-12 DIAGNOSIS — E11.9 TYPE 2 DIABETES MELLITUS WITHOUT COMPLICATION, WITHOUT LONG-TERM CURRENT USE OF INSULIN (H): ICD-10-CM

## 2024-12-12 DIAGNOSIS — E11.9 TYPE 2 DIABETES MELLITUS WITHOUT COMPLICATION, WITHOUT LONG-TERM CURRENT USE OF INSULIN (H): Primary | ICD-10-CM

## 2024-12-12 LAB
ANION GAP SERPL CALCULATED.3IONS-SCNC: 13 MMOL/L (ref 7–15)
BUN SERPL-MCNC: 18.4 MG/DL (ref 8–23)
CALCIUM SERPL-MCNC: 9.9 MG/DL (ref 8.8–10.4)
CHLORIDE SERPL-SCNC: 95 MMOL/L (ref 98–107)
CREAT SERPL-MCNC: 0.93 MG/DL (ref 0.67–1.17)
CREAT UR-MCNC: 60.2 MG/DL
EGFRCR SERPLBLD CKD-EPI 2021: 85 ML/MIN/1.73M2
EST. AVERAGE GLUCOSE BLD GHB EST-MCNC: 232 MG/DL
GLUCOSE SERPL-MCNC: 512 MG/DL (ref 70–99)
HBA1C MFR BLD: 9.7 %
HCO3 SERPL-SCNC: 25 MMOL/L (ref 22–29)
MICROALBUMIN UR-MCNC: <12 MG/L
MICROALBUMIN/CREAT UR: NORMAL MG/G{CREAT}
POTASSIUM SERPL-SCNC: 4.5 MMOL/L (ref 3.4–5.3)
SODIUM SERPL-SCNC: 133 MMOL/L (ref 135–145)

## 2024-12-12 PROCEDURE — 82570 ASSAY OF URINE CREATININE: CPT | Mod: ZL | Performed by: FAMILY MEDICINE

## 2024-12-12 PROCEDURE — 36415 COLL VENOUS BLD VENIPUNCTURE: CPT | Mod: ZL | Performed by: FAMILY MEDICINE

## 2024-12-12 PROCEDURE — G0463 HOSPITAL OUTPT CLINIC VISIT: HCPCS

## 2024-12-12 PROCEDURE — 83036 HEMOGLOBIN GLYCOSYLATED A1C: CPT | Mod: ZL | Performed by: FAMILY MEDICINE

## 2024-12-12 PROCEDURE — 82043 UR ALBUMIN QUANTITATIVE: CPT | Mod: ZL | Performed by: FAMILY MEDICINE

## 2024-12-12 PROCEDURE — 80048 BASIC METABOLIC PNL TOTAL CA: CPT | Mod: ZL | Performed by: FAMILY MEDICINE

## 2024-12-12 RX ORDER — TAMSULOSIN HYDROCHLORIDE 0.4 MG/1
0.4 CAPSULE ORAL DAILY
Qty: 90 CAPSULE | Refills: 3 | Status: CANCELLED | OUTPATIENT
Start: 2024-12-12

## 2024-12-12 ASSESSMENT — PAIN SCALES - GENERAL: PAINLEVEL_OUTOF10: MILD PAIN (3)

## 2024-12-12 NOTE — TELEPHONE ENCOUNTER
DATE/TIME OF CALL RECEIVED FROM LAB:  12/12/24 at 3:57 PM   LAB TEST:  glucose   LAB VALUE:  512  PROVIDER NOTIFIED?: Yes  PROVIDER NAME: Dr Harry Olson  DATE/TIME LAB VALUE REPORTED TO PROVIDER: 12/12/24 1462  MECHANISM OF PROVIDER NOTIFICATION: Face-To-Face  Maliha Paz LPN

## 2025-02-25 DIAGNOSIS — R33.9 URINARY RETENTION: ICD-10-CM

## 2025-02-25 DIAGNOSIS — E78.2 MIXED HYPERLIPIDEMIA: ICD-10-CM

## 2025-02-25 PROBLEM — Z08 ENCOUNTER FOR FOLLOW-UP SURVEILLANCE OF PROSTATE CANCER: Status: ACTIVE | Noted: 2025-02-25

## 2025-02-25 PROBLEM — Z85.46 ENCOUNTER FOR FOLLOW-UP SURVEILLANCE OF PROSTATE CANCER: Status: ACTIVE | Noted: 2025-02-25

## 2025-02-25 RX ORDER — TAMSULOSIN HYDROCHLORIDE 0.4 MG/1
0.4 CAPSULE ORAL DAILY
Qty: 90 CAPSULE | Refills: 3 | Status: SHIPPED | OUTPATIENT
Start: 2025-02-25 | End: 2025-02-26

## 2025-02-25 RX ORDER — ROSUVASTATIN CALCIUM 20 MG/1
20 TABLET, COATED ORAL DAILY
Qty: 90 TABLET | Refills: 3 | Status: SHIPPED | OUTPATIENT
Start: 2025-02-25

## 2025-02-25 NOTE — PROGRESS NOTES
[facility] Follow-up Visit:  February 25, 2025    Diagnosis:   Encounter for follow-up surveillance of prostate cancer    History Of Present Illness:  Mr. Bhatti is a 79 year old male is here for follow-up post radiation therapy to the pelvis for Gleasons 4+3 (7) prostatic adenocarcinoma. He received short term ADT with Lupron 45 mg on 08/06/2024.    5/3/2024 MR prostate with and without contrast: Prostate volume 29 cc.  There is a 1.1 x 1.0 x 0.9 cm lesion in the right peripheral zone at the level of the apex at the 8 o'clock position.  No extracapsular extension or seminal vesicle invasion.  No ROSEMARIE prostatic or pelvic sidewall lymph nodes.  No other bony or soft tissue abnormalities identified.     5/21/2024 prostate biopsy: Prostatic adenocarcinoma highest Shelia's grade 4+3 (7), involving 5 of 15 cores      6/21/2024 PET PSMA: 14 mm low-density lesion within the pancreatic head not adequately characterized by the study with the differential diagnosis including malignancy.  Recommend pancreatic MR.  Significant focal uptake with associated gastric wall thickening within the posterior gastric fundus, focal inflammation/ulcer versus malignancy.  Increased avidity about postsurgical changes adjacent to gastric pylorus, malignancy versus inflammation.  Malignancy within the prostate.  Low-grade activity within the superior endplate of S1 is technically equivocal for metastatic disease with differential diagnosis also to include degenerative changes or endplate compression fracture.     7/5/24 MR abd: Motion artifact limits assessment of the pancreas.  Dilation of the pancreatic duct smoothly tapering distally.  No evidence of gross pancreatic head or ampullary mass.  Consider ERCP with EUS     PSA history           Depression Screening Follow Up    Follow Up Actions Taken  Patient counseled, no additional follow up at this time.       Review Of Systems:  /72 (BP Location: Right arm, Patient Position: Chair,  Cuff Size: Adult Regular)   Pulse 88   Temp 97.1  F (36.2  C) (Tympanic)   Resp 24   Wt 72.2 kg (159 lb 1.6 oz)   SpO2 95%   BMI 22.83 kg/m      Past medical, social, surgical, and family histories reviewed.    Allergies:  Allergies as of 02/26/2025 - Reviewed 02/26/2025   Allergen Reaction Noted    Influenza virus vaccine Diarrhea 10/03/2023    Lactose Unknown 05/26/2020       Current Medications:  Current Outpatient Medications   Medication Sig Dispense Refill    allopurinol (ZYLOPRIM) 100 MG tablet TAKE 1 TABLET BY MOUTH TWICE A  tablet 3    aspirin (ASA) 81 MG chewable tablet Take 81 mg by mouth daily      blood glucose monitoring (FREESTYLE) lancets Use to test blood sugar 1 times daily. 100 each 3    calcium carbonate (TUMS) 500 MG chewable tablet Take 1 tablet by mouth daily as needed.      colchicine (COLCYRS) 0.6 MG tablet TAKE 2 TABLETS AT THE ONSET OF GOUT PAIN. MAY TAKE ONE TABLET AGAIN IN ONE HOUR. MAX 4 TABS IN 24 HRS. 20 tablet 1    FREESTYLE LITE test strip USE TO TEST BLOOD SUGARS ONCE A DAY. 100 strip 0    losartan (COZAAR) 25 MG tablet TAKE 1 TABLET (25 MG) BY MOUTH DAILY 90 tablet 3    Magnesium Hydroxide (DULCOLAX PO) Take 1 tablet by mouth as needed (constipation).      pantoprazole (PROTONIX) 40 MG EC tablet Take 40 mg by mouth daily      rosuvastatin (CRESTOR) 20 MG tablet TAKE 1 TABLET (20 MG) BY MOUTH DAILY 90 tablet 3    sitagliptin (JANUVIA) 100 MG tablet Take 1 tablet (100 mg) by mouth daily. 90 tablet 3    tamsulosin (FLOMAX) 0.4 MG capsule Take 2 capsules (0.8 mg) by mouth daily. 90 capsule 3    VITAMIN D PO Take 4,000 Units by mouth daily      phenazopyridine (PYRIDIUM) 200 MG tablet Take 1 tablet (200 mg) by mouth 3 times daily as needed for irritation. (Patient not taking: Reported on 2/26/2025) 30 tablet 2    sildenafil (REVATIO) 20 MG tablet Take 1-2 tablets (20-40 mg) by mouth daily as needed (erectile dysfunction) (Patient not taking: Reported on 2/26/2025) 60  tablet 3        Physical Exam:  /72 (BP Location: Right arm, Patient Position: Chair, Cuff Size: Adult Regular)   Pulse 88   Temp 97.1  F (36.2  C) (Tympanic)   Resp 24   Wt 72.2 kg (159 lb 1.6 oz)   SpO2 95%   BMI 22.83 kg/m      GENERAL APPEARANCE: healthy, alert and no distress     HENT: Mouth without ulcers or lesions     RESP: lungs clear to auscultation - no rales, rhonchi or wheezes     CARDIOVASCULAR: regular rates and rhythm, normal S1 S2, no S3 or S4 and no murmur.     ABDOMEN:  soft, nontender, and bowel sounds normal     MUSCULOSKELETAL: extremities normal- no gross deformities noted, no evidence of inflammation in joints, FROM in all extremities.      SKIN: no suspicious lesions or rashes     PSYCHIATRIC: mentation appears normal and affect normal    Laboratory/Imaging Studies  No visits with results within 2 Week(s) from this visit.   Latest known visit with results is:   Office Visit on 12/12/2024   Component Date Value Ref Range Status    Creatinine Urine mg/dL 12/12/2024 60.2  mg/dL Final    The reference ranges have not been established in urine creatinine. The results should be integrated into the clinical context for interpretation.    Albumin Urine mg/L 12/12/2024 <12.0  mg/L Final    The reference ranges have not been established in urine albumin. The results should be integrated into the clinical context for interpretation.    Albumin Urine mg/g Cr 12/12/2024    Final    Unable to calculate, urine albumin and/or urine creatinine is outside detectable limits.  Microalbuminuria is defined as an albumin:creatinine ratio of 17 to 299 for males and 25 to 299 for females. A ratio of albumin:creatinine of 300 or higher is indicative of overt proteinuria.  Due to biologic variability, positive results should be confirmed by a second, first-morning random or 24-hour timed urine specimen. If there is discrepancy, a third specimen is recommended. When 2 out of 3 results are in the  microalbuminuria range, this is evidence for incipient nephropathy and warrants increased efforts at glucose control, blood pressure control, and institution of therapy with an angiotensin-converting-enzyme (ACE) inhibitor (if the patient can tolerate it).      Sodium 12/12/2024 133 (L)  135 - 145 mmol/L Final    Potassium 12/12/2024 4.5  3.4 - 5.3 mmol/L Final    Chloride 12/12/2024 95 (L)  98 - 107 mmol/L Final    Carbon Dioxide (CO2) 12/12/2024 25  22 - 29 mmol/L Final    Anion Gap 12/12/2024 13  7 - 15 mmol/L Final    Urea Nitrogen 12/12/2024 18.4  8.0 - 23.0 mg/dL Final    Creatinine 12/12/2024 0.93  0.67 - 1.17 mg/dL Final    GFR Estimate 12/12/2024 85  >60 mL/min/1.73m2 Final    eGFR calculated using 2021 CKD-EPI equation.    Calcium 12/12/2024 9.9  8.8 - 10.4 mg/dL Final    Reference intervals for this test were updated on 7/16/2024 to reflect our healthy population more accurately. There may be differences in the flagging of prior results with similar values performed with this method. Those prior results can be interpreted in the context of the updated reference intervals.    Glucose 12/12/2024 512 (HH)  70 - 99 mg/dL Final    Estimated Average Glucose 12/12/2024 232 (H)  <117 mg/dL Final    Hemoglobin A1C 12/12/2024 9.7 (H)  <5.7 % Final    Normal <5.7%   Prediabetes 5.7-6.4%    Diabetes 6.5% or higher     Note: Adopted from ADA consensus guidelines.        ASSESSMENT/PLAN:    Prostate cancer (H)  Encounter for follow-up surveillance of prostate cancer    Mr. Bhatti is a 79 year old male post radiation therapy to the pelvis for Gleasons 4+3 (7) prostatic adenocarcinoma, treated with short term ADT, and pelvic irradiation therapy. He presents today for prostate cancer surveillance, PSA is 0.01. Indicating a good response to treatment. He is struggling with fatigue, hot flashes, and what he feels is muscle wasting. This is likely due to low testosterone from ADT and should begin to resolve. Taking Vit E for  hot flashes, discussed additional options for hot flashes. Given the timing of ADT he is willing to see if the side effects begin to resolve. Discussed urinary frequency, currently taking 0.4 mg Flomax at HS, plan to increase to 0.8 mg. Plan to return to clinic in 3 months for PSA and testosterone check.       EMI Moore CNP    15 minutes spent face to face with the patient obtaining a health history, discussion of treatment options, counseling, and 10 minutes on medical record review and documentation for a total of 25 on this date of service.     This encounter was dictated with the use of voice recognition software, any errors are unintentional.             Answers submitted by the patient for this visit:  Patient Health Questionnaire (Submitted on 2/26/2025)  If you checked off any problems, how difficult have these problems made it for you to do your work, take care of things at home, or get along with other people?: Not difficult at all  PHQ9 TOTAL SCORE: 4

## 2025-02-25 NOTE — TELEPHONE ENCOUNTER
rosuvastatin (CRESTOR) 20 MG tablet 90 tablet 3 3/4/2024       Last Office Visit: 3/27/24  Future Office visit:    Next 5 appointments (look out 90 days)      Mar 13, 2025 9:45 AM  (Arrive by 9:30 AM)  Provider Visit with Harry Olson MD  [facility] [address] [facility] [address]  [phone]             Routing refill request to provider for review/approval because:

## 2025-02-25 NOTE — TELEPHONE ENCOUNTER
Flomax       Last Written Prescription Date:  4/15/2024  Last Fill Quantity: 90,   # refills: 3  Last Office Visit: 12/12/2024  Future Office visit:    Next 5 appointments (look out 90 days)      Mar 13, 2025 9:45 AM  (Arrive by 9:30 AM)  Provider Visit with Harry Olson MD  [facility] ) [address] [facility] [address]  [phone]

## 2025-02-25 NOTE — PROGRESS NOTES
Assessment & Plan     Type 2 diabetes mellitus without complication, without long-term current use of insulin (H)  Has worsened sugars overall.  The prostate ca tx seems to contribute.  Overall stable feeling with fatigue, etc due to this.  Getting more numb left leg.  Overall stable.  Update full labs.    - Comprehensive metabolic panel; Future  - Lipid Profile; Future  - Hemoglobin A1c; Future  - TSH with free T4 reflex; Future    Chronic idiopathic gout involving toe of left foot without tophus  Update uric acid.  Monitor.  No new concerns.    - Uric acid; Future    Benign essential hypertension  Stable.      Prostate cancer (H)  With treatment.  Reviewed.  It seems to be a factor in his dm cares with the fatigue, etc.                  No follow-ups on file.    Terrance Craig is a 79 year old, presenting for the following health issues:  Diabetes        3/13/2025     9:19 AM   Additional Questions   Roomed by Maliha PEARSON     Diabetes Follow-up    How often are you checking your blood sugar? One time daily  What time of day are you checking your blood sugars (select all that apply)?  Before meals  Have you had any blood sugars above 200?  Yes   Have you had any blood sugars below 70?  No  What symptoms do you notice when your blood sugar is low?  Shaky and Weak  What concerns do you have today about your diabetes? Blood sugar is often over 200   Do you have any of these symptoms? (Select all that apply)  Numbness in feet  Have you had a diabetic eye exam in the last 12 months? Yes- Date of last eye exam: Summer,  Location: [place]             Hyperlipidemia Follow-Up    Are you regularly taking any medication or supplement to lower your cholesterol?   Yes- crestor   Are you having muscle aches or other side effects that you think could be caused by your cholesterol lowering medication?  No    Hypertension Follow-up    Do you check your blood pressure regularly outside of the clinic? Yes   Are you following a  low salt diet? Yes  Are your blood pressures ever more than 140 on the top number (systolic) OR more   than 90 on the bottom number (diastolic), for example 140/90? No    BP Readings from Last 2 Encounters:   03/13/25 116/62   02/26/25 122/72     Hemoglobin A1C (%)   Date Value   12/12/2024 9.7 (H)   09/09/2024 9.2 (H)   06/16/2021 10.9 (H)   08/12/2020 6.4 (H)     LDL Cholesterol Calculated (mg/dL)   Date Value   03/27/2024 45   09/21/2023 46   06/16/2021 32   02/21/2020 38           Review of Systems  Constitutional, HEENT, cardiovascular, pulmonary, gi and gu systems are negative, except as otherwise noted.      Objective    /62   Pulse 81   Temp 97.1  F (36.2  C) (Tympanic)   Wt 72.6 kg (160 lb)   SpO2 98%   BMI 22.96 kg/m    Body mass index is 22.96 kg/m .  Physical Exam   GENERAL: alert and no distress  NECK: no adenopathy, no asymmetry, masses, or scars  RESP: lungs clear to auscultation - no rales, rhonchi or wheezes  CV: regular rate and rhythm, normal S1 S2, no S3 or S4, no murmur, click or rub, no peripheral edema  ABDOMEN: soft, nontender, no hepatosplenomegaly, no masses and bowel sounds normal  MS: no gross musculoskeletal defects noted, no edema    Labs pending, full.      The longitudinal plan of care for the diagnosis(es)/condition(s) as documented were addressed during this visit. Due to the added complexity in care, I will continue to support Rafa in the subsequent management and with ongoing continuity of care.        Signed Electronically by: Harry Olson MD

## 2025-02-26 ENCOUNTER — ONCOLOGY VISIT (OUTPATIENT)
Dept: RADIATION ONCOLOGY | Facility: HOSPITAL | Age: 80
End: 2025-02-26
Payer: COMMERCIAL

## 2025-02-26 ENCOUNTER — TELEPHONE (OUTPATIENT)
Dept: RADIATION ONCOLOGY | Facility: HOSPITAL | Age: 80
End: 2025-02-26

## 2025-02-26 VITALS
SYSTOLIC BLOOD PRESSURE: 122 MMHG | RESPIRATION RATE: 24 BRPM | OXYGEN SATURATION: 95 % | BODY MASS INDEX: 22.83 KG/M2 | TEMPERATURE: 97.1 F | WEIGHT: 159.1 LBS | HEART RATE: 88 BPM | DIASTOLIC BLOOD PRESSURE: 72 MMHG

## 2025-02-26 DIAGNOSIS — Z08 ENCOUNTER FOR FOLLOW-UP SURVEILLANCE OF PROSTATE CANCER: ICD-10-CM

## 2025-02-26 DIAGNOSIS — Z85.46 ENCOUNTER FOR FOLLOW-UP SURVEILLANCE OF PROSTATE CANCER: ICD-10-CM

## 2025-02-26 DIAGNOSIS — C61 PROSTATE CANCER (H): Primary | ICD-10-CM

## 2025-02-26 DIAGNOSIS — R33.9 URINARY RETENTION: ICD-10-CM

## 2025-02-26 DIAGNOSIS — R35.0 URINARY FREQUENCY: ICD-10-CM

## 2025-02-26 LAB
ALBUMIN UR-MCNC: NEGATIVE MG/DL
APPEARANCE UR: CLEAR
BILIRUB UR QL STRIP: NEGATIVE
COLOR UR AUTO: ABNORMAL
GLUCOSE UR STRIP-MCNC: >1000 MG/DL
HGB UR QL STRIP: NEGATIVE
KETONES UR STRIP-MCNC: NEGATIVE MG/DL
LEUKOCYTE ESTERASE UR QL STRIP: NEGATIVE
NITRATE UR QL: NEGATIVE
PH UR STRIP: 5.5 [PH] (ref 4.7–8)
PSA SERPL DL<=0.01 NG/ML-MCNC: <0.01 NG/ML (ref 0–6.5)
RBC URINE: <1 /HPF
SP GR UR STRIP: 1.03 (ref 1–1.03)
SQUAMOUS EPITHELIAL: 0 /HPF
UROBILINOGEN UR STRIP-MCNC: NORMAL MG/DL
WBC URINE: <1 /HPF

## 2025-02-26 PROCEDURE — 81003 URINALYSIS AUTO W/O SCOPE: CPT

## 2025-02-26 PROCEDURE — 36415 COLL VENOUS BLD VENIPUNCTURE: CPT

## 2025-02-26 PROCEDURE — 84153 ASSAY OF PSA TOTAL: CPT

## 2025-02-26 RX ORDER — TAMSULOSIN HYDROCHLORIDE 0.4 MG/1
0.8 CAPSULE ORAL DAILY
Qty: 90 CAPSULE | Refills: 3 | Status: SHIPPED | OUTPATIENT
Start: 2025-02-26

## 2025-02-26 ASSESSMENT — PATIENT HEALTH QUESTIONNAIRE - PHQ9
SUM OF ALL RESPONSES TO PHQ QUESTIONS 1-9: 4
SUM OF ALL RESPONSES TO PHQ QUESTIONS 1-9: 4
10. IF YOU CHECKED OFF ANY PROBLEMS, HOW DIFFICULT HAVE THESE PROBLEMS MADE IT FOR YOU TO DO YOUR WORK, TAKE CARE OF THINGS AT HOME, OR GET ALONG WITH OTHER PEOPLE: NOT DIFFICULT AT ALL

## 2025-02-26 ASSESSMENT — PAIN SCALES - GENERAL: PAINLEVEL_OUTOF10: MILD PAIN (1)

## 2025-02-26 NOTE — RESULT ENCOUNTER NOTE
Please call patient with results: PSA 0.01  This is great, plan to follow up in 3 months. Let me know if he has any questions

## 2025-02-26 NOTE — TELEPHONE ENCOUNTER
Called and informed patient of PSA result.  He is pleased with the result and he has no further questions or concerns.  He is scheduled to come back in 3 months for a follow up.    Chelsy Wilkins RN

## 2025-03-13 ENCOUNTER — OFFICE VISIT (OUTPATIENT)
Dept: FAMILY MEDICINE | Facility: OTHER | Age: 80
End: 2025-03-13
Attending: FAMILY MEDICINE
Payer: COMMERCIAL

## 2025-03-13 VITALS
BODY MASS INDEX: 22.96 KG/M2 | TEMPERATURE: 97.1 F | WEIGHT: 160 LBS | OXYGEN SATURATION: 98 % | DIASTOLIC BLOOD PRESSURE: 62 MMHG | HEART RATE: 81 BPM | SYSTOLIC BLOOD PRESSURE: 116 MMHG

## 2025-03-13 DIAGNOSIS — I10 BENIGN ESSENTIAL HYPERTENSION: ICD-10-CM

## 2025-03-13 DIAGNOSIS — C61 PROSTATE CANCER (H): ICD-10-CM

## 2025-03-13 DIAGNOSIS — M1A.0720 CHRONIC IDIOPATHIC GOUT INVOLVING TOE OF LEFT FOOT WITHOUT TOPHUS: ICD-10-CM

## 2025-03-13 DIAGNOSIS — E11.9 TYPE 2 DIABETES MELLITUS WITHOUT COMPLICATION, WITHOUT LONG-TERM CURRENT USE OF INSULIN (H): Primary | ICD-10-CM

## 2025-03-13 LAB
ALBUMIN SERPL BCG-MCNC: 4.6 G/DL (ref 3.5–5.2)
ALP SERPL-CCNC: 72 U/L (ref 40–150)
ALT SERPL W P-5'-P-CCNC: 30 U/L (ref 0–70)
ANION GAP SERPL CALCULATED.3IONS-SCNC: 13 MMOL/L (ref 7–15)
AST SERPL W P-5'-P-CCNC: 21 U/L (ref 0–45)
BILIRUB SERPL-MCNC: 0.4 MG/DL
BUN SERPL-MCNC: 18.3 MG/DL (ref 8–23)
CALCIUM SERPL-MCNC: 9.7 MG/DL (ref 8.8–10.4)
CHLORIDE SERPL-SCNC: 96 MMOL/L (ref 98–107)
CHOLEST SERPL-MCNC: 109 MG/DL
CREAT SERPL-MCNC: 0.81 MG/DL (ref 0.67–1.17)
EGFRCR SERPLBLD CKD-EPI 2021: >90 ML/MIN/1.73M2
EST. AVERAGE GLUCOSE BLD GHB EST-MCNC: 395 MG/DL
FASTING STATUS PATIENT QL REPORTED: YES
FASTING STATUS PATIENT QL REPORTED: YES
GLUCOSE SERPL-MCNC: 392 MG/DL (ref 70–99)
HBA1C MFR BLD: 15.4 %
HCO3 SERPL-SCNC: 25 MMOL/L (ref 22–29)
HDLC SERPL-MCNC: 37 MG/DL
LDLC SERPL CALC-MCNC: 52 MG/DL
NONHDLC SERPL-MCNC: 72 MG/DL
POTASSIUM SERPL-SCNC: 4.5 MMOL/L (ref 3.4–5.3)
PROT SERPL-MCNC: 7.5 G/DL (ref 6.4–8.3)
SODIUM SERPL-SCNC: 134 MMOL/L (ref 135–145)
TRIGL SERPL-MCNC: 99 MG/DL
TSH SERPL DL<=0.005 MIU/L-ACNC: 1.57 UIU/ML (ref 0.3–4.2)
URATE SERPL-MCNC: 1.9 MG/DL (ref 3.4–7)

## 2025-03-13 PROCEDURE — 36415 COLL VENOUS BLD VENIPUNCTURE: CPT | Mod: ZL | Performed by: FAMILY MEDICINE

## 2025-03-13 PROCEDURE — 82247 BILIRUBIN TOTAL: CPT | Mod: ZL | Performed by: FAMILY MEDICINE

## 2025-03-13 PROCEDURE — 82465 ASSAY BLD/SERUM CHOLESTEROL: CPT | Mod: ZL | Performed by: FAMILY MEDICINE

## 2025-03-13 PROCEDURE — 84550 ASSAY OF BLOOD/URIC ACID: CPT | Mod: ZL | Performed by: FAMILY MEDICINE

## 2025-03-13 PROCEDURE — 80061 LIPID PANEL: CPT | Mod: ZL | Performed by: FAMILY MEDICINE

## 2025-03-13 PROCEDURE — 83036 HEMOGLOBIN GLYCOSYLATED A1C: CPT | Mod: ZL | Performed by: FAMILY MEDICINE

## 2025-03-13 PROCEDURE — 84443 ASSAY THYROID STIM HORMONE: CPT | Mod: ZL | Performed by: FAMILY MEDICINE

## 2025-03-13 PROCEDURE — G0463 HOSPITAL OUTPT CLINIC VISIT: HCPCS

## 2025-03-13 PROCEDURE — 82310 ASSAY OF CALCIUM: CPT | Mod: ZL | Performed by: FAMILY MEDICINE

## 2025-03-13 ASSESSMENT — PAIN SCALES - GENERAL: PAINLEVEL_OUTOF10: NO PAIN (0)

## 2025-05-09 NOTE — INTERVAL H&P NOTE
I have reviewed the surgical (or preoperative) H&P that is linked to this encounter, and examined the patient. There are no significant changes.  Sameer Go MD    Clinical Conditions Present on Arrival:  Clinically Significant Risk Factors Present on Admission                      Calm

## 2025-05-26 DIAGNOSIS — M1A.0720 CHRONIC IDIOPATHIC GOUT INVOLVING TOE OF LEFT FOOT WITHOUT TOPHUS: ICD-10-CM

## 2025-05-27 RX ORDER — ALLOPURINOL 100 MG/1
100 TABLET ORAL
Qty: 180 TABLET | Refills: 2 | Status: SHIPPED | OUTPATIENT
Start: 2025-05-27

## 2025-05-28 ENCOUNTER — ONCOLOGY VISIT (OUTPATIENT)
Dept: RADIATION ONCOLOGY | Facility: HOSPITAL | Age: 80
End: 2025-05-28
Payer: COMMERCIAL

## 2025-05-28 VITALS
DIASTOLIC BLOOD PRESSURE: 70 MMHG | TEMPERATURE: 97.5 F | HEART RATE: 86 BPM | RESPIRATION RATE: 20 BRPM | SYSTOLIC BLOOD PRESSURE: 130 MMHG | OXYGEN SATURATION: 98 %

## 2025-05-28 DIAGNOSIS — N52.2 DRUG-INDUCED ERECTILE DYSFUNCTION: ICD-10-CM

## 2025-05-28 DIAGNOSIS — Z85.46 ENCOUNTER FOR FOLLOW-UP SURVEILLANCE OF PROSTATE CANCER: Primary | ICD-10-CM

## 2025-05-28 DIAGNOSIS — Z08 ENCOUNTER FOR FOLLOW-UP SURVEILLANCE OF PROSTATE CANCER: Primary | ICD-10-CM

## 2025-05-28 DIAGNOSIS — N32.81 OVERACTIVE BLADDER: ICD-10-CM

## 2025-05-28 LAB
HOLD SPECIMEN: NORMAL
PSA SERPL DL<=0.01 NG/ML-MCNC: <0.01 NG/ML (ref 0–6.5)

## 2025-05-28 PROCEDURE — G0463 HOSPITAL OUTPT CLINIC VISIT: HCPCS

## 2025-05-28 PROCEDURE — 84153 ASSAY OF PSA TOTAL: CPT

## 2025-05-28 PROCEDURE — 36415 COLL VENOUS BLD VENIPUNCTURE: CPT

## 2025-05-28 RX ORDER — OXYBUTYNIN CHLORIDE 5 MG/1
5 TABLET ORAL DAILY
Qty: 30 TABLET | Refills: 1 | Status: SHIPPED | OUTPATIENT
Start: 2025-05-28

## 2025-05-28 RX ORDER — TADALAFIL 20 MG/1
20 TABLET ORAL DAILY PRN
Qty: 10 TABLET | Refills: 1 | Status: SHIPPED | OUTPATIENT
Start: 2025-05-28

## 2025-05-28 ASSESSMENT — PAIN SCALES - GENERAL: PAINLEVEL_OUTOF10: SEVERE PAIN (7)

## 2025-05-28 NOTE — PATIENT INSTRUCTIONS
Surveillance of prostate cancer   -Will call you PSA and testosterone results     Dizziness   -Stop Flomax two times daily  -Start Fromax ONLY at night (1 capsule)    Overactive Bladder   -Start Oxybutynin 5 mg (1 tab) daily  -follow up in 1 month     Hot flashes   -Start Vitamin E 800-1000 international unit(s) daily     Erectile dysfunction   -Start Cialis as needed   -Consider mechanical devise as discussed

## 2025-05-28 NOTE — PROGRESS NOTES
[facility] Follow-up Visit:  May 28, 2025    Diagnosis:  Encounter for follow-up surveillance of prostate cancer    History Of Present Illness:  Mr. Bhatti is a 79 year old male is here for follow-up post radiation therapy to the pelvis for Gleasons 4+3 (7) prostatic adenocarcinoma. He received short term ADT with Lupron 45 mg on 08/06/2024.    5/3/2024 MR prostate with and without contrast: Prostate volume 29 cc.  There is a 1.1 x 1.0 x 0.9 cm lesion in the right peripheral zone at the level of the apex at the 8 o'clock position.  No extracapsular extension or seminal vesicle invasion.  No ROSEMARIE prostatic or pelvic sidewall lymph nodes.  No other bony or soft tissue abnormalities identified.     5/21/2024 prostate biopsy: Prostatic adenocarcinoma highest Shelia's grade 4+3 (7), involving 5 of 15 cores      6/21/2024 PET PSMA: 14 mm low-density lesion within the pancreatic head not adequately characterized by the study with the differential diagnosis including malignancy.  Recommend pancreatic MR.  Significant focal uptake with associated gastric wall thickening within the posterior gastric fundus, focal inflammation/ulcer versus malignancy.  Increased avidity about postsurgical changes adjacent to gastric pylorus, malignancy versus inflammation.  Malignancy within the prostate.  Low-grade activity within the superior endplate of S1 is technically equivocal for metastatic disease with differential diagnosis also to include degenerative changes or endplate compression fracture.     7/5/24 MR abd: Motion artifact limits assessment of the pancreas.  Dilation of the pancreatic duct smoothly tapering distally.  No evidence of gross pancreatic head or ampullary mass.  Consider ERCP with EUS     PSA history         02/26/2025  0.01 ng/mL  05/28/2025 0.01 ng/mL    Depression Screening Follow Up    Follow Up Actions Taken  Patient counseled, no additional follow up at this time.       Review Of Systems:  /70 (BP  Location: Left arm, Patient Position: Standing, Cuff Size: Adult Regular)   Pulse 86   Temp 97.5  F (36.4  C) (Tympanic)   Resp 20   SpO2 98%     Past medical, social, surgical, and family histories reviewed.    Allergies:  Allergies as of 05/28/2025 - Reviewed 05/28/2025   Allergen Reaction Noted    Influenza virus vaccine Diarrhea 10/03/2023    Lactose Unknown 05/26/2020       Current Medications:  Current Outpatient Medications   Medication Sig Dispense Refill    allopurinol (ZYLOPRIM) 100 MG tablet TAKE 1 TABLET BY MOUTH TWICE A  tablet 2    aspirin (ASA) 81 MG chewable tablet Take 81 mg by mouth daily      blood glucose monitoring (FREESTYLE) lancets Use to test blood sugar 1 times daily. 100 each 3    calcium carbonate (TUMS) 500 MG chewable tablet Take 1 tablet by mouth daily as needed.      FREESTYLE LITE test strip USE TO TEST BLOOD SUGARS ONCE A DAY. 100 strip 0    losartan (COZAAR) 25 MG tablet TAKE 1 TABLET (25 MG) BY MOUTH DAILY 90 tablet 3    oxyBUTYnin (DITROPAN) 5 MG tablet Take 1 tablet (5 mg) by mouth daily. 30 tablet 1    pantoprazole (PROTONIX) 40 MG EC tablet Take 40 mg by mouth daily      rosuvastatin (CRESTOR) 20 MG tablet TAKE 1 TABLET (20 MG) BY MOUTH DAILY 90 tablet 3    sitagliptin (JANUVIA) 100 MG tablet Take 1 tablet (100 mg) by mouth daily. 90 tablet 3    tadalafil (CIALIS) 20 MG tablet Take 1 tablet (20 mg) by mouth daily as needed. 10 tablet 1    tamsulosin (FLOMAX) 0.4 MG capsule Take 2 capsules (0.8 mg) by mouth daily. 90 capsule 3    VITAMIN D PO Take 4,000 Units by mouth daily      colchicine (COLCYRS) 0.6 MG tablet TAKE 2 TABLETS AT THE ONSET OF GOUT PAIN. MAY TAKE ONE TABLET AGAIN IN ONE HOUR. MAX 4 TABS IN 24 HRS. (Patient not taking: Reported on 5/28/2025) 20 tablet 1    Magnesium Hydroxide (DULCOLAX PO) Take 1 tablet by mouth as needed (constipation). (Patient not taking: Reported on 5/28/2025)      phenazopyridine (PYRIDIUM) 200 MG tablet Take 1 tablet (200 mg) by  mouth 3 times daily as needed for irritation. (Patient not taking: Reported on 5/28/2025) 30 tablet 2        Physical Exam:  /70 (BP Location: Left arm, Patient Position: Standing, Cuff Size: Adult Regular)   Pulse 86   Temp 97.5  F (36.4  C) (Tympanic)   Resp 20   SpO2 98%     GENERAL APPEARANCE: healthy, alert and no distress     HENT: Mouth without ulcers or lesions     RESP: lungs clear to auscultation - no rales, rhonchi or wheezes     CARDIOVASCULAR: regular rates and rhythm, normal S1 S2, no S3 or S4 and no murmur.     ABDOMEN:  soft, nontender, and bowel sounds normal     MUSCULOSKELETAL: extremities normal- no gross deformities noted, no evidence of inflammation in joints, FROM in all extremities.      SKIN: no suspicious lesions or rashes     PSYCHIATRIC: mentation appears normal and affect normal    Laboratory/Imaging Studies  No visits with results within 2 Week(s) from this visit.   Latest known visit with results is:   Office Visit on 12/12/2024   Component Date Value Ref Range Status    Creatinine Urine mg/dL 12/12/2024 60.2  mg/dL Final    The reference ranges have not been established in urine creatinine. The results should be integrated into the clinical context for interpretation.    Albumin Urine mg/L 12/12/2024 <12.0  mg/L Final    The reference ranges have not been established in urine albumin. The results should be integrated into the clinical context for interpretation.    Albumin Urine mg/g Cr 12/12/2024    Final    Unable to calculate, urine albumin and/or urine creatinine is outside detectable limits.  Microalbuminuria is defined as an albumin:creatinine ratio of 17 to 299 for males and 25 to 299 for females. A ratio of albumin:creatinine of 300 or higher is indicative of overt proteinuria.  Due to biologic variability, positive results should be confirmed by a second, first-morning random or 24-hour timed urine specimen. If there is discrepancy, a third specimen is recommended.  When 2 out of 3 results are in the microalbuminuria range, this is evidence for incipient nephropathy and warrants increased efforts at glucose control, blood pressure control, and institution of therapy with an angiotensin-converting-enzyme (ACE) inhibitor (if the patient can tolerate it).      Sodium 12/12/2024 133 (L)  135 - 145 mmol/L Final    Potassium 12/12/2024 4.5  3.4 - 5.3 mmol/L Final    Chloride 12/12/2024 95 (L)  98 - 107 mmol/L Final    Carbon Dioxide (CO2) 12/12/2024 25  22 - 29 mmol/L Final    Anion Gap 12/12/2024 13  7 - 15 mmol/L Final    Urea Nitrogen 12/12/2024 18.4  8.0 - 23.0 mg/dL Final    Creatinine 12/12/2024 0.93  0.67 - 1.17 mg/dL Final    GFR Estimate 12/12/2024 85  >60 mL/min/1.73m2 Final    eGFR calculated using 2021 CKD-EPI equation.    Calcium 12/12/2024 9.9  8.8 - 10.4 mg/dL Final    Reference intervals for this test were updated on 7/16/2024 to reflect our healthy population more accurately. There may be differences in the flagging of prior results with similar values performed with this method. Those prior results can be interpreted in the context of the updated reference intervals.    Glucose 12/12/2024 512 (HH)  70 - 99 mg/dL Final    Estimated Average Glucose 12/12/2024 232 (H)  <117 mg/dL Final    Hemoglobin A1C 12/12/2024 9.7 (H)  <5.7 % Final    Normal <5.7%   Prediabetes 5.7-6.4%    Diabetes 6.5% or higher     Note: Adopted from ADA consensus guidelines.        ASSESSMENT/PLAN:    Mr. Bhatti is a 79 year old male post radiation therapy to the pelvis for Gleasons 4+3 (7) prostatic adenocarcinoma, treated with short term ADT, and pelvic irradiation therapy. He presents today for prostate cancer surveillance, PSA is 0.01. Indicating a good response to treatment. He is struggling with hot flashes. This is likely due to low testosterone from ADT and should begin to resolve. Was taking Vit D rather than Vit E for hot flashes, discussed additional options for hot flashes. He would  prefer a trial of Vit E.  Discussed urinary frequency, currently taking 0.4 mg Flomax BID, however reports new onset of dizziness since Flomax dose was increased.     Encounter for follow-up surveillance of prostate cancer  -PSA stable, 0.01 ng/mL good response to therapy  -Testosterone not yet resulted, will call patient with results.   -Plan to return to clinic in 3 months for PSA and testosterone check.    Dizziness   -Decrease Flomax from BID to daily at HS     Overactive bladder   -Start Oxybutyin 5 mg daily   -Follow up in 1 month for med check     Hot flashes   -Start Vit E 800-1000 international unit(s) daily   -Discussed pharmacological intervention options. Trial of Vit E,  with follow up in 1 month     ADT   -taking Vit D 1000 mg daily for bone health     Erectile dysfunction   -Discussed with patient the erectile dysfunction is likely a direct result of ADT. Explained that medications for ED likely are less effective while ADT is still active in his system. Sildenafil has not been effective. Can try Cialis, order placed. Discontinued Sildenafil. Educated not to take cialis with sildenafil or with other BP medication. Discussed trial of Cialis and if still ineffective, attempt to add mechanical intervention in conjunction with medication, such as a vacuum erection device. Offered urology referral to discuss ED interventions. Patient would like to wait until ADT has resolved prior to urology referral.       EMI Moore CNP    30 minutes spent face to face with the patient obtaining a health history, discussion of treatment options, counseling, and 15 minutes on medical record review and documentation for a total of 45 on this date of service.     This encounter was dictated with the use of voice recognition software, any errors are unintentional.       Answers submitted by the patient for this visit:  Patient Health Questionnaire (Submitted on 2/26/2025)  If you checked off any problems, how  difficult have these problems made it for you to do your work, take care of things at home, or get along with other people?: Not difficult at all  PHQ9 TOTAL SCORE: 4

## 2025-05-30 NOTE — PROGRESS NOTES
Assessment & Plan     Type 2 diabetes mellitus without complication, without long-term current use of insulin (H)  Doing well.  Sugars high with the cancer treatment.    - Basic metabolic panel; Future  - Hemoglobin A1c; Future  - CBC with platelets and differential; Future    Benign essential hypertension  Stable.  Some dizziness at times but we didn't change his losartan today as well.      Prostate cancer (H)  S/p radiation and lupron.      Thrombocytopenia  Update cbc.    - CBC with platelets and differential; Future                Subjective   Rafa is a 80 year old, presenting for the following health issues:  Diabetes    HPI      Diabetes Follow-up  How often are you checking your blood sugar? A few times a month  What time of day are you checking your blood sugars (select all that apply)?  Just before lunch  Have you had any blood sugars above 200?  Yes   Have you had any blood sugars below 70?  No  What symptoms do you notice when your blood sugar is low?  Dizzy  What concerns do you have today about your diabetes? None   Do you have any of these symptoms? (Select all that apply)  Numbness in feet, Excessive thirst, and Weight loss    BP Readings from Last 2 Encounters:   06/17/25 120/58   05/28/25 130/70     Hemoglobin A1C (%)   Date Value   03/13/2025 15.4 (H)   12/12/2024 9.7 (H)   06/16/2021 10.9 (H)   08/12/2020 6.4 (H)     LDL Cholesterol Calculated (mg/dL)   Date Value   03/13/2025 52   03/27/2024 45   06/16/2021 32   02/21/2020 38       Hypertension Follow-up  Do you check your blood pressure regularly outside of the clinic? No   Are you following a low salt diet? Yes  Are your blood pressures ever more than 140 on the top number (systolic) OR more than 90 on the bottom number (diastolic), for example 140/90? No    BP Readings from Last 2 Encounters:   06/17/25 120/58   05/28/25 130/70         Review of Systems  Constitutional, HEENT, cardiovascular, pulmonary, gi and gu systems are negative,  except as otherwise noted.      Objective    /58 (BP Location: Right arm, Patient Position: Sitting, Cuff Size: Adult Regular)   Pulse 66   Temp 96.9  F (36.1  C) (Tympanic)   Wt 71.8 kg (158 lb 3.2 oz)   SpO2 97%   BMI 22.70 kg/m    Body mass index is 22.7 kg/m .  Physical Exam   GENERAL: alert and no distress  NECK: no adenopathy, no asymmetry, masses, or scars  RESP: lungs clear to auscultation - no rales, rhonchi or wheezes  CV: regular rate and rhythm, normal S1 S2, no S3 or S4, no murmur, click or rub, no peripheral edema  ABDOMEN: soft, nontender, no hepatosplenomegaly, no masses and bowel sounds normal  MS: no gross musculoskeletal defects noted, no edema    The longitudinal plan of care for the diagnosis(es)/condition(s) as documented were addressed during this visit. Due to the added complexity in care, I will continue to support Rafa in the subsequent management and with ongoing continuity of care.    Labs pending.          Signed Electronically by: Harry Olson MD

## 2025-06-03 ENCOUNTER — DOCUMENTATION ONLY (OUTPATIENT)
Dept: RADIATION ONCOLOGY | Facility: HOSPITAL | Age: 80
End: 2025-06-03
Payer: COMMERCIAL

## 2025-06-03 NOTE — PROGRESS NOTES
Patient was notified that the testosterone level was unable to be completed as there was an issue with collection. I explained there is no harm to him. Offered to recollect this lab, or re draw this at his next visit. Patient was understanding and opted to wait until his next visit in 3 months.     Mariela Myers DNP, APRN, FNP-C   Department of Radiation Oncology

## 2025-06-17 ENCOUNTER — RESULTS FOLLOW-UP (OUTPATIENT)
Dept: FAMILY MEDICINE | Facility: OTHER | Age: 80
End: 2025-06-17

## 2025-06-17 ENCOUNTER — OFFICE VISIT (OUTPATIENT)
Dept: FAMILY MEDICINE | Facility: OTHER | Age: 80
End: 2025-06-17
Attending: FAMILY MEDICINE
Payer: COMMERCIAL

## 2025-06-17 VITALS
DIASTOLIC BLOOD PRESSURE: 58 MMHG | HEART RATE: 66 BPM | OXYGEN SATURATION: 97 % | BODY MASS INDEX: 22.7 KG/M2 | SYSTOLIC BLOOD PRESSURE: 120 MMHG | WEIGHT: 158.2 LBS | TEMPERATURE: 96.9 F

## 2025-06-17 DIAGNOSIS — D69.6 THROMBOCYTOPENIA: ICD-10-CM

## 2025-06-17 DIAGNOSIS — E11.9 TYPE 2 DIABETES MELLITUS WITHOUT COMPLICATION, WITHOUT LONG-TERM CURRENT USE OF INSULIN (H): Primary | ICD-10-CM

## 2025-06-17 DIAGNOSIS — I10 BENIGN ESSENTIAL HYPERTENSION: ICD-10-CM

## 2025-06-17 DIAGNOSIS — C61 PROSTATE CANCER (H): ICD-10-CM

## 2025-06-17 LAB
ANION GAP SERPL CALCULATED.3IONS-SCNC: 9 MMOL/L (ref 7–15)
BASOPHILS # BLD AUTO: 0 10E3/UL (ref 0–0.2)
BASOPHILS NFR BLD AUTO: 0 %
BUN SERPL-MCNC: 16.5 MG/DL (ref 8–23)
CALCIUM SERPL-MCNC: 9.7 MG/DL (ref 8.8–10.4)
CHLORIDE SERPL-SCNC: 101 MMOL/L (ref 98–107)
CREAT SERPL-MCNC: 0.81 MG/DL (ref 0.67–1.17)
EGFRCR SERPLBLD CKD-EPI 2021: 90 ML/MIN/1.73M2
EOSINOPHIL # BLD AUTO: 0 10E3/UL (ref 0–0.7)
EOSINOPHIL NFR BLD AUTO: 0 %
ERYTHROCYTE [DISTWIDTH] IN BLOOD BY AUTOMATED COUNT: 12.4 % (ref 10–15)
EST. AVERAGE GLUCOSE BLD GHB EST-MCNC: 335 MG/DL
GLUCOSE SERPL-MCNC: 304 MG/DL (ref 70–99)
HBA1C MFR BLD: 13.3 %
HCO3 SERPL-SCNC: 26 MMOL/L (ref 22–29)
HCT VFR BLD AUTO: 37.2 % (ref 40–53)
HGB BLD-MCNC: 12.6 G/DL (ref 13.3–17.7)
IMM GRANULOCYTES # BLD: 0 10E3/UL
IMM GRANULOCYTES NFR BLD: 0 %
LYMPHOCYTES # BLD AUTO: 0.9 10E3/UL (ref 0.8–5.3)
LYMPHOCYTES NFR BLD AUTO: 21 %
MCH RBC QN AUTO: 30.7 PG (ref 26.5–33)
MCHC RBC AUTO-ENTMCNC: 33.9 G/DL (ref 31.5–36.5)
MCV RBC AUTO: 91 FL (ref 78–100)
MONOCYTES # BLD AUTO: 0.3 10E3/UL (ref 0–1.3)
MONOCYTES NFR BLD AUTO: 8 %
NEUTROPHILS # BLD AUTO: 2.8 10E3/UL (ref 1.6–8.3)
NEUTROPHILS NFR BLD AUTO: 70 %
PLATELET # BLD AUTO: 129 10E3/UL (ref 150–450)
POTASSIUM SERPL-SCNC: 4.7 MMOL/L (ref 3.4–5.3)
RBC # BLD AUTO: 4.11 10E6/UL (ref 4.4–5.9)
SODIUM SERPL-SCNC: 136 MMOL/L (ref 135–145)
WBC # BLD AUTO: 4 10E3/UL (ref 4–11)

## 2025-06-17 PROCEDURE — 83036 HEMOGLOBIN GLYCOSYLATED A1C: CPT | Mod: ZL | Performed by: FAMILY MEDICINE

## 2025-06-17 PROCEDURE — G0463 HOSPITAL OUTPT CLINIC VISIT: HCPCS

## 2025-06-17 PROCEDURE — 80048 BASIC METABOLIC PNL TOTAL CA: CPT | Mod: ZL | Performed by: FAMILY MEDICINE

## 2025-06-17 PROCEDURE — 36415 COLL VENOUS BLD VENIPUNCTURE: CPT | Mod: ZL | Performed by: FAMILY MEDICINE

## 2025-06-17 PROCEDURE — 85025 COMPLETE CBC W/AUTO DIFF WBC: CPT | Mod: ZL | Performed by: FAMILY MEDICINE

## 2025-06-17 RX ORDER — CYANOCOBALAMIN (VITAMIN B-12) 500 MCG
400 LOZENGE ORAL DAILY
COMMUNITY

## 2025-06-17 ASSESSMENT — PAIN SCALES - GENERAL: PAINLEVEL_OUTOF10: NO PAIN (0)

## 2025-06-23 ENCOUNTER — ONCOLOGY VISIT (OUTPATIENT)
Dept: RADIATION ONCOLOGY | Facility: HOSPITAL | Age: 80
End: 2025-06-23
Payer: COMMERCIAL

## 2025-06-23 VITALS
DIASTOLIC BLOOD PRESSURE: 76 MMHG | HEART RATE: 78 BPM | RESPIRATION RATE: 16 BRPM | SYSTOLIC BLOOD PRESSURE: 118 MMHG | TEMPERATURE: 97 F | OXYGEN SATURATION: 98 %

## 2025-06-23 DIAGNOSIS — C61 PROSTATE CANCER (H): Primary | ICD-10-CM

## 2025-06-23 PROCEDURE — G0463 HOSPITAL OUTPT CLINIC VISIT: HCPCS

## 2025-06-23 PROCEDURE — 99213 OFFICE O/P EST LOW 20 MIN: CPT

## 2025-06-23 ASSESSMENT — PAIN SCALES - GENERAL: PAINLEVEL_OUTOF10: MILD PAIN (3)

## 2025-06-23 NOTE — PROGRESS NOTES
[facility] Follow-up Visit:  June 23, 2025    Diagnosis:  Encounter for follow-up surveillance of prostate cancer    History Of Present Illness:  Mr. Bhatti is a 80 year old male is here for follow-up to discuss recent medication changes for over active bladder and hot flashes. Symptoms presumably radiation and ADT related. Today patient reports urinary frequency has improved with the addition on oxybutynin. Last visit we decrease flomax to 1 tablet daily from 2 tabs, due to dizziness, however dizziness persists. In addition he started on Vit E for hot flashes which he finds to be helping.      Oncologic History     He received radiation therapy to the pelvis for Gleasons 4+3 (7) prostatic adenocarcinoma. He received short term ADT with Lupron 45 mg on 08/06/2024.    5/3/2024 MR prostate with and without contrast: Prostate volume 29 cc.  There is a 1.1 x 1.0 x 0.9 cm lesion in the right peripheral zone at the level of the apex at the 8 o'clock position.  No extracapsular extension or seminal vesicle invasion.  No ROSEMARIE prostatic or pelvic sidewall lymph nodes.  No other bony or soft tissue abnormalities identified.     5/21/2024 prostate biopsy: Prostatic adenocarcinoma highest Bloxom's grade 4+3 (7), involving 5 of 15 cores      6/21/2024 PET PSMA: 14 mm low-density lesion within the pancreatic head not adequately characterized by the study with the differential diagnosis including malignancy.  Recommend pancreatic MR.  Significant focal uptake with associated gastric wall thickening within the posterior gastric fundus, focal inflammation/ulcer versus malignancy.  Increased avidity about postsurgical changes adjacent to gastric pylorus, malignancy versus inflammation.  Malignancy within the prostate.  Low-grade activity within the superior endplate of S1 is technically equivocal for metastatic disease with differential diagnosis also to include degenerative changes or endplate compression fracture.     7/5/24 MR  abd: Motion artifact limits assessment of the pancreas.  Dilation of the pancreatic duct smoothly tapering distally.  No evidence of gross pancreatic head or ampullary mass.  Consider ERCP with EUS     PSA history         02/26/2025  0.01 ng/mL  05/28/2025 0.01 ng/mL    Depression Screening Follow Up    Follow Up Actions Taken  Patient counseled, no additional follow up at this time.       Review Of Systems:  There were no vitals taken for this visit.    Past medical, social, surgical, and family histories reviewed.    Allergies:  Allergies as of 06/23/2025 - Reviewed 06/17/2025   Allergen Reaction Noted    Influenza virus vaccine Diarrhea 10/03/2023    Lactose Unknown 05/26/2020       Current Medications:  Current Outpatient Medications   Medication Sig Dispense Refill    allopurinol (ZYLOPRIM) 100 MG tablet TAKE 1 TABLET BY MOUTH TWICE A  tablet 2    aspirin (ASA) 81 MG chewable tablet Take 81 mg by mouth daily      blood glucose monitoring (FREESTYLE) lancets Use to test blood sugar 1 times daily. 100 each 3    calcium carbonate (TUMS) 500 MG chewable tablet Take 1 tablet by mouth daily as needed.      colchicine (COLCYRS) 0.6 MG tablet TAKE 2 TABLETS AT THE ONSET OF GOUT PAIN. MAY TAKE ONE TABLET AGAIN IN ONE HOUR. MAX 4 TABS IN 24 HRS. 20 tablet 1    FREESTYLE LITE test strip USE TO TEST BLOOD SUGARS ONCE A DAY. 100 strip 0    losartan (COZAAR) 25 MG tablet TAKE 1 TABLET (25 MG) BY MOUTH DAILY 90 tablet 3    Magnesium Hydroxide (DULCOLAX PO) Take 1 tablet by mouth as needed (constipation).      oxyBUTYnin (DITROPAN) 5 MG tablet Take 1 tablet (5 mg) by mouth daily. 30 tablet 1    pantoprazole (PROTONIX) 40 MG EC tablet Take 40 mg by mouth daily      phenazopyridine (PYRIDIUM) 200 MG tablet Take 1 tablet (200 mg) by mouth 3 times daily as needed for irritation. 30 tablet 2    rosuvastatin (CRESTOR) 20 MG tablet TAKE 1 TABLET (20 MG) BY MOUTH DAILY 90 tablet 3    sitagliptin (JANUVIA) 100 MG tablet Take 1  tablet (100 mg) by mouth daily. 90 tablet 3    tadalafil (CIALIS) 20 MG tablet Take 1 tablet (20 mg) by mouth daily as needed. 10 tablet 1    tamsulosin (FLOMAX) 0.4 MG capsule Take 2 capsules (0.8 mg) by mouth daily. 90 capsule 3    VITAMIN D PO Take 4,000 Units by mouth daily      vitamin E 400 units TABS Take 400 Units by mouth daily.          Physical Exam:  There were no vitals taken for this visit.    GENERAL APPEARANCE: healthy, alert and no distress     RESP: non labored      MUSCULOSKELETAL: extremities normal- no gross deformities noted, no evidence of inflammation in joints, FROM in all extremities.      SKIN: no suspicious lesions or rashes     PSYCHIATRIC: mentation appears normal and affect normal    Laboratory/Imaging Studies  No visits with results within 2 Week(s) from this visit.   Latest known visit with results is:   Office Visit on 12/12/2024   Component Date Value Ref Range Status    Creatinine Urine mg/dL 12/12/2024 60.2  mg/dL Final    The reference ranges have not been established in urine creatinine. The results should be integrated into the clinical context for interpretation.    Albumin Urine mg/L 12/12/2024 <12.0  mg/L Final    The reference ranges have not been established in urine albumin. The results should be integrated into the clinical context for interpretation.    Albumin Urine mg/g Cr 12/12/2024    Final    Unable to calculate, urine albumin and/or urine creatinine is outside detectable limits.  Microalbuminuria is defined as an albumin:creatinine ratio of 17 to 299 for males and 25 to 299 for females. A ratio of albumin:creatinine of 300 or higher is indicative of overt proteinuria.  Due to biologic variability, positive results should be confirmed by a second, first-morning random or 24-hour timed urine specimen. If there is discrepancy, a third specimen is recommended. When 2 out of 3 results are in the microalbuminuria range, this is evidence for incipient nephropathy and  warrants increased efforts at glucose control, blood pressure control, and institution of therapy with an angiotensin-converting-enzyme (ACE) inhibitor (if the patient can tolerate it).      Sodium 12/12/2024 133 (L)  135 - 145 mmol/L Final    Potassium 12/12/2024 4.5  3.4 - 5.3 mmol/L Final    Chloride 12/12/2024 95 (L)  98 - 107 mmol/L Final    Carbon Dioxide (CO2) 12/12/2024 25  22 - 29 mmol/L Final    Anion Gap 12/12/2024 13  7 - 15 mmol/L Final    Urea Nitrogen 12/12/2024 18.4  8.0 - 23.0 mg/dL Final    Creatinine 12/12/2024 0.93  0.67 - 1.17 mg/dL Final    GFR Estimate 12/12/2024 85  >60 mL/min/1.73m2 Final    eGFR calculated using 2021 CKD-EPI equation.    Calcium 12/12/2024 9.9  8.8 - 10.4 mg/dL Final    Reference intervals for this test were updated on 7/16/2024 to reflect our healthy population more accurately. There may be differences in the flagging of prior results with similar values performed with this method. Those prior results can be interpreted in the context of the updated reference intervals.    Glucose 12/12/2024 512 (HH)  70 - 99 mg/dL Final    Estimated Average Glucose 12/12/2024 232 (H)  <117 mg/dL Final    Hemoglobin A1C 12/12/2024 9.7 (H)  <5.7 % Final    Normal <5.7%   Prediabetes 5.7-6.4%    Diabetes 6.5% or higher     Note: Adopted from ADA consensus guidelines.        ASSESSMENT/PLAN:    Mr. Bhatti is a 80 year old male post radiation therapy to the pelvis for Gleasons 4+3 (7) prostatic adenocarcinoma, treated with short term ADT, and pelvic irradiation therapy. He presents today for medication review/ follow up. Last visit he started on Oxybutynin for urinary frequency which has been helpful. He decreased Flomax due to dizziness, however he continues to experience dizziness with position changes. Dizziness was not present prior to starting Flomax. Plan is to continue on Oxybutynin and stop Flomax. He is instructed to monitor for urinary changes and note if dizziness persists. He can  continue on Vit E for hot flashes as this is helpful. I will see him back at his 3 month surveillance visit.     Dizziness   -Stop Flomax as above    Overactive bladder   -continue Oxybutyin 5 mg daily     Hot flashes   -continue Vit E 800-1000 international unit(s) daily     ADT   -taking Vit D 1000 mg daily for bone health     Erectile dysfunction   -Discussed with patient the erectile dysfunction is likely a direct result of ADT. Explained that medications for ED likely are less effective while ADT is still active in his system. Sildenafil has not been effective. Has Cialis, has not yet tried. Discontinued Sildenafil. Educated not to take cialis with sildenafil or with other BP medication. Discussed trial of Cialis and if still ineffective, attempt to add mechanical intervention in conjunction with medication, such as a vacuum erection device. Offered urology referral to discuss ED interventions. Patient would like to wait until ADT has resolved prior to urology referral.       EMI Moore CNP    10 minutes spent face to face with the patient obtaining a health history, discussion of treatment options, counseling, and 5 minutes on medical record review and documentation for a total of 15 on this date of service.     This encounter was dictated with the use of voice recognition software, any errors are unintentional.       Answers submitted by the patient for this visit:  Patient Health Questionnaire (Submitted on 2/26/2025)  If you checked off any problems, how difficult have these problems made it for you to do your work, take care of things at home, or get along with other people?: Not difficult at all  PHQ9 TOTAL SCORE: 4

## 2025-08-19 ENCOUNTER — ONCOLOGY VISIT (OUTPATIENT)
Dept: RADIATION ONCOLOGY | Facility: HOSPITAL | Age: 80
End: 2025-08-19
Payer: COMMERCIAL

## 2025-08-19 VITALS
RESPIRATION RATE: 20 BRPM | TEMPERATURE: 97.4 F | SYSTOLIC BLOOD PRESSURE: 118 MMHG | OXYGEN SATURATION: 98 % | DIASTOLIC BLOOD PRESSURE: 78 MMHG | HEART RATE: 60 BPM

## 2025-08-19 DIAGNOSIS — Z08 ENCOUNTER FOR FOLLOW-UP SURVEILLANCE OF PROSTATE CANCER: Primary | ICD-10-CM

## 2025-08-19 DIAGNOSIS — Z85.46 ENCOUNTER FOR FOLLOW-UP SURVEILLANCE OF PROSTATE CANCER: Primary | ICD-10-CM

## 2025-08-19 LAB — PSA SERPL DL<=0.01 NG/ML-MCNC: 0.02 NG/ML (ref 0–6.5)

## 2025-08-19 PROCEDURE — 84403 ASSAY OF TOTAL TESTOSTERONE: CPT

## 2025-08-19 PROCEDURE — G0463 HOSPITAL OUTPT CLINIC VISIT: HCPCS

## 2025-08-19 PROCEDURE — 84153 ASSAY OF PSA TOTAL: CPT

## 2025-08-19 PROCEDURE — 36415 COLL VENOUS BLD VENIPUNCTURE: CPT

## 2025-08-19 ASSESSMENT — PAIN SCALES - GENERAL: PAINLEVEL_OUTOF10: NO PAIN (0)

## 2025-08-21 DIAGNOSIS — N52.2 DRUG-INDUCED ERECTILE DYSFUNCTION: Primary | ICD-10-CM

## 2025-08-21 LAB — TESTOST SERPL-MCNC: 171 NG/DL (ref 240–950)

## 2025-08-24 DIAGNOSIS — I10 BENIGN ESSENTIAL HYPERTENSION: ICD-10-CM

## 2025-08-24 DIAGNOSIS — E11.42 TYPE 2 DIABETES MELLITUS WITH DIABETIC POLYNEUROPATHY, WITHOUT LONG-TERM CURRENT USE OF INSULIN (H): ICD-10-CM

## 2025-08-25 RX ORDER — LOSARTAN POTASSIUM 25 MG/1
25 TABLET ORAL DAILY
Qty: 90 TABLET | Refills: 2 | Status: SHIPPED | OUTPATIENT
Start: 2025-08-25

## 2025-08-27 ENCOUNTER — OFFICE VISIT (OUTPATIENT)
Dept: UROLOGY | Facility: OTHER | Age: 80
End: 2025-08-27
Payer: COMMERCIAL

## 2025-08-27 VITALS
HEIGHT: 70 IN | RESPIRATION RATE: 16 BRPM | SYSTOLIC BLOOD PRESSURE: 123 MMHG | OXYGEN SATURATION: 98 % | WEIGHT: 158.29 LBS | BODY MASS INDEX: 22.66 KG/M2 | HEART RATE: 68 BPM | DIASTOLIC BLOOD PRESSURE: 80 MMHG

## 2025-08-27 DIAGNOSIS — N52.2 DRUG-INDUCED ERECTILE DYSFUNCTION: ICD-10-CM

## 2025-08-27 PROCEDURE — G0463 HOSPITAL OUTPT CLINIC VISIT: HCPCS

## 2025-08-27 ASSESSMENT — PAIN SCALES - GENERAL: PAINLEVEL_OUTOF10: NO PAIN (0)

## (undated) DEVICE — PACK PHACO STELLARIS VAC 1 AUX DRP 1 NDL WRNCH BL5110

## (undated) DEVICE — GLOVE PROTEXIS POWDER FREE CLSC 7.5  2D72PL75X

## (undated) DEVICE — SET HANDPIECE IRRIG/ASPIRATION 2.2-2.8X5.4" 45DEG TIP 85910S

## (undated) DEVICE — GLOVE PROTEXIS POWDER FREE 7.0 ORTHOPEDIC 2D73ET70

## (undated) DEVICE — EYE CANN IRR 25GA CYSTOTOME 581610

## (undated) DEVICE — EYE KNIFE SLIT XSTAR VISITEC 2.8MM 45DEG 373728

## (undated) DEVICE — BIN-CATARACT BIN BN37

## (undated) DEVICE — EYE KNIFE MICRO 15DEG BEVEL 377516

## (undated) DEVICE — INSTRUMENT WIPE VISIWIPE 581047

## (undated) DEVICE — EYE PREP BETADINE 5% SOLUTION 30ML 0065-0411-30

## (undated) DEVICE — Device

## (undated) DEVICE — CANNULA IRR 7/8IN 30GA VSTC VSCFL 45D 9MM DISP 585046

## (undated) DEVICE — BIN-LENS IMPLANT CART

## (undated) DEVICE — BIN-TECNIS DCB00 LENSES

## (undated) DEVICE — INJECTOR PORT FOR IOL LENSES SOFPORT EZ-28

## (undated) DEVICE — PACK EYE CUSTOM SEY32EPMBO

## (undated) DEVICE — EYE CANN IRR 25GA HYDRODISSECTING 585037

## (undated) DEVICE — MALYUGIN 2.0 RING 6.25MM

## (undated) DEVICE — CANNULA IRR 7/8IN 25GA VSTC VSCFL 45D 9MM DISP 585045

## (undated) RX ORDER — HYDRALAZINE HYDROCHLORIDE 20 MG/ML
INJECTION INTRAMUSCULAR; INTRAVENOUS
Status: DISPENSED
Start: 2023-06-27